# Patient Record
Sex: MALE | Race: WHITE | NOT HISPANIC OR LATINO | Employment: OTHER | URBAN - METROPOLITAN AREA
[De-identification: names, ages, dates, MRNs, and addresses within clinical notes are randomized per-mention and may not be internally consistent; named-entity substitution may affect disease eponyms.]

---

## 2017-01-13 RX ORDER — SODIUM CHLORIDE 9 MG/ML
20 INJECTION, SOLUTION INTRAVENOUS CONTINUOUS
Status: DISCONTINUED | OUTPATIENT
Start: 2017-01-16 | End: 2017-01-19 | Stop reason: HOSPADM

## 2017-01-16 ENCOUNTER — HOSPITAL ENCOUNTER (OUTPATIENT)
Dept: INFUSION CENTER | Facility: CLINIC | Age: 57
Discharge: HOME | End: 2017-01-16
Payer: COMMERCIAL

## 2017-01-16 VITALS
TEMPERATURE: 95.5 F | DIASTOLIC BLOOD PRESSURE: 92 MMHG | BODY MASS INDEX: 28.1 KG/M2 | HEART RATE: 61 BPM | RESPIRATION RATE: 16 BRPM | SYSTOLIC BLOOD PRESSURE: 144 MMHG | WEIGHT: 236.99 LBS

## 2017-01-16 LAB
ALBUMIN SERPL BCP-MCNC: 4.1 G/DL (ref 3.5–5)
ALP SERPL-CCNC: 69 U/L (ref 46–116)
ALT SERPL W P-5'-P-CCNC: <6 U/L (ref 12–78)
ANION GAP SERPL CALCULATED.3IONS-SCNC: 8 MMOL/L (ref 4–13)
ANISOCYTOSIS BLD QL SMEAR: PRESENT
AST SERPL W P-5'-P-CCNC: 8 U/L (ref 5–45)
BASOPHILS # BLD AUTO: 0.06 THOUSAND/UL (ref 0–0.1)
BASOPHILS NFR MAR MANUAL: 1 % (ref 0–1)
BILIRUB SERPL-MCNC: 1.3 MG/DL (ref 0.2–1)
BILIRUB UR QL STRIP: NEGATIVE
BUN SERPL-MCNC: 12 MG/DL (ref 5–25)
CALCIUM SERPL-MCNC: 9.5 MG/DL (ref 8.3–10.1)
CEA SERPL-MCNC: 1.6 NG/ML (ref 0–3)
CHLORIDE SERPL-SCNC: 105 MMOL/L (ref 100–108)
CLARITY UR: CLEAR
CO2 SERPL-SCNC: 29 MMOL/L (ref 21–32)
COLOR UR: YELLOW
CREAT SERPL-MCNC: 1.02 MG/DL (ref 0.6–1.3)
EOSINOPHIL # BLD AUTO: 0.17 THOUSAND/UL (ref 0–0.61)
EOSINOPHIL NFR BLD MANUAL: 3 % (ref 0–6)
ERYTHROCYTE [DISTWIDTH] IN BLOOD BY AUTOMATED COUNT: 15.5 % (ref 11.6–15.1)
GFR SERPL CREATININE-BSD FRML MDRD: >60 ML/MIN/1.73SQ M
GLUCOSE SERPL-MCNC: 117 MG/DL (ref 65–140)
GLUCOSE UR STRIP-MCNC: NEGATIVE MG/DL
HCT VFR BLD AUTO: 48.5 % (ref 36.5–49.3)
HGB BLD-MCNC: 16.7 G/DL (ref 12–17)
HGB UR QL STRIP.AUTO: NEGATIVE
KETONES UR STRIP-MCNC: NEGATIVE MG/DL
LEUKOCYTE ESTERASE UR QL STRIP: NEGATIVE
LYMPHOCYTES # BLD AUTO: 1.22 THOUSAND/UL (ref 0.6–4.47)
LYMPHOCYTES # BLD AUTO: 21 % (ref 14–44)
MCH RBC QN AUTO: 33.5 PG (ref 26.8–34.3)
MCHC RBC AUTO-ENTMCNC: 34.4 G/DL (ref 31.4–37.4)
MCV RBC AUTO: 97 FL (ref 82–98)
MONOCYTES # BLD AUTO: 0.23 THOUSAND/UL (ref 0–1.22)
MONOCYTES NFR BLD AUTO: 4 % (ref 4–12)
NEUTS BAND NFR BLD MANUAL: 0 % (ref 0–8)
NEUTS SEG # BLD: 4.12 THOUSAND/UL (ref 1.81–6.82)
NEUTS SEG NFR BLD AUTO: 71 % (ref 43–75)
NITRITE UR QL STRIP: NEGATIVE
PH UR STRIP.AUTO: 6 [PH] (ref 4.5–8)
PLATELET # BLD AUTO: 137 THOUSANDS/UL (ref 149–390)
PLATELET BLD QL SMEAR: ABNORMAL
PMV BLD AUTO: 6.7 FL (ref 8.9–12.7)
POTASSIUM SERPL-SCNC: 4.1 MMOL/L (ref 3.5–5.3)
PROT SERPL-MCNC: 7.2 G/DL (ref 6.4–8.2)
PROT UR STRIP-MCNC: NEGATIVE MG/DL
PROT UR-MCNC: 17 MG/DL
RBC # BLD AUTO: 4.98 MILLION/UL (ref 3.88–5.62)
SODIUM SERPL-SCNC: 142 MMOL/L (ref 136–145)
SP GR UR STRIP.AUTO: >=1.03 (ref 1–1.03)
TOTAL CELLS COUNTED SPEC: 100
UROBILINOGEN UR QL STRIP.AUTO: 0.2 E.U./DL
WBC # BLD AUTO: 5.8 THOUSAND/UL (ref 4.31–10.16)
WBC NRBC COR # BLD: 5.8 THOUSAND/UL (ref 4.31–10.16)

## 2017-01-16 PROCEDURE — 84156 ASSAY OF PROTEIN URINE: CPT | Performed by: INTERNAL MEDICINE

## 2017-01-16 PROCEDURE — 80053 COMPREHEN METABOLIC PANEL: CPT | Performed by: INTERNAL MEDICINE

## 2017-01-16 PROCEDURE — 81003 URINALYSIS AUTO W/O SCOPE: CPT | Performed by: INTERNAL MEDICINE

## 2017-01-16 PROCEDURE — 85027 COMPLETE CBC AUTOMATED: CPT | Performed by: INTERNAL MEDICINE

## 2017-01-16 PROCEDURE — 82378 CARCINOEMBRYONIC ANTIGEN: CPT | Performed by: INTERNAL MEDICINE

## 2017-01-16 PROCEDURE — 85007 BL SMEAR W/DIFF WBC COUNT: CPT | Performed by: INTERNAL MEDICINE

## 2017-01-16 PROCEDURE — 96413 CHEMO IV INFUSION 1 HR: CPT

## 2017-01-16 RX ADMIN — SODIUM CHLORIDE 20 ML/HR: 0.9 INJECTION, SOLUTION INTRAVENOUS at 08:35

## 2017-01-16 RX ADMIN — BEVACIZUMAB 800 MG: 100 INJECTION, SOLUTION INTRAVENOUS at 08:45

## 2017-01-16 RX ADMIN — Medication 300 UNITS: at 09:22

## 2017-01-25 ENCOUNTER — ALLSCRIPTS OFFICE VISIT (OUTPATIENT)
Dept: OTHER | Facility: OTHER | Age: 57
End: 2017-01-25

## 2017-01-25 ENCOUNTER — TRANSCRIBE ORDERS (OUTPATIENT)
Dept: ADMINISTRATIVE | Facility: HOSPITAL | Age: 57
End: 2017-01-25

## 2017-01-25 DIAGNOSIS — C18.9 MALIGNANT NEOPLASM OF COLON, UNSPECIFIED SITE: Primary | ICD-10-CM

## 2017-01-25 DIAGNOSIS — D75.1 SECONDARY POLYCYTHEMIA: ICD-10-CM

## 2017-01-25 DIAGNOSIS — C18.9 MALIGNANT NEOPLASM OF COLON (HCC): ICD-10-CM

## 2017-02-03 RX ORDER — SODIUM CHLORIDE 9 MG/ML
20 INJECTION, SOLUTION INTRAVENOUS CONTINUOUS
Status: DISCONTINUED | OUTPATIENT
Start: 2017-02-06 | End: 2017-02-09 | Stop reason: HOSPADM

## 2017-02-06 ENCOUNTER — HOSPITAL ENCOUNTER (OUTPATIENT)
Dept: INFUSION CENTER | Facility: CLINIC | Age: 57
Discharge: HOME | End: 2017-02-06
Payer: COMMERCIAL

## 2017-02-06 VITALS
BODY MASS INDEX: 28.23 KG/M2 | DIASTOLIC BLOOD PRESSURE: 60 MMHG | TEMPERATURE: 96.3 F | SYSTOLIC BLOOD PRESSURE: 132 MMHG | WEIGHT: 238.1 LBS | HEART RATE: 60 BPM | RESPIRATION RATE: 18 BRPM

## 2017-02-06 LAB
ALBUMIN SERPL BCP-MCNC: 3.9 G/DL (ref 3.5–5)
ALP SERPL-CCNC: 61 U/L (ref 46–116)
ALT SERPL W P-5'-P-CCNC: <6 U/L (ref 12–78)
ANION GAP SERPL CALCULATED.3IONS-SCNC: 7 MMOL/L (ref 4–13)
ANISOCYTOSIS BLD QL SMEAR: PRESENT
AST SERPL W P-5'-P-CCNC: <5 U/L (ref 5–45)
BACTERIA UR QL AUTO: NORMAL /HPF
BASOPHILS # BLD AUTO: 0 THOUSAND/UL (ref 0–0.1)
BASOPHILS NFR MAR MANUAL: 0 % (ref 0–1)
BILIRUB SERPL-MCNC: 0.7 MG/DL (ref 0.2–1)
BILIRUB UR QL STRIP: NEGATIVE
BUN SERPL-MCNC: 12 MG/DL (ref 5–25)
CALCIUM SERPL-MCNC: 8.9 MG/DL (ref 8.3–10.1)
CEA SERPL-MCNC: 2 NG/ML (ref 0–3)
CHLORIDE SERPL-SCNC: 105 MMOL/L (ref 100–108)
CLARITY UR: CLEAR
CO2 SERPL-SCNC: 28 MMOL/L (ref 21–32)
COLOR UR: YELLOW
CREAT SERPL-MCNC: 0.91 MG/DL (ref 0.6–1.3)
EOSINOPHIL # BLD AUTO: 0 THOUSAND/UL (ref 0–0.61)
EOSINOPHIL NFR BLD MANUAL: 0 % (ref 0–6)
ERYTHROCYTE [DISTWIDTH] IN BLOOD BY AUTOMATED COUNT: 14.4 % (ref 11.6–15.1)
GFR SERPL CREATININE-BSD FRML MDRD: >60 ML/MIN/1.73SQ M
GLUCOSE SERPL-MCNC: 105 MG/DL (ref 65–140)
GLUCOSE UR STRIP-MCNC: NEGATIVE MG/DL
HCT VFR BLD AUTO: 46 % (ref 36.5–49.3)
HGB BLD-MCNC: 16.3 G/DL (ref 12–17)
HGB UR QL STRIP.AUTO: NEGATIVE
KETONES UR STRIP-MCNC: NEGATIVE MG/DL
LEUKOCYTE ESTERASE UR QL STRIP: NEGATIVE
LYMPHOCYTES # BLD AUTO: 1.22 THOUSAND/UL (ref 0.6–4.47)
LYMPHOCYTES # BLD AUTO: 26 % (ref 14–44)
MCH RBC QN AUTO: 34.2 PG (ref 26.8–34.3)
MCHC RBC AUTO-ENTMCNC: 35.5 G/DL (ref 31.4–37.4)
MCV RBC AUTO: 96 FL (ref 82–98)
MONOCYTES # BLD AUTO: 0.14 THOUSAND/UL (ref 0–1.22)
MONOCYTES NFR BLD AUTO: 3 % (ref 4–12)
NEUTS BAND NFR BLD MANUAL: 1 % (ref 0–8)
NEUTS SEG # BLD: 3.24 THOUSAND/UL (ref 1.81–6.82)
NEUTS SEG NFR BLD AUTO: 68 % (ref 43–75)
NITRITE UR QL STRIP: NEGATIVE
NON-SQ EPI CELLS URNS QL MICRO: NORMAL /HPF
PH UR STRIP.AUTO: 5.5 [PH] (ref 4.5–8)
PLATELET # BLD AUTO: 122 THOUSANDS/UL (ref 149–390)
PLATELET BLD QL SMEAR: ABNORMAL
PMV BLD AUTO: 7 FL (ref 8.9–12.7)
POTASSIUM SERPL-SCNC: 4.1 MMOL/L (ref 3.5–5.3)
PROT SERPL-MCNC: 6.6 G/DL (ref 6.4–8.2)
PROT UR STRIP-MCNC: NEGATIVE MG/DL
RBC # BLD AUTO: 4.77 MILLION/UL (ref 3.88–5.62)
RBC #/AREA URNS AUTO: NORMAL /HPF
SODIUM SERPL-SCNC: 140 MMOL/L (ref 136–145)
SP GR UR STRIP.AUTO: 1.01 (ref 1–1.03)
TOTAL CELLS COUNTED SPEC: 100
UROBILINOGEN UR QL STRIP.AUTO: 0.2 E.U./DL
VARIANT LYMPHS # BLD AUTO: 2 % (ref 0–0)
WBC # BLD AUTO: 4.7 THOUSAND/UL (ref 4.31–10.16)
WBC #/AREA URNS AUTO: NORMAL /HPF
WBC NRBC COR # BLD: 4.7 THOUSAND/UL (ref 4.31–10.16)

## 2017-02-06 PROCEDURE — 80053 COMPREHEN METABOLIC PANEL: CPT | Performed by: INTERNAL MEDICINE

## 2017-02-06 PROCEDURE — 85027 COMPLETE CBC AUTOMATED: CPT | Performed by: INTERNAL MEDICINE

## 2017-02-06 PROCEDURE — 82668 ASSAY OF ERYTHROPOIETIN: CPT | Performed by: INTERNAL MEDICINE

## 2017-02-06 PROCEDURE — 82378 CARCINOEMBRYONIC ANTIGEN: CPT | Performed by: INTERNAL MEDICINE

## 2017-02-06 PROCEDURE — 96413 CHEMO IV INFUSION 1 HR: CPT

## 2017-02-06 PROCEDURE — 85007 BL SMEAR W/DIFF WBC COUNT: CPT | Performed by: INTERNAL MEDICINE

## 2017-02-06 PROCEDURE — 81270 JAK2 GENE: CPT | Performed by: INTERNAL MEDICINE

## 2017-02-06 PROCEDURE — 81001 URINALYSIS AUTO W/SCOPE: CPT | Performed by: INTERNAL MEDICINE

## 2017-02-06 RX ADMIN — BEVACIZUMAB 800 MG: 400 INJECTION, SOLUTION INTRAVENOUS at 08:41

## 2017-02-06 RX ADMIN — SODIUM CHLORIDE 20 ML/HR: 0.9 INJECTION, SOLUTION INTRAVENOUS at 08:30

## 2017-02-06 RX ADMIN — Medication 300 UNITS: at 09:15

## 2017-02-07 LAB — EPO SERPL-ACNC: 14.8 MIU/ML (ref 2.6–18.5)

## 2017-02-20 ENCOUNTER — HOSPITAL ENCOUNTER (OUTPATIENT)
Dept: CT IMAGING | Facility: HOSPITAL | Age: 57
Discharge: HOME | End: 2017-02-20
Attending: INTERNAL MEDICINE
Payer: COMMERCIAL

## 2017-02-20 DIAGNOSIS — C18.9 MALIGNANT NEOPLASM OF COLON (HCC): ICD-10-CM

## 2017-02-20 PROCEDURE — 71260 CT THORAX DX C+: CPT

## 2017-02-20 PROCEDURE — 74177 CT ABD & PELVIS W/CONTRAST: CPT

## 2017-02-20 RX ADMIN — IOHEXOL 100 ML: 350 INJECTION, SOLUTION INTRAVENOUS at 08:07

## 2017-02-23 LAB — SCAN RESULT: NORMAL

## 2017-02-24 RX ORDER — SODIUM CHLORIDE 9 MG/ML
20 INJECTION, SOLUTION INTRAVENOUS CONTINUOUS
Status: DISCONTINUED | OUTPATIENT
Start: 2017-02-27 | End: 2017-03-02 | Stop reason: HOSPADM

## 2017-02-27 ENCOUNTER — HOSPITAL ENCOUNTER (OUTPATIENT)
Dept: INFUSION CENTER | Facility: CLINIC | Age: 57
Discharge: HOME | End: 2017-02-27
Payer: COMMERCIAL

## 2017-02-27 VITALS
SYSTOLIC BLOOD PRESSURE: 120 MMHG | RESPIRATION RATE: 16 BRPM | DIASTOLIC BLOOD PRESSURE: 84 MMHG | WEIGHT: 234.79 LBS | BODY MASS INDEX: 27.84 KG/M2 | TEMPERATURE: 97.1 F | HEART RATE: 80 BPM

## 2017-02-27 LAB
ALBUMIN SERPL BCP-MCNC: 4.1 G/DL (ref 3.5–5)
ALP SERPL-CCNC: 71 U/L (ref 46–116)
ALT SERPL W P-5'-P-CCNC: <6 U/L (ref 12–78)
ANION GAP SERPL CALCULATED.3IONS-SCNC: 6 MMOL/L (ref 4–13)
ANISOCYTOSIS BLD QL SMEAR: PRESENT
AST SERPL W P-5'-P-CCNC: 5 U/L (ref 5–45)
BASOPHILS # BLD AUTO: 0 THOUSAND/UL (ref 0–0.1)
BASOPHILS NFR MAR MANUAL: 0 % (ref 0–1)
BILIRUB SERPL-MCNC: 1.1 MG/DL (ref 0.2–1)
BILIRUB UR QL STRIP: NEGATIVE
BUN SERPL-MCNC: 10 MG/DL (ref 5–25)
CALCIUM SERPL-MCNC: 9.2 MG/DL (ref 8.3–10.1)
CEA SERPL-MCNC: 1.6 NG/ML (ref 0–3)
CHLORIDE SERPL-SCNC: 104 MMOL/L (ref 100–108)
CLARITY UR: CLEAR
CO2 SERPL-SCNC: 30 MMOL/L (ref 21–32)
COLOR UR: YELLOW
CREAT SERPL-MCNC: 1.07 MG/DL (ref 0.6–1.3)
EOSINOPHIL # BLD AUTO: 0.36 THOUSAND/UL (ref 0–0.61)
EOSINOPHIL NFR BLD MANUAL: 4 % (ref 0–6)
ERYTHROCYTE [DISTWIDTH] IN BLOOD BY AUTOMATED COUNT: 13.3 % (ref 11.6–15.1)
GFR SERPL CREATININE-BSD FRML MDRD: >60 ML/MIN/1.73SQ M
GLUCOSE SERPL-MCNC: 101 MG/DL (ref 65–140)
GLUCOSE UR STRIP-MCNC: NEGATIVE MG/DL
HCT VFR BLD AUTO: 50.9 % (ref 36.5–49.3)
HGB BLD-MCNC: 17.2 G/DL (ref 12–17)
HGB UR QL STRIP.AUTO: NEGATIVE
KETONES UR STRIP-MCNC: NEGATIVE MG/DL
LEUKOCYTE ESTERASE UR QL STRIP: NEGATIVE
LYMPHOCYTES # BLD AUTO: 1.42 THOUSAND/UL (ref 0.6–4.47)
LYMPHOCYTES # BLD AUTO: 16 % (ref 14–44)
MCH RBC QN AUTO: 31.7 PG (ref 26.8–34.3)
MCHC RBC AUTO-ENTMCNC: 33.7 G/DL (ref 31.4–37.4)
MCV RBC AUTO: 94 FL (ref 82–98)
MONOCYTES # BLD AUTO: 0.27 THOUSAND/UL (ref 0–1.22)
MONOCYTES NFR BLD AUTO: 3 % (ref 4–12)
NEUTS BAND NFR BLD MANUAL: 0 % (ref 0–8)
NEUTS SEG # BLD: 6.76 THOUSAND/UL (ref 1.81–6.82)
NEUTS SEG NFR BLD AUTO: 76 % (ref 43–75)
NITRITE UR QL STRIP: NEGATIVE
PH UR STRIP.AUTO: 6 [PH] (ref 4.5–8)
PLATELET # BLD AUTO: 164 THOUSANDS/UL (ref 149–390)
PLATELET BLD QL SMEAR: ADEQUATE
PMV BLD AUTO: 6.9 FL (ref 8.9–12.7)
POTASSIUM SERPL-SCNC: 4.3 MMOL/L (ref 3.5–5.3)
PROT SERPL-MCNC: 7.1 G/DL (ref 6.4–8.2)
PROT UR STRIP-MCNC: NEGATIVE MG/DL
RBC # BLD AUTO: 5.41 MILLION/UL (ref 3.88–5.62)
SODIUM SERPL-SCNC: 140 MMOL/L (ref 136–145)
SP GR UR STRIP.AUTO: 1.01 (ref 1–1.03)
TOTAL CELLS COUNTED SPEC: 100
UROBILINOGEN UR QL STRIP.AUTO: 0.2 E.U./DL
VARIANT LYMPHS # BLD AUTO: 1 % (ref 0–0)
WBC # BLD AUTO: 8.9 THOUSAND/UL (ref 4.31–10.16)
WBC NRBC COR # BLD: 8.9 THOUSAND/UL (ref 4.31–10.16)

## 2017-02-27 PROCEDURE — 96413 CHEMO IV INFUSION 1 HR: CPT

## 2017-02-27 PROCEDURE — 82378 CARCINOEMBRYONIC ANTIGEN: CPT | Performed by: INTERNAL MEDICINE

## 2017-02-27 PROCEDURE — 85027 COMPLETE CBC AUTOMATED: CPT | Performed by: INTERNAL MEDICINE

## 2017-02-27 PROCEDURE — 85007 BL SMEAR W/DIFF WBC COUNT: CPT | Performed by: INTERNAL MEDICINE

## 2017-02-27 PROCEDURE — 80053 COMPREHEN METABOLIC PANEL: CPT | Performed by: INTERNAL MEDICINE

## 2017-02-27 PROCEDURE — 81003 URINALYSIS AUTO W/O SCOPE: CPT | Performed by: INTERNAL MEDICINE

## 2017-02-27 RX ADMIN — Medication 300 UNITS: at 09:52

## 2017-02-27 RX ADMIN — SODIUM CHLORIDE 20 ML/HR: 0.9 INJECTION, SOLUTION INTRAVENOUS at 09:08

## 2017-02-27 RX ADMIN — BEVACIZUMAB 800 MG: 400 INJECTION, SOLUTION INTRAVENOUS at 09:08

## 2017-02-27 NOTE — PROGRESS NOTES
Labs drawn as per lab slip/urine specimen obtained  Patient tolerated chemotherapy  Denies any discomforts   Aware of next appointment

## 2017-03-24 ENCOUNTER — GENERIC CONVERSION - ENCOUNTER (OUTPATIENT)
Dept: OTHER | Facility: OTHER | Age: 57
End: 2017-03-24

## 2017-04-14 ENCOUNTER — TRANSCRIBE ORDERS (OUTPATIENT)
Dept: ADMINISTRATIVE | Facility: HOSPITAL | Age: 57
End: 2017-04-14

## 2017-04-14 ENCOUNTER — ALLSCRIPTS OFFICE VISIT (OUTPATIENT)
Dept: OTHER | Facility: OTHER | Age: 57
End: 2017-04-14

## 2017-04-14 DIAGNOSIS — C18.9 MALIGNANT NEOPLASM OF COLON, UNSPECIFIED PART OF COLON (HCC): Primary | ICD-10-CM

## 2017-05-03 ENCOUNTER — HOSPITAL ENCOUNTER (OUTPATIENT)
Dept: CT IMAGING | Facility: HOSPITAL | Age: 57
Discharge: HOME | End: 2017-05-03
Attending: INTERNAL MEDICINE
Payer: COMMERCIAL

## 2017-05-03 DIAGNOSIS — C18.9 MALIGNANT NEOPLASM OF COLON (HCC): ICD-10-CM

## 2017-05-03 PROCEDURE — 71250 CT THORAX DX C-: CPT

## 2017-05-08 ENCOUNTER — ALLSCRIPTS OFFICE VISIT (OUTPATIENT)
Dept: OTHER | Facility: OTHER | Age: 57
End: 2017-05-08

## 2017-05-10 ENCOUNTER — ALLSCRIPTS OFFICE VISIT (OUTPATIENT)
Dept: OTHER | Facility: OTHER | Age: 57
End: 2017-05-10

## 2017-05-15 ENCOUNTER — HOSPITAL ENCOUNTER (OUTPATIENT)
Dept: INFUSION CENTER | Facility: CLINIC | Age: 57
Discharge: HOME | End: 2017-05-15
Payer: COMMERCIAL

## 2017-05-15 PROCEDURE — 96523 IRRIG DRUG DELIVERY DEVICE: CPT

## 2017-05-15 RX ADMIN — Medication 300 UNITS: at 12:10

## 2017-05-15 NOTE — PROGRESS NOTES
Pt here for port flush  Last flush was 2/28/17  Line erwin and flushed easily  Pt aware of future appointments for port flush  AVS declined  Pt left withough questions or concern

## 2017-05-16 ENCOUNTER — GENERIC CONVERSION - ENCOUNTER (OUTPATIENT)
Dept: OTHER | Facility: OTHER | Age: 57
End: 2017-05-16

## 2017-05-16 ENCOUNTER — APPOINTMENT (OUTPATIENT)
Dept: RADIATION ONCOLOGY | Facility: HOSPITAL | Age: 57
End: 2017-05-16
Attending: RADIOLOGY
Payer: COMMERCIAL

## 2017-05-16 PROCEDURE — 99214 OFFICE O/P EST MOD 30 MIN: CPT | Performed by: RADIOLOGY

## 2017-05-22 ENCOUNTER — GENERIC CONVERSION - ENCOUNTER (OUTPATIENT)
Dept: OTHER | Facility: OTHER | Age: 57
End: 2017-05-22

## 2017-06-22 ENCOUNTER — TRANSCRIBE ORDERS (OUTPATIENT)
Dept: ADMINISTRATIVE | Facility: HOSPITAL | Age: 57
End: 2017-06-22

## 2017-06-22 DIAGNOSIS — C78.00 MALIGNANT NEOPLASM METASTATIC TO LUNG, UNSPECIFIED LATERALITY (HCC): Primary | ICD-10-CM

## 2017-06-26 ENCOUNTER — HOSPITAL ENCOUNTER (OUTPATIENT)
Dept: INFUSION CENTER | Facility: CLINIC | Age: 57
Discharge: HOME | End: 2017-06-26
Payer: COMMERCIAL

## 2017-06-26 PROCEDURE — 96523 IRRIG DRUG DELIVERY DEVICE: CPT

## 2017-06-26 RX ADMIN — Medication 300 UNITS: at 13:13

## 2017-08-04 ENCOUNTER — HOSPITAL ENCOUNTER (OUTPATIENT)
Dept: CT IMAGING | Facility: HOSPITAL | Age: 57
Discharge: HOME | End: 2017-08-04
Attending: RADIOLOGY
Payer: COMMERCIAL

## 2017-08-04 DIAGNOSIS — C78.00 MALIGNANT NEOPLASM METASTATIC TO LUNG, UNSPECIFIED LATERALITY (HCC): ICD-10-CM

## 2017-08-04 PROCEDURE — 71250 CT THORAX DX C-: CPT

## 2017-08-09 ENCOUNTER — HOSPITAL ENCOUNTER (OUTPATIENT)
Dept: INFUSION CENTER | Facility: CLINIC | Age: 57
Discharge: HOME | End: 2017-08-09
Payer: COMMERCIAL

## 2017-08-09 ENCOUNTER — ALLSCRIPTS OFFICE VISIT (OUTPATIENT)
Dept: OTHER | Facility: OTHER | Age: 57
End: 2017-08-09

## 2017-08-09 ENCOUNTER — TRANSCRIBE ORDERS (OUTPATIENT)
Dept: ADMINISTRATIVE | Facility: HOSPITAL | Age: 57
End: 2017-08-09

## 2017-08-09 DIAGNOSIS — C18.9 MALIGNANT NEOPLASM OF COLON, UNSPECIFIED PART OF COLON (HCC): Primary | ICD-10-CM

## 2017-08-09 LAB
ALBUMIN SERPL BCP-MCNC: 4 G/DL (ref 3.2–5)
ALP SERPL-CCNC: 63 U/L (ref 46–116)
ALT SERPL W P-5'-P-CCNC: 22 U/L (ref 12–78)
ANION GAP SERPL CALCULATED.3IONS-SCNC: 6 MMOL/L (ref 4–13)
AST SERPL W P-5'-P-CCNC: 29 U/L (ref 5–45)
BASOPHILS # BLD AUTO: 0 THOUSAND/UL (ref 0–0.1)
BASOPHILS NFR MAR MANUAL: 0 % (ref 0–1)
BILIRUB SERPL-MCNC: 1.3 MG/DL (ref 0.2–1)
BUN SERPL-MCNC: 9 MG/DL (ref 5–25)
CALCIUM SERPL-MCNC: 8.9 MG/DL (ref 8.3–10.1)
CEA SERPL-MCNC: 3 NG/ML (ref 0–3)
CHLORIDE SERPL-SCNC: 105 MMOL/L (ref 98–108)
CO2 SERPL-SCNC: 29 MMOL/L (ref 21–32)
CREAT SERPL-MCNC: 1.1 MG/DL (ref 0.6–1.3)
EOSINOPHIL # BLD AUTO: 0.07 THOUSAND/UL (ref 0–0.61)
EOSINOPHIL NFR BLD MANUAL: 1 % (ref 0–6)
ERYTHROCYTE [DISTWIDTH] IN BLOOD BY AUTOMATED COUNT: 13.6 % (ref 11.6–15.1)
GFR SERPL CREATININE-BSD FRML MDRD: 75 ML/MIN/1.73SQ M
GLUCOSE SERPL-MCNC: 88 MG/DL (ref 65–140)
HCT VFR BLD AUTO: 51.2 % (ref 36.5–49.3)
HGB BLD-MCNC: 17.2 G/DL (ref 12–17)
LYMPHOCYTES # BLD AUTO: 0.5 THOUSAND/UL (ref 0.6–4.47)
LYMPHOCYTES # BLD AUTO: 7 % (ref 14–44)
MCH RBC QN AUTO: 31.7 PG (ref 26.8–34.3)
MCHC RBC AUTO-ENTMCNC: 33.6 G/DL (ref 31.4–37.4)
MCV RBC AUTO: 94 FL (ref 82–98)
MONOCYTES # BLD AUTO: 0.22 THOUSAND/UL (ref 0–1.22)
MONOCYTES NFR BLD AUTO: 3 % (ref 4–12)
MYELOCYTES NFR BLD MANUAL: 1 % (ref 0–1)
NEUTS BAND NFR BLD MANUAL: 1 % (ref 0–8)
NEUTS SEG # BLD: 6.34 THOUSAND/UL (ref 1.81–6.82)
NEUTS SEG NFR BLD AUTO: 87 % (ref 43–75)
OVALOCYTES BLD QL SMEAR: PRESENT
PLATELET # BLD AUTO: 123 THOUSANDS/UL (ref 149–390)
PLATELET BLD QL SMEAR: ABNORMAL
PMV BLD AUTO: 6.9 FL (ref 8.9–12.7)
POTASSIUM SERPL-SCNC: 4.4 MMOL/L (ref 3.5–5.3)
PROT SERPL-MCNC: 6.8 G/DL (ref 6.4–8.2)
RBC # BLD AUTO: 5.42 MILLION/UL (ref 3.88–5.62)
SODIUM SERPL-SCNC: 140 MMOL/L (ref 136–145)
TOTAL CELLS COUNTED SPEC: 100
WBC # BLD AUTO: 7.2 THOUSAND/UL (ref 4.31–10.16)
WBC NRBC COR # BLD: 7.2 THOUSAND/UL (ref 4.31–10.16)

## 2017-08-09 PROCEDURE — 85027 COMPLETE CBC AUTOMATED: CPT | Performed by: INTERNAL MEDICINE

## 2017-08-09 PROCEDURE — 85007 BL SMEAR W/DIFF WBC COUNT: CPT | Performed by: INTERNAL MEDICINE

## 2017-08-09 PROCEDURE — 82378 CARCINOEMBRYONIC ANTIGEN: CPT | Performed by: INTERNAL MEDICINE

## 2017-08-09 PROCEDURE — 80053 COMPREHEN METABOLIC PANEL: CPT | Performed by: INTERNAL MEDICINE

## 2017-08-09 RX ADMIN — Medication 300 UNITS: at 09:10

## 2017-08-18 DIAGNOSIS — C18.9 MALIGNANT NEOPLASM OF COLON (HCC): ICD-10-CM

## 2017-08-18 DIAGNOSIS — C77.9 SECONDARY AND UNSPECIFIED MALIGNANT NEOPLASM OF LYMPH NODE, UNSPECIFIED (HCC): ICD-10-CM

## 2017-08-18 DIAGNOSIS — C78.00 SECONDARY MALIGNANT NEOPLASM OF LUNG (HCC): ICD-10-CM

## 2017-08-21 ENCOUNTER — TRANSCRIBE ORDERS (OUTPATIENT)
Dept: ADMINISTRATIVE | Facility: HOSPITAL | Age: 57
End: 2017-08-21

## 2017-08-21 ENCOUNTER — HOSPITAL ENCOUNTER (OUTPATIENT)
Dept: RADIOLOGY | Age: 57
Discharge: HOME | End: 2017-08-21
Payer: COMMERCIAL

## 2017-08-21 ENCOUNTER — GENERIC CONVERSION - ENCOUNTER (OUTPATIENT)
Dept: OTHER | Facility: OTHER | Age: 57
End: 2017-08-21

## 2017-08-21 VITALS — BODY MASS INDEX: 26.2 KG/M2 | WEIGHT: 221 LBS

## 2017-08-21 DIAGNOSIS — C78.00 MALIGNANT NEOPLASM METASTATIC TO LUNG, UNSPECIFIED LATERALITY (HCC): ICD-10-CM

## 2017-08-21 DIAGNOSIS — C18.9 MALIGNANT NEOPLASM OF COLON, UNSPECIFIED PART OF COLON (HCC): ICD-10-CM

## 2017-08-21 DIAGNOSIS — C18.9 MALIGNANT NEOPLASM OF COLON, UNSPECIFIED PART OF COLON (HCC): Primary | ICD-10-CM

## 2017-08-21 LAB — GLUCOSE SERPL-MCNC: 76 MG/DL (ref 65–140)

## 2017-08-21 PROCEDURE — 82948 REAGENT STRIP/BLOOD GLUCOSE: CPT

## 2017-08-21 PROCEDURE — 78815 PET IMAGE W/CT SKULL-THIGH: CPT

## 2017-08-21 PROCEDURE — A9552 F18 FDG: HCPCS

## 2017-08-21 RX ADMIN — IOHEXOL 5 ML: 240 INJECTION, SOLUTION INTRATHECAL; INTRAVASCULAR; INTRAVENOUS; ORAL at 11:13

## 2017-08-22 ENCOUNTER — GENERIC CONVERSION - ENCOUNTER (OUTPATIENT)
Dept: OTHER | Facility: OTHER | Age: 57
End: 2017-08-22

## 2017-08-28 ENCOUNTER — HOSPITAL ENCOUNTER (OUTPATIENT)
Dept: MRI IMAGING | Facility: HOSPITAL | Age: 57
Discharge: HOME | End: 2017-08-28
Attending: INTERNAL MEDICINE
Payer: COMMERCIAL

## 2017-08-28 DIAGNOSIS — C18.9 MALIGNANT NEOPLASM OF COLON (HCC): ICD-10-CM

## 2017-08-28 DIAGNOSIS — C78.00 SECONDARY MALIGNANT NEOPLASM OF LUNG (HCC): ICD-10-CM

## 2017-08-28 PROCEDURE — 70553 MRI BRAIN STEM W/O & W/DYE: CPT

## 2017-08-28 PROCEDURE — A9585 GADOBUTROL INJECTION: HCPCS | Performed by: INTERNAL MEDICINE

## 2017-08-28 RX ADMIN — GADOBUTROL 9 ML: 604.72 INJECTION INTRAVENOUS at 08:02

## 2017-08-30 ENCOUNTER — ALLSCRIPTS OFFICE VISIT (OUTPATIENT)
Dept: OTHER | Facility: OTHER | Age: 57
End: 2017-08-30

## 2017-09-05 ENCOUNTER — GENERIC CONVERSION - ENCOUNTER (OUTPATIENT)
Dept: OTHER | Facility: OTHER | Age: 57
End: 2017-09-05

## 2017-09-20 DIAGNOSIS — C18.9 MALIGNANT NEOPLASM OF COLON (HCC): ICD-10-CM

## 2017-10-31 ENCOUNTER — GENERIC CONVERSION - ENCOUNTER (OUTPATIENT)
Dept: OTHER | Facility: OTHER | Age: 57
End: 2017-10-31

## 2017-11-07 ENCOUNTER — GENERIC CONVERSION - ENCOUNTER (OUTPATIENT)
Dept: OTHER | Facility: OTHER | Age: 57
End: 2017-11-07

## 2018-01-09 NOTE — MISCELLANEOUS
Message  Call from [address] who is reporting pt at her office with c/o fever and diarrhea  Thinks he should be admitted  Called PAC with update and Dr Aylin Krishnamurthy  Pt going to [facility] ER  Active Problems    1  Atrial fibrillation (427 31) (I48 91)   2  Atrial flutter (427 32) (I48 92)   3  Broncholithiasis (518 89)   4  Cancer, colon (153 9) (C18 9)   5  Collapse of lung (518 0) (J98 19)   6  Left rotator cuff tear arthropathy (716 81) (M12 812)   7  Malignant neoplasm of descending colon (153 2) (C18 6)   8  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)   9  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)   10  Pulmonary metastases (197 0) (C78 00)   11  Tension Pneumothorax (512 0)    Current Meds   1  Capecitabine 500 MG Oral Tablet; TAKE 4 TABLETS PO TWICE DAILY FOR 2   WEEKS,THEN 1 WEEK OFF  REPEAT CYCLE; Therapy: 81TCE3425 to (Evaluate:69Wsc3762)  Requested for: 36BON4522; Last   Rx:02Nov2015 Ordered   2  Metoprolol Succinate ER 25 MG Oral Tablet Extended Release 24 Hour; Take 1 tablet   daily; Therapy: 02WWP8103 to (Evaluate:24Jun2016); Last Rx:30Jun2015 Ordered   3  Ondansetron 8 MG Oral Tablet Dispersible; 1 tab PO Q 8 hr PRN nausea; Therapy: 28Vuc3309 to (Last Rx:54Ovj7821) Ordered   4  OxyCODONE HCl - 5 MG Oral Capsule; TAKE 1 CAPSULE EVERY 4   HOURS AS   NEEDED FOR  PAIN;   Therapy: 75ATO4261 to (Evaluate:26Rjk2127); Last Rx:06Jan2016 Ordered    Allergies    1  Aspirin TABS   2   Sulfa Drugs    Signatures   Electronically signed by : Jonathan Singleton RN; Jan 26 2016 11:36AM EST                       (Author)

## 2018-01-10 NOTE — MISCELLANEOUS
Message  Yesterday I noted PET CT scan report and called patient and requested a return phone call      Plan  Adenocarcinoma of colon, Carcinoma metastatic to lung    · * MRI BRAIN W WO CONTRAST; Status:Need Information - Financial Authorization;   Requested for:62Lxy8573 02:30PM;     Signatures   Electronically signed by : Kristin Kim MD; Aug 22 2017  3:32AM EST                       (Author)

## 2018-01-10 NOTE — MISCELLANEOUS
Message   Recorded as Task   Date: 08/22/2017 08:40 AM, Created By: Sharla Fall   Task Name: Care Coordination   Assigned To: Xena Sebastian   Regarding Patient: Romana Fitz, Status: In Progress   Tuan Nunez - 22 Aug 2017 8:40 AM     TASK CREATED  Caller: Self; Care Coordination; [phone] (Home)  stated that he is returning a call for Dr Anna Shawq that he call him back at [phone]  [facility] hospital - 22 Aug 2017 9:46 AM     TASK IN PROGRESS   Xena Sebastian - 22 Aug 2017 1:14 PM     TASK REPLIED TO: Previously Assigned To Philomena Irene called and spoke with patient  noted      Active Problems    1  Adenocarcinoma of colon (153 9) (C18 9)   2  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,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)   3  Atrial fibrillation (427 31) (I48 91)   4  Atrial flutter (427 32) (I48 92)   5  Broncholithiasis (518 89)   6  Carcinoma metastatic to lung (197 0) (C78 00)   7  Collapse of lung (518 0) (J98 19)   8  Erythrocytosis (289 0) (D75 1)   9  Left rotator cuff tear arthropathy (716 81) (M12 812)   10  Malignant neoplasm of colon (153 9) (C18 9)   11  Malignant neoplasm of descending colon (153 2) (C18 6)   12  Tension Pneumothorax (512 0)    Current Meds   1  Ventolin  (90 Base) MCG/ACT Inhalation Aerosol Solution; INHALE 1 TO 2   PUFFS EVERY 4 TO 6 HOURS AS NEEDED; Therapy: 49QLJ4637 to (Last Rx:81Cgg3972)  Requested for: 00QDS8046 Ordered    Allergies    1  Aspirin TABS   2   Sulfa Drugs    Signatures   Electronically signed by : Manju Gunter; Aug 22 2017  1:14PM EST                       (Author)

## 2018-01-12 VITALS
WEIGHT: 236.38 LBS | TEMPERATURE: 97.2 F | OXYGEN SATURATION: 97 % | HEIGHT: 76 IN | HEART RATE: 80 BPM | BODY MASS INDEX: 28.78 KG/M2 | DIASTOLIC BLOOD PRESSURE: 82 MMHG | SYSTOLIC BLOOD PRESSURE: 132 MMHG | RESPIRATION RATE: 18 BRPM

## 2018-01-13 VITALS
BODY MASS INDEX: 27.8 KG/M2 | DIASTOLIC BLOOD PRESSURE: 73 MMHG | HEIGHT: 76 IN | TEMPERATURE: 96.9 F | HEART RATE: 58 BPM | WEIGHT: 228.31 LBS | OXYGEN SATURATION: 97 % | SYSTOLIC BLOOD PRESSURE: 134 MMHG

## 2018-01-13 VITALS
HEART RATE: 78 BPM | WEIGHT: 228 LBS | DIASTOLIC BLOOD PRESSURE: 72 MMHG | HEIGHT: 76 IN | OXYGEN SATURATION: 97 % | SYSTOLIC BLOOD PRESSURE: 114 MMHG | TEMPERATURE: 98.7 F | BODY MASS INDEX: 27.76 KG/M2

## 2018-01-13 VITALS
WEIGHT: 228 LBS | TEMPERATURE: 97.6 F | HEIGHT: 76 IN | BODY MASS INDEX: 27.76 KG/M2 | RESPIRATION RATE: 16 BRPM | OXYGEN SATURATION: 99 % | SYSTOLIC BLOOD PRESSURE: 118 MMHG | DIASTOLIC BLOOD PRESSURE: 82 MMHG | HEART RATE: 67 BPM

## 2018-01-13 NOTE — MISCELLANEOUS
Message  I received a call from Dr Mora Listen that patient is not a candidate for RFA to lung lesions   I left message for the patient that I'll be changing his treatment when I see him on 4/14/17      Signatures   Electronically signed by : Stephanie Chavez MD; Mar 24 2017 11:33AM EST                       (Author)

## 2018-01-13 NOTE — MISCELLANEOUS
Message  Patient had peritonsilar abcess last week lanced  Feeling better  No fevers, chills  Advised ok for treatment        Signatures   Electronically signed by : Baljeet Barrera [facility]; Feb 29 2016  8:59AM EST                       (Author)

## 2018-01-14 VITALS
TEMPERATURE: 98.2 F | DIASTOLIC BLOOD PRESSURE: 90 MMHG | WEIGHT: 219.5 LBS | OXYGEN SATURATION: 96 % | HEART RATE: 73 BPM | HEIGHT: 76 IN | RESPIRATION RATE: 18 BRPM | BODY MASS INDEX: 26.73 KG/M2 | SYSTOLIC BLOOD PRESSURE: 140 MMHG

## 2018-01-14 VITALS
SYSTOLIC BLOOD PRESSURE: 124 MMHG | WEIGHT: 221 LBS | HEART RATE: 72 BPM | DIASTOLIC BLOOD PRESSURE: 80 MMHG | BODY MASS INDEX: 26.91 KG/M2 | HEIGHT: 76 IN | RESPIRATION RATE: 18 BRPM | OXYGEN SATURATION: 95 % | TEMPERATURE: 98.6 F

## 2018-01-14 VITALS
HEART RATE: 77 BPM | OXYGEN SATURATION: 96 % | BODY MASS INDEX: 28.07 KG/M2 | HEIGHT: 76 IN | DIASTOLIC BLOOD PRESSURE: 78 MMHG | WEIGHT: 230.5 LBS | TEMPERATURE: 96.1 F | RESPIRATION RATE: 16 BRPM | SYSTOLIC BLOOD PRESSURE: 136 MMHG

## 2018-01-14 NOTE — MISCELLANEOUS
Message  I called patient about lung biopsy  He said he would rather not        Signatures   Electronically signed by : Arleth Rivera MD; Sep  5 2017  3:15PM EST                       (Author)

## 2018-01-15 ENCOUNTER — GENERIC CONVERSION - ENCOUNTER (OUTPATIENT)
Dept: OTHER | Facility: OTHER | Age: 58
End: 2018-01-15

## 2018-01-15 NOTE — MISCELLANEOUS
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)   3  Atrial fibrillation (427 31) (I48 91)   4  Atrial flutter (427 32) (I48 92)   5  Broncholithiasis (518 89)   6  Carcinoma metastatic to lung (197 0) (C78 00)   7  Collapse of lung (518 0) (J98 19)   8  Erythrocytosis (289 0) (D75 1)   9  Left rotator cuff tear arthropathy (716 81) (M12 812)   10  Malignant neoplasm of colon (153 9) (C18 9)   11  Malignant neoplasm of descending colon (153 2) (C18 6)   12  Tension Pneumothorax (512 0)    Current Meds   1  Ventolin  (90 Base) MCG/ACT Inhalation Aerosol Solution; INHALE 1 TO 2   PUFFS EVERY 4 TO 6 HOURS AS NEEDED; Therapy: 82SQO2728 to (Last Rx:07Cam1339)  Requested for: 32HSV5390 Ordered    Allergies    1  Aspirin TABS   2   Sulfa Drugs    Signatures   Electronically signed by : Lawson Esparza, ; Nov 7 2017 11:42AM EST                       (Author)

## 2018-01-16 NOTE — MISCELLANEOUS
Message   Recorded as Task   Date: 02/15/2016 02:26 PM, Created By: Jordin Trent   Task Name: Call Back   Assigned To: Yara Del Rosario   Regarding Patient: Juanita Simmons, Status: Active   Xuanshelton Hyde - 15 Feb 2016 2:26 PM     TASK CREATED  Patient called stating he has been having alot of trouble getting his capecitabine and is unsure of what to do and does not know if you have heard anything  Please call at [phone]   Spoke with pt who reports insurance changed  Stated just received fax from [employer] reporting prescription forwarded to [address]   Fax new script to [facility]  Active Problems    1  Atrial fibrillation (427 31) (I48 91)   2  Atrial flutter (427 32) (I48 92)   3  Broncholithiasis (518 89)   4  Cancer, colon (153 9) (C18 9)   5  Collapse of lung (518 0) (J98 19)   6  Left rotator cuff tear arthropathy (716 81) (M12 812)   7  Malignant neoplasm of descending colon (153 2) (C18 6)   8  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)   9  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)   10  Pulmonary metastases (197 0) (C78 00)   11  Tension Pneumothorax (512 0)    Current Meds   1  Capecitabine 500 MG Oral Tablet; TAKE 4 TABLETS PO TWICE DAILY FOR 2   WEEKS,THEN 1 WEEK OFF  REPEAT CYCLE; Therapy: 48UUC5549 to (Bello Capulin)  Requested for: 34AYK5066; Last   Rx:94Vrx8911; Status: ACTIVE - Retrospective By Protocol Authorization Ordered   2  Levofloxacin 500 MG Oral Tablet; TAKE 1 TABLET DAILY UNTIL FINISHED; Therapy: 74CNG5173 to (Evaluate:82Esy3963)  Requested for: 67HQP2247; Last   Rx:13Kzg4734 Ordered   3  Metoprolol Succinate ER 25 MG Oral Tablet Extended Release 24 Hour; Take 1 tablet   daily; Therapy: 35AVX1567 to (Evaluate:24Jun2016); Last Rx:30Jun2015 Ordered   4  Ondansetron 8 MG Oral Tablet Dispersible; 1 tab PO Q 8 hr PRN nausea; Therapy: 18Hba1282 to (Last Rx:12Aug2015) Ordered   5   OxyCODONE HCl - 5 MG Oral Capsule; TAKE 1 CAPSULE EVERY 4   HOURS AS   NEEDED FOR PAIN;   Therapy: 54RFX4841 to (Evaluate:71Gkq8967); Last Rx:06Jan2016 Ordered    Allergies    1  Aspirin TABS   2   Sulfa Drugs    Signatures   Electronically signed by : Patrice Winters RN; Feb 15 2016  2:57PM EST                       (Author)

## 2018-01-16 NOTE — MISCELLANEOUS
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)   3  Atrial fibrillation (427 31) (I48 91)   4  Atrial flutter (427 32) (I48 92)   5  Broncholithiasis (518 89)   6  Carcinoma metastatic to lung (197 0) (C78 00)   7  Collapse of lung (518 0) (J98 19)   8  Erythrocytosis (289 0) (D75 1)   9  Left rotator cuff tear arthropathy (716 81) (M12 812)   10  Malignant neoplasm of colon (153 9) (C18 9)   11  Malignant neoplasm of descending colon (153 2) (C18 6)   12  Tension Pneumothorax (512 0)    Current Meds   1  Ventolin  (90 Base) MCG/ACT Inhalation Aerosol Solution; INHALE 1 TO 2   PUFFS EVERY 4 TO 6 HOURS AS NEEDED; Therapy: 79RNY9509 to (Last Rx:23Dqu3051)  Requested for: 20JBZ9841 Ordered    Allergies    1  Aspirin TABS   2   Sulfa Drugs    Signatures   Electronically signed by : Jerson Rogers, ; Oct 31 2017 10:08AM EST                       (Author)

## 2018-01-23 NOTE — MISCELLANEOUS
Message   Recorded as Task   Date: 01/01/2018 11:26 PM, Created By: Abby Cassidy   Task Name: Miscellaneous   Assigned To: Radha Mukherjee   Regarding Patient: Kathryn Paredes, Status: Active   Comment:    Seth Gongora - 01 Jan 2018 11:26 PM     TASK CREATED  Please send patient another letter that we have not heard from him and he had missed his appointment on 11/01/2017 and a letter was sent to him on 11/07/2017  Please call our office to set up another appointment because he would need treatment for his condition that we were managing  Thanks,   Rolan    mailed reminder letter to patient for missed appt  11/7/17--mailed letter today      Active Problems    1  Adenocarcinoma of colon (153 9) (C18 9)   2  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,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)   3  Atrial fibrillation (427 31) (I48 91)   4  Atrial flutter (427 32) (I48 92)   5  Broncholithiasis (518 89)   6  Carcinoma metastatic to lung (197 0) (C78 00)   7  Collapse of lung (518 0) (J98 19)   8  Erythrocytosis (289 0) (D75 1)   9  Left rotator cuff tear arthropathy (716 81) (M12 812)   10  Malignant neoplasm of colon (153 9) (C18 9)   11  Malignant neoplasm of descending colon (153 2) (C18 6)   12  Tension Pneumothorax (512 0)    Current Meds   1  Ventolin  (90 Base) MCG/ACT Inhalation Aerosol Solution; INHALE 1 TO 2   PUFFS EVERY 4 TO 6 HOURS AS NEEDED; Therapy: 28YOF3579 to (Last Rx:62Lcz8192)  Requested for: 73CUH9063 Ordered    Allergies    1  Aspirin TABS   2   Sulfa Drugs    Signatures   Electronically signed by : Bee Wills, ; Mahamed 15 2018 12:40PM EST                       (Author)

## 2018-01-29 ENCOUNTER — HOSPITAL ENCOUNTER (OUTPATIENT)
Dept: INFUSION CENTER | Facility: CLINIC | Age: 58
Discharge: HOME | End: 2018-01-29
Payer: COMMERCIAL

## 2018-01-29 DIAGNOSIS — C18.9 MALIGNANT NEOPLASM OF COLON (HCC): ICD-10-CM

## 2018-01-29 LAB
BASOPHILS # BLD AUTO: 0 THOUSAND/UL (ref 0–0.1)
BASOPHILS NFR MAR MANUAL: 0 % (ref 0–1)
CREAT SERPL-MCNC: 0.92 MG/DL (ref 0.6–1.3)
EOSINOPHIL # BLD AUTO: 0.06 THOUSAND/UL (ref 0–0.61)
EOSINOPHIL NFR BLD MANUAL: 1 % (ref 0–6)
ERYTHROCYTE [DISTWIDTH] IN BLOOD BY AUTOMATED COUNT: 12.8 % (ref 11.6–15.1)
GFR SERPL CREATININE-BSD FRML MDRD: 93 ML/MIN/1.73SQ M
HCT VFR BLD AUTO: 48.4 % (ref 36.5–49.3)
HGB BLD-MCNC: 16.6 G/DL (ref 12–17)
LYMPHOCYTES # BLD AUTO: 0.93 THOUSAND/UL (ref 0.6–4.47)
LYMPHOCYTES # BLD AUTO: 15 % (ref 14–44)
MCH RBC QN AUTO: 31.8 PG (ref 26.8–34.3)
MCHC RBC AUTO-ENTMCNC: 34.3 G/DL (ref 31.4–37.4)
MCV RBC AUTO: 93 FL (ref 82–98)
MONOCYTES # BLD AUTO: 0.25 THOUSAND/UL (ref 0–1.22)
MONOCYTES NFR BLD AUTO: 4 % (ref 4–12)
NEUTS BAND NFR BLD MANUAL: 0 % (ref 0–8)
NEUTS SEG # BLD: 4.96 THOUSAND/UL (ref 1.81–6.82)
NEUTS SEG NFR BLD AUTO: 80 % (ref 43–75)
OVALOCYTES BLD QL SMEAR: PRESENT
PLATELET # BLD AUTO: 142 THOUSANDS/UL (ref 149–390)
PLATELET BLD QL SMEAR: ABNORMAL
PMV BLD AUTO: 7 FL (ref 8.9–12.7)
RBC # BLD AUTO: 5.22 MILLION/UL (ref 3.88–5.62)
TOTAL CELLS COUNTED SPEC: 100
WBC # BLD AUTO: 6.2 THOUSAND/UL (ref 4.31–10.16)
WBC NRBC COR # BLD: 6.2 THOUSAND/UL (ref 4.31–10.16)

## 2018-01-29 PROCEDURE — 85027 COMPLETE CBC AUTOMATED: CPT

## 2018-01-29 PROCEDURE — 82565 ASSAY OF CREATININE: CPT

## 2018-01-29 PROCEDURE — 85007 BL SMEAR W/DIFF WBC COUNT: CPT

## 2018-01-29 RX ADMIN — Medication 300 UNITS: at 11:50

## 2018-01-29 NOTE — PROGRESS NOTES
Patient offers no complaints  Port flushed per protocol  Central labs drawn per Dr Janet West order  Patient sees Dr Janet West next week and that will determine if he needs port flush or treatment appts and will schedule at that time  AVS declined

## 2018-02-05 ENCOUNTER — OFFICE VISIT (OUTPATIENT)
Dept: HEMATOLOGY ONCOLOGY | Facility: CLINIC | Age: 58
End: 2018-02-05
Payer: COMMERCIAL

## 2018-02-05 VITALS
TEMPERATURE: 98.1 F | BODY MASS INDEX: 26.73 KG/M2 | SYSTOLIC BLOOD PRESSURE: 128 MMHG | DIASTOLIC BLOOD PRESSURE: 70 MMHG | WEIGHT: 226.4 LBS | HEART RATE: 69 BPM | OXYGEN SATURATION: 97 % | HEIGHT: 77 IN | RESPIRATION RATE: 16 BRPM

## 2018-02-05 DIAGNOSIS — C78.00 COLON CANCER METASTASIZED TO LUNG (HCC): Primary | Chronic | ICD-10-CM

## 2018-02-05 DIAGNOSIS — C77.1 METASTASIS TO INTRATHORACIC LYMPH NODES (HCC): ICD-10-CM

## 2018-02-05 DIAGNOSIS — C18.2 MALIGNANT NEOPLASM OF ASCENDING COLON (HCC): ICD-10-CM

## 2018-02-05 DIAGNOSIS — C18.9 COLON CANCER METASTASIZED TO LUNG (HCC): Primary | Chronic | ICD-10-CM

## 2018-02-05 PROCEDURE — 99214 OFFICE O/P EST MOD 30 MIN: CPT | Performed by: INTERNAL MEDICINE

## 2018-02-05 NOTE — PROGRESS NOTES
HPI:   Miryam Mosqueda is a 65 y o  male with  Metastatic right-sided colon cancer with metastases to lungs and lymph nodes and disease was progressing when he was seen last in our office in August 2017  He wanted to have a break  He states he is feeling much better, better than before and hardly has any symptom , nothing of the ordinary  He has gained some weight  Discussion Summary: This is a follow-up visit for stage IV colon cancer with metastases to lungs and lymph nodes  Patient has disease progression in the right lung and hilar and infrahilar areas  He is being considered for systemic chemotherapy  We discussed various options and he decided to go with [facility]  Original diagnosis was in 2008 and he had resection of ascending colon  Second resection was of transverse colon and that was in March 2011  Patient states that even in 2008 he had stage IV disease with involvement of the nodes and lung  Post surgery he received FOLFOX plus Avastin chemotherapy for 6 months in 2008  Patient received chemotherapy again after second surgery in 2011 and that was FOLFIRI plus Avastin, again for 6 months  In 2012 he had wedge resection of right lung nodule  There was no chemotherapy at that time  He had wedge resection of right lower lung nodule in January 2014 and he states this time he had radiation therapy after wedge resection  He thinks he had a total of 3 wedge resections  Post surgery he had atrial fibrillation/flutter  He had ablation of atrial fibrillation and that was successful and he is in sinus rhythm  He had left lower lobe lobectomy on 4/14/2015  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  He was not a candidate for Erbitux or Vectibix  was on Xeloda and Avastin from June 2015 - February 2017 because of a small new lesion in the lung  He had good response to Xeloda plus Avastin  2017 stopped Xeloda plus Avastin due to 6 mm lung nodule   Discussed at that time with IR for RFA and could not be done due to technical reasonsthoracic surg in May 2017 regarding 6 mm right lower lobe nodule, stable since Feb 2017, but enlarged since Aug 2016; due to location wedge resection cannot be performed  Thoracic surgery advised SBRT; rad onc conslt was made  Patient did not receive radiation  He had progression of disease in the right lower lung, hilar and infrahilar areas as above  He was running hematocrit 16 and higher  He was checked for erythrocytosis/polycythemia and tests that did not favor polycythemia vera   chronic problem the left shoulder rotator cuff  He was on oxycodone   We  had discussed Lonsurf, stivarga and cyramza plus FOLFIRI  We had decided to go with lonsurf  Also had discussed consultation at [facility]  He was not interested in having another lung biopsy  No current outpatient prescriptions on file  Allergies   Allergen Reactions    Aspirin Other (See Comments)     Nose bleed    Sulfa Antibiotics Other (See Comments)     Dizzy        ROS:  02/05/18 Reviewed 13 systems:  Presently no headaches, seizures, dizziness, diplopia, dysphagia, hoarseness, chest pain, palpitations, shortness of breath, cough, hemoptysis, abdominal pain, nausea, vomiting, change in bowel habits, melena, hematuria, fever, chills, bleeding, bone pains, skin rash, weight loss, arthritic symptoms, numbness, tiredness , weakness, claudication and gait problem  No frequent infections  No hot flashes  Not unusually sensitive to heat or cold  No swelling of the ankles  No swollen glands  Patient is anxious         /70 (BP Location: Left arm, Cuff Size: Adult)   Pulse 69   Temp 98 1 °F (36 7 °C) (Tympanic)   Resp 16   Ht 6' 5" (1 956 m)   Wt 103 kg (226 lb 6 4 oz)   SpO2 97%   BMI 26 85 kg/m²     Physical Exam:  Vitals:    02/05/18 1300   BP: 128/70   BP Location: Left arm   Cuff Size: Adult   Pulse: 69   Resp: 16   Temp: 98 1 °F (36 7 °C)   TempSrc: Tympanic   SpO2: 97%   Weight: 103 kg (226 lb 6 4 oz) Height: 6' 5" (1 956 m)     Alert, oriented, not in distress, no icterus, no oral thrush, no palpable neck mass, clear lung foods, regular heart rate, abdomen  soft and non tender, no palpable abdominal mass, no ascites, no edema of ankles, no calf tenderness, no focal neurological deficit, no skin rash, no palpable lymphadenopathy, good arterial pulses, no clubbing  Patient is anxious  Performance status 1  IMAGING:  CT chest abdomen pelvis w contrast    (Results Pending)       Lab Results    Lab Results   Component Value Date    WBC 6 20 01/29/2018    HGB 16 6 01/29/2018    HCT 48 4 01/29/2018    MCV 93 01/29/2018     (L) 01/29/2018     Lab Results   Component Value Date     08/09/2017    K 4 4 08/09/2017     08/09/2017    CO2 29 08/09/2017    ANIONGAP 6 08/09/2017    BUN 9 08/09/2017    CREATININE 0 92 01/29/2018    GLUCOSE 88 08/09/2017    CALCIUM 8 9 08/09/2017    AST 29 08/09/2017    ALT 22 08/09/2017    ALKPHOS 63 08/09/2017    PROT 6 8 08/09/2017    BILITOT 1 30 (H) 08/09/2017    EGFR 93 01/29/2018         No results found for: SPEP, UPEP    No results found for: PSA    Lab Results   Component Value Date    CEA 3 0 08/09/2017             Reviewed and discussed with patient  Assessment and plan: This is a follow-up visit for stage IV colon cancer with metastases to lungs and lymph nodes  Patient has disease progression in the right lung and hilar and infrahilar areas  He is being considered for systemic chemotherapy  We discussed various options and he decided to go with [facility]  Original diagnosis was in 2008 and he had resection of ascending colon  Second resection was of transverse colon and that was in March 2011  Patient states that even in 2008 he had stage IV disease with involvement of the nodes and lung  Post surgery he received FOLFOX plus Avastin chemotherapy for 6 months in 2008   Patient received chemotherapy again after second surgery in 2011 and that was FOLFIRI plus Avastin, again for 6 months  In 2012 he had wedge resection of right lung nodule  There was no chemotherapy at that time  He had wedge resection of right lower lung nodule in January 2014 and he states this time he had radiation therapy after wedge resection  He thinks he had a total of 3 wedge resections  Post surgery he had atrial fibrillation/flutter  He had ablation of atrial fibrillation and that was successful and he is in sinus rhythm  He had left lower lobe lobectomy on 4/14/2015  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  He was not a candidate for Erbitux or Vectibix  was on Xeloda and Avastin from June 2015 - February 2017 because of a small new lesion in the lung  He had good response to Xeloda plus Avastin  2017 stopped Xeloda plus Avastin due to 6 mm lung nodule  Discussed at that time with IR for RFA and could not be done due to technical reasonsthoracic surg in May 2017 regarding 6 mm right lower lobe nodule, stable since Feb 2017, but enlarged since Aug 2016; due to location wedge resection cannot be performed  Thoracic surgery advised SBRT; rad onc conslt was made  Patient did not receive radiation  He had progression of disease in the right lower lung, hilar and infrahilar areas as above  He was running hematocrit 16 and higher  He was checked for erythrocytosis/polycythemia and tests that did not favor polycythemia vera   chronic problem the left shoulder rotator cuff  He was on oxycodone   We  had discussed Lonsurf, stivarga and cyramza plus FOLFIRI  We had decided to go with lonsurf  Also had discussed consultation at [facility]  He was not interested in having another lung biopsy  Patient is feeling so well     We discussed the  whole situation  He agrees that it is time to re-evaluate status of his malignancy before deciding additional management  He will have CT scans and blood tests and return to our office  Condition and plan discussed  Questions answered     He will continue to get Port-A-Cath flushed  Patient voiced understanding and agreement in the discussion  He is aware to contact us with questions/symptoms in the interim  Counseling / Coordination of Care  Total  time spent today 35 minutes  Greater than 50% of total time was spent with the patient and / or family counseling and / or coordination of care

## 2018-02-05 NOTE — LETTER
February 5, 2018     Farshadfeltonjuany Gomez, DO  [address]    Patient: Nazario Yap   YOB: 1960   Date of Visit: 2/5/2018       Dear Dr Dulce Lopez:    Thank you for referring Ebony Fransisco to me for evaluation  Below are my notes for this consultation  If you have questions, please do not hesitate to call me  I look forward to following your patient along with you  Sincerely,        Eugenio Adames MD        CC: No Recipients  Eugenio Adames MD  2/5/2018  2:25 PM  Sign at close encounter    HPI:   Nazario Yap is a 65 y o  male with  Metastatic right-sided colon cancer with metastases to lungs and lymph nodes and disease was progressing when he was seen last in our office in August 2017  He wanted to have a break  He states he is feeling much better, better than before and hardly has any symptom , nothing of the ordinary  He has gained some weight  Discussion Summary: This is a follow-up visit for stage IV colon cancer with metastases to lungs and lymph nodes  Patient has disease progression in the right lung and hilar and infrahilar areas  He is being considered for systemic chemotherapy  We discussed various options and he decided to go with [facility]  Original diagnosis was in 2008 and he had resection of ascending colon  Second resection was of transverse colon and that was in March 2011  Patient states that even in 2008 he had stage IV disease with involvement of the nodes and lung  Post surgery he received FOLFOX plus Avastin chemotherapy for 6 months in 2008  Patient received chemotherapy again after second surgery in 2011 and that was FOLFIRI plus Avastin, again for 6 months  In 2012 he had wedge resection of right lung nodule  There was no chemotherapy at that time  He had wedge resection of right lower lung nodule in January 2014 and he states this time he had radiation therapy after wedge resection   He thinks he had a total of 3 wedge resections  Post surgery he had atrial fibrillation/flutter  He had ablation of atrial fibrillation and that was successful and he is in sinus rhythm  He had left lower lobe lobectomy on 4/14/2015  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  He was not a candidate for Erbitux or Vectibix  was on Xeloda and Avastin from June 2015 - February 2017 because of a small new lesion in the lung  He had good response to Xeloda plus Avastin  2017 stopped Xeloda plus Avastin due to 6 mm lung nodule  Discussed at that time with IR for RFA and could not be done due to technical reasonsthoracic surg in May 2017 regarding 6 mm right lower lobe nodule, stable since Feb 2017, but enlarged since Aug 2016; due to location wedge resection cannot be performed  Thoracic surgery advised SBRT; rad onc conslt was made  Patient did not receive radiation  He had progression of disease in the right lower lung, hilar and infrahilar areas as above  He was running hematocrit 16 and higher  He was checked for erythrocytosis/polycythemia and tests that did not favor polycythemia vera   chronic problem the left shoulder rotator cuff  He was on oxycodone   We  had discussed Lonsurf, stivarga and cyramza plus FOLFIRI  We had decided to go with lonsurf  Also had discussed consultation at [facility]  He was not interested in having another lung biopsy  No current outpatient prescriptions on file  Allergies   Allergen Reactions    Aspirin Other (See Comments)     Nose bleed    Sulfa Antibiotics Other (See Comments)     Dizzy        ROS:  02/05/18 Reviewed 13 systems:  Presently no headaches, seizures, dizziness, diplopia, dysphagia, hoarseness, chest pain, palpitations, shortness of breath, cough, hemoptysis, abdominal pain, nausea, vomiting, change in bowel habits, melena, hematuria, fever, chills, bleeding, bone pains, skin rash, weight loss, arthritic symptoms, numbness, tiredness , weakness, claudication and gait problem   No frequent infections  No hot flashes  Not unusually sensitive to heat or cold  No swelling of the ankles  No swollen glands  Patient is anxious  /70 (BP Location: Left arm, Cuff Size: Adult)   Pulse 69   Temp 98 1 °F (36 7 °C) (Tympanic)   Resp 16   Ht 6' 5" (1 956 m)   Wt 103 kg (226 lb 6 4 oz)   SpO2 97%   BMI 26 85 kg/m²      Physical Exam:  Vitals:    02/05/18 1300   BP: 128/70   BP Location: Left arm   Cuff Size: Adult   Pulse: 69   Resp: 16   Temp: 98 1 °F (36 7 °C)   TempSrc: Tympanic   SpO2: 97%   Weight: 103 kg (226 lb 6 4 oz)   Height: 6' 5" (1 956 m)     Alert, oriented, not in distress, no icterus, no oral thrush, no palpable neck mass, clear lung foods, regular heart rate, abdomen  soft and non tender, no palpable abdominal mass, no ascites, no edema of ankles, no calf tenderness, no focal neurological deficit, no skin rash, no palpable lymphadenopathy, good arterial pulses, no clubbing  Patient is anxious  Performance status 1  IMAGING:  CT chest abdomen pelvis w contrast    (Results Pending)       Lab Results    Lab Results   Component Value Date    WBC 6 20 01/29/2018    HGB 16 6 01/29/2018    HCT 48 4 01/29/2018    MCV 93 01/29/2018     (L) 01/29/2018     Lab Results   Component Value Date     08/09/2017    K 4 4 08/09/2017     08/09/2017    CO2 29 08/09/2017    ANIONGAP 6 08/09/2017    BUN 9 08/09/2017    CREATININE 0 92 01/29/2018    GLUCOSE 88 08/09/2017    CALCIUM 8 9 08/09/2017    AST 29 08/09/2017    ALT 22 08/09/2017    ALKPHOS 63 08/09/2017    PROT 6 8 08/09/2017    BILITOT 1 30 (H) 08/09/2017    EGFR 93 01/29/2018         No results found for: SPEP, UPEP    No results found for: PSA    Lab Results   Component Value Date    CEA 3 0 08/09/2017             Reviewed and discussed with patient  Assessment and plan: This is a follow-up visit for stage IV colon cancer with metastases to lungs and lymph nodes   Patient has disease progression in the right lung and hilar and infrahilar areas  He is being considered for systemic chemotherapy  We discussed various options and he decided to go with [facility]  Original diagnosis was in 2008 and he had resection of ascending colon  Second resection was of transverse colon and that was in March 2011  Patient states that even in 2008 he had stage IV disease with involvement of the nodes and lung  Post surgery he received FOLFOX plus Avastin chemotherapy for 6 months in 2008  Patient received chemotherapy again after second surgery in 2011 and that was FOLFIRI plus Avastin, again for 6 months  In 2012 he had wedge resection of right lung nodule  There was no chemotherapy at that time  He had wedge resection of right lower lung nodule in January 2014 and he states this time he had radiation therapy after wedge resection  He thinks he had a total of 3 wedge resections  Post surgery he had atrial fibrillation/flutter  He had ablation of atrial fibrillation and that was successful and he is in sinus rhythm  He had left lower lobe lobectomy on 4/14/2015  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  He was not a candidate for Erbitux or Vectibix  was on Xeloda and Avastin from June 2015 - February 2017 because of a small new lesion in the lung  He had good response to Xeloda plus Avastin  2017 stopped Xeloda plus Avastin due to 6 mm lung nodule  Discussed at that time with IR for RFA and could not be done due to technical reasonsthoracic surg in May 2017 regarding 6 mm right lower lobe nodule, stable since Feb 2017, but enlarged since Aug 2016; due to location wedge resection cannot be performed  Thoracic surgery advised SBRT; rad onc conslt was made  Patient did not receive radiation  He had progression of disease in the right lower lung, hilar and infrahilar areas as above  He was running hematocrit 16 and higher  He was checked for erythrocytosis/polycythemia and tests that did not favor polycythemia vera   chronic problem the left shoulder rotator cuff  He was on oxycodone   We  had discussed Lonsurf, stivarga and cyramza plus FOLFIRI  We had decided to go with lonsurf  Also had discussed consultation at [facility]  He was not interested in having another lung biopsy  Patient is feeling so well     We discussed the  whole situation  He agrees that it is time to re-evaluate status of his malignancy before deciding additional management  He will have CT scans and blood tests and return to our office  Condition and plan discussed  Questions answered   He will continue to get Port-A-Cath flushed  Patient voiced understanding and agreement in the discussion  He is aware to contact us with questions/symptoms in the interim  Counseling / Coordination of Care  Total  time spent today 35 minutes  Greater than 50% of total time was spent with the patient and / or family counseling and / or coordination of care

## 2018-03-06 ENCOUNTER — HOSPITAL ENCOUNTER (OUTPATIENT)
Dept: CT IMAGING | Facility: HOSPITAL | Age: 58
Discharge: HOME | End: 2018-03-06
Attending: INTERNAL MEDICINE
Payer: COMMERCIAL

## 2018-03-06 PROCEDURE — 74177 CT ABD & PELVIS W/CONTRAST: CPT

## 2018-03-06 PROCEDURE — 71260 CT THORAX DX C+: CPT

## 2018-03-06 RX ADMIN — IOHEXOL 100 ML: 350 INJECTION, SOLUTION INTRAVENOUS at 15:06

## 2018-03-07 NOTE — PROGRESS NOTES
Thoracic Oncology Multidisciplinary Conference: Paulina Carrasco was discussed at Thoracic Tumor Conference  The groups suggestions were: Continued surveillance  When nodule increases in size treat with SBRT  Spoke with Dr Shahida Eastman he will contact patient with recommendation and repeat CTC in 2-3 months        Signatures   Electronically signed by : Perry Kasper, ; May 22 2017  1:39PM EST                       (Author)

## 2018-03-15 ENCOUNTER — OFFICE VISIT (OUTPATIENT)
Dept: HEMATOLOGY ONCOLOGY | Facility: CLINIC | Age: 58
End: 2018-03-15
Payer: COMMERCIAL

## 2018-03-15 VITALS
OXYGEN SATURATION: 95 % | RESPIRATION RATE: 16 BRPM | WEIGHT: 223.8 LBS | DIASTOLIC BLOOD PRESSURE: 78 MMHG | TEMPERATURE: 98 F | HEART RATE: 88 BPM | SYSTOLIC BLOOD PRESSURE: 126 MMHG | HEIGHT: 77 IN | BODY MASS INDEX: 26.42 KG/M2

## 2018-03-15 DIAGNOSIS — C18.8 OVERLAPPING MALIGNANT NEOPLASM OF COLON (HCC): Primary | ICD-10-CM

## 2018-03-15 DIAGNOSIS — C18.9 COLON CANCER METASTASIZED TO LUNG (HCC): Chronic | ICD-10-CM

## 2018-03-15 DIAGNOSIS — C18.2 MALIGNANT NEOPLASM OF ASCENDING COLON (HCC): ICD-10-CM

## 2018-03-15 DIAGNOSIS — C78.00 COLON CANCER METASTASIZED TO LUNG (HCC): Chronic | ICD-10-CM

## 2018-03-15 PROCEDURE — 99215 OFFICE O/P EST HI 40 MIN: CPT | Performed by: INTERNAL MEDICINE

## 2018-03-15 NOTE — LETTER
March 15, 2018     Kody Pantoja, [address] [facility]  [address]    Patient: Shiva Connolly   YOB: 1960   Date of Visit: 3/15/2018       Dear Dr Abdoul Wong:    Thank you for referring Odalis Wayne to me for evaluation  Below are my notes for this consultation  If you have questions, please do not hesitate to call me  I look forward to following your patient along with you  Sincerely,        Kandice Goodman MD        CC: No Recipients  Kandice Goodman MD  3/15/2018  2:34 PM  Sign at close encounter    HPI:   Shiva Connolly is a 65 y o  male with to metastatic lesion in right lower lung lobe with slight increase in size  Patient is not symptomatic from these 2 lesions  This is a follow-up visit for stage IV colon cancer with metastases to lungs and lymph nodes  Original colon cancer diagnosis was made in 2008 and he had resection of ascending colon  Second resection was of transverse colon and that was in March 2011  Patient states that even in 2008 he had stage IV disease with involvement of the nodes and lung  Post surgery he received FOLFOX plus Avastin chemotherapy for 6 months in 2008  Patient received chemotherapy again after second surgery in 2011 and that was FOLFIRI plus Avastin, again for 6 months  In 2012 he had wedge resection of right lung nodule  There was no chemotherapy at that time  He had wedge resection of right lower lung nodule in January 2014 and he states this time he had radiation therapy after wedge resection  He thinks he had a total of 3 wedge resections  Post surgery he had atrial fibrillation/flutter  He had ablation of atrial fibrillation and that was successful and he is in sinus rhythm  He had left lower lobe lobectomy on 4/14/2015  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  He was not a candidate for Erbitux or Vectibix  was on Xeloda and Avastin from June 2015 - February 2017 because of a small new lesion in the lung   He had good response to Xeloda plus Avastin  2017 stopped Xeloda plus Avastin due to 6 mm lung nodule  Discussed at that time with IR for RFA and could not be done due to technical reasonsthoracic surg in May 2017 regarding 6 mm right lower lobe nodule, stable since Feb 2017, but enlarged since Aug 2016; due to location wedge resection cannot be performed  Thoracic surgery advised SBRT; rad onc conslt was made  Patient did not receive radiation  He had progression of disease in the right lower lung, hilar and infrahilar areas as above  Patient had not have any treatment since February 2017  New CT scan shows slight increase in 2 lung nodules in right lower lung  No mention of other distant metastatic disease  Patient does not have pulmonary symptoms  He does not have GI symptoms  No bone pains  He is maintaining his weight  He was running hematocrit 16 and higher  He was checked for erythrocytosis/polycythemia and tests that did not favor polycythemia vera     chronic problem the left shoulder rotator cuff  He was on oxycodone but not anymore  We  had discussed  Lonsurf, stivarga and cyramza plus FOLFIRI  We had decided to go with lonsurf  Also had discussed consultation at [facility]  He was not interested in having another lung biopsy  No current outpatient prescriptions on file  Allergies   Allergen Reactions    Aspirin Other (See Comments)     Nose bleed    Sulfa Antibiotics Other (See Comments)     Dizzy        ROS:  03/15/18 Reviewed 13 systems:  Presently no headaches, seizures, dizziness, diplopia, dysphagia, hoarseness, chest pain, palpitations, shortness of breath, cough, hemoptysis, abdominal pain, nausea, vomiting, change in bowel habits, melena, hematuria, fever, chills, bleeding, bone pains, skin rash, weight loss, numbness, tiredness , weakness, claudication and gait problem  No frequent infections  No hot flashes  Not unusually sensitive to heat or cold   No swelling of the ankles  No swollen glands  Patient is anxious  /78 (BP Location: Left arm, Cuff Size: Adult)   Pulse 88   Temp 98 °F (36 7 °C) (Tympanic)   Resp 16   Ht 6' 5" (1 956 m)   Wt 102 kg (223 lb 12 8 oz)   SpO2 95%   BMI 26 54 kg/m²      Physical Exam:  Alert, oriented, not in distress, no icterus, no oral thrush, no palpable neck mass, clear lung fields, regular heart rate, abdomen  soft and non tender, no palpable abdominal mass, no ascites, no edema of ankles, no calf tenderness, no focal neurological deficit, no skin rash, no palpable lymphadenopathy, good arterial pulses, no clubbing  Patient is anxious  Performance status 0  IMAGING:  CT scan of chest abdomen and pelvis done on 03/06/2018  FL < 1 hour    (Results Pending)   Enlarging right lower lobe nodular masses suspicious for metastases     Stable postoperative changes bilateral chest including probable loculated left apical effusion     No evidence of metastatic disease involving the abdomen or pelvis       LABS:    Results for orders placed or performed during the hospital encounter of 01/29/18   CBC and differential   Result Value Ref Range    WBC 6 20 4  31 - 10 16 Thousand/uL    Adjusted WBC 6 20 4  31 - 10 16 Thousand/uL    RBC 5 22 3 88 - 5 62 Million/uL    Hemoglobin 16 6 12 0 - 17 0 g/dL    Hematocrit 48 4 36 5 - 49 3 %    MCV 93 82 - 98 fL    MCH 31 8 26 8 - 34 3 pg    MCHC 34 3 31 4 - 37 4 g/dL    RDW 12 8 11 6 - 15 1 %    MPV 7 0 (L) 8 9 - 12 7 fL    Platelets 166 (L) 098 - 390 Thousands/uL   Creatinine, serum   Result Value Ref Range    Creatinine 0 92 0 60 - 1 30 mg/dL    eGFR 93 ml/min/1 73sq m   Manual Differential (Non Wam)   Result Value Ref Range    Segmented % 80 (H) 43 - 75 %    Bands % 0 0 - 8 %    Lymphocytes % 15 14 - 44 %    Monocytes % 4 4 - 12 %    Eosinophils % 1 0 - 6 %    Basophils % 0 0 - 1 %    Neutrophils Absolute 4 96 1 81 - 6 82 Thousand/uL    Lymphocytes Absolute 0 93 0 60 - 4 47 Thousand/uL Monocytes Absolute 0 25 0 00 - 1 22 Thousand/uL    Eosinophils Absolute 0 06 0 00 - 0 61 Thousand/uL    Basophils Absolute 0 00 0 00 - 0 10 Thousand/uL    Total Counted 100     Ovalocytes Present     Platelet Estimate Borderline (A) Adequate           Reviewed and discussed with patient  Assessment and plan:  Timoteo Gomez is a 65 y o  male with 2 metastatic nodule in right lower lung they has grown slightly over the last  several months  Detail is in HPI  Patient does not have pulmonary symptoms and he does not have GI symptoms  No bone pains  He is maintaining his weight  CEA has increased significantly  We discussed various options, surgery,  RFA, radiation, locally or CyberKnife and systemic chemotherapy  He will have consultation for RFA and thoracic surgery and then decide  We had a long abena discussion  All discussed in detail  Questions answered  Patient voiced understanding and agreement in the discussion  Counseling / Coordination of Care   Greater than 50% of total time was spent with the patient and / or family counseling and / or coordination of care

## 2018-03-15 NOTE — PROGRESS NOTES
HPI:   Miryam Mosqueda is a 65 y o  male with to metastatic lesion in right lower lung lobe with slight increase in size  Patient is not symptomatic from these 2 lesions  This is a follow-up visit for stage IV colon cancer with metastases to lungs and lymph nodes  Original colon cancer diagnosis was made in 2008 and he had resection of ascending colon  Second resection was of transverse colon and that was in March 2011  Patient states that even in 2008 he had stage IV disease with involvement of the nodes and lung  Post surgery he received FOLFOX plus Avastin chemotherapy for 6 months in 2008  Patient received chemotherapy again after second surgery in 2011 and that was FOLFIRI plus Avastin, again for 6 months  In 2012 he had wedge resection of right lung nodule  There was no chemotherapy at that time  He had wedge resection of right lower lung nodule in January 2014 and he states this time he had radiation therapy after wedge resection  He thinks he had a total of 3 wedge resections  Post surgery he had atrial fibrillation/flutter  He had ablation of atrial fibrillation and that was successful and he is in sinus rhythm  He had left lower lobe lobectomy on 4/14/2015  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  He was not a candidate for Erbitux or Vectibix  was on Xeloda and Avastin from June 2015 - February 2017 because of a small new lesion in the lung  He had good response to Xeloda plus Avastin  2017 stopped Xeloda plus Avastin due to 6 mm lung nodule  Discussed at that time with IR for RFA and could not be done due to technical reasonsthoracic surg in May 2017 regarding 6 mm right lower lobe nodule, stable since Feb 2017, but enlarged since Aug 2016; due to location wedge resection cannot be performed  Thoracic surgery advised SBRT; rad onc conslt was made  Patient did not receive radiation  He had progression of disease in the right lower lung, hilar and infrahilar areas as above    Patient had not have any treatment since February 2017  New CT scan shows slight increase in 2 lung nodules in right lower lung  No mention of other distant metastatic disease  Patient does not have pulmonary symptoms  He does not have GI symptoms  No bone pains  He is maintaining his weight  He was running hematocrit 16 and higher  He was checked for erythrocytosis/polycythemia and tests that did not favor polycythemia vera     chronic problem the left shoulder rotator cuff  He was on oxycodone but not anymore  We  had discussed Lonsurf, stivarga and cyramza plus FOLFIRI  We had decided to go with lonsurf  Also had discussed consultation at [facility]  He was not interested in having another lung biopsy  No current outpatient prescriptions on file  Allergies   Allergen Reactions    Aspirin Other (See Comments)     Nose bleed    Sulfa Antibiotics Other (See Comments)     Dizzy        ROS:  03/15/18 Reviewed 13 systems:  Presently no headaches, seizures, dizziness, diplopia, dysphagia, hoarseness, chest pain, palpitations, shortness of breath, cough, hemoptysis, abdominal pain, nausea, vomiting, change in bowel habits, melena, hematuria, fever, chills, bleeding, bone pains, skin rash, weight loss, numbness, tiredness , weakness, claudication and gait problem  No frequent infections  No hot flashes  Not unusually sensitive to heat or cold  No swelling of the ankles  No swollen glands  Patient is anxious         /78 (BP Location: Left arm, Cuff Size: Adult)   Pulse 88   Temp 98 °F (36 7 °C) (Tympanic)   Resp 16   Ht 6' 5" (1 956 m)   Wt 102 kg (223 lb 12 8 oz)   SpO2 95%   BMI 26 54 kg/m²     Physical Exam:  Alert, oriented, not in distress, no icterus, no oral thrush, no palpable neck mass, clear lung fields, regular heart rate, abdomen  soft and non tender, no palpable abdominal mass, no ascites, no edema of ankles, no calf tenderness, no focal neurological deficit, no skin rash, no palpable lymphadenopathy, good arterial pulses, no clubbing  Patient is anxious  Performance status 0  IMAGING:  CT scan of chest abdomen and pelvis done on 03/06/2018  FL < 1 hour    (Results Pending)   Enlarging right lower lobe nodular masses suspicious for metastases     Stable postoperative changes bilateral chest including probable loculated left apical effusion     No evidence of metastatic disease involving the abdomen or pelvis       LABS:    Results for orders placed or performed during the hospital encounter of 01/29/18   CBC and differential   Result Value Ref Range    WBC 6 20 4  31 - 10 16 Thousand/uL    Adjusted WBC 6 20 4  31 - 10 16 Thousand/uL    RBC 5 22 3 88 - 5 62 Million/uL    Hemoglobin 16 6 12 0 - 17 0 g/dL    Hematocrit 48 4 36 5 - 49 3 %    MCV 93 82 - 98 fL    MCH 31 8 26 8 - 34 3 pg    MCHC 34 3 31 4 - 37 4 g/dL    RDW 12 8 11 6 - 15 1 %    MPV 7 0 (L) 8 9 - 12 7 fL    Platelets 028 (L) 742 - 390 Thousands/uL   Creatinine, serum   Result Value Ref Range    Creatinine 0 92 0 60 - 1 30 mg/dL    eGFR 93 ml/min/1 73sq m   Manual Differential (Non Wam)   Result Value Ref Range    Segmented % 80 (H) 43 - 75 %    Bands % 0 0 - 8 %    Lymphocytes % 15 14 - 44 %    Monocytes % 4 4 - 12 %    Eosinophils % 1 0 - 6 %    Basophils % 0 0 - 1 %    Neutrophils Absolute 4 96 1 81 - 6 82 Thousand/uL    Lymphocytes Absolute 0 93 0 60 - 4 47 Thousand/uL    Monocytes Absolute 0 25 0 00 - 1 22 Thousand/uL    Eosinophils Absolute 0 06 0 00 - 0 61 Thousand/uL    Basophils Absolute 0 00 0 00 - 0 10 Thousand/uL    Total Counted 100     Ovalocytes Present     Platelet Estimate Borderline (A) Adequate           Reviewed and discussed with patient  Assessment and plan:  Ross Shetty is a 65 y o  male with 2 metastatic nodule in right lower lung they has grown slightly over the last  several months  Detail is in HPI  Patient does not have pulmonary symptoms and he does not have GI symptoms  No bone pains    He is maintaining his weight  CEA has increased significantly  We discussed various options, surgery,  RFA, radiation, locally or CyberKnife and systemic chemotherapy  He will have consultation for RFA and thoracic surgery and then decide  We had a long abena discussion  All discussed in detail  Questions answered  Patient voiced understanding and agreement in the discussion  Counseling / Coordination of Care   Greater than 50% of total time was spent with the patient and / or family counseling and / or coordination of care

## 2018-03-26 ENCOUNTER — HOSPITAL ENCOUNTER (OUTPATIENT)
Dept: INFUSION CENTER | Facility: CLINIC | Age: 58
Discharge: HOME | End: 2018-03-26
Payer: COMMERCIAL

## 2018-03-26 PROCEDURE — 96523 IRRIG DRUG DELIVERY DEVICE: CPT

## 2018-03-26 RX ADMIN — Medication 300 UNITS: at 08:40

## 2018-03-27 ENCOUNTER — OFFICE VISIT (OUTPATIENT)
Dept: CARDIAC SURGERY | Facility: CLINIC | Age: 58
End: 2018-03-27
Payer: COMMERCIAL

## 2018-03-27 VITALS
WEIGHT: 228 LBS | HEART RATE: 67 BPM | DIASTOLIC BLOOD PRESSURE: 77 MMHG | SYSTOLIC BLOOD PRESSURE: 138 MMHG | OXYGEN SATURATION: 98 % | BODY MASS INDEX: 26.92 KG/M2 | HEIGHT: 77 IN

## 2018-03-27 DIAGNOSIS — C78.00 COLON CANCER METASTASIZED TO LUNG (HCC): Primary | Chronic | ICD-10-CM

## 2018-03-27 DIAGNOSIS — C18.9 COLON CANCER METASTASIZED TO LUNG (HCC): Primary | Chronic | ICD-10-CM

## 2018-03-27 PROCEDURE — 99213 OFFICE O/P EST LOW 20 MIN: CPT | Performed by: THORACIC SURGERY (CARDIOTHORACIC VASCULAR SURGERY)

## 2018-03-27 NOTE — ASSESSMENT & PLAN NOTE
Mr Sepideh Roberto has what is likely 2 metastatic foci of colon cancer within his right lower lobe  Both her fairly central and the removal of them would require a right lower lobectomy  He is status post left lower lobectomy for metastatic disease  We will obtain full pulmonary function tests and see him back in the office to discuss the feasibility  He would like to put this off until after his daughter's wedding in early April

## 2018-03-27 NOTE — PROGRESS NOTES
Thoracic Follow-Up  Assessment/Plan:    Colon cancer metastasized to lung [facility])  Mr  Jean Sam has what is likely 2 metastatic foci of colon cancer within his right lower lobe  Both her fairly central and the removal of them would require a right lower lobectomy  He is status post left lower lobectomy for metastatic disease  We will obtain full pulmonary function tests and see him back in the office to discuss the feasibility  He would like to put this off until after his daughter's wedding in early April  Diagnoses and all orders for this visit:    Colon cancer metastasized to lung [facility])  -     Spirometry with diffusing capacity; Future          Thoracic History   Diagnosis: 1  Left recurrent pneumothorax2  Chest wall hernia3  History of colon cancer lung metastasis  Procedures: 1  Flexible bronchoscopy, right thoracoscopic lower lobe wedge resection from April 21, 2010 2  Left Port-A-Cath placed on may 7, 2010 3  Flexible bronchoscopy, left thorascopic lower lobar dissection intercostal nerve block from August 19, 2010 4  Flexible bronchoscopy, left thoracoscopic upper lobe wedge resection with technetium and gamma probe localization, repair chest wall incisional hernia and intercostal nerve block performed on November 9, 2011 5  Left vats, talc pleurodesis, replacement [place]-Tian patch for chest wall incisional hernia and intercostal block performed on December 11, 20116  Right thoracoscopic lower lobe wedge resection on January 15, 2014  7  Flexible bronchoscopy, video mediastinoscopy, left posterior lateral thoracotomy, lower lobectomy and pulmonary artery arterioplasty April 14, 20158  Flexible bronchoscopy April 22, 20159  Port-A-Cath Insertion - Left IJ June 8, 2015  Pathology: 1  Right lower lobe pulmonary nodule consistent with metastatic colon adenocarcinoma   Tumor immunohistochemically positive for CK 20 and Cd-X2, negative her CK 7 and CT F1  2  Left lower lobe wedge resection revealed moderately differentiated adenocarcinoma, morphologically consistent with metastases from colon primary  Tumor size 1 3 cm, positive for pancytokeratin A1/A3, cytokeratin 20 and CDX-2, and negative to cytokeratin 7 and CTF-1  3  Left upper lobe wedge resection show metastatic adenocarcinoma of the colon, compatible with patient's known history of colon adenocarcinoma  Invasive carcinoma 0 4 cm from parenchymal resected margin  4  Right lower lobe wedge resection revealed moderate to poorly differentiated adenocarcinoma with focal signet-ring cell features and extensive necrosis consistent with patient's known colon adenocarcinoma  Surgical margins were negative5  Left lower lobe--metastatic colonic adenocarcinoma  Vascular and bronchial margins are negative for tumor  Lymph node level IIR and 7 are positive for metastatic colonic adenocarcinoma6  Bronchial washings and cell block of the left upper lobe are negative for malignancy  Subjective:    Patient ID: Nazario Yap is a 65 y o  male  Mr Connor Gallegos has been along running [place] with colon cancer  He has had multiple pulmonary metastasis and has required a wedge resection on the right, a left wedge resection, and a left lower lobectomy in the past for metastasectomy  He and his medical oncologist have been following 2 right lower lobe lung mass is which have continued to increase in size despite previous chemotherapy  The 2 right lower lobe lung mass is now measured 12 x 12 mm and 12 x 25 mm  There may be another small subcentimeter nodule in the superior segment of his right lower lobe  He does not appear to have significantly enlarged mediastinal or hilar adenopathy  The right lower lobe lung nodules or fairly central   He denies significant symptoms related to his lungs  He is fairly active and denies shortness of breath  He denies cough, purulent sputum production, or hemoptysis    He denies unexplained weight loss, new headaches or blurry vision, new bone or joint pains, or new numbness or weakness in any extremity  The following portions of the patient's history were reviewed and updated as appropriate: allergies, current medications, past family history, past medical history, past social history, past surgical history and problem list     Review of Systems   Constitutional: Negative for activity change, appetite change, chills, fever and unexpected weight change  HENT: Negative for voice change  Respiratory: Negative for cough, shortness of breath and wheezing  Cardiovascular: Negative for chest pain, palpitations and leg swelling  Gastrointestinal: Negative for abdominal pain, constipation, diarrhea, nausea and vomiting  Musculoskeletal: Negative for arthralgias and myalgias  Skin: Negative for rash  Neurological: Negative for dizziness, seizures, weakness, numbness and headaches  Hematological: Negative for adenopathy  Psychiatric/Behavioral: Negative for confusion  Objective:   Physical Exam   Constitutional: He is oriented to person, place, and time  He appears well-developed  No distress  HENT:   Head: Normocephalic and atraumatic  Mouth/Throat: Oropharynx is clear and moist    Eyes: Conjunctivae and EOM are normal  Pupils are equal, round, and reactive to light  No scleral icterus  Neck: Normal range of motion  Neck supple  No tracheal deviation present  Cardiovascular: Normal rate, regular rhythm and normal heart sounds  Pulmonary/Chest: Effort normal and breath sounds normal  No respiratory distress  He has no wheezes  He has no rales  Abdominal: Soft  Bowel sounds are normal  He exhibits no distension  There is no tenderness  Musculoskeletal: He exhibits no edema  Lymphadenopathy:     He has no cervical adenopathy  Neurological: He is alert and oriented to person, place, and time  No cranial nerve deficit  Skin: Skin is warm and dry  Psychiatric: He has a normal mood and affect   His behavior is normal  Judgment and thought content normal    Vitals reviewed  /77 (BP Location: Left arm, Patient Position: Sitting, Cuff Size: Standard)   Pulse 67   Ht 6' 5" (1 956 m)   Wt 103 kg (228 lb)   SpO2 98%   BMI 27 04 kg/m²       Ct Chest Wo Contrast    Result Date: 8/8/2017  Narrative CT CHEST WITHOUT IV CONTRAST INDICATION:  60-year-old male, metastatic colon cancer COMPARISON: 5/3/2017 CT TECHNIQUE: CT examination of the chest was performed without intravenous contrast   Reformatted images were created in axial, sagittal, and coronal planes  Radiation dose length product (DLP) for this visit:  357 mGy-cm   This examination, like all CT scans performed in the [facility], was performed utilizing techniques to minimize radiation dose exposure, including the use of iterative reconstruction and automated exposure control  FINDINGS: LUNGS: Diffuse centrilobular emphysema Stable postoperative changes bilaterally Right lower lobe pulmonary nodule measuring 12 mm x 12 mm, previously 6 mm x 6 mm, series 3, image 42    Increased wall thickening associated with 12 mm x 25 mm medial right lower lobe cavitary mass, previously 10 mm x 20 mm, series 3, image 43  [place] Anjum PLEURA:  Persistent small loculated left pleural effusion HEART/GREAT VESSELS:  4 3 cm ascending thoracic aortic aneurysm, unchanged  MEDIASTINUM AND ZABRINA: Persistent mediastinal shift to the left related to postoperative volume loss CHEST WALL AND LOWER NECK: Right chest wall [id number], terminating at cavoatrial junction, unchanged VISUALIZED STRUCTURES IN THE UPPER ABDOMEN:  Unremarkable  OSSEOUS STRUCTURES:  No acute fracture  No destructive osseous lesion       Impression Stable bilateral postoperative changes Enlarging solid right lower lobe pulmonary nodule measuring approximately 12 mm x 12 mm and increasing thick walled cystic lesion 12 mm x 25 mm suspicious for metastases    Persistent small left apical pleural effusion 4 3 cm ascending thoracic aortic aneurysm    Centrilobular emphysema ##sigslh##sigslh Workstation performed: [id number]

## 2018-04-03 ENCOUNTER — HOSPITAL ENCOUNTER (OUTPATIENT)
Dept: PULMONOLOGY | Facility: HOSPITAL | Age: 58
Discharge: HOME | End: 2018-04-03
Attending: THORACIC SURGERY (CARDIOTHORACIC VASCULAR SURGERY)
Payer: COMMERCIAL

## 2018-04-03 DIAGNOSIS — C78.00 COLON CANCER METASTASIZED TO LUNG (HCC): Chronic | ICD-10-CM

## 2018-04-03 DIAGNOSIS — C18.9 COLON CANCER METASTASIZED TO LUNG (HCC): Chronic | ICD-10-CM

## 2018-04-03 PROCEDURE — 94729 DIFFUSING CAPACITY: CPT | Performed by: INTERNAL MEDICINE

## 2018-04-03 PROCEDURE — 94010 BREATHING CAPACITY TEST: CPT

## 2018-04-03 PROCEDURE — 94760 N-INVAS EAR/PLS OXIMETRY 1: CPT

## 2018-04-03 PROCEDURE — 94010 BREATHING CAPACITY TEST: CPT | Performed by: INTERNAL MEDICINE

## 2018-04-03 PROCEDURE — 94729 DIFFUSING CAPACITY: CPT

## 2018-04-03 RX ORDER — ALBUTEROL SULFATE 2.5 MG/3ML
2.5 SOLUTION RESPIRATORY (INHALATION) ONCE AS NEEDED
Status: DISCONTINUED | OUTPATIENT
Start: 2018-04-03 | End: 2018-04-07 | Stop reason: HOSPADM

## 2018-04-17 ENCOUNTER — OFFICE VISIT (OUTPATIENT)
Dept: CARDIAC SURGERY | Facility: CLINIC | Age: 58
End: 2018-04-17
Payer: COMMERCIAL

## 2018-04-17 VITALS
WEIGHT: 227.4 LBS | DIASTOLIC BLOOD PRESSURE: 70 MMHG | BODY MASS INDEX: 26.85 KG/M2 | HEIGHT: 77 IN | OXYGEN SATURATION: 99 % | TEMPERATURE: 97.3 F | SYSTOLIC BLOOD PRESSURE: 141 MMHG | HEART RATE: 73 BPM

## 2018-04-17 DIAGNOSIS — C78.00 COLON CANCER METASTASIZED TO LUNG (HCC): Primary | Chronic | ICD-10-CM

## 2018-04-17 DIAGNOSIS — C18.9 COLON CANCER METASTASIZED TO LUNG (HCC): Primary | Chronic | ICD-10-CM

## 2018-04-17 PROCEDURE — 99211 OFF/OP EST MAY X REQ PHY/QHP: CPT | Performed by: THORACIC SURGERY (CARDIOTHORACIC VASCULAR SURGERY)

## 2018-04-17 NOTE — ASSESSMENT & PLAN NOTE
Mr Heriberto Miller underwent pulmonary function testing and they demonstrate limitations in air flow with an FEV1 of only 44% of predicted as well as a DLCO of 58% of predicted  I suspect that he would not tolerate a right lower lobectomy well  He would likely be left with a significant pulmonary deficit with a decrement in his quality of life and possible need for lifelong oxygen use  We did discuss doing a cardiopulmonary exercise test as well as a V/Q scan to more formally define the predicted postoperative deficit but he would rather avoid surgery at this point  He is going to contact Dr Reyes Chew to re-evaluate possible chemotherapy and/or stereotactic body radiotherapy

## 2018-04-17 NOTE — PROGRESS NOTES
Thoracic Follow-Up  Assessment/Plan:    Colon cancer metastasized to lung [facility])  Mr Shahid Araiza underwent pulmonary function testing and they demonstrate limitations in air flow with an FEV1 of only 44% of predicted as well as a DLCO of 58% of predicted  I suspect that he would not tolerate a right lower lobectomy well  He would likely be left with a significant pulmonary deficit with a decrement in his quality of life and possible need for lifelong oxygen use  We did discuss doing a cardiopulmonary exercise test as well as a V/Q scan to more formally define the predicted postoperative deficit but he would rather avoid surgery at this point  He is going to contact Dr Lawson Valente to re-evaluate possible chemotherapy and/or stereotactic body radiotherapy  There are no diagnoses linked to this encounter  Thoracic History    Diagnosis: 1  Left recurrent pneumothorax  2  Chest wall hernia  3  Colon cancer lung metastasis      Procedures: 1  Flexible bronchoscopy, right thoracoscopic lower lobe wedge resection from April 21, 2010 2  Left Port-A-Cath placed on may 7, 2010 3  Flexible bronchoscopy, left thorascopic lower lobar dissection intercostal nerve block from August 19, 2010 4  Flexible bronchoscopy, left thoracoscopic upper lobe wedge resection with technetium and gamma probe localization, repair chest wall incisional hernia and intercostal nerve block performed on November 9, 2011 5  Left vats, talc pleurodesis, replacement [place]-Tian patch for chest wall incisional hernia and intercostal block performed on December 11, 20116  Right thoracoscopic lower lobe wedge resection on January 15, 2014  7  Flexible bronchoscopy, video mediastinoscopy, left posterior lateral thoracotomy, lower lobectomy and pulmonary artery arterioplasty April 14, 20158  Flexible bronchoscopy April 22, 20159  Port-A-Cath Insertion - Left IJ June 8, 2015  Subjective:    Patient ID: Evon Troncoso is a 65 y o  male      Mr Shahid Araiza has were likely recurrent lung metastases within his right lower lobe, centrally  He has had multiple previous resection including a left lower lobectomy but has also had a right thoracoscopic lower lobe wedge resection  He returns now to discuss possible resection of his central right lower lobe masses  He underwent repeat pulmonary function testing in April of 2018 which demonstrated an FVC of 3 65 L or 61% of predicted, an FEV1 of 2 0 L or 44% of predicted, and a DLCO of 58% of predicted  Looking at his CT scan, his left lung is pretty small and restricted at this point  While we have not done formal V/Q scan testing, I would estimate that 75% of his lung function is on his right side currently  He is quite functional and active and values that freedom  The following portions of the patient's history were reviewed and updated as appropriate: allergies, current medications, past family history, past medical history, past social history, past surgical history and problem list     Review of Systems      Objective:   Physical ExamBP 141/70 (BP Location: Left arm, Patient Position: Sitting, Cuff Size: Adult)   Pulse 73   Temp (!) 97 3 °F (36 3 °C)   Ht 6' 5" (1 956 m)   Wt 103 kg (227 lb 6 4 oz)   SpO2 99%   BMI 26 97 kg/m²       Ct Chest Abdomen Pelvis W Contrast    Result Date: 3/8/2018  Narrative CT CHEST, ABDOMEN AND PELVIS WITH IV CONTRAST INDICATION:  69-year-old male,  C18 9: Malignant neoplasm of colon, unspecified C78 00: Secondary malignant neoplasm of unspecified lung COMPARISON: 8/4/2017 chest CT; 2/20/2017 chest, abdomen and pelvis CT TECHNIQUE: CT examination of the chest, abdomen and pelvis was performed  Axial, sagittal, and coronal 2D reformatted images were created from the source data and submitted for interpretation  Radiation dose length product (DLP) for this visit:  680 mGy-cm     This examination, like all CT scans performed in the [facility], was performed utilizing techniques to minimize radiation dose exposure, including the use of iterative reconstruction and automated exposure control  IV Contrast:  100 mL of iohexol (OMNIPAQUE) Enteric Contrast: Enteric contrast was administered  FINDINGS: CHEST LUNGS:  Enlarging medial right lower lobe pulmonary nodule measuring approximately 2 2 cm x 3 cm, previously 1 2 cm x 2 5 cm, series 3, image 44     Enlarging right lower lobe pulmonary nodule currently measuring 1 6 cm x 1 6 cm compared with 1 2 cm x 1 2  cm on the prior study  Findings remain suspicious for metastases  Diffuse centrilobular emphysema unchanged  Stable postoperative residua at both lung bases, unchanged  Persistent volume loss left hemithorax  PLEURA:  Persistent probable loculated left apical effusion, unchanged  HEART/GREAT VESSELS:  Ascending aortic dilatation up to 4 cm, slightly improved, compared with 4 3 cm on the prior study  MEDIASTINUM AND ZABRINA:  Unremarkable  CHEST WALL AND LOWER NECK:   Right IJ [id number] terminating at cavoatrial junction, unchanged  ABDOMEN LIVER/BILIARY TREE:  Unremarkable  GALLBLADDER:  No calcified gallstones  No pericholecystic inflammatory change  SPLEEN:  Unremarkable  PANCREAS:  Unremarkable  ADRENAL GLANDS:  Unremarkable  KIDNEYS/URETERS:  Unremarkable  No hydronephrosis  STOMACH AND BOWEL:  Unremarkable  APPENDIX:  No findings to suggest appendicitis  ABDOMINOPELVIC CAVITY:  No ascites or free intraperitoneal air  No lymphadenopathy  VESSELS:  Unremarkable for patient's age  PELVIS REPRODUCTIVE ORGANS:  Unremarkable for patient's age  URINARY BLADDER:  Unremarkable  ABDOMINAL WALL/INGUINAL REGIONS:  Unremarkable  OSSEOUS STRUCTURES:  No acute fracture or destructive osseous lesion       Impression Enlarging right lower lobe nodular masses suspicious for metastases Stable postoperative changes bilateral chest including probable loculated left apical effusion No evidence of metastatic disease involving the abdomen or pelvis Workstation performed: [id number]

## 2018-05-14 ENCOUNTER — HOSPITAL ENCOUNTER (OUTPATIENT)
Dept: INFUSION CENTER | Facility: CLINIC | Age: 58
Discharge: HOME | End: 2018-05-14
Payer: COMMERCIAL

## 2018-05-14 PROCEDURE — 96523 IRRIG DRUG DELIVERY DEVICE: CPT

## 2018-05-14 RX ADMIN — Medication 300 UNITS: at 10:40

## 2018-06-25 ENCOUNTER — HOSPITAL ENCOUNTER (OUTPATIENT)
Dept: INFUSION CENTER | Facility: CLINIC | Age: 58
Discharge: HOME | End: 2018-06-25
Payer: COMMERCIAL

## 2018-06-25 VITALS — TEMPERATURE: 96.9 F | DIASTOLIC BLOOD PRESSURE: 83 MMHG | RESPIRATION RATE: 18 BRPM | SYSTOLIC BLOOD PRESSURE: 129 MMHG

## 2018-06-25 PROCEDURE — 96523 IRRIG DRUG DELIVERY DEVICE: CPT

## 2018-06-25 RX ADMIN — Medication 300 UNITS: at 09:00

## 2018-07-18 ENCOUNTER — OFFICE VISIT (OUTPATIENT)
Dept: HEMATOLOGY ONCOLOGY | Facility: CLINIC | Age: 58
End: 2018-07-18
Payer: COMMERCIAL

## 2018-07-18 VITALS
RESPIRATION RATE: 17 BRPM | OXYGEN SATURATION: 99 % | SYSTOLIC BLOOD PRESSURE: 146 MMHG | HEIGHT: 77 IN | TEMPERATURE: 97.9 F | WEIGHT: 223.4 LBS | HEART RATE: 66 BPM | BODY MASS INDEX: 26.38 KG/M2 | DIASTOLIC BLOOD PRESSURE: 86 MMHG

## 2018-07-18 DIAGNOSIS — C18.9 COLON CANCER METASTASIZED TO LUNG (HCC): Chronic | ICD-10-CM

## 2018-07-18 DIAGNOSIS — C18.2 MALIGNANT NEOPLASM OF ASCENDING COLON (HCC): Primary | ICD-10-CM

## 2018-07-18 DIAGNOSIS — C78.00 COLON CANCER METASTASIZED TO LUNG (HCC): Chronic | ICD-10-CM

## 2018-07-18 PROCEDURE — 99215 OFFICE O/P EST HI 40 MIN: CPT | Performed by: INTERNAL MEDICINE

## 2018-07-18 RX ORDER — ALBUTEROL SULFATE 90 UG/1
AEROSOL, METERED RESPIRATORY (INHALATION)
COMMUNITY
End: 2018-07-18 | Stop reason: SDUPTHER

## 2018-07-18 RX ORDER — ALBUTEROL SULFATE 90 UG/1
AEROSOL, METERED RESPIRATORY (INHALATION)
Qty: 1 INHALER | Refills: 1 | Status: SHIPPED | OUTPATIENT
Start: 2018-07-18 | End: 2018-08-22 | Stop reason: SDUPTHER

## 2018-07-18 NOTE — PROGRESS NOTES
HPI:  Patient has stage IV colon cancer with metastatic disease to lungs and lymph nodes and on previous CT scan of the chest disease was in the   Right lower lung  He did not qualify for RFA  His pulmonary functions were not good enough for  right lower lobectomy  He did not go for  CyberKnife  He would consider that now but that will be if disease is still limited to l right lower lung lobe  He will have PET-CT scan to be sure that his disease is limited to left lower lobe  Otherwise he will have systemic therapy  Now he is symptomatic from metastatic disease to his lung  He has exertional dyspnea  1 day he had slight amount of cough with blood  He has noticed decreased stamina  He has asked for renewal of Ventolin inhaler and he uses that when that is humidity  Original colon cancer diagnosis was made in 2008 and he had resection of ascending colon  Second resection was of transverse colon and that was in March 2011  Patient states that even in 2008 he had stage IV disease with involvement of the nodes and lung  Post surgery he received FOLFOX plus Avastin chemotherapy for 6 months in 2008  Patient received chemotherapy again after second surgery in 2011 and that was FOLFIRI plus Avastin, again for 6 months  In 2012 he had wedge resection of right lung nodule  There was no chemotherapy at that time  He had wedge resection of right lower lung nodule in January 2014 and he states this time he had radiation therapy after wedge resection  He thinks he had a total of 3 wedge resections  Post surgery he had atrial fibrillation/flutter  He had ablation of atrial fibrillation and that was successful and he is in sinus rhythm  He had left lower lobe lobectomy on 4/14/2015  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  He was not a candidate for Erbitux or Vectibix  was on Xeloda and Avastin from June 2015 - February 2017 because of a small new lesion in the lung  He had good response to Xeloda plus Avastin  2017 stopped Xeloda plus Avastin due to 6 mm lung nodule  Discussed at that time with IR for RFA and could not be done due to technical reasonsthoracic surg in May 2017 regarding 6 mm right lower lobe nodule, stable since Feb 2017, but enlarged since Aug 2016; due to location wedge resection cannot be performed  Thoracic surgery advised SBRT  Rad onc conslt was made  Patient did not receive radiation  He had progression of disease in the right lower lung, hilar and infrahilar areas as above  Patient had not have any treatment since February 2017  New CT scan showed slight increase in 2 lung nodules in right lower lung  No mention of other distant metastatic disease  He was running hematocrit 16 and higher  He was checked for erythrocytosis/polycythemia and tests that did not favor polycythemia vera     chronic problem the left shoulder rotator cuff  He was on oxycodone but not anymore  We  had discussed Lonsurf, stivarga and cyramza plus FOLFIRI  We had decided to go with lonsurf  He did not come back for lonsurf  We had discussed consultation at [facility] was not interested in having another lung biopsy  Current Outpatient Prescriptions:     albuterol (VENTOLIN HFA) 90 mcg/act inhaler, Ventolin HFA 90 mcg/actuation aerosol inhaler, Disp: , Rfl:     Allergies   Allergen Reactions    Aspirin Other (See Comments)     Nose bleed    Sulfa Antibiotics Other (See Comments)     Dizzy         No history exists  ROS:  07/18/18 Reviewed 13 systems:  Presently no headaches, seizures, dizziness, diplopia, dysphagia, hoarseness, chest pain, palpitations,  abdominal pain, nausea, vomiting, change in bowel habits, melena, hematuria, fever, chills, bleeding, bone pains, skin rash, weight loss, arthritic symptoms,   weakness, numbness,  claudication and gait problem  No frequent infections  Not unusually sensitive to heat or cold  No swelling of the ankles  No swollen glands    Patient is anxious  Other symptoms are in HPI        /86 (BP Location: Left arm, Cuff Size: Adult)   Pulse 66   Temp 97 9 °F (36 6 °C) (Tympanic)   Resp 17   Ht 6' 5" (1 956 m)   Wt 101 kg (223 lb 6 4 oz)   SpO2 99%   BMI 26 49 kg/m²     Physical Exam:  Alert, oriented, not in distress, no icterus, no oral thrush, no palpable neck mass, clear lung fields, regular heart rate, abdomen  soft and non tender, no palpable abdominal mass, no ascites, no edema of ankles, no calf tenderness, no focal neurological deficit, no skin rash, no palpable lymphadenopathy, good arterial pulses, no clubbing  Patient is anxious  Performance status 1  IMAGING:  IMPRESSION:  Enlarging right lower lobe nodular masses suspicious for metastases     Stable postoperative changes bilateral chest including probable loculated left apical effusion     No evidence of metastatic disease involving the abdomen or pelvis           Workstation performed: [id number]      Imaging     CT chest abdomen pelvis w contrast (Order #[id number]) on 2/5/2018 - Imaging Information       LABS:  Results for orders placed or performed during the hospital encounter of 01/29/18   CBC and differential   Result Value Ref Range    WBC 6 20 4  31 - 10 16 Thousand/uL    Adjusted WBC 6 20 4  31 - 10 16 Thousand/uL    RBC 5 22 3 88 - 5 62 Million/uL    Hemoglobin 16 6 12 0 - 17 0 g/dL    Hematocrit 48 4 36 5 - 49 3 %    MCV 93 82 - 98 fL    MCH 31 8 26 8 - 34 3 pg    MCHC 34 3 31 4 - 37 4 g/dL    RDW 12 8 11 6 - 15 1 %    MPV 7 0 (L) 8 9 - 12 7 fL    Platelets 504 (L) 147 - 390 Thousands/uL   Creatinine, serum   Result Value Ref Range    Creatinine 0 92 0 60 - 1 30 mg/dL    eGFR 93 ml/min/1 73sq m   Manual Differential (Non Wam)   Result Value Ref Range    Segmented % 80 (H) 43 - 75 %    Bands % 0 0 - 8 %    Lymphocytes % 15 14 - 44 %    Monocytes % 4 4 - 12 %    Eosinophils % 1 0 - 6 %    Basophils % 0 0 - 1 %    Neutrophils Absolute 4 96 1 81 - 6 82 Thousand/uL Lymphocytes Absolute 0 93 0 60 - 4 47 Thousand/uL    Monocytes Absolute 0 25 0 00 - 1 22 Thousand/uL    Eosinophils Absolute 0 06 0 00 - 0 61 Thousand/uL    Basophils Absolute 0 00 0 00 - 0 10 Thousand/uL    Total Counted 100     Ovalocytes Present     Platelet Estimate Borderline (A) Adequate     Labs, Imaging, & Other studies:   All pertinent labs and imaging studies were personally reviewed    Lab Results   Component Value Date     08/09/2017    K 4 4 08/09/2017     08/09/2017    CO2 29 08/09/2017    ANIONGAP 6 08/09/2017    BUN 9 08/09/2017    CREATININE 0 92 01/29/2018    GLUCOSE 88 08/09/2017    CALCIUM 8 9 08/09/2017    AST 29 08/09/2017    ALT 22 08/09/2017    ALKPHOS 63 08/09/2017    PROT 6 8 08/09/2017    BILITOT 1 30 (H) 08/09/2017    EGFR 93 01/29/2018     Lab Results   Component Value Date    WBC 6 20 01/29/2018    HGB 16 6 01/29/2018    HCT 48 4 01/29/2018    MCV 93 01/29/2018     (L) 01/29/2018   No results found for: SEDRATE    Reviewed and discussed with patient  Assessment and plan:  Patient has stage IV colon cancer with metastatic disease to lungs and lymph nodes and on previous CT scan of the chest disease was in the   Right lower lung  He did not qualify for RFA  His pulmonary functions were not good enough for  right lower lobectomy  He did not go for  CyberKnife  He would consider that now but that will be if disease is still limited to l right lower lung lobe  He will have PET-CT scan to be sure that his disease is limited to left lower lobe  Otherwise he will have systemic therapy  Now he is symptomatic from metastatic disease to his lung  He has exertional dyspnea  1 day he had slight amount of cough with blood  He has noticed decreased stamina  He has asked for renewal of Ventolin inhaler and he uses that when that is humidity  Original colon cancer diagnosis was made in 2008 and he had resection of ascending colon   Second resection was of transverse colon and that was in March 2011  Patient states that even in 2008 he had stage IV disease with involvement of the nodes and lung  Post surgery he received FOLFOX plus Avastin chemotherapy for 6 months in 2008  Patient received chemotherapy again after second surgery in 2011 and that was FOLFIRI plus Avastin, again for 6 months  In 2012 he had wedge resection of right lung nodule  There was no chemotherapy at that time  He had wedge resection of right lower lung nodule in January 2014 and he states this time he had radiation therapy after wedge resection  He thinks he had a total of 3 wedge resections  Post surgery he had atrial fibrillation/flutter  He had ablation of atrial fibrillation and that was successful and he is in sinus rhythm  He had left lower lobe lobectomy on 4/14/2015  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  He was not a candidate for Erbitux or Vectibix  was on Xeloda and Avastin from June 2015 - February 2017 because of a small new lesion in the lung  He had good response to Xeloda plus Avastin  2017 stopped Xeloda plus Avastin due to 6 mm lung nodule  Discussed at that time with IR for RFA and could not be done due to technical reasonsthoracic surg in May 2017 regarding 6 mm right lower lobe nodule, stable since Feb 2017, but enlarged since Aug 2016; due to location wedge resection cannot be performed  Thoracic surgery advised SBRT  Rad onc conslt was made  Patient did not receive radiation  He had progression of disease in the right lower lung, hilar and infrahilar areas as above  Patient had not have any treatment since February 2017  New CT scan showed slight increase in 2 lung nodules in right lower lung  No mention of other distant metastatic disease  He was running hematocrit 16 and higher  He was checked for erythrocytosis/polycythemia and tests that did not favor polycythemia vera     chronic problem the left shoulder rotator cuff  He was on oxycodone but not anymore  We  had discussed Lonsurf, stivarga and cyramza plus FOLFIRI  We had decided to go with lonsurf  He did not come back for lonsurf  We had discussed consultation at [facility] was not interested in having another lung biopsy  1  Malignant neoplasm of ascending colon (HCC)    - CBC and differential  - CEA  - Comprehensive metabolic panel  - NM PET CT skull base to mid thigh; Future    2  Colon cancer metastasized to lung (HCC)    - CBC and differential  - CEA  - Comprehensive metabolic panel  - NM PET CT skull base to mid thigh; Future  - albuterol (VENTOLIN HFA) 90 mcg/act inhaler; 2 puffs every 4 hours as needed for shortness of breath  Dispense: 1 Inhaler; Refill: 1   He will have both tests and depending upon the results he would have consultation at [facility] or go with systemic therapy  We discussed the options listed above  All discussed in detail  Questions answered  Patient voiced understanding and agreement in the discussion  Counseling / Coordination of Care : 45 minutes   Greater than 50% of total time was spent with the patient and / or family counseling and / or coordination of care

## 2018-07-18 NOTE — LETTER
July 18, 2018     Bennie Yang, [address] [facility]  [address] [facility]    Patient: Goran Crocker   YOB: 1960   Date of Visit: 7/18/2018       Dear Dr Rufus Easton:    Thank you for referring Anh Castañeda to me for evaluation  Below are my notes for this consultation  If you have questions, please do not hesitate to call me  I look forward to following your patient along with you  Sincerely,        Yokasta Sims MD        CC: No Recipients  Yokasta Sims MD  7/18/2018  9:09 AM  Sign at close encounter    HPI:  Patient has stage IV colon cancer with metastatic disease to lungs and lymph nodes and on previous CT scan of the chest disease was in the   Right lower lung  He did not qualify for RFA  His pulmonary functions were not good enough for  right lower lobectomy  He did not go for  CyberKnife  He would consider that now but that will be if disease is still limited to l right lower lung lobe  He will have PET-CT scan to be sure that his disease is limited to left lower lobe  Otherwise he will have systemic therapy  Now he is symptomatic from metastatic disease to his lung  He has exertional dyspnea  1 day he had slight amount of cough with blood  He has noticed decreased stamina  He has asked for renewal of Ventolin inhaler and he uses that when that is humidity  Original colon cancer diagnosis was made in 2008 and he had resection of ascending colon  Second resection was of transverse colon and that was in March 2011  Patient states that even in 2008 he had stage IV disease with involvement of the nodes and lung  Post surgery he received FOLFOX plus Avastin chemotherapy for 6 months in 2008  Patient received chemotherapy again after second surgery in 2011 and that was FOLFIRI plus Avastin, again for 6 months  In 2012 he had wedge resection of right lung nodule  There was no chemotherapy at that time   He had wedge resection of right lower lung nodule in January 2014 and he states this time he had radiation therapy after wedge resection  He thinks he had a total of 3 wedge resections  Post surgery he had atrial fibrillation/flutter  He had ablation of atrial fibrillation and that was successful and he is in sinus rhythm  He had left lower lobe lobectomy on 4/14/2015  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  He was not a candidate for Erbitux or Vectibix  was on Xeloda and Avastin from June 2015 - February 2017 because of a small new lesion in the lung  He had good response to Xeloda plus Avastin  2017 stopped Xeloda plus Avastin due to 6 mm lung nodule  Discussed at that time with IR for RFA and could not be done due to technical reasonsthoracic surg in May 2017 regarding 6 mm right lower lobe nodule, stable since Feb 2017, but enlarged since Aug 2016; due to location wedge resection cannot be performed  Thoracic surgery advised SBRT  Rad onc conslt was made  Patient did not receive radiation  He had progression of disease in the right lower lung, hilar and infrahilar areas as above  Patient had not have any treatment since February 2017  New CT scan showed slight increase in 2 lung nodules in right lower lung  No mention of other distant metastatic disease  He was running hematocrit 16 and higher  He was checked for erythrocytosis/polycythemia and tests that did not favor polycythemia vera     chronic problem the left shoulder rotator cuff  He was on oxycodone but not anymore  We  had discussed Lonsurf, stivarga and cyramza plus FOLFIRI  We had decided to go with lonsurf  He did not come back for lonsurf  We had discussed consultation at [facility] was not interested in having another lung biopsy          Current Outpatient Prescriptions:     albuterol (VENTOLIN HFA) 90 mcg/act inhaler, Ventolin HFA 90 mcg/actuation aerosol inhaler, Disp: , Rfl:     Allergies   Allergen Reactions    Aspirin Other (See Comments)     Nose bleed    Sulfa Antibiotics Other (See Comments)     Dizzy         No history exists  ROS:  07/18/18 Reviewed 13 systems:  Presently no headaches, seizures, dizziness, diplopia, dysphagia, hoarseness, chest pain, palpitations,  abdominal pain, nausea, vomiting, change in bowel habits, melena, hematuria, fever, chills, bleeding, bone pains, skin rash, weight loss, arthritic symptoms,   weakness, numbness,  claudication and gait problem  No frequent infections  Not unusually sensitive to heat or cold  No swelling of the ankles  No swollen glands  Patient is anxious  Other symptoms are in HPI        /86 (BP Location: Left arm, Cuff Size: Adult)   Pulse 66   Temp 97 9 °F (36 6 °C) (Tympanic)   Resp 17   Ht 6' 5" (1 956 m)   Wt 101 kg (223 lb 6 4 oz)   SpO2 99%   BMI 26 49 kg/m²      Physical Exam:  Alert, oriented, not in distress, no icterus, no oral thrush, no palpable neck mass, clear lung fields, regular heart rate, abdomen  soft and non tender, no palpable abdominal mass, no ascites, no edema of ankles, no calf tenderness, no focal neurological deficit, no skin rash, no palpable lymphadenopathy, good arterial pulses, no clubbing  Patient is anxious  Performance status 1  IMAGING:  IMPRESSION:  Enlarging right lower lobe nodular masses suspicious for metastases     Stable postoperative changes bilateral chest including probable loculated left apical effusion     No evidence of metastatic disease involving the abdomen or pelvis           Workstation performed: [id number]      Imaging     CT chest abdomen pelvis w contrast (Order #[id number]) on 2/5/2018 - Imaging Information       LABS:  Results for orders placed or performed during the hospital encounter of 01/29/18   CBC and differential   Result Value Ref Range    WBC 6 20 4  31 - 10 16 Thousand/uL    Adjusted WBC 6 20 4  31 - 10 16 Thousand/uL    RBC 5 22 3 88 - 5 62 Million/uL    Hemoglobin 16 6 12 0 - 17 0 g/dL    Hematocrit 48 4 36 5 - 49 3 % MCV 93 82 - 98 fL    MCH 31 8 26 8 - 34 3 pg    MCHC 34 3 31 4 - 37 4 g/dL    RDW 12 8 11 6 - 15 1 %    MPV 7 0 (L) 8 9 - 12 7 fL    Platelets 149 (L) 196 - 390 Thousands/uL   Creatinine, serum   Result Value Ref Range    Creatinine 0 92 0 60 - 1 30 mg/dL    eGFR 93 ml/min/1 73sq m   Manual Differential (Non Wam)   Result Value Ref Range    Segmented % 80 (H) 43 - 75 %    Bands % 0 0 - 8 %    Lymphocytes % 15 14 - 44 %    Monocytes % 4 4 - 12 %    Eosinophils % 1 0 - 6 %    Basophils % 0 0 - 1 %    Neutrophils Absolute 4 96 1 81 - 6 82 Thousand/uL    Lymphocytes Absolute 0 93 0 60 - 4 47 Thousand/uL    Monocytes Absolute 0 25 0 00 - 1 22 Thousand/uL    Eosinophils Absolute 0 06 0 00 - 0 61 Thousand/uL    Basophils Absolute 0 00 0 00 - 0 10 Thousand/uL    Total Counted 100     Ovalocytes Present     Platelet Estimate Borderline (A) Adequate     Labs, Imaging, & Other studies:   All pertinent labs and imaging studies were personally reviewed    Lab Results   Component Value Date     08/09/2017    K 4 4 08/09/2017     08/09/2017    CO2 29 08/09/2017    ANIONGAP 6 08/09/2017    BUN 9 08/09/2017    CREATININE 0 92 01/29/2018    GLUCOSE 88 08/09/2017    CALCIUM 8 9 08/09/2017    AST 29 08/09/2017    ALT 22 08/09/2017    ALKPHOS 63 08/09/2017    PROT 6 8 08/09/2017    BILITOT 1 30 (H) 08/09/2017    EGFR 93 01/29/2018     Lab Results   Component Value Date    WBC 6 20 01/29/2018    HGB 16 6 01/29/2018    HCT 48 4 01/29/2018    MCV 93 01/29/2018     (L) 01/29/2018   No results found for: SEDRATE    Reviewed and discussed with patient  Assessment and plan:  Patient has stage IV colon cancer with metastatic disease to lungs and lymph nodes and on previous CT scan of the chest disease was in the   Right lower lung  He did not qualify for RFA  His pulmonary functions were not good enough for  right lower lobectomy  He did not go for  CyberKnife    He would consider that now but that will be if disease is still limited to l right lower lung lobe  He will have PET-CT scan to be sure that his disease is limited to left lower lobe  Otherwise he will have systemic therapy  Now he is symptomatic from metastatic disease to his lung  He has exertional dyspnea  1 day he had slight amount of cough with blood  He has noticed decreased stamina  He has asked for renewal of Ventolin inhaler and he uses that when that is humidity  Original colon cancer diagnosis was made in 2008 and he had resection of ascending colon  Second resection was of transverse colon and that was in March 2011  Patient states that even in 2008 he had stage IV disease with involvement of the nodes and lung  Post surgery he received FOLFOX plus Avastin chemotherapy for 6 months in 2008  Patient received chemotherapy again after second surgery in 2011 and that was FOLFIRI plus Avastin, again for 6 months  In 2012 he had wedge resection of right lung nodule  There was no chemotherapy at that time  He had wedge resection of right lower lung nodule in January 2014 and he states this time he had radiation therapy after wedge resection  He thinks he had a total of 3 wedge resections  Post surgery he had atrial fibrillation/flutter  He had ablation of atrial fibrillation and that was successful and he is in sinus rhythm  He had left lower lobe lobectomy on 4/14/2015  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  He was not a candidate for Erbitux or Vectibix  was on Xeloda and Avastin from June 2015 - February 2017 because of a small new lesion in the lung  He had good response to Xeloda plus Avastin  2017 stopped Xeloda plus Avastin due to 6 mm lung nodule  Discussed at that time with IR for RFA and could not be done due to technical reasonsthoracic surg in May 2017 regarding 6 mm right lower lobe nodule, stable since Feb 2017, but enlarged since Aug 2016; due to location wedge resection cannot be performed  Thoracic surgery advised SBRT   Rad onc conslt was made  Patient did not receive radiation  He had progression of disease in the right lower lung, hilar and infrahilar areas as above  Patient had not have any treatment since February 2017  New CT scan showed slight increase in 2 lung nodules in right lower lung  No mention of other distant metastatic disease  He was running hematocrit 16 and higher  He was checked for erythrocytosis/polycythemia and tests that did not favor polycythemia vera     chronic problem the left shoulder rotator cuff  He was on oxycodone but not anymore  We  had discussed Lonsurf, stivarga and cyramza plus FOLFIRI  We had decided to go with lonsurf  He did not come back for lonsurf  We had discussed consultation at [facility] was not interested in having another lung biopsy  1  Malignant neoplasm of ascending colon (HCC)    - CBC and differential  - CEA  - Comprehensive metabolic panel  - NM PET CT skull base to mid thigh; Future    2  Colon cancer metastasized to lung (HCC)    - CBC and differential  - CEA  - Comprehensive metabolic panel  - NM PET CT skull base to mid thigh; Future  - albuterol (VENTOLIN HFA) 90 mcg/act inhaler; 2 puffs every 4 hours as needed for shortness of breath  Dispense: 1 Inhaler; Refill: 1   He will have both tests and depending upon the results he would have consultation at [facility] or go with systemic therapy  We discussed the options listed above  All discussed in detail  Questions answered  Patient voiced understanding and agreement in the discussion  Counseling / Coordination of Care : 45 minutes   Greater than 50% of total time was spent with the patient and / or family counseling and / or coordination of care

## 2018-08-02 ENCOUNTER — TELEPHONE (OUTPATIENT)
Dept: HEMATOLOGY ONCOLOGY | Facility: CLINIC | Age: 58
End: 2018-08-02

## 2018-08-02 ENCOUNTER — HOSPITAL ENCOUNTER (OUTPATIENT)
Dept: RADIOLOGY | Age: 58
Discharge: HOME | End: 2018-08-02
Payer: COMMERCIAL

## 2018-08-02 ENCOUNTER — APPOINTMENT (OUTPATIENT)
Dept: LAB | Age: 58
End: 2018-08-02
Payer: COMMERCIAL

## 2018-08-02 VITALS — WEIGHT: 222.8 LBS | BODY MASS INDEX: 26.42 KG/M2

## 2018-08-02 DIAGNOSIS — C18.2 MALIGNANT NEOPLASM OF ASCENDING COLON (HCC): ICD-10-CM

## 2018-08-02 DIAGNOSIS — C78.00 COLON CANCER METASTASIZED TO LUNG (HCC): Chronic | ICD-10-CM

## 2018-08-02 DIAGNOSIS — C18.9 COLON CANCER METASTASIZED TO LUNG (HCC): Chronic | ICD-10-CM

## 2018-08-02 LAB
ALBUMIN SERPL BCP-MCNC: 3.6 G/DL (ref 3.5–5)
ALP SERPL-CCNC: 78 U/L (ref 46–116)
ALT SERPL W P-5'-P-CCNC: 23 U/L (ref 12–78)
ANION GAP SERPL CALCULATED.3IONS-SCNC: 8 MMOL/L (ref 4–13)
AST SERPL W P-5'-P-CCNC: 22 U/L (ref 5–45)
BASOPHILS # BLD AUTO: 0.03 THOUSANDS/ΜL (ref 0–0.1)
BASOPHILS NFR BLD AUTO: 1 % (ref 0–1)
BILIRUB SERPL-MCNC: 1.27 MG/DL (ref 0.2–1)
BUN SERPL-MCNC: 12 MG/DL (ref 5–25)
CALCIUM SERPL-MCNC: 9.2 MG/DL (ref 8.3–10.1)
CEA SERPL-MCNC: 30.1 NG/ML (ref 0–3)
CHLORIDE SERPL-SCNC: 103 MMOL/L (ref 100–108)
CO2 SERPL-SCNC: 27 MMOL/L (ref 21–32)
CREAT SERPL-MCNC: 0.86 MG/DL (ref 0.6–1.3)
EOSINOPHIL # BLD AUTO: 0.2 THOUSAND/ΜL (ref 0–0.61)
EOSINOPHIL NFR BLD AUTO: 3 % (ref 0–6)
ERYTHROCYTE [DISTWIDTH] IN BLOOD BY AUTOMATED COUNT: 12.9 % (ref 11.6–15.1)
GFR SERPL CREATININE-BSD FRML MDRD: 97 ML/MIN/1.73SQ M
GLUCOSE P FAST SERPL-MCNC: 79 MG/DL (ref 65–99)
GLUCOSE SERPL-MCNC: 100 MG/DL (ref 65–140)
HCT VFR BLD AUTO: 49.8 % (ref 36.5–49.3)
HGB BLD-MCNC: 17 G/DL (ref 12–17)
IMM GRANULOCYTES # BLD AUTO: 0.02 THOUSAND/UL (ref 0–0.2)
IMM GRANULOCYTES NFR BLD AUTO: 0 % (ref 0–2)
LYMPHOCYTES # BLD AUTO: 1.03 THOUSANDS/ΜL (ref 0.6–4.47)
LYMPHOCYTES NFR BLD AUTO: 18 % (ref 14–44)
MCH RBC QN AUTO: 31.7 PG (ref 26.8–34.3)
MCHC RBC AUTO-ENTMCNC: 34.1 G/DL (ref 31.4–37.4)
MCV RBC AUTO: 93 FL (ref 82–98)
MONOCYTES # BLD AUTO: 0.54 THOUSAND/ΜL (ref 0.17–1.22)
MONOCYTES NFR BLD AUTO: 9 % (ref 4–12)
NEUTROPHILS # BLD AUTO: 4.06 THOUSANDS/ΜL (ref 1.85–7.62)
NEUTS SEG NFR BLD AUTO: 69 % (ref 43–75)
NRBC BLD AUTO-RTO: 0 /100 WBCS
PLATELET # BLD AUTO: 148 THOUSANDS/UL (ref 149–390)
PMV BLD AUTO: 9.1 FL (ref 8.9–12.7)
POTASSIUM SERPL-SCNC: 4.1 MMOL/L (ref 3.5–5.3)
PROT SERPL-MCNC: 7.3 G/DL (ref 6.4–8.2)
RBC # BLD AUTO: 5.36 MILLION/UL (ref 3.88–5.62)
SODIUM SERPL-SCNC: 138 MMOL/L (ref 136–145)
WBC # BLD AUTO: 5.88 THOUSAND/UL (ref 4.31–10.16)

## 2018-08-02 PROCEDURE — 78815 PET IMAGE W/CT SKULL-THIGH: CPT

## 2018-08-02 PROCEDURE — 85025 COMPLETE CBC W/AUTO DIFF WBC: CPT | Performed by: INTERNAL MEDICINE

## 2018-08-02 PROCEDURE — 82378 CARCINOEMBRYONIC ANTIGEN: CPT | Performed by: INTERNAL MEDICINE

## 2018-08-02 PROCEDURE — 80053 COMPREHEN METABOLIC PANEL: CPT | Performed by: INTERNAL MEDICINE

## 2018-08-02 PROCEDURE — 82948 REAGENT STRIP/BLOOD GLUCOSE: CPT

## 2018-08-02 PROCEDURE — 36415 COLL VENOUS BLD VENIPUNCTURE: CPT | Performed by: INTERNAL MEDICINE

## 2018-08-02 PROCEDURE — A9552 F18 FDG: HCPCS

## 2018-08-02 RX ADMIN — IOHEXOL 5 ML: 240 INJECTION, SOLUTION INTRATHECAL; INTRAVASCULAR; INTRAVENOUS; ORAL at 11:47

## 2018-08-08 ENCOUNTER — OFFICE VISIT (OUTPATIENT)
Dept: HEMATOLOGY ONCOLOGY | Facility: CLINIC | Age: 58
End: 2018-08-08
Payer: COMMERCIAL

## 2018-08-08 VITALS
BODY MASS INDEX: 26.71 KG/M2 | HEART RATE: 67 BPM | TEMPERATURE: 97.7 F | SYSTOLIC BLOOD PRESSURE: 132 MMHG | HEIGHT: 77 IN | WEIGHT: 226.2 LBS | DIASTOLIC BLOOD PRESSURE: 78 MMHG | OXYGEN SATURATION: 96 % | RESPIRATION RATE: 16 BRPM

## 2018-08-08 DIAGNOSIS — C78.00 COLON CANCER METASTASIZED TO LUNG (HCC): Primary | Chronic | ICD-10-CM

## 2018-08-08 DIAGNOSIS — C18.9 COLON CANCER METASTASIZED TO LUNG (HCC): Primary | Chronic | ICD-10-CM

## 2018-08-08 PROCEDURE — 99215 OFFICE O/P EST HI 40 MIN: CPT | Performed by: INTERNAL MEDICINE

## 2018-08-08 NOTE — LETTER
August 8, 2018     Bennie Partidanatanael   [address] [facility]  [address] [facility]    Patient: Goran Crocker   YOB: 1960   Date of Visit: 8/8/2018       Dear Dr Rufus Easton:    Thank you for referring Anh Castañeda to me for evaluation  Below are my notes for this consultation  If you have questions, please do not hesitate to call me  I look forward to following your patient along with you  Sincerely,        Yokasta Sims MD        CC: No Recipients  Yokasta Sims MD  8/8/2018  1:10 PM  Sign at close encounter    HPI:   Goran Crocker is a 65 y o  male with  enlarging lung metastases and new lung metastases from  Colon cancer  Patient has nonproductive cough that is more in the morning and has some exertional dyspnea  No chest pain  No hemoptysis  Patient has stage IV colon cancer with metastatic disease to lungs and lymph nodes and on previous CT scan of the chest disease was in the   Right lower lung  He did not qualify for RFA  His pulmonary functions were not good enough for  right lower lobectomy  He did not go for  CyberKnife  He    had PET-CT scan recently there is progression of disease in the  lungs  See  report  Original colon cancer diagnosis was made in 2008 and he had resection of ascending colon  Second resection was of transverse colon and that was in March 2011  Patient states that even in 2008 he had stage IV disease with involvement of the nodes and lung  Post surgery he received FOLFOX plus Avastin chemotherapy for 6 months in 2008  Patient received chemotherapy again after second surgery in 2011 and that was FOLFIRI plus Avastin, again for 6 months  In 2012 he had wedge resection of right lung nodule  There was no chemotherapy at that time  He had wedge resection of right lower lung nodule in January 2014 and he states this time he had radiation therapy after wedge resection  He thinks he had a total of 3 wedge resections   Post surgery he had atrial fibrillation/flutter  He had ablation of atrial fibrillation and that was successful and he is in sinus rhythm  He had left lower lobe lobectomy on 4/14/2015  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  He was not a candidate for Erbitux or Vectibix  was on Xeloda and Avastin from June 2015 - February 2017 because of a small new lesion in the lung  He had good response to Xeloda plus Avastin  2017 stopped Xeloda plus Avastin due to 6 mm lung nodule  Discussed at that time with IR for RFA and could not be done due to technical reasonsthoracic surg in May 2017 regarding 6 mm right lower lobe nodule, stable since Feb 2017, but enlarged since Aug 2016; due to location wedge resection cannot be performed  Thoracic surgery advised SBRT  Rad onc conslt was made  Patient did not receive radiation  He had progression of disease in the right lower lung, hilar and infrahilar areas as above   Patient had not have any treatment since February 2017  Adama Grayson running hematocrit 16 and higher  He was checked for erythrocytosis/polycythemia and tests that did not favor polycythemia vera     Chronic problem the left shoulder rotator cuff  He was on oxycodone but not anymore  Current Outpatient Prescriptions:     albuterol (VENTOLIN HFA) 90 mcg/act inhaler, 2 puffs every 4 hours as needed for shortness of breath, Disp: 1 Inhaler, Rfl: 1    Allergies   Allergen Reactions    Aspirin Other (See Comments)     Nose bleed    Sulfa Antibiotics Other (See Comments)     Dizzy         No history exists  ROS:  08/08/18 Reviewed 13 systems:  Presently no headaches, seizures, dizziness, diplopia, dysphagia, hoarseness, chest pain, palpitations,  hemoptysis, abdominal pain, nausea, vomiting, change in bowel habits, melena, hematuria, fever, chills, bleeding, bone pains, skin rash, weight loss,  tiredness ,  weakness, numbness,  claudication and gait problem  No frequent infections  Not unusually sensitive to heat or cold   No swelling of the ankles  No swollen glands  Patient is anxious  Other symptoms are in HPI        /78 (BP Location: Right arm, Cuff Size: Adult)   Pulse 67   Temp 97 7 °F (36 5 °C) (Tympanic)   Resp 16   Ht 6' 5" (1 956 m)   Wt 103 kg (226 lb 3 2 oz)   SpO2 96%   BMI 26 82 kg/m²      Physical Exam:  Alert, oriented, not in distress, no icterus, no oral thrush, no palpable neck mass,   decreased breath sounds left lung base,  regular heart rate, abdomen  soft and non tender, no palpable abdominal mass, no ascites, no edema of ankles, no calf tenderness, no focal neurological deficit, no skin rash, no palpable lymphadenopathy, good arterial pulses, no clubbing  Patient is anxious  Performance status 1  IMAGING:     IMPRESSION:  1  Enlarging hypermetabolic right lower medial lung and right hilar lesions, compatible with metastases  2   New hypermetabolic left upper lung nodule also compatible with metastasis  3   Multiple left pleural-based hypermetabolic lesions, similar in configuration to prior exam, likely inflammatory related to prior talc pleurodesis  4   No new hypermetabolic metastases in the neck, abdomen, pelvis  5   Tiny left apical hydropneumothorax    Follow-up recommended      The study was marked in [facility] for immediate notification         Workstation performed: [id number]      Imaging     NM PET CT skull base to mid thigh (Order #[id number]) on 8/2/2018 - Imaging Information         LABS:  Results for orders placed or performed during the hospital encounter of 08/02/18   Fingerstick Glucose (POCT)   Result Value Ref Range    POC Glucose 100 65 - 140 mg/dl     Labs, Imaging, & Other studies:   All pertinent labs and imaging studies were personally reviewed    Lab Results   Component Value Date     08/02/2018    K 4 1 08/02/2018     08/02/2018    CO2 27 08/02/2018    ANIONGAP 8 08/02/2018    BUN 12 08/02/2018    CREATININE 0 86 08/02/2018    GLUCOSE 88 08/09/2017    GLUF 79 08/02/2018    CALCIUM 9 2 08/02/2018    AST 22 08/02/2018    ALT 23 08/02/2018    ALKPHOS 78 08/02/2018    PROT 7 3 08/02/2018    BILITOT 1 27 (H) 08/02/2018    EGFR 97 08/02/2018     Lab Results   Component Value Date    WBC 5 88 08/02/2018    HGB 17 0 08/02/2018    HCT 49 8 (H) 08/02/2018    MCV 93 08/02/2018     (L) 08/02/2018   No results found for: [address] [place] [address] and discussed with patient  Assessment and plan:  Coral Diggs is a 65 y o  male with  enlarging lung metastases and new lung metastases from  Colon cancer  Patient has nonproductive cough that is more in the morning and has some exertional dyspnea  No chest pain  No hemoptysis  Patient has stage IV colon cancer with metastatic disease to lungs and lymph nodes and on previous CT scan of the chest disease was in the   Right lower lung  He did not qualify for RFA  His pulmonary functions were not good enough for  right lower lobectomy  He did not go for  CyberKnife  He    had PET-CT scan recently there is progression of disease in the  lungs  See  report  Original colon cancer diagnosis was made in 2008 and he had resection of ascending colon  Second resection was of transverse colon and that was in March 2011  Patient states that even in 2008 he had stage IV disease with involvement of the nodes and lung  Post surgery he received FOLFOX plus Avastin chemotherapy for 6 months in 2008  Patient received chemotherapy again after second surgery in 2011 and that was FOLFIRI plus Avastin, again for 6 months  In 2012 he had wedge resection of right lung nodule  There was no chemotherapy at that time  He had wedge resection of right lower lung nodule in January 2014 and he states this time he had radiation therapy after wedge resection  He thinks he had a total of 3 wedge resections  Post surgery he had atrial fibrillation/flutter  He had ablation of atrial fibrillation and that was successful and he is in sinus rhythm   He had left lower lobe lobectomy on 4/14/2015  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  He was not a candidate for Erbitux or Vectibix  was on Xeloda and Avastin from June 2015 - February 2017 because of a small new lesion in the lung  He had good response to Xeloda plus Avastin  2017 stopped Xeloda plus Avastin due to 6 mm lung nodule  Discussed at that time with IR for RFA and could not be done due to technical reasonsthoracic surg in May 2017 regarding 6 mm right lower lobe nodule, stable since Feb 2017, but enlarged since Aug 2016; due to location wedge resection cannot be performed  Thoracic surgery advised SBRT  Rad onc conslt was made  Patient did not receive radiation  He had progression of disease in the right lower lung, hilar and infrahilar areas as above   Patient had not have any treatment since February 2017  Sammy Clementine running hematocrit 16 and higher  He was checked for erythrocytosis/polycythemia and tests that did not favor polycythemia vera     Chronic problem the left shoulder rotator cuff  He was on oxycodone but not anymore  physical examination and test results are as recorded and discussed  I showed him the report of most recent PET-CT scan and he has copy of that  He has information to take home on [place], [place], and FOLFIRI+Cyramza   Myself and my nurse gave information on these medications verbally and in printed form  He has signed consent for Lonsurf  To check for MSI,/ MMR to see he would qualify for Keytruda  I suggested getting  a biopsy on the lung mass but is not ready for that  We had a long discussion  All discussed in detail  Questions answered  Patient voiced understanding and agreement in the discussion  Counseling / Coordination of Care; 50 min   Greater than 50% of total time was spent with the patient and / or family counseling and / or coordination of care

## 2018-08-13 ENCOUNTER — LAB REQUISITION (OUTPATIENT)
Dept: LAB | Facility: HOSPITAL | Age: 58
End: 2018-08-13
Payer: COMMERCIAL

## 2018-08-13 DIAGNOSIS — C78.00 SECONDARY MALIGNANT NEOPLASM OF LUNG (HCC): ICD-10-CM

## 2018-08-13 DIAGNOSIS — C18.9 MALIGNANT NEOPLASM OF COLON (HCC): ICD-10-CM

## 2018-08-13 PROCEDURE — 88363 XM ARCHIVE TISSUE MOLEC ANAL: CPT | Performed by: PATHOLOGY

## 2018-08-15 ENCOUNTER — HOSPITAL ENCOUNTER (OUTPATIENT)
Dept: INFUSION CENTER | Facility: CLINIC | Age: 58
Discharge: HOME | End: 2018-08-15
Payer: COMMERCIAL

## 2018-08-15 PROCEDURE — 96523 IRRIG DRUG DELIVERY DEVICE: CPT

## 2018-08-15 RX ADMIN — Medication 300 UNITS: at 11:16

## 2018-08-15 NOTE — PLAN OF CARE
Problem: SAFETY ADULT  Goal: Patient will remain free of falls  INTERVENTIONS:  - Assess patient frequently for physical needs  -  Identify cognitive and physical deficits and behaviors that affect risk of falls    -  [place] fall precautions as indicated by assessment   - Educate patient/family on patient safety including physical limitations  - Instruct patient to call for assistance with activity based on assessment  - Modify environment to reduce risk of injury  - Consider OT/PT consult to assist with strengthening/mobility  Outcome: Progressing

## 2018-08-22 ENCOUNTER — OFFICE VISIT (OUTPATIENT)
Dept: HEMATOLOGY ONCOLOGY | Facility: CLINIC | Age: 58
End: 2018-08-22
Payer: COMMERCIAL

## 2018-08-22 VITALS
RESPIRATION RATE: 18 BRPM | HEART RATE: 84 BPM | WEIGHT: 225.6 LBS | HEIGHT: 77 IN | TEMPERATURE: 97.9 F | BODY MASS INDEX: 26.64 KG/M2 | DIASTOLIC BLOOD PRESSURE: 80 MMHG | SYSTOLIC BLOOD PRESSURE: 151 MMHG | OXYGEN SATURATION: 98 %

## 2018-08-22 DIAGNOSIS — C18.2 MALIGNANT NEOPLASM OF ASCENDING COLON (HCC): ICD-10-CM

## 2018-08-22 DIAGNOSIS — C78.00 COLON CANCER METASTASIZED TO LUNG (HCC): Primary | Chronic | ICD-10-CM

## 2018-08-22 DIAGNOSIS — C77.1 METASTASIS TO INTRATHORACIC LYMPH NODES (HCC): ICD-10-CM

## 2018-08-22 DIAGNOSIS — C18.9 COLON CANCER METASTASIZED TO LUNG (HCC): Primary | Chronic | ICD-10-CM

## 2018-08-22 DIAGNOSIS — R06.00 DYSPNEA ON EXERTION: ICD-10-CM

## 2018-08-22 PROCEDURE — 99214 OFFICE O/P EST MOD 30 MIN: CPT | Performed by: INTERNAL MEDICINE

## 2018-08-22 RX ORDER — ALBUTEROL SULFATE 90 UG/1
AEROSOL, METERED RESPIRATORY (INHALATION)
Qty: 1 INHALER | Refills: 0 | Status: SHIPPED | OUTPATIENT
Start: 2018-08-22 | End: 2018-10-10 | Stop reason: SDUPTHER

## 2018-08-22 NOTE — PROGRESS NOTES
HPI:  Patient has stage IV colon cancer with metastasis to lungs and lymph nodes  He is going to be started on Lonsurf  He has instructions but does not have the supply of the medicine and financial person in the hospital and my nurse are helping to get him the funding and the supply  Once he has the medicine he will call our office to let us know that he is starting  He had the schema /schedule  He has minimal nonproductive cough and exertional dyspnea with some wheezing and he uses Proventil HFA inhaler twice a day and that helps  He has for renewal of the prescription of Proventil HFA  No chest pain  No hemoptysis  Patient has stage IV colon cancer with metastatic disease to lungs and lymph nodes and on previous CT scan of the chest disease was in the [place] lower lung   He did not qualify for RFA   His pulmonary functions were not good enough for  right lower lobectomy   He did not go for [employer] [facility]   had PET-CT scan recently there is progression of disease in the  lungs  See  report  Original colon cancer diagnosis was made in 2008 and he had resection of ascending colon  Second resection was of transverse colon and that was in March 2011  Patient states that even in 2008 he had stage IV disease with involvement of the nodes and lung  Post surgery he received FOLFOX plus Avastin chemotherapy for 6 months in 2008  Patient received chemotherapy again after second surgery in 2011 and that was FOLFIRI plus Avastin, again for 6 months  In 2012 he had wedge resection of right lung nodule  There was no chemotherapy at that time  He had wedge resection of right lower lung nodule in January 2014 and he states this time he had radiation therapy after wedge resection  He thinks he had a total of 3 wedge resections  Post surgery he had atrial fibrillation/flutter  He had ablation of atrial fibrillation and that was successful and he is in sinus rhythm  He had left lower lobe lobectomy on 4/14/2015  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  He was not a candidate for Erbitux or Vectibix  was on Xeloda and Avastin from June 2015 - February 2017 because of a small new lesion in the lung  He had good response to Xeloda plus Avastin  2017 stopped Xeloda plus Avastin due to 6 mm lung nodule  Discussed at that time with IR for RFA and could not be done due to technical reasonsthoracic surg in May 2017 regarding 6 mm right lower lobe nodule, stable since Feb 2017, but enlarged since Aug 2016; due to location wedge resection cannot be performed  Thoracic surgery advised SBRT  Rad onc conslt was made  Patient did not receive radiation  He had progression of disease in the right lower lung, hilar and infrahilar areas as above   Patient had not have any treatment since February 2017        He was running hematocrit 16 and higher  He was checked for erythrocytosis/polycythemia and tests that did not favor polycythemia vera     Chronic problem with left shoulder rotator cuff  He was on oxycodone but not anymore  Current Outpatient Prescriptions:     albuterol (VENTOLIN HFA) 90 mcg/act inhaler, 2 puffs every 4 hours as needed for shortness of breath, Disp: 1 Inhaler, Rfl: 1    Allergies   Allergen Reactions    Aspirin Other (See Comments)     Nose bleed    Sulfa Antibiotics Other (See Comments)     Dizzy         No history exists  ROS:  08/22/18 Reviewed 13 systems:  Presently no headaches, seizures, dizziness, diplopia, dysphagia, hoarseness, chest pain, palpitations,hemoptysis, abdominal pain, nausea, vomiting, change in bowel habits, melena, hematuria, fever, chills, bleeding, bone pains, skin rash, weight loss,  tiredness ,  weakness, numbness,  claudication and gait problem  No frequent infections  Not unusually sensitive to heat or cold  No swelling of the ankles  No swollen glands  Patient is anxious   Other symptoms are in HPI        /80 (BP Location: Right arm, Cuff Size: Standard) Pulse 84   Temp 97 9 °F (36 6 °C) (Tympanic)   Resp 18   Ht 6' 5" (1 956 m)   Wt 102 kg (225 lb 9 6 oz)   SpO2 98%   BMI 26 75 kg/m²     Physical Exam:  Alert, oriented, not in distress, no icterus, no oral thrush, no palpable neck mass,   decreased breath sounds left lower lung,clear lung fields, regular heart rate, abdomen  soft and non tender, no palpable abdominal mass, no ascites, no edema of ankles, no calf tenderness, no focal neurological deficit, no skin rash, no palpable lymphadenopathy, good arterial pulses, no clubbing  Patient is anxious  Performance status 1  IMAGING:      LABS:  Results for orders placed or performed in visit on 08/13/18   [facility]   Result Value Ref Range    Case Report       Surgical Pathology Report                         Case: [id number]                                   Authorizing Provider:  Yokasta Sims MD        Collected:           08/13/2018 0547              Pathologist:           Migdalia Carnes MD      Received:            08/13/2018 0548              Specimen:    Lymph Node, Lymph node Level 2R, [address] case [id number], originally collected on 4/14/2015, reaccessioned for Molecular               Studies  The final diagnosis reamins unchanged  Final Diagnosis       A  Lymph Node, Lymph node Level 2R, [facility] case [id number], originally collected on 4/14/2015, reaccessioned for Molecular Studies:  -  The final diagnosis reamins unchanged  Additional Information       All controls performed with the immunohistochemical stains reported above reacted appropriately  These tests were developed and their performance characteristics determined by [facility] or [facility]  They may not be cleared or approved by the [place]  Food and Drug Administration   The FDA has determined that such clearance or approval is not necessary  These tests are used for clinical purposes  They should not be regarded as investigational or for research  This laboratory has been approved by [place] [address], designated as a high-complexity laboratory and is qualified to perform these tests  Labs, Imaging, & Other studies:   All pertinent labs and imaging studies were personally reviewed    Lab Results   Component Value Date     08/02/2018    K 4 1 08/02/2018     08/02/2018    CO2 27 08/02/2018    ANIONGAP 8 08/02/2018    BUN 12 08/02/2018    CREATININE 0 86 08/02/2018    GLUCOSE 88 08/09/2017    GLUF 79 08/02/2018    CALCIUM 9 2 08/02/2018    AST 22 08/02/2018    ALT 23 08/02/2018    ALKPHOS 78 08/02/2018    PROT 7 3 08/02/2018    BILITOT 1 27 (H) 08/02/2018    EGFR 97 08/02/2018     Lab Results   Component Value Date    WBC 5 88 08/02/2018    HGB 17 0 08/02/2018    HCT 49 8 (H) 08/02/2018    MCV 93 08/02/2018     (L) 08/02/2018   No results found for: [address] [place] [address] and discussed with patient  Assessment and plan:  Patient has stage IV colon cancer with metastasis to lungs and lymph nodes  He is going to be started on Lonsurf  He has instructions but does not have the supply of the medicine and financial person in the hospital and my nurse are helping to get him the funding and the supply  Once he has the medicine he will call our office to let us know that he is starting  He had the schema /schedule  He has minimal nonproductive cough and exertional dyspnea with some wheezing and he uses Proventil HFA inhaler twice a day and that helps  He has for renewal of the prescription of Proventil HFA  No chest pain  No hemoptysis    Patient has stage IV colon cancer with metastatic disease to lungs and lymph nodes and on previous CT scan of the chest disease was in the   Right lower lung   He did not qualify for RFA   His pulmonary functions were not good enough for  right lower lobectomy   He did not go for [facility]   had PET-CT scan recently there is progression of disease in the  lungs  See  report  Original colon cancer diagnosis was made in 2008 and he had resection of ascending colon  Second resection was of transverse colon and that was in March 2011  Patient states that even in 2008 he had stage IV disease with involvement of the nodes and lung  Post surgery he received FOLFOX plus Avastin chemotherapy for 6 months in 2008  Patient received chemotherapy again after second surgery in 2011 and that was FOLFIRI plus Avastin, again for 6 months  In 2012 he had wedge resection of right lung nodule  There was no chemotherapy at that time  He had wedge resection of right lower lung nodule in January 2014 and he states this time he had radiation therapy after wedge resection  He thinks he had a total of 3 wedge resections  Post surgery he had atrial fibrillation/flutter  He had ablation of atrial fibrillation and that was successful and he is in sinus rhythm  He had left lower lobe lobectomy on 4/14/2015  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  He was not a candidate for Erbitux or Vectibix  was on Xeloda and Avastin from June 2015 - February 2017 because of a small new lesion in the lung  He had good response to Xeloda plus Avastin  2017 stopped Xeloda plus Avastin due to 6 mm lung nodule  Discussed at that time with IR for RFA and could not be done due to technical reasonsthoracic surg in May 2017 regarding 6 mm right lower lobe nodule, stable since Feb 2017, but enlarged since Aug 2016; due to location wedge resection cannot be performed  Thoracic surgery advised SBRT  Rad onc conslt was made  Patient did not receive radiation  He had progression of disease in the right lower lung, hilar and infrahilar areas as above   Patient had not have any treatment since February 2017        He was running hematocrit 16 and higher   He was checked for erythrocytosis/polycythemia and tests that did not favor polycythemia vera     Chronic problem with left shoulder rotator cuff  He was on oxycodone but not anymore  1  Colon cancer metastasized to lung (HCC)    - albuterol (VENTOLIN HFA) 90 mcg/act inhaler; 2 puffs every 4 hours as needed for shortness of breath  Dispense: 1 Inhaler; Refill: 0  - CBC and differential; Standing  - CEA; Standing  - Comprehensive metabolic panel; Standing  - CBC and differential  - CEA  - Comprehensive metabolic panel    2  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[facility] [address] )      3  Malignant neoplasm of ascending colon (HCC)    - CBC and differential; Standing  - CEA; Standing  - Comprehensive metabolic panel; Standing  - CBC and differential  - CEA  - Comprehensive metabolic panel    4  Dyspnea on exertion   Plan is as above  Goal is prolongation of survival   All discussed in detail  Questions answered  Also discussed the importance of eating healthy foods, staying active and health screening tests        Patient voiced understanding and agreement in the discussion  Counseling / Coordination of Care   Greater than 50% of total time was spent with the patient and / or family counseling and / or coordination of care

## 2018-08-22 NOTE — LETTER
August 22, 2018     Diptibree Yagn, [address]    Patient: Goran Crocker   YOB: 1960   Date of Visit: 8/22/2018       Dear Dr Rufus Easton:    Thank you for referring Anh Castañeda to me for evaluation  Below are my notes for this consultation  If you have questions, please do not hesitate to call me  I look forward to following your patient along with you  Sincerely,        Yokasta Sims MD        CC: No Recipients  Yokasta Sims MD  8/22/2018  9:46 AM  Sign at close encounter    HPI:  Patient has stage IV colon cancer with metastasis to lungs and lymph nodes  He is going to be started on Lonsurf  He has instructions but does not have the supply of the medicine and financial person in the hospital and my nurse are helping to get him the funding and the supply  Once he has the medicine he will call our office to let us know that he is starting  He had the schema /schedule  He has minimal nonproductive cough and exertional dyspnea with some wheezing and he uses Proventil HFA inhaler twice a day and that helps  He has for renewal of the prescription of Proventil HFA  No chest pain  No hemoptysis  Patient has stage IV colon cancer with metastatic disease to lungs and lymph nodes and on previous CT scan of the chest disease was in the [place] lower lung   He did not qualify for RFA   His pulmonary functions were not good enough for  right lower lobectomy   He did not go for Juli Even   had PET-CT scan recently there is progression of disease in the  lungs  See  report  Original colon cancer diagnosis was made in 2008 and he had resection of ascending colon  Second resection was of transverse colon and that was in March 2011  Patient states that even in 2008 he had stage IV disease with involvement of the nodes and lung  Post surgery he received FOLFOX plus Avastin chemotherapy for 6 months in 2008   Patient received chemotherapy again after second surgery in 2011 and that was FOLFIRI plus Avastin, again for 6 months  In 2012 he had wedge resection of right lung nodule  There was no chemotherapy at that time  He had wedge resection of right lower lung nodule in January 2014 and he states this time he had radiation therapy after wedge resection  He thinks he had a total of 3 wedge resections  Post surgery he had atrial fibrillation/flutter  He had ablation of atrial fibrillation and that was successful and he is in sinus rhythm  He had left lower lobe lobectomy on 4/14/2015  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  He was not a candidate for Erbitux or Vectibix  was on Xeloda and Avastin from June 2015 - February 2017 because of a small new lesion in the lung  He had good response to Xeloda plus Avastin  2017 stopped Xeloda plus Avastin due to 6 mm lung nodule  Discussed at that time with IR for RFA and could not be done due to technical reasonsthoracic surg in May 2017 regarding 6 mm right lower lobe nodule, stable since Feb 2017, but enlarged since Aug 2016; due to location wedge resection cannot be performed  Thoracic surgery advised SBRT  Rad onc conslt was made  Patient did not receive radiation  He had progression of disease in the right lower lung, hilar and infrahilar areas as above   Patient had not have any treatment since February 2017        He was running hematocrit 16 and higher  He was checked for erythrocytosis/polycythemia and tests that did not favor polycythemia vera     Chronic problem with left shoulder rotator cuff  He was on oxycodone but not anymore  Current Outpatient Prescriptions:     albuterol (VENTOLIN HFA) 90 mcg/act inhaler, 2 puffs every 4 hours as needed for shortness of breath, Disp: 1 Inhaler, Rfl: 1    Allergies   Allergen Reactions    Aspirin Other (See Comments)     Nose bleed    Sulfa Antibiotics Other (See Comments)     Dizzy         No history exists         ROS:  08/22/18 Reviewed 13 systems:  Presently no headaches, seizures, dizziness, diplopia, dysphagia, hoarseness, chest pain, palpitations,hemoptysis, abdominal pain, nausea, vomiting, change in bowel habits, melena, hematuria, fever, chills, bleeding, bone pains, skin rash, weight loss,  tiredness ,  weakness, numbness,  claudication and gait problem  No frequent infections  Not unusually sensitive to heat or cold  No swelling of the ankles  No swollen glands  Patient is anxious  Other symptoms are in HPI        /80 (BP Location: Right arm, Cuff Size: Standard)   Pulse 84   Temp 97 9 °F (36 6 °C) (Tympanic)   Resp 18   Ht 6' 5" (1 956 m)   Wt 102 kg (225 lb 9 6 oz)   SpO2 98%   BMI 26 75 kg/m²      Physical Exam:  Alert, oriented, not in distress, no icterus, no oral thrush, no palpable neck mass,   decreased breath sounds left lower lung,clear lung fields, regular heart rate, abdomen  soft and non tender, no palpable abdominal mass, no ascites, no edema of ankles, no calf tenderness, no focal neurological deficit, no skin rash, no palpable lymphadenopathy, good arterial pulses, no clubbing  Patient is anxious  Performance status 1  IMAGING:      LABS:  Results for orders placed or performed in visit on 08/13/18   [facility]   Result Value Ref Range    Case Report       Surgical Pathology Report                         Case: [id number]                                   Authorizing Provider:  Derk Dakin, MD        Collected:           08/13/2018 0547              Pathologist:           Nancy Rivera MD      Received:            08/13/2018 0548              Specimen:    Lymph Node, Lymph node Level 2R, [address] [place] case [id number], originally collected on 4/14/2015, reaccessioned for Molecular               Studies  The final diagnosis reamins unchanged  Final Diagnosis       A   Lymph Node, Lymph node Level 2R, [address] case [id number], originally collected on 4/14/2015, reaccessioned for Molecular Studies:  -  The final diagnosis reamins unchanged  Additional Information       All controls performed with the immunohistochemical stains reported above reacted appropriately  These tests were developed and their performance characteristics determined by [facility] or [facility]  They may not be cleared or approved by the [place]  Food and Drug Administration  The FDA has determined that such clearance or approval is not necessary  These tests are used for clinical purposes  They should not be regarded as investigational or for research  This laboratory has been approved by [place] [address], designated as a high-complexity laboratory and is qualified to perform these tests  Labs, Imaging, & Other studies:   All pertinent labs and imaging studies were personally reviewed    Lab Results   Component Value Date     08/02/2018    K 4 1 08/02/2018     08/02/2018    CO2 27 08/02/2018    ANIONGAP 8 08/02/2018    BUN 12 08/02/2018    CREATININE 0 86 08/02/2018    GLUCOSE 88 08/09/2017    GLUF 79 08/02/2018    CALCIUM 9 2 08/02/2018    AST 22 08/02/2018    ALT 23 08/02/2018    ALKPHOS 78 08/02/2018    PROT 7 3 08/02/2018    BILITOT 1 27 (H) 08/02/2018    EGFR 97 08/02/2018     Lab Results   Component Value Date    WBC 5 88 08/02/2018    HGB 17 0 08/02/2018    HCT 49 8 (H) 08/02/2018    MCV 93 08/02/2018     (L) 08/02/2018   No results found for: [address] [place] [address] and discussed with patient  Assessment and plan:  Patient has stage IV colon cancer with metastasis to lungs and lymph nodes  He is going to be started on Lonsurf  He has instructions but does not have the supply of the medicine and financial person in the hospital and my nurse are helping to get him the funding and the supply    Once he has the medicine he will call our office to let us know that he is starting  He had the schema /schedule  He has minimal nonproductive cough and exertional dyspnea with some wheezing and he uses Proventil HFA inhaler twice a day and that helps  He has for renewal of the prescription of Proventil HFA  No chest pain  No hemoptysis  Patient has stage IV colon cancer with metastatic disease to lungs and lymph nodes and on previous CT scan of the chest disease was in the [place] lower lung   He did not qualify for RFA   His pulmonary functions were not good enough for  right lower lobectomy   He did not go for Aaliyah Rios   had PET-CT scan recently there is progression of disease in the  lungs  See  report  Original colon cancer diagnosis was made in 2008 and he had resection of ascending colon  Second resection was of transverse colon and that was in March 2011  Patient states that even in 2008 he had stage IV disease with involvement of the nodes and lung  Post surgery he received FOLFOX plus Avastin chemotherapy for 6 months in 2008  Patient received chemotherapy again after second surgery in 2011 and that was FOLFIRI plus Avastin, again for 6 months  In 2012 he had wedge resection of right lung nodule  There was no chemotherapy at that time  He had wedge resection of right lower lung nodule in January 2014 and he states this time he had radiation therapy after wedge resection  He thinks he had a total of 3 wedge resections  Post surgery he had atrial fibrillation/flutter  He had ablation of atrial fibrillation and that was successful and he is in sinus rhythm  He had left lower lobe lobectomy on 4/14/2015  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  He was not a candidate for Erbitux or Vectibix  was on Xeloda and Avastin from June 2015 - February 2017 because of a small new lesion in the lung  He had good response to Xeloda plus Avastin  2017 stopped Xeloda plus Avastin due to 6 mm lung nodule   Discussed at that time with IR for RFA and could not be done due to technical reasonsthoracic surg in May 2017 regarding 6 mm right lower lobe nodule, stable since Feb 2017, but enlarged since Aug 2016; due to location wedge resection cannot be performed  Thoracic surgery advised SBRT  Rad onc conslt was made  Patient did not receive radiation  He had progression of disease in the right lower lung, hilar and infrahilar areas as above   Patient had not have any treatment since February 2017        He was running hematocrit 16 and higher  He was checked for erythrocytosis/polycythemia and tests that did not favor polycythemia vera     Chronic problem with left shoulder rotator cuff  He was on oxycodone but not anymore  1  Colon cancer metastasized to lung (HCC)    - albuterol (VENTOLIN HFA) 90 mcg/act inhaler; 2 puffs every 4 hours as needed for shortness of breath  Dispense: 1 Inhaler; Refill: 0  - CBC and differential; Standing  - CEA; Standing  - Comprehensive metabolic panel; Standing  - CBC and differential  - CEA  - Comprehensive metabolic panel    2  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[facility] )      3  Malignant neoplasm of ascending colon (HCC)    - CBC and differential; Standing  - CEA; Standing  - Comprehensive metabolic panel; Standing  - CBC and differential  - CEA  - Comprehensive metabolic panel    4  Dyspnea on exertion   Plan is as above  Goal is prolongation of survival   All discussed in detail  Questions answered  Also discussed the importance of eating healthy foods, staying active and health screening tests        Patient voiced understanding and agreement in the discussion  Counseling / Coordination of Care   Greater than 50% of total time was spent with the patient and / or family counseling and / or coordination of care

## 2018-08-28 ENCOUNTER — DOCUMENTATION (OUTPATIENT)
Dept: HEMATOLOGY ONCOLOGY | Facility: CLINIC | Age: 58
End: 2018-08-28

## 2018-08-28 ENCOUNTER — TELEPHONE (OUTPATIENT)
Dept: HEMATOLOGY ONCOLOGY | Facility: CLINIC | Age: 58
End: 2018-08-28

## 2018-08-28 NOTE — PROGRESS NOTES
8/10/2018 received notification from clinical pt will be starting lonsurf  Called pharmacy to obtain rx ins information    Pt has [facility]  ID #[id number]  Phone # [phone]    Submitted for auth    8/14/2018 called [facility] @ 8:15 ([phone]) per auto system, auth request is still pending  8/16/2018 called [facility] @ 8:40 ([phone]) per auto system auth is still pending  Transferred to an attendant  Spoke with Blanquita Barnes who stated that they have until 8/24/2018 to review  8/20/2018 received denial from [facility]  It was denied due to pt not meeting their criteria  However, pt did meet the criteria of having the KRAS mutation  Called [facility] @10:30 ([phone]) spoke with Jerad Truong who took the information and stated she will send it for a re-review  It takes 4 days for this process  8/24/2018 called [facility] @ 1:35 ([phone]) per Jerad Truong it is still pending case #[id number]    8/28/2018 called [facility] @ 8:47 ([phone]) spoke with Sarah Cifuentes who stated the lonsurf has been approved  Jose Hernandez #[place] - [id number] is valid from 8/20/2018 through 8/20/2019  Requested an approval letter be sent  Called [facility] @ 8:52 ([phone]) per Rik Gambino, pt is not capitated to a specific pharmacy  Received approval letter  Notified clinical, financial, and [facility]

## 2018-08-28 NOTE — PROGRESS NOTES
Received a message that [facility] called back stating the pt is capitated to [facility]   requested for clinical to please forward the order along with the pt demographic, insurance information, med list, and allergy list to [facility]    The phone number I have for them is [phone] and the fax number is [phone]

## 2018-08-28 NOTE — TELEPHONE ENCOUNTER
Belen Adamson, This was tasked to me  Chief Complaint   Patient presents with    Vomiting     Vomiting for over a week, stopped then started back up yesterday.  Diarrhea last week, abdominal pain

## 2018-08-28 NOTE — TELEPHONE ENCOUNTER
[facility] called back patient is locked into a specialty pharmacy [address]  They said someone named Conor Grossman called and asked them this

## 2018-09-04 ENCOUNTER — DOCUMENTATION (OUTPATIENT)
Dept: HEMATOLOGY ONCOLOGY | Facility: CLINIC | Age: 58
End: 2018-09-04

## 2018-09-04 ENCOUNTER — TELEPHONE (OUTPATIENT)
Dept: HEMATOLOGY ONCOLOGY | Facility: CLINIC | Age: 58
End: 2018-09-04

## 2018-09-04 NOTE — TELEPHONE ENCOUNTER
[facility] called to report that the do not carry Lonsurf as well  Script was forwarded to [address]

## 2018-09-04 NOTE — TELEPHONE ENCOUNTER
received a script from Hawa Blanchard  for [facility] but [facility] is unable to fill the script since they don't carry the medication  Not sure who you would like them to forward it to  Please call  back

## 2018-09-04 NOTE — PROGRESS NOTES
Received notification from Adrien Ignacio that the pt is not capitated to [address] [place] [address] as per my previous notation  Apparently, I was misinformed by Stacie Singh at [facility]  The pt will be receiving a copay card but the card is not accepted by [address] [place] [address]  Called [facility] @ 1:46 ([phone]) spoke with Neha Grayson who stated that the pt is not capitated to [address] [place] [address]  Pt is not capitated at all  Informed him I was told the pt had to use [facility] and he stated that was an error  Informed clinical and requested that the order be forwarded to [facility]

## 2018-09-05 LAB — SCAN RESULT: NORMAL

## 2018-09-06 ENCOUNTER — TELEPHONE (OUTPATIENT)
Dept: HEMATOLOGY ONCOLOGY | Facility: CLINIC | Age: 58
End: 2018-09-06

## 2018-09-06 NOTE — TELEPHONE ENCOUNTER
Call from [address] [place] [address] regarding [address]  Asked if can speak with the pharmacist but then stated the wait time is to long and would it be OK for the pharmacist to call directly later today   OK given and informed in the office until 4:30 pm

## 2018-09-07 ENCOUNTER — TELEPHONE (OUTPATIENT)
Dept: HEMATOLOGY ONCOLOGY | Facility: CLINIC | Age: 58
End: 2018-09-07

## 2018-09-07 NOTE — TELEPHONE ENCOUNTER
CEDRICK Briceño Call from Sara Chappell [address] with [facility] called to verify [facility] script  Script reviewed

## 2018-09-10 ENCOUNTER — TELEPHONE (OUTPATIENT)
Dept: HEMATOLOGY ONCOLOGY | Facility: CLINIC | Age: 58
End: 2018-09-10

## 2018-09-10 ENCOUNTER — DOCUMENTATION (OUTPATIENT)
Dept: HEMATOLOGY ONCOLOGY | Facility: CLINIC | Age: 58
End: 2018-09-10

## 2018-09-10 NOTE — PROGRESS NOTES
9/10/2018 received notification from [facility] that ros[facility] called stating the lonsurf needs auth  Called [facility] @ 3:57 ([phone]) spoke with Matt Sharma who stated that the medication needs authorization  I explained to her that Ramona Gene was obtained and gave her the approval number  Also informed her that if a copy of the approval letter is needed, they should call me and let me know so I can send them a copy

## 2018-09-10 NOTE — TELEPHONE ENCOUNTER
PER AIMEE, THEY FAXED A FORM FOR A REQ'D PRIOR AUTH ON Friday  Lev Dynvikash # [phone]  THIS IS REQ'D FOR PT'S LONSURF 20MG    TXS

## 2018-09-11 ENCOUNTER — TELEPHONE (OUTPATIENT)
Dept: HEMATOLOGY ONCOLOGY | Facility: CLINIC | Age: 58
End: 2018-09-11

## 2018-09-11 ENCOUNTER — DOCUMENTATION (OUTPATIENT)
Dept: HEMATOLOGY ONCOLOGY | Facility: CLINIC | Age: 58
End: 2018-09-11

## 2018-09-11 NOTE — PROGRESS NOTES
9/10/2018 received notification from clinical that [facility] called stating that they can not process the order due to no auth  Called [facility] back @ 4:40 ([phone]) spoke with Ashlee Yen who stated that they are reflecting the pt having [facility] plan  I explained that the auth obtained was from [facility]  She stated that she does not have that insurance information  Gave her the auth number, pt ID #,     Checked Lani, the pt does have both [employer] health plan and Elayne Weber (pharmacy goes through [facility]) both reflect themselves as primary and active    [employer] @ 4:45 ([phone]) spoke with Reji Shen who stated that she will fax an auth form over to us  Std turn around time is 72 hrs and expedited is 24 hrs  Received form  Completed it and sent it over to Dr Alok Lopez for his review and signature  Received the form back on 9/11/2018  Submitted for auth    Received notification from clinical on 9/11/2018 that [facility] called stating [employer] #[id number] is for the [address]  Called [facility] @ 1:42 ([phone]) spoke with Rosalina Jenkins who stated the [employer] is valid from 9/11/2018 through 9/11/2019  An approval letter is to be sent  She stated [facility] can supply the drug    Called [facility] @ 1:53 ([phone]) spoke with nena who transferred me to [facility]  Informed [facility] that [employer] has been obtained for the lonsurf  She stated that they were able to get the claim to go through and the medication is scheduled to go out for delivery tomorrow      Notified clinical

## 2018-09-11 NOTE — TELEPHONE ENCOUNTER
CEDRICK Ocampo from [facility] called  Has attempted to reach pt to schedule delivery of Lonsurf but has not spoken with pt  Has left VM for pt  She requested to verify phone #'s and that was done

## 2018-09-19 ENCOUNTER — TELEPHONE (OUTPATIENT)
Dept: HEMATOLOGY ONCOLOGY | Facility: CLINIC | Age: 58
End: 2018-09-19

## 2018-09-19 DIAGNOSIS — T45.1X5A CHEMOTHERAPY INDUCED NAUSEA AND VOMITING: Primary | ICD-10-CM

## 2018-09-19 DIAGNOSIS — R11.2 CHEMOTHERAPY INDUCED NAUSEA AND VOMITING: Primary | ICD-10-CM

## 2018-09-19 RX ORDER — ONDANSETRON 4 MG/1
4 TABLET, FILM COATED ORAL EVERY 6 HOURS PRN
Qty: 30 TABLET | Refills: 1 | Status: SHIPPED | OUTPATIENT
Start: 2018-09-19 | End: 2018-12-05 | Stop reason: SDUPTHER

## 2018-09-19 NOTE — TELEPHONE ENCOUNTER
Patient just recently started his second week of Lonsurf and has been experiencing nausea that he describes as "pretty bad"  Script for 4 mg Zofran Q6 hrs was sent to the patient's pharmacy  The patient was cautioned that he will need to take stool softeners if he finds he is taking the Zofran a lot  Patient verbally understood

## 2018-09-19 NOTE — TELEPHONE ENCOUNTER
Patient called  He was told to call if he noticed any reactions from his Lonsurf medication and he is starting to have a lot of nausea  He would like to speak with Elena Dunbar about this and possibly get a script for an anti-nausea medication  He can be reached at [phone]

## 2018-09-24 ENCOUNTER — TELEPHONE (OUTPATIENT)
Dept: HEMATOLOGY ONCOLOGY | Facility: CLINIC | Age: 58
End: 2018-09-24

## 2018-09-24 NOTE — TELEPHONE ENCOUNTER
Called and left message for patient  He has an appointment with Reece Samuels on 9/26  He is missing a CBC, CEA and CMP  He is supposed to have them done every four weeks and the last one we have is from 8/9/18 in [place]  Asked him to call us back so we know if he had them done at an outside lab or if he still needs to have them done

## 2018-10-03 ENCOUNTER — TELEPHONE (OUTPATIENT)
Dept: HEMATOLOGY ONCOLOGY | Facility: CLINIC | Age: 58
End: 2018-10-03

## 2018-10-03 NOTE — TELEPHONE ENCOUNTER
[address] and spoke with a representative regarding the patient's Lonsurf prescription  Patient does have refills and according to [facility] they have been trying to get a hold of the patient since Monday, 10/1/18  Called the patient and gave him [facility]'s number, [phone], and made them him aware that if he schedules delivery with them today, he should have the [address] by Monday, 10/8/18

## 2018-10-03 NOTE — TELEPHONE ENCOUNTER
Patient rescheduled his appointment for next Wednesday 10/10/18 with Roge Langston and he is requesting we set up a port flush for the same day in [facility]  please call patient with time

## 2018-10-03 NOTE — TELEPHONE ENCOUNTER
Patient is scheduled for 10/10/18 at 2:30 pm for a port flush  Called and informed him of this appt  He stated that he has not be able to get his medication lonsurf that he is suppose to start again on Monday due to the company that he gets the meds from being unreachable  Bertha Silva RN this information and she is going to contact the company for his medication  Informed him to call our office if he has any additional questions

## 2018-10-09 ENCOUNTER — TELEPHONE (OUTPATIENT)
Dept: HEMATOLOGY ONCOLOGY | Facility: CLINIC | Age: 58
End: 2018-10-09

## 2018-10-09 NOTE — TELEPHONE ENCOUNTER
Attempted to reach pt to verify where he gets his blood work done at so I can retrieve results for tomorrow's appt with Peggy Posey

## 2018-10-10 ENCOUNTER — OFFICE VISIT (OUTPATIENT)
Dept: HEMATOLOGY ONCOLOGY | Facility: CLINIC | Age: 58
End: 2018-10-10
Payer: COMMERCIAL

## 2018-10-10 ENCOUNTER — HOSPITAL ENCOUNTER (OUTPATIENT)
Dept: INFUSION CENTER | Facility: CLINIC | Age: 58
Discharge: HOME | End: 2018-10-10
Payer: COMMERCIAL

## 2018-10-10 VITALS
RESPIRATION RATE: 18 BRPM | HEART RATE: 76 BPM | OXYGEN SATURATION: 97 % | BODY MASS INDEX: 26.68 KG/M2 | HEIGHT: 77 IN | TEMPERATURE: 98.6 F | WEIGHT: 226 LBS | SYSTOLIC BLOOD PRESSURE: 116 MMHG | DIASTOLIC BLOOD PRESSURE: 68 MMHG

## 2018-10-10 DIAGNOSIS — C18.9 COLON CANCER METASTASIZED TO LUNG (HCC): Chronic | ICD-10-CM

## 2018-10-10 DIAGNOSIS — C78.00 COLON CANCER METASTASIZED TO LUNG (HCC): Chronic | ICD-10-CM

## 2018-10-10 DIAGNOSIS — Z13.1 SCREENING FOR DIABETES MELLITUS: Primary | ICD-10-CM

## 2018-10-10 PROCEDURE — 99214 OFFICE O/P EST MOD 30 MIN: CPT | Performed by: PHYSICIAN ASSISTANT

## 2018-10-10 PROCEDURE — 96523 IRRIG DRUG DELIVERY DEVICE: CPT

## 2018-10-10 RX ORDER — ALBUTEROL SULFATE 90 UG/1
AEROSOL, METERED RESPIRATORY (INHALATION)
Qty: 1 INHALER | Refills: 0 | Status: SHIPPED | OUTPATIENT
Start: 2018-10-10 | End: 2018-12-05 | Stop reason: SDUPTHER

## 2018-10-10 RX ADMIN — Medication 300 UNITS: at 12:36

## 2018-10-10 NOTE — TELEPHONE ENCOUNTER
Pt called and had labs done about 2 weeks ago at [facility]   Pt confirmed today's appt at 11:30 am

## 2018-10-10 NOTE — PROGRESS NOTES
Hematology/Oncology Outpatient Follow-up  Sg Ramos 65 y o  male 1960 [id number]    Date:  10/10/2018      Assessment and Plan:  1  Colon cancer metastasized to lung [facility])  56-year-old male with history of stage IV adenocarcinoma of the colon as outlined below  He is presently on Lonsurf  He is tolerating well except for some intermittent nausea  This is controlled at this point with Zofran  Reviewed that continued use of Zofran may result and constipation  He will complete 3 cycles of therapy and then we will repeat imaging studies  He will follow up after repeat imaging  He will continue to have CBC and CMP following completion of Lonsurf  CEA in September 2018 was 63 2 -- previously 26 4 in March 2018  Patient previously had noted erythrocytosis  Most recent hemoglobin is 16 4     - albuterol (VENTOLIN HFA) 90 mcg/act inhaler; 2 puffs every 4 hours as needed for shortness of breath  Dispense: 1 Inhaler; Refill: 0  - CBC and differential; Standing  - Comprehensive metabolic panel; Standing  - NM PET CT skull base to mid thigh; Future  - CBC and differential  - Comprehensive metabolic panel    2  Screening for diabetes mellitus  - Hemoglobin A1C; Future    HPI:  56-year-old male with history of stage IV adenocarcinoma of the colon  Patient's original diagnosis was in 2008  He had resection of the ascending colon  Pathology showed involvement of lymph nodes and lung  Postsurgery he received FOLFOX plus Avastin for 6 months  Then, in 2011 patient had additional resection this time of the transverse colon  He received FOLFIRI plus Avastin for 6 months after surgery  He has residual grade 1 peripheral 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  In 2012 he had wedge resection for right lung nodule  No chemotherapy at that time  In January 2014 he had wedge resection of the right lower  No radiation following this      Left wrist surgery he developed AFib/atrial flutter and had ablation  June 2015-February 2017 patient was on Xeloda plus Avastin  In February 2017 patient was noted to have a new lesion of the lung  IR was unable to do RFA to this lesion  Patient was seen by thoracic surgery  Due to location, wedge resection was not able to be done  He was referred to rad onc for SBRT  May 2017 lung nodule was noted to be stable compared to February 2017 August 2017 right lower lobe pulmonary nodule had enlarged compared to May 2017  Due to enlarging nodule in May 2017, a PET/CT was performed in August 2017  This showed a 2 8 x 1 8 cm right lower medial lung mass with hypermetabolism  Aug 2017 he was referred to [place] for Cyberknife of the lung  Patient then did not return for follow up with our office until Feb 2018  At this time patient then had repeat scans to be performed  CT C/A/P in March 2018 showed enlarging right lower lobe nodular masses -- consistent with metastatic disease  He was then seen in follow-up on 03/15/2018 after his CT scan result  Patient was presented with multiple options at included surgery, RFA, radiation, CyberKnife, systemic therapy  Patient opted for consultation for RFA in thoracic surgery and then decide  Patient then did not come back to the office until July 2018  He needed up to date imaging studies and had repeat PET/CT in Aug 2018 which showed enlarging hypermetabolic right lower medial lung in right hilar lesions  New hypermetabolic left lower lung nodule  Multiple left pleural base lesions, however noted to likely be inflammatory related to prior talc pleurodesis  No new lesions in the neck, abdomen, pelvis  Due to increase in metastatic disease, patient was recommended to pursue systemic therapy  Patient began [place] in September 2018  Interval history:   He is presently on day 2, cycle 2 of Lonsurf  He is tolerating well except for some nausea  He has been taking Zofran approximately once daily   He has headache  ROS: Review of Systems   Constitutional: Positive for appetite change (always has poor appetite )  Negative for chills, fatigue, fever and unexpected weight change  Respiratory: Positive for cough (associated with being in humidity) and shortness of breath (associated with being in humidity)  Cardiovascular: Negative for chest pain and leg swelling  Gastrointestinal: Positive for nausea  Negative for abdominal pain, constipation, diarrhea and vomiting  Genitourinary: Negative for difficulty urinating, dysuria and hematuria  Musculoskeletal: Negative for arthralgias, joint swelling and myalgias  Skin: Negative  Neurological: Negative for dizziness, weakness, light-headedness, numbness and headaches  Hematological: Negative  Psychiatric/Behavioral: Negative           Past Medical History:   Diagnosis Date    Atrial fibrillation ([facility] [address] )     Colon cancer (HCC)     Metastatic carcinoma to lung [facility])        Past Surgical History:   Procedure Laterality Date    ANTERIOR CRUCIATE LIGAMENT REPAIR      ANTERIOR CRUCIATE LIGAMENT REPAIR      2  on R knee    APPENDECTOMY      ATRIAL ABLATION SURGERY      BACK SURGERY      COLON SURGERY      Ascending and transverse colon    LUNG REMOVAL, PARTIAL      RIGHT COLECTOMY      and Transverse colon resection       Social History     Social History    Marital status: /Civil Union     Spouse name: N/A    Number of children: N/A    Years of education: N/A     Social History Main Topics    Smoking status: Former Smoker     Types: Cigarettes     Quit date: 2010    Smokeless tobacco: Never Used      Comment: Cigars     Alcohol use 7 2 oz/week     12 Cans of beer per week      Comment: Socially     Drug use: No    Sexual activity: Not on file     Other Topics Concern    Not on file     Social History Narrative    Patient not questioned about family hx        Family History   Problem Relation Age of Onset    Diabetes Mother    Niko Alejandro Heart disease Mother     Heart disease Father        Allergies   Allergen Reactions    Aspirin Other (See Comments)     Nose bleed    Sulfa Antibiotics Other (See Comments)     Dizzy          Current Outpatient Prescriptions:     albuterol (VENTOLIN HFA) 90 mcg/act inhaler, 2 puffs every 4 hours as needed for shortness of breath, Disp: 1 Inhaler, Rfl: 0    ondansetron (ZOFRAN) 4 mg tablet, Take 1 tablet (4 mg total) by mouth every 6 (six) hours as needed for nausea or vomiting, Disp: 30 tablet, Rfl: 1  No current facility-administered medications for this visit  Facility-Administered Medications Ordered in Other Visits:     heparin lock flush 100 units/mL injection 300 Units, 300 Units, Intracatheter, Q1H PRN, Mark Lomeli MD      Physical Exam:  /68 (BP Location: Right arm, Cuff Size: Large)   Pulse 76   Temp 98 6 °F (37 °C) (Tympanic)   Resp 18   Ht 6' 5" (1 956 m)   Wt 103 kg (226 lb)   SpO2 97%   BMI 26 80 kg/m²     Physical Exam   Constitutional: He is oriented to person, place, and time  He appears well-developed and well-nourished  No distress  HENT:   Head: Normocephalic and atraumatic  Eyes: Conjunctivae are normal  No scleral icterus  Neck: Normal range of motion  Neck supple  Cardiovascular: Normal rate, regular rhythm and normal heart sounds  No murmur heard  Pulmonary/Chest: Effort normal and breath sounds normal  No respiratory distress  Abdominal: Soft  There is no tenderness  Musculoskeletal: Normal range of motion  He exhibits no edema or tenderness  Lymphadenopathy:     He has no cervical adenopathy  Neurological: He is alert and oriented to person, place, and time  No cranial nerve deficit  Skin: Skin is warm and dry  Psychiatric: He has a normal mood and affect  Vitals reviewed  Labs:            Patient voiced understanding and agreement in the above discussion  Aware to contact our office with questions/symptoms in the interim

## 2018-10-10 NOTE — PROGRESS NOTES
Patient here for port flush  Port accessed with good blood return, Heparin flushed per protocol  Declines AVS, left unit in stable condition

## 2018-11-09 DIAGNOSIS — C18.9: ICD-10-CM

## 2018-11-09 DIAGNOSIS — C18.2 MALIGNANT NEOPLASM OF ASCENDING COLON (HCC): Primary | ICD-10-CM

## 2018-11-09 DIAGNOSIS — C77.1: ICD-10-CM

## 2018-11-21 ENCOUNTER — HOSPITAL ENCOUNTER (OUTPATIENT)
Dept: INFUSION CENTER | Facility: CLINIC | Age: 58
Discharge: HOME | End: 2018-11-21
Payer: COMMERCIAL

## 2018-11-21 DIAGNOSIS — Z13.1 SCREENING FOR DIABETES MELLITUS: ICD-10-CM

## 2018-11-21 DIAGNOSIS — C18.2 MALIGNANT NEOPLASM OF ASCENDING COLON (HCC): ICD-10-CM

## 2018-11-21 DIAGNOSIS — C18.9 COLON CANCER METASTASIZED TO LUNG (HCC): ICD-10-CM

## 2018-11-21 DIAGNOSIS — C78.00 COLON CANCER METASTASIZED TO LUNG (HCC): ICD-10-CM

## 2018-11-21 LAB
ALBUMIN SERPL BCP-MCNC: 3.8 G/DL (ref 3.5–5.7)
ALP SERPL-CCNC: 61 U/L (ref 46–116)
ALT SERPL W P-5'-P-CCNC: 20 U/L (ref 12–78)
ANION GAP SERPL CALCULATED.3IONS-SCNC: 8 MMOL/L (ref 4–13)
ANISOCYTOSIS BLD QL SMEAR: PRESENT
AST SERPL W P-5'-P-CCNC: 25 U/L (ref 5–45)
BASOPHILS # BLD AUTO: 0 THOUSAND/UL (ref 0–0.1)
BASOPHILS NFR MAR MANUAL: 0 % (ref 0–1)
BILIRUB SERPL-MCNC: 1.2 MG/DL (ref 0.2–1)
BUN SERPL-MCNC: 10 MG/DL (ref 5–25)
CALCIUM SERPL-MCNC: 9.2 MG/DL (ref 8.3–10.1)
CEA SERPL-MCNC: 19.6 NG/ML (ref 0–3)
CHLORIDE SERPL-SCNC: 104 MMOL/L (ref 98–108)
CO2 SERPL-SCNC: 28 MMOL/L (ref 21–32)
CREAT SERPL-MCNC: 0.6 MG/DL (ref 0.6–1.3)
EOSINOPHIL # BLD AUTO: 0 THOUSAND/UL (ref 0–0.61)
EOSINOPHIL NFR BLD MANUAL: 0 % (ref 0–6)
ERYTHROCYTE [DISTWIDTH] IN BLOOD BY AUTOMATED COUNT: 15.6 % (ref 11.6–15.1)
EST. AVERAGE GLUCOSE BLD GHB EST-MCNC: 88 MG/DL
GFR SERPL CREATININE-BSD FRML MDRD: 112 ML/MIN/1.73SQ M
GLUCOSE SERPL-MCNC: 94 MG/DL (ref 65–140)
HBA1C MFR BLD: 4.7 % (ref 4.2–6.3)
HCT VFR BLD AUTO: 36.7 % (ref 36.5–49.3)
HGB BLD-MCNC: 12.5 G/DL (ref 12–17)
LYMPHOCYTES # BLD AUTO: 0.99 THOUSAND/UL (ref 0.6–4.47)
LYMPHOCYTES # BLD AUTO: 32 % (ref 14–44)
MCH RBC QN AUTO: 34.2 PG (ref 26.8–34.3)
MCHC RBC AUTO-ENTMCNC: 34.1 G/DL (ref 31.4–37.4)
MCV RBC AUTO: 100 FL (ref 82–98)
MONOCYTES # BLD AUTO: 0.12 THOUSAND/UL (ref 0–1.22)
MONOCYTES NFR BLD AUTO: 4 % (ref 4–12)
NEUTS BAND NFR BLD MANUAL: 2 % (ref 0–8)
NEUTS SEG # BLD: 1.98 THOUSAND/UL (ref 1.81–6.82)
NEUTS SEG NFR BLD AUTO: 62 % (ref 43–75)
PLATELET # BLD AUTO: 123 THOUSANDS/UL (ref 149–390)
PLATELET BLD QL SMEAR: ABNORMAL
PMV BLD AUTO: 5.6 FL (ref 8.9–12.7)
POTASSIUM SERPL-SCNC: 4 MMOL/L (ref 3.5–5.3)
PROT SERPL-MCNC: 6.4 G/DL (ref 6.4–8.2)
RBC # BLD AUTO: 3.66 MILLION/UL (ref 3.88–5.62)
SODIUM SERPL-SCNC: 140 MMOL/L (ref 136–145)
TOTAL CELLS COUNTED SPEC: 100
WBC # BLD AUTO: 3.1 THOUSAND/UL (ref 4.31–10.16)
WBC NRBC COR # BLD: 3.1 THOUSAND/UL (ref 4.31–10.16)

## 2018-11-21 PROCEDURE — 80053 COMPREHEN METABOLIC PANEL: CPT

## 2018-11-21 PROCEDURE — 82378 CARCINOEMBRYONIC ANTIGEN: CPT

## 2018-11-21 PROCEDURE — 85007 BL SMEAR W/DIFF WBC COUNT: CPT

## 2018-11-21 PROCEDURE — 83036 HEMOGLOBIN GLYCOSYLATED A1C: CPT

## 2018-11-21 PROCEDURE — 85027 COMPLETE CBC AUTOMATED: CPT

## 2018-11-21 RX ADMIN — Medication 300 UNITS: at 09:05

## 2018-11-21 NOTE — PLAN OF CARE
Problem: INFECTION - ADULT  Goal: Absence or prevention of progression during hospitalization  INTERVENTIONS:  - Assess and monitor for signs and symptoms of infection  - Monitor lab/diagnostic results  - Monitor all insertion sites, i e  indwelling lines, tubes, and drains  - Monitor endotracheal (as able) and nasal secretions for changes in amount and color  - Fraser appropriate cooling/warming therapies per order  - Administer medications as ordered  - Instruct and encourage patient and family to use good hand hygiene technique  - Identify and instruct in appropriate isolation precautions for identified infection/condition  Outcome: Progressing    Goal: Absence of fever/infection during neutropenic period  INTERVENTIONS:  - Monitor WBC  - Implement neutropenic guidelines  Outcome: Progressing

## 2018-11-29 ENCOUNTER — TELEPHONE (OUTPATIENT)
Dept: HEMATOLOGY ONCOLOGY | Facility: CLINIC | Age: 58
End: 2018-11-29

## 2018-11-29 DIAGNOSIS — C18.2 MALIGNANT NEOPLASM OF ASCENDING COLON (HCC): ICD-10-CM

## 2018-11-29 DIAGNOSIS — C77.1: ICD-10-CM

## 2018-11-29 DIAGNOSIS — C18.9: ICD-10-CM

## 2018-12-03 ENCOUNTER — HOSPITAL ENCOUNTER (OUTPATIENT)
Dept: RADIOLOGY | Age: 58
Discharge: HOME | End: 2018-12-03
Payer: COMMERCIAL

## 2018-12-03 VITALS — WEIGHT: 227 LBS | BODY MASS INDEX: 26.92 KG/M2

## 2018-12-03 DIAGNOSIS — C18.9 COLON CANCER METASTASIZED TO LUNG (HCC): Chronic | ICD-10-CM

## 2018-12-03 DIAGNOSIS — C78.00 COLON CANCER METASTASIZED TO LUNG (HCC): Chronic | ICD-10-CM

## 2018-12-03 LAB — GLUCOSE SERPL-MCNC: 84 MG/DL (ref 65–140)

## 2018-12-03 PROCEDURE — 82948 REAGENT STRIP/BLOOD GLUCOSE: CPT

## 2018-12-03 PROCEDURE — 78815 PET IMAGE W/CT SKULL-THIGH: CPT

## 2018-12-03 PROCEDURE — A9552 F18 FDG: HCPCS

## 2018-12-05 ENCOUNTER — OFFICE VISIT (OUTPATIENT)
Dept: HEMATOLOGY ONCOLOGY | Facility: CLINIC | Age: 58
End: 2018-12-05
Payer: COMMERCIAL

## 2018-12-05 VITALS
DIASTOLIC BLOOD PRESSURE: 80 MMHG | OXYGEN SATURATION: 99 % | BODY MASS INDEX: 26.73 KG/M2 | WEIGHT: 226.4 LBS | RESPIRATION RATE: 18 BRPM | TEMPERATURE: 97.8 F | SYSTOLIC BLOOD PRESSURE: 120 MMHG | HEART RATE: 70 BPM | HEIGHT: 77 IN

## 2018-12-05 DIAGNOSIS — C78.00 COLON CANCER METASTASIZED TO LUNG (HCC): Chronic | ICD-10-CM

## 2018-12-05 DIAGNOSIS — C18.9 COLON CANCER METASTASIZED TO LUNG (HCC): Chronic | ICD-10-CM

## 2018-12-05 DIAGNOSIS — T45.1X5A CHEMOTHERAPY INDUCED NAUSEA AND VOMITING: ICD-10-CM

## 2018-12-05 DIAGNOSIS — R11.2 CHEMOTHERAPY INDUCED NAUSEA AND VOMITING: ICD-10-CM

## 2018-12-05 PROCEDURE — 99214 OFFICE O/P EST MOD 30 MIN: CPT | Performed by: INTERNAL MEDICINE

## 2018-12-05 RX ORDER — ALBUTEROL SULFATE 90 UG/1
AEROSOL, METERED RESPIRATORY (INHALATION)
Qty: 1 INHALER | Refills: 2 | Status: SHIPPED | OUTPATIENT
Start: 2018-12-05 | End: 2019-02-13 | Stop reason: SDUPTHER

## 2018-12-05 RX ORDER — ONDANSETRON 4 MG/1
4 TABLET, FILM COATED ORAL EVERY 6 HOURS PRN
Qty: 50 TABLET | Refills: 2 | Status: SHIPPED | OUTPATIENT
Start: 2018-12-05 | End: 2020-08-22 | Stop reason: HOSPADM

## 2018-12-05 NOTE — LETTER
December 5, 2018     Christian Monte DO  [address] [facility]  [address]    Patient: Ernst Olmstead   YOB: 1960   Date of Visit: 12/5/2018       Dear Dr Duy Hightower:    Thank you for referring Jonnieda Figures to me for evaluation  Below are my notes for this consultation  If you have questions, please do not hesitate to call me  I look forward to following your patient along with you  Sincerely,        Gail Mcbride MD        CC: No Recipients  Gail Mcbride MD  12/5/2018  2:25 PM  Sign at close encounter    HPI:  Follow-up visit for colon cancer with lung metastasis and patient is on [address] and he is responding some  CEA level has come down from 30 to 19  Mixed picture on PET-CT scan, some response and some progression     Patient has some tiredness  When he is stressed he has some wheezing and shortness of breath but not otherwise  No cough  No chest pain  No hemoptysis  Occasionally nausea  He has requested renewal prescriptions for Zofran and Ventolin inhaler  52-year-old male with history of stage IV adenocarcinoma of the colon  Patient's original diagnosis was in 2008  He had resection of the ascending colon  Pathology showed involvement of lymph nodes and lung  Postsurgery he received FOLFOX plus Avastin for 6 months      Then, in 2011 patient had additional resection this time of the transverse colon  He received FOLFIRI plus Avastin for 6 months after surgery  He has residual grade 1 peripheral 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      In 2012 he had wedge resection for right lung nodule  No chemotherapy at that time  In January 2014 he had wedge resection of the right lower  No radiation following this      Left wrist surgery he developed AFib/atrial flutter and had ablation      June 2015-February 2017 patient was on Xeloda plus Avastin  In February 2017 patient was noted to have a new lesion of the lung    IR was unable to do RFA to this lesion  Patient was seen by thoracic surgery  Due to location, wedge resection was not able to be done  He was referred to rad onc for SBRT       May 2017 lung nodule was noted to be stable compared to February 2017 August 2017 right lower lobe pulmonary nodule had enlarged compared to May 2017      Due to enlarging nodule in May 2017, a PET/CT was performed in August 2017  This showed a 2 8 x 1 8 cm right lower medial lung mass with hypermetabolism      Aug 2017 he was referred to [place] for Cyberknife of the lung      Patient then did not return for follow up with our office until Feb 2018  At this time patient then had repeat scans to be performed  CT C/A/P in March 2018 showed enlarging right lower lobe nodular masses -- consistent with metastatic disease      He was then seen in follow-up on 03/15/2018 after his CT scan result  Patient was presented with multiple options at included surgery, RFA, radiation, CyberKnife, systemic therapy  Patient opted for consultation for RFA in thoracic surgery and then decide      Patient then did not come back to the office until July 2018  He needed up to date imaging studies and had repeat PET/CT in Aug 2018 which showed enlarging hypermetabolic right lower medial lung in right hilar lesions  New hypermetabolic left lower lung nodule  Multiple left pleural base lesions, however noted to likely be inflammatory related to prior talc pleurodesis  No new lesions in the neck, abdomen, pelvis      Due to increase in metastatic disease, patient was recommended to pursue systemic therapy  Patient began [place] in September 2018           Current Outpatient Prescriptions:     albuterol (VENTOLIN HFA) 90 mcg/act inhaler, 2 puffs every 4 hours as needed for shortness of breath, Disp: 1 Inhaler, Rfl: 0    ondansetron (ZOFRAN) 4 mg tablet, Take 1 tablet (4 mg total) by mouth every 6 (six) hours as needed for nausea or vomiting, Disp: 30 tablet, Rfl: 1   Trifluridine-Tipiracil (LONSURF) 20-8  19 MG TABS, Take 4 tablets (80 mg) BID on Days 1 through 5 and 8 through 12 every 28 days, Disp: 80 tablet, Rfl: 5    Allergies   Allergen Reactions    Aspirin Other (See Comments)     Nose bleed    Sulfa Antibiotics Other (See Comments)     Dizzy         No history exists  ROS:  12/05/18 Reviewed 13 systems:  Presently no other neurological, cardiac, pulmonary, GI and  symptoms other than listed above in HPI  Other symptoms as in HPI  No other symptoms like  fever, chills, bleeding, bone pains, skin rash, weight loss, arthritic symptoms,  weakness, numbness,  claudication and gait problem  No frequent infections  Not unusually sensitive to heat or cold  No swelling of the ankles  No swollen glands  Patient is anxious  Other symptoms are in HPI        /80 (BP Location: Right arm, Patient Position: Sitting, Cuff Size: Adult)   Pulse 70   Temp 97 8 °F (36 6 °C)   Resp 18   Ht 6' 4 75" (1 949 m)   Wt 103 kg (226 lb 6 4 oz)   SpO2 99%   BMI 27 02 kg/m²      Physical Exam:    Patient is alert and oriented  He is not in distress  Vital signs are stable  There is no icterus  There is no oral thrush  No palpable neck mass  Lung fields are clear to percussion and auscultation  Heart rate is regular  Abdomen is    soft and non tender, no palpable abdominal mass, no ascites, no edema of ankles, no calf tenderness, no focal neurological deficit, no skin rash, no palpable lymphadenopathy, good arterial pulses, no clubbing  Patient is anxious  Performance status 1  IMAGING:  IMPRESSION:  1  Confluent hypermetabolic right lower medial lesion demonstrates decreased size and hypermetabolism, however viable tumor likely remains  Right hilar lesion also appears improved  2   Mildly increased size and hypermetabolism of metastatic left upper lung nodule    3   New hypermetabolic densities in the anterior right middle lobe, anterior right upper lobe, and along the left heart border may be inflammatory/infectious or metastatic  These may be reassessed on follow-up exam   4   No new hypermetabolic metastases in the neck, abdomen, pelvis               Workstation performed: [id number]      Imaging     NM PET CT skull base to mid thigh (Order #[id number]) on 12/3/2018 - Imaging Information       LABS:  Results for orders placed or performed during the hospital encounter of 12/03/18   Fingerstick Glucose (POCT)   Result Value Ref Range    POC Glucose 84 65 - 140 mg/dl     Labs, Imaging, & Other studies:   All pertinent labs and imaging studies were personally reviewed    Lab Results   Component Value Date     10/19/2015    K 4 0 11/21/2018     11/21/2018    CO2 28 11/21/2018    ANIONGAP 8 10/19/2015    BUN 10 11/21/2018    CREATININE 0 60 11/21/2018    GLUCOSE 92 10/19/2015    GLUF 79 08/02/2018    CALCIUM 9 2 11/21/2018    AST 25 11/21/2018    ALT 20 11/21/2018    ALKPHOS 61 11/21/2018    PROT 7 1 10/19/2015    BILITOT 1 24 (H) 10/19/2015    EGFR 112 11/21/2018     Lab Results   Component Value Date    WBC 3 10 (L) 11/21/2018    HGB 12 5 11/21/2018    HCT 36 7 11/21/2018     (H) 11/21/2018     (L) 11/21/2018    Absolute granulocyte 1980  CEA has come down from 30 1 to 19 6 a CT  Reviewed and discussed with patient  Assessment and plan: Follow-up visit for colon cancer with lung metastasis and patient is on Lonsurf and he is responding some  CEA level has come down from 30 to 19  Mixed picture on PET-CT scan, some response and some progression     Patient has some tiredness  When he is stressed he has some wheezing and shortness of breath but not otherwise  No cough  No chest pain  No hemoptysis  Occasionally nausea  He has requested renewal prescriptions for Zofran and Ventolin inhaler  46-year-old male with history of stage IV adenocarcinoma of the colon  Patient's original diagnosis was in 2008    He had resection of the ascending colon  Pathology showed involvement of lymph nodes and lung  Postsurgery he received FOLFOX plus Avastin for 6 months      Then, in 2011 patient had additional resection this time of the transverse colon  He received FOLFIRI plus Avastin for 6 months after surgery  He has residual grade 1 peripheral 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      In 2012 he had wedge resection for right lung nodule  No chemotherapy at that time  In January 2014 he had wedge resection of the right lower  No radiation following this      Left wrist surgery he developed AFib/atrial flutter and had ablation      June 2015-February 2017 patient was on Xeloda plus Avastin  In February 2017 patient was noted to have a new lesion of the lung  IR was unable to do RFA to this lesion  Patient was seen by thoracic surgery  Due to location, wedge resection was not able to be done  He was referred to rad onc for SBRT       May 2017 lung nodule was noted to be stable compared to February 2017 August 2017 right lower lobe pulmonary nodule had enlarged compared to May 2017      Due to enlarging nodule in May 2017, a PET/CT was performed in August 2017  This showed a 2 8 x 1 8 cm right lower medial lung mass with hypermetabolism      Aug 2017 he was referred to [facility] for Cyberknife of the lung      Patient then did not return for follow up with our office until Feb 2018  At this time patient then had repeat scans to be performed  CT C/A/P in March 2018 showed enlarging right lower lobe nodular masses -- consistent with metastatic disease      He was then seen in follow-up on 03/15/2018 after his CT scan result  Patient was presented with multiple options at included surgery, RFA, radiation, CyberKnife, systemic therapy  Patient opted for consultation for RFA in thoracic surgery and then decide      Patient then did not come back to the office until July 2018    He needed up to date imaging studies and had repeat PET/CT in Aug 2018 which showed enlarging hypermetabolic right lower medial lung in right hilar lesions  New hypermetabolic left lower lung nodule  Multiple left pleural base lesions, however noted to likely be inflammatory related to prior talc pleurodesis  No new lesions in the neck, abdomen, pelvis      Due to increase in metastatic disease, patient was recommended to pursue systemic therapy  Patient began [place] in September 2018  Lexx Davila Physical examination and test results are as recorded and discussed  No change in therapy at this time  Follow-up CT scan of chest prior to next visit in 2 months  Renewed Zofran and Ventolin inhaler  He will continue to take Lonsurf as before He has ongoing paper for blood tests  All discussed in detail  Questions answered  Discussed the importance of eating healthy foods, staying active and health screening tests  He is capable of self-care  1  Chemotherapy induced nausea and vomiting    - ondansetron (ZOFRAN) 4 mg tablet; Take 1 tablet (4 mg total) by mouth every 6 (six) hours as needed for nausea or vomiting  Dispense: 50 tablet; Refill: 2    2  Colon cancer metastasized to lung (HCC)    - albuterol (VENTOLIN HFA) 90 mcg/act inhaler; 2 puffs every 4 hours as needed for shortness of breath  No more than 4 times a day  Dispense: 1 Inhaler; Refill: 2  - CT chest wo contrast; Future        Patient voiced understanding and agreement in the discussion  Counseling / Coordination of Care   Greater than 50% of total time was spent with the patient and / or family counseling and / or coordination of care

## 2018-12-05 NOTE — PROGRESS NOTES
HPI:  Follow-up visit for colon cancer with lung metastasis and patient is on Lonsurf and he is responding some  CEA level has come down from 30 to 19  Mixed picture on PET-CT scan, some response and some progression     Patient has some tiredness  When he is stressed he has some wheezing and shortness of breath but not otherwise  No cough  No chest pain  No hemoptysis  Occasionally nausea  He has requested renewal prescriptions for Zofran and Ventolin inhaler  28-year-old male with history of stage IV adenocarcinoma of the colon  Patient's original diagnosis was in 2008  He had resection of the ascending colon  Pathology showed involvement of lymph nodes and lung  Postsurgery he received FOLFOX plus Avastin for 6 months      Then, in 2011 patient had additional resection this time of the transverse colon  He received FOLFIRI plus Avastin for 6 months after surgery  He has residual grade 1 peripheral 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      In 2012 he had wedge resection for right lung nodule  No chemotherapy at that time  In January 2014 he had wedge resection of the right lower  No radiation following this      Left wrist surgery he developed AFib/atrial flutter and had ablation      June 2015-February 2017 patient was on Xeloda plus Avastin  In February 2017 patient was noted to have a new lesion of the lung  IR was unable to do RFA to this lesion  Patient was seen by thoracic surgery  Due to location, wedge resection was not able to be done  He was referred to rad onc for SBRT       May 2017 lung nodule was noted to be stable compared to February 2017 August 2017 right lower lobe pulmonary nodule had enlarged compared to May 2017      Due to enlarging nodule in May 2017, a PET/CT was performed in August 2017    This showed a 2 8 x 1 8 cm right lower medial lung mass with hypermetabolism      Aug 2017 he was referred to [place] for Cyberknife of the lung      Patient then did not return for follow up with our office until Feb 2018  At this time patient then had repeat scans to be performed  CT C/A/P in March 2018 showed enlarging right lower lobe nodular masses -- consistent with metastatic disease      He was then seen in follow-up on 03/15/2018 after his CT scan result  Patient was presented with multiple options at included surgery, RFA, radiation, CyberKnife, systemic therapy  Patient opted for consultation for RFA in thoracic surgery and then decide      Patient then did not come back to the office until July 2018  He needed up to date imaging studies and had repeat PET/CT in Aug 2018 which showed enlarging hypermetabolic right lower medial lung in right hilar lesions  New hypermetabolic left lower lung nodule  Multiple left pleural base lesions, however noted to likely be inflammatory related to prior talc pleurodesis  No new lesions in the neck, abdomen, pelvis      Due to increase in metastatic disease, patient was recommended to pursue systemic therapy  Patient began [place] in September 2018  Current Outpatient Prescriptions:     albuterol (VENTOLIN HFA) 90 mcg/act inhaler, 2 puffs every 4 hours as needed for shortness of breath, Disp: 1 Inhaler, Rfl: 0    ondansetron (ZOFRAN) 4 mg tablet, Take 1 tablet (4 mg total) by mouth every 6 (six) hours as needed for nausea or vomiting, Disp: 30 tablet, Rfl: 1    Trifluridine-Tipiracil (LONSURF) 20-8  19 MG TABS, Take 4 tablets (80 mg) BID on Days 1 through 5 and 8 through 12 every 28 days, Disp: 80 tablet, Rfl: 5    Allergies   Allergen Reactions    Aspirin Other (See Comments)     Nose bleed    Sulfa Antibiotics Other (See Comments)     Dizzy         No history exists  ROS:  12/05/18 Reviewed 13 systems:  Presently no other neurological, cardiac, pulmonary, GI and  symptoms other than listed above in HPI  Other symptoms as in HPI    No other symptoms like  fever, chills, bleeding, bone pains, skin rash, weight loss, arthritic symptoms,  weakness, numbness,  claudication and gait problem  No frequent infections  Not unusually sensitive to heat or cold  No swelling of the ankles  No swollen glands  Patient is anxious  Other symptoms are in HPI        /80 (BP Location: Right arm, Patient Position: Sitting, Cuff Size: Adult)   Pulse 70   Temp 97 8 °F (36 6 °C)   Resp 18   Ht 6' 4 75" (1 949 m)   Wt 103 kg (226 lb 6 4 oz)   SpO2 99%   BMI 27 02 kg/m²     Physical Exam:    Patient is alert and oriented  He is not in distress  Vital signs are stable  There is no icterus  There is no oral thrush  No palpable neck mass  Lung fields are clear to percussion and auscultation  Heart rate is regular  Abdomen is    soft and non tender, no palpable abdominal mass, no ascites, no edema of ankles, no calf tenderness, no focal neurological deficit, no skin rash, no palpable lymphadenopathy, good arterial pulses, no clubbing  Patient is anxious  Performance status 1  IMAGING:  IMPRESSION:  1  Confluent hypermetabolic right lower medial lesion demonstrates decreased size and hypermetabolism, however viable tumor likely remains  Right hilar lesion also appears improved  2   Mildly increased size and hypermetabolism of metastatic left upper lung nodule  3   New hypermetabolic densities in the anterior right middle lobe, anterior right upper lobe, and along the left heart border may be inflammatory/infectious or metastatic    These may be reassessed on follow-up exam   4   No new hypermetabolic metastases in the neck, abdomen, pelvis               Workstation performed: [id number]      Imaging     NM PET CT skull base to mid thigh (Order #[id number]) on 12/3/2018 - Imaging Information       LABS:  Results for orders placed or performed during the hospital encounter of 12/03/18   Fingerstick Glucose (POCT)   Result Value Ref Range    POC Glucose 84 65 - 140 mg/dl     Labs, Imaging, & Other studies:   All pertinent labs and imaging studies were personally reviewed    Lab Results   Component Value Date     10/19/2015    K 4 0 11/21/2018     11/21/2018    CO2 28 11/21/2018    ANIONGAP 8 10/19/2015    BUN 10 11/21/2018    CREATININE 0 60 11/21/2018    GLUCOSE 92 10/19/2015    GLUF 79 08/02/2018    CALCIUM 9 2 11/21/2018    AST 25 11/21/2018    ALT 20 11/21/2018    ALKPHOS 61 11/21/2018    PROT 7 1 10/19/2015    BILITOT 1 24 (H) 10/19/2015    EGFR 112 11/21/2018     Lab Results   Component Value Date    WBC 3 10 (L) 11/21/2018    HGB 12 5 11/21/2018    HCT 36 7 11/21/2018     (H) 11/21/2018     (L) 11/21/2018    Absolute granulocyte 1980  CEA has come down from 30 1 to 19 6 a CT  Reviewed and discussed with patient  Assessment and plan: Follow-up visit for colon cancer with lung metastasis and patient is on Lonsurf and he is responding some  CEA level has come down from 30 to 19  Mixed picture on PET-CT scan, some response and some progression     Patient has some tiredness  When he is stressed he has some wheezing and shortness of breath but not otherwise  No cough  No chest pain  No hemoptysis  Occasionally nausea  He has requested renewal prescriptions for Zofran and Ventolin inhaler  61-year-old male with history of stage IV adenocarcinoma of the colon  Patient's original diagnosis was in 2008  He had resection of the ascending colon  Pathology showed involvement of lymph nodes and lung  Postsurgery he received FOLFOX plus Avastin for 6 months      Then, in 2011 patient had additional resection this time of the transverse colon  He received FOLFIRI plus Avastin for 6 months after surgery  He has residual grade 1 peripheral 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      In 2012 he had wedge resection for right lung nodule  No chemotherapy at that time  In January 2014 he had wedge resection of the right lower     No radiation following this      Left wrist surgery he developed AFib/atrial flutter and had ablation      June 2015-February 2017 patient was on Xeloda plus Avastin  In February 2017 patient was noted to have a new lesion of the lung  IR was unable to do RFA to this lesion  Patient was seen by thoracic surgery  Due to location, wedge resection was not able to be done  He was referred to rad onc for SBRT       May 2017 lung nodule was noted to be stable compared to February 2017 August 2017 right lower lobe pulmonary nodule had enlarged compared to May 2017      Due to enlarging nodule in May 2017, a PET/CT was performed in August 2017  This showed a 2 8 x 1 8 cm right lower medial lung mass with hypermetabolism      Aug 2017 he was referred to [place] for Cyberknife of the lung      Patient then did not return for follow up with our office until Feb 2018  At this time patient then had repeat scans to be performed  CT C/A/P in March 2018 showed enlarging right lower lobe nodular masses -- consistent with metastatic disease      He was then seen in follow-up on 03/15/2018 after his CT scan result  Patient was presented with multiple options at included surgery, RFA, radiation, CyberKnife, systemic therapy  Patient opted for consultation for RFA in thoracic surgery and then decide      Patient then did not come back to the office until July 2018  He needed up to date imaging studies and had repeat PET/CT in Aug 2018 which showed enlarging hypermetabolic right lower medial lung in right hilar lesions  New hypermetabolic left lower lung nodule  Multiple left pleural base lesions, however noted to likely be inflammatory related to prior talc pleurodesis  No new lesions in the neck, abdomen, pelvis      Due to increase in metastatic disease, patient was recommended to pursue systemic therapy  Patient began [place] in September 2018  [place] Organ Physical examination and test results are as recorded and discussed  No change in therapy at this time    Follow-up CT scan of chest prior to next visit in 2 months  Renewed Zofran and Ventolin inhaler  He will continue to take Lonsurf as before He has ongoing paper for blood tests  All discussed in detail  Questions answered  Discussed the importance of eating healthy foods, staying active and health screening tests  He is capable of self-care  1  Chemotherapy induced nausea and vomiting    - ondansetron (ZOFRAN) 4 mg tablet; Take 1 tablet (4 mg total) by mouth every 6 (six) hours as needed for nausea or vomiting  Dispense: 50 tablet; Refill: 2    2  Colon cancer metastasized to lung (HCC)    - albuterol (VENTOLIN HFA) 90 mcg/act inhaler; 2 puffs every 4 hours as needed for shortness of breath  No more than 4 times a day  Dispense: 1 Inhaler; Refill: 2  - CT chest wo contrast; Future        Patient voiced understanding and agreement in the discussion  Counseling / Coordination of Care   Greater than 50% of total time was spent with the patient and / or family counseling and / or coordination of care

## 2019-01-02 ENCOUNTER — HOSPITAL ENCOUNTER (OUTPATIENT)
Dept: INFUSION CENTER | Facility: CLINIC | Age: 59
Discharge: HOME | End: 2019-01-02
Payer: COMMERCIAL

## 2019-01-02 DIAGNOSIS — C18.9 COLON CANCER METASTASIZED TO LUNG (HCC): Chronic | ICD-10-CM

## 2019-01-02 DIAGNOSIS — C78.00 COLON CANCER METASTASIZED TO LUNG (HCC): Chronic | ICD-10-CM

## 2019-01-02 LAB
ALBUMIN SERPL BCP-MCNC: 3.5 G/DL (ref 3.5–5.7)
ALP SERPL-CCNC: 74 U/L (ref 46–116)
ALT SERPL W P-5'-P-CCNC: 20 U/L (ref 12–78)
ANION GAP SERPL CALCULATED.3IONS-SCNC: 8 MMOL/L (ref 4–13)
AST SERPL W P-5'-P-CCNC: 25 U/L (ref 5–45)
BASOPHILS # BLD AUTO: 0.01 THOUSANDS/ΜL (ref 0–0.1)
BASOPHILS NFR BLD AUTO: 1 % (ref 0–1)
BILIRUB SERPL-MCNC: 1.4 MG/DL (ref 0.2–1)
BUN SERPL-MCNC: 9 MG/DL (ref 5–25)
CALCIUM SERPL-MCNC: 9.4 MG/DL (ref 8.3–10.1)
CHLORIDE SERPL-SCNC: 107 MMOL/L (ref 98–108)
CO2 SERPL-SCNC: 28 MMOL/L (ref 21–32)
CREAT SERPL-MCNC: 1.2 MG/DL (ref 0.6–1.3)
EOSINOPHIL # BLD AUTO: 0.05 THOUSAND/ΜL (ref 0–0.61)
EOSINOPHIL NFR BLD AUTO: 2 % (ref 0–6)
ERYTHROCYTE [DISTWIDTH] IN BLOOD BY AUTOMATED COUNT: 14.1 % (ref 11.6–15.1)
GFR SERPL CREATININE-BSD FRML MDRD: 67 ML/MIN/1.73SQ M
GLUCOSE SERPL-MCNC: 92 MG/DL (ref 65–140)
HCT VFR BLD AUTO: 38.1 % (ref 36.5–49.3)
HGB BLD-MCNC: 13.7 G/DL (ref 12–17)
LYMPHOCYTES # BLD AUTO: 0.7 THOUSANDS/ΜL (ref 0.6–4.47)
LYMPHOCYTES NFR BLD AUTO: 33 % (ref 14–44)
MCH RBC QN AUTO: 36.3 PG (ref 26.8–34.3)
MCHC RBC AUTO-ENTMCNC: 36 G/DL (ref 31.4–37.4)
MCV RBC AUTO: 101 FL (ref 82–98)
MONOCYTES # BLD AUTO: 0.3 THOUSAND/ΜL (ref 0.17–1.22)
MONOCYTES NFR BLD AUTO: 14 % (ref 4–12)
NEUTROPHILS # BLD AUTO: 1.07 THOUSANDS/ΜL (ref 1.85–7.62)
NEUTS SEG NFR BLD AUTO: 50 % (ref 43–75)
PLATELET # BLD AUTO: 116 THOUSANDS/UL (ref 149–390)
PMV BLD AUTO: 9 FL (ref 8.9–12.7)
POTASSIUM SERPL-SCNC: 4.1 MMOL/L (ref 3.5–5.3)
PROT SERPL-MCNC: 6.4 G/DL (ref 6.4–8.2)
RBC # BLD AUTO: 3.77 MILLION/UL (ref 3.88–5.62)
SODIUM SERPL-SCNC: 143 MMOL/L (ref 136–145)
WBC # BLD AUTO: 2.13 THOUSAND/UL (ref 4.31–10.16)

## 2019-01-02 PROCEDURE — 85025 COMPLETE CBC W/AUTO DIFF WBC: CPT

## 2019-01-02 PROCEDURE — 80053 COMPREHEN METABOLIC PANEL: CPT

## 2019-01-02 NOTE — PROGRESS NOTES
Patient  arrived on unit for central labs  And port flush  Patient tolerated  Aware of next appointment   AVS given to patient

## 2019-01-29 ENCOUNTER — HOSPITAL ENCOUNTER (OUTPATIENT)
Dept: CT IMAGING | Facility: HOSPITAL | Age: 59
Discharge: HOME | End: 2019-01-29
Attending: INTERNAL MEDICINE
Payer: COMMERCIAL

## 2019-01-29 DIAGNOSIS — C18.9 COLON CANCER METASTASIZED TO LUNG (HCC): Chronic | ICD-10-CM

## 2019-01-29 DIAGNOSIS — C78.00 COLON CANCER METASTASIZED TO LUNG (HCC): Chronic | ICD-10-CM

## 2019-01-29 PROCEDURE — 71250 CT THORAX DX C-: CPT

## 2019-02-13 ENCOUNTER — HOSPITAL ENCOUNTER (OUTPATIENT)
Dept: INFUSION CENTER | Facility: CLINIC | Age: 59
Discharge: HOME | End: 2019-02-13
Payer: COMMERCIAL

## 2019-02-13 ENCOUNTER — OFFICE VISIT (OUTPATIENT)
Dept: HEMATOLOGY ONCOLOGY | Facility: CLINIC | Age: 59
End: 2019-02-13
Payer: COMMERCIAL

## 2019-02-13 VITALS
HEART RATE: 67 BPM | WEIGHT: 223 LBS | DIASTOLIC BLOOD PRESSURE: 72 MMHG | TEMPERATURE: 97.8 F | OXYGEN SATURATION: 93 % | SYSTOLIC BLOOD PRESSURE: 140 MMHG | BODY MASS INDEX: 26.33 KG/M2 | HEIGHT: 77 IN | RESPIRATION RATE: 17 BRPM

## 2019-02-13 DIAGNOSIS — C78.00 COLON CANCER METASTASIZED TO LUNG (HCC): Chronic | ICD-10-CM

## 2019-02-13 DIAGNOSIS — C18.9 COLON CANCER METASTASIZED TO LUNG (HCC): Chronic | ICD-10-CM

## 2019-02-13 DIAGNOSIS — C18.2 MALIGNANT NEOPLASM OF ASCENDING COLON (HCC): Primary | ICD-10-CM

## 2019-02-13 PROCEDURE — 96523 IRRIG DRUG DELIVERY DEVICE: CPT

## 2019-02-13 PROCEDURE — 99214 OFFICE O/P EST MOD 30 MIN: CPT | Performed by: INTERNAL MEDICINE

## 2019-02-13 RX ORDER — ALBUTEROL SULFATE 90 UG/1
AEROSOL, METERED RESPIRATORY (INHALATION)
Qty: 1 INHALER | Refills: 2 | Status: SHIPPED | OUTPATIENT
Start: 2019-02-13 | End: 2019-07-10 | Stop reason: SDUPTHER

## 2019-02-13 RX ORDER — ALBUTEROL SULFATE 90 UG/1
AEROSOL, METERED RESPIRATORY (INHALATION)
Qty: 1 INHALER | Refills: 2 | Status: SHIPPED | OUTPATIENT
Start: 2019-02-13 | End: 2019-02-13 | Stop reason: SDUPTHER

## 2019-02-13 NOTE — PROGRESS NOTES
HPI:  Patient is on Lonsurf for metastatic colon cancer to lungs  This was started in September 2018  He has nausea and fatigue secondary to Lonsurf  Also when he is on Lonsurf he has diarrhea but not in between  He states diarrhea is not too bad  Most recent CT scan has shown stable disease except 1 nodule has gotten bigger  When he is stressed he has some wheezing and shortness of breath but not otherwise  No cough  No chest pain  No hemoptysis  He has requested renewal of Ventolin inhaler     In 2008 he was diagnosed to have  stage IV adenocarcinoma of the colon     He had resection of the ascending colon   Pathology showed involvement of lymph nodes and lung   Postsurgery he received FOLFOX plus Avastin for 6 months      Then, in 2011 patient had additional resection this time of the transverse colon   He received FOLFIRI plus Avastin for 6 months after surgery    He has residual grade 1 peripheral 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      In 2012 he had wedge resection for right lung nodule   No chemotherapy at that time   In January 2014 he had wedge resection of the right lower    No radiation following this      Left wrist surgery he developed AFib/atrial flutter and had ablation      June 2015-February 2017 patient was on Xeloda plus Avastin   In February 2017 patient was noted to have a new lesion of the lung  Silvano Angulo was unable to do RFA to this lesion   Patient was seen by thoracic surgery   Due to location, wedge resection was not able to be done   He was referred to rad onc for SBRT       May 2017 lung nodule was noted to be stable compared to February 2017 August 2017 right lower lobe pulmonary nodule had enlarged compared to May 2017      Due to enlarging nodule in May 2017, a PET/CT was performed in August 2017   This showed a 2 8 x 1 8 cm right lower medial lung mass with hypermetabolism      Aug 2017 he was referred to [place] for Cyberknife of the lung      Patient then did not return for follow up with our office until Feb 2018  At this time patient then had repeat scans to be performed  CT C/A/P in March 2018 showed enlarging right lower lobe nodular masses -- consistent with metastatic disease      He was then seen in follow-up on 03/15/2018 after his CT scan result   Patient was presented with multiple options at included surgery, RFA, radiation, CyberKnife, systemic therapy   Patient opted for consultation for RFA in thoracic surgery and then decide      Patient then did not come back to the office until July 2018  [facility] needed up to date imaging studies and had repeat PET/CT in Aug 2018 which showed enlarging hypermetabolic right lower medial lung in right hilar lesions   New hypermetabolic left lower lung nodule   Multiple left pleural base lesions, however noted to likely be inflammatory related to prior talc pleurodesis  No new lesions in the neck, abdomen, pelvis      Due to increase in metastatic disease, patient was recommended to pursue systemic therapy   Patient began Lonsurf in September 2018                        Current Outpatient Medications:     albuterol (VENTOLIN HFA) 90 mcg/act inhaler, 2 puffs every 4 hours as needed for shortness of breath  No more than 4 times a day, Disp: 1 Inhaler, Rfl: 2    ondansetron (ZOFRAN) 4 mg tablet, Take 1 tablet (4 mg total) by mouth every 6 (six) hours as needed for nausea or vomiting, Disp: 50 tablet, Rfl: 2    Trifluridine-Tipiracil (LONSURF) 20-8  19 MG TABS, Take 4 tablets (80 mg) BID on Days 1 through 5 and 8 through 12 every 28 days, Disp: 80 tablet, Rfl: 5    Allergies   Allergen Reactions    Aspirin Other (See Comments)     Nose bleed    Sulfa Antibiotics Other (See Comments)     Dizzy         No history exists         ROS:  02/13/19 Reviewed 13 systems:  Presently no headaches, seizures, dizziness, diplopia, dysphagia, hoarseness, chest pain, palpitations, shortness of breath, cough, hemoptysis, abdominal pain, vomiting, melena, hematuria, fever, chills, bleeding, bone pains, skin rash, weight loss, arthritic symptoms,   weakness, numbness,  claudication and gait problem  No frequent infections  Not unusually sensitive to heat or cold  No swelling of the ankles  No swollen glands  Patient is anxious  Other symptoms are in HPI        /72 (BP Location: Right arm, Patient Position: Sitting)   Pulse 67   Temp 97 8 °F (36 6 °C) (Tympanic)   Resp 17   Ht 6' 4 5" (1 943 m)   Wt 101 kg (223 lb)   SpO2 93%   BMI 26 79 kg/m²     Physical Exam:  Alert, oriented, not in distress, no icterus, no oral thrush, no palpable neck mass, clear lung fields, regular heart rate, abdomen  soft and non tender, no palpable abdominal mass, no ascites, no edema of ankles, no calf tenderness, no focal neurological deficit, no skin rash, no palpable lymphadenopathy, good arterial pulses, no clubbing  Patient is anxious  Performance status 1  IMAGING:  IMPRESSION:     Again seen are multiple nodular masses in the lung consistent with metastatic disease    Most of these are stable except for the anterior right middle lobe pulmonary nodule, currently 2 0 x 1 4 cm, increased from the PET/CT of 12/3/2018, where it measured   1 5 x 0 8 cm (series 2 image 59)              Workstation performed: [id number]      Imaging     CT chest wo contrast (Order: [id number]) - 1/29/2019       LABS:  Results for orders placed or performed during the hospital encounter of 01/02/19   CBC and differential   Result Value Ref Range    WBC 2 13 (L) 4 31 - 10 16 Thousand/uL    RBC 3 77 (L) 3 88 - 5 62 Million/uL    Hemoglobin 13 7 12 0 - 17 0 g/dL    Hematocrit 38 1 36 5 - 49 3 %     (H) 82 - 98 fL    MCH 36 3 (H) 26 8 - 34 3 pg    MCHC 36 0 31 4 - 37 4 g/dL    RDW 14 1 11 6 - 15 1 %    MPV 9 0 8 9 - 12 7 fL    Platelets 725 (L) 140 - 390 Thousands/uL    Neutrophils Relative 50 43 - 75 %    Lymphocytes Relative 33 14 - 44 %    Monocytes Relative 14 (H) 4 - 12 % Eosinophils Relative 2 0 - 6 %    Basophils Relative 1 0 - 1 %    Neutrophils Absolute 1 07 (L) 1 85 - 7 62 Thousands/µL    Lymphocytes Absolute 0 70 0 60 - 4 47 Thousands/µL    Monocytes Absolute 0 30 0 17 - 1 22 Thousand/µL    Eosinophils Absolute 0 05 0 00 - 0 61 Thousand/µL    Basophils Absolute 0 01 0 00 - 0 10 Thousands/µL   Comprehensive metabolic panel   Result Value Ref Range    Sodium 143 136 - 145 mmol/L    Potassium 4 1 3 5 - 5 3 mmol/L    Chloride 107 98 - 108 mmol/L    CO2 28 21 - 32 mmol/L    ANION GAP 8 4 - 13 mmol/L    BUN 9 5 - 25 mg/dL    Creatinine 1 20 0 60 - 1 30 mg/dL    Glucose 92 65 - 140 mg/dL    Calcium 9 4 8 3 - 10 1 mg/dL    AST 25 5 - 45 U/L    ALT 20 12 - 78 U/L    Alkaline Phosphatase 74 46 - 116 U/L    Total Protein 6 4 6 4 - 8 2 g/dL    Albumin 3 5 3 5 - 5 7 g/dL    Total Bilirubin 1 40 (H) 0 20 - 1 00 mg/dL    eGFR 67 ml/min/1 73sq m     Labs, Imaging, & Other studies:   All pertinent labs and imaging studies were personally reviewed    Lab Results   Component Value Date     10/19/2015    K 4 1 01/02/2019     01/02/2019    CO2 28 01/02/2019    ANIONGAP 8 10/19/2015    BUN 9 01/02/2019    CREATININE 1 20 01/02/2019    GLUCOSE 92 10/19/2015    GLUF 79 08/02/2018    CALCIUM 9 4 01/02/2019    AST 25 01/02/2019    ALT 20 01/02/2019    ALKPHOS 74 01/02/2019    PROT 7 1 10/19/2015    BILITOT 1 24 (H) 10/19/2015    EGFR 67 01/02/2019 02/11/2019:  Hemoglobin 13 3  WBC 3100  Granulocyte 2000  Platelets [id number]  Normal chemistry profile  Reviewed and discussed with patient  Assessment and plan:  Patient is on Lonsurf for metastatic colon cancer to lungs  This was started in September 2018  He has nausea and fatigue secondary to Lonsurf  Also when he is on Lonsurf he has diarrhea but not in between  He states diarrhea is not too bad  Most recent CT scan has shown stable disease except 1 nodule has gotten bigger     When he is stressed he has some wheezing and shortness of breath but not otherwise  No cough  No chest pain  No hemoptysis  He has requested renewal of Ventolin inhaler     In 2008 he was diagnosed to have  stage IV adenocarcinoma of the colon     He had resection of the ascending colon   Pathology showed involvement of lymph nodes and lung   Postsurgery he received FOLFOX plus Avastin for 6 months      Then, in 2011 patient had additional resection this time of the transverse colon   He received FOLFIRI plus Avastin for 6 months after surgery    He has residual grade 1 peripheral 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      In 2012 he had wedge resection for right lung nodule   No chemotherapy at that time   In January 2014 he had wedge resection of the right lower    No radiation following this      Left wrist surgery he developed AFib/atrial flutter and had ablation      June 2015-February 2017 patient was on Xeloda plus Avastin   In February 2017 patient was noted to have a new lesion of the lung  Janis Pain was unable to do RFA to this lesion   Patient was seen by thoracic surgery   Due to location, wedge resection was not able to be done  He was referred to rad onc for SBRT       May 2017 lung nodule was noted to be stable compared to February 2017 August 2017 right lower lobe pulmonary nodule had enlarged compared to May 2017      Due to enlarging nodule in May 2017, a PET/CT was performed in August 2017   This showed a 2 8 x 1 8 cm right lower medial lung mass with hypermetabolism      Aug 2017 he was referred to [place] for Cyberknife of the lung      Patient then did not return for follow up with our office until Feb 2018  At this time patient then had repeat scans to be performed   CT C/A/P in March 2018 showed enlarging right lower lobe nodular masses -- consistent with metastatic disease      He was then seen in follow-up on 03/15/2018 after his CT scan result   Patient was presented with multiple options at included surgery, RFA, radiation, CyberKnife, systemic therapy   Patient opted for consultation for RFA in thoracic surgery and then decide      Patient then did not come back to the office until July 2018  [facility] needed up to date imaging studies and had repeat PET/CT in Aug 2018 which showed enlarging hypermetabolic right lower medial lung in right hilar lesions   New hypermetabolic left lower lung nodule   Multiple left pleural base lesions, however noted to likely be inflammatory related to prior talc pleurodesis  No new lesions in the neck, abdomen, pelvis      Due to increase in metastatic disease, patient was recommended to pursue systemic therapy   Patient began Lonsurf in September 2018  [place] Organ Physical examination and test results are as recorded and discussed  No change in therapy at this time  Suggested follow-up   CT scan of chest prior to next visit in 2 months but he wants to wait  Renewed  Ventolin inhaler  He will continue to take Lonsurf as before He has ongoing paper for blood tests  All discussed in detail  Questions answered  Discussed the importance of eating healthy foods, staying active and health screening tests  He is capable of self-care  He gets Port-A-Cath flushed every 6 weeks  She states she will be going for tPA tomorrow because there was no blood return the last time  All discussed in detail  Questions answered  Goal is response and prolongation of survival     1  Malignant neoplasm of ascending colon ([facility] [address] )      2  Colon cancer metastasized to lung (HCC)    - albuterol (VENTOLIN HFA) 90 mcg/act inhaler; 2 puffs every 4 hours as needed for shortness of breath  No more than 4 times a day  Dispense: 1 Inhaler; Refill: 2               Patient voiced understanding and agreement in the discussion  Counseling / Coordination of Care   Greater than 50% of total time was spent with the patient and / or family counseling and / or coordination of care

## 2019-02-13 NOTE — LETTER
February 13, 2019     [place], [address]  [address]    Patient: Leonel Prado   YOB: 1960   Date of Visit: 2/13/2019       Dear Dr Yue Santana:    Thank you for referring Tejinder Harris to me for evaluation  Below are my notes for this consultation  If you have questions, please do not hesitate to call me  I look forward to following your patient along with you  Sincerely,        Isabel Covington MD        CC: No Recipients  Isabel Covington MD  2/13/2019  1:40 PM  Sign at close encounter    HPI:  Patient is on [address] [place] [address] for metastatic colon cancer to lungs  This was started in September 2018  He has nausea and fatigue secondary to Lonsurf  Also when he is on Lonsurf he has diarrhea but not in between  He states diarrhea is not too bad  Most recent CT scan has shown stable disease except 1 nodule has gotten bigger  When he is stressed he has some wheezing and shortness of breath but not otherwise  No cough  No chest pain  No hemoptysis  He has requested renewal of Ventolin inhaler       In 2008 he was diagnosed to have  stage IV adenocarcinoma of the colon     He had resection of the ascending colon   Pathology showed involvement of lymph nodes and lung   Postsurgery he received FOLFOX plus Avastin for 6 months      Then, in 2011 patient had additional resection this time of the transverse colon   He received FOLFIRI plus Avastin for 6 months after surgery    He has residual grade 1 peripheral 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      In 2012 he had wedge resection for right lung nodule   No chemotherapy at that time   In January 2014 he had wedge resection of the right lower    No radiation following this      Left wrist surgery he developed AFib/atrial flutter and had ablation      June 2015-February 2017 patient was on Xeloda plus Avastin   In February 2017 patient was noted to have a new lesion of the lung   IR was unable to do RFA to this lesion   Patient was seen by thoracic surgery   Due to location, wedge resection was not able to be done  He was referred to rad onc for SBRT       May 2017 lung nodule was noted to be stable compared to February 2017 August 2017 right lower lobe pulmonary nodule had enlarged compared to May 2017      Due to enlarging nodule in May 2017, a PET/CT was performed in August 2017   This showed a 2 8 x 1 8 cm right lower medial lung mass with hypermetabolism      Aug 2017 he was referred to [facility] for Cyberknife of the lung      Patient then did not return for follow up with our office until Feb 2018  At this time patient then had repeat scans to be performed  CT C/A/P in March 2018 showed enlarging right lower lobe nodular masses -- consistent with metastatic disease      He was then seen in follow-up on 03/15/2018 after his CT scan result   Patient was presented with multiple options at included surgery, RFA, radiation, CyberKnife, systemic therapy   Patient opted for consultation for RFA in thoracic surgery and then decide      Patient then did not come back to the office until July 2018  Adrianna Ulysses needed up to date imaging studies and had repeat PET/CT in Aug 2018 which showed enlarging hypermetabolic right lower medial lung in right hilar lesions   New hypermetabolic left lower lung nodule   Multiple left pleural base lesions, however noted to likely be inflammatory related to prior talc pleurodesis  No new lesions in the neck, abdomen, pelvis      Due to increase in metastatic disease, patient was recommended to pursue systemic therapy   Patient began Lonsurf in September 2018                        Current Outpatient Medications:     albuterol (VENTOLIN HFA) 90 mcg/act inhaler, 2 puffs every 4 hours as needed for shortness of breath    No more than 4 times a day, Disp: 1 Inhaler, Rfl: 2    ondansetron (ZOFRAN) 4 mg tablet, Take 1 tablet (4 mg total) by mouth every 6 (six) hours as needed for nausea or vomiting, Disp: 50 tablet, Rfl: 2    Trifluridine-Tipiracil (LONSURF) 20-8  19 MG TABS, Take 4 tablets (80 mg) BID on Days 1 through 5 and 8 through 12 every 28 days, Disp: 80 tablet, Rfl: 5    Allergies   Allergen Reactions    Aspirin Other (See Comments)     Nose bleed    Sulfa Antibiotics Other (See Comments)     Dizzy         No history exists  ROS:  02/13/19 Reviewed 13 systems:  Presently no headaches, seizures, dizziness, diplopia, dysphagia, hoarseness, chest pain, palpitations, shortness of breath, cough, hemoptysis, abdominal pain, vomiting,   melena, hematuria, fever, chills, bleeding, bone pains, skin rash, weight loss, arthritic symptoms,   weakness, numbness,  claudication and gait problem  No frequent infections  Not unusually sensitive to heat or cold  No swelling of the ankles  No swollen glands  Patient is anxious  Other symptoms are in HPI        /72 (BP Location: Right arm, Patient Position: Sitting)   Pulse 67   Temp 97 8 °F (36 6 °C) (Tympanic)   Resp 17   Ht 6' 4 5" (1 943 m)   Wt 101 kg (223 lb)   SpO2 93%   BMI 26 79 kg/m²      Physical Exam:  Alert, oriented, not in distress, no icterus, no oral thrush, no palpable neck mass, clear lung fields, regular heart rate, abdomen  soft and non tender, no palpable abdominal mass, no ascites, no edema of ankles, no calf tenderness, no focal neurological deficit, no skin rash, no palpable lymphadenopathy, good arterial pulses, no clubbing  Patient is anxious  Performance status 1  IMAGING:  IMPRESSION:     Again seen are multiple nodular masses in the lung consistent with metastatic disease    Most of these are stable except for the anterior right middle lobe pulmonary nodule, currently 2 0 x 1 4 cm, increased from the PET/CT of 12/3/2018, where it measured   1 5 x 0 8 cm (series 2 image 59)              Workstation performed: [id number]      Imaging     CT chest wo contrast (Order: [id number]) - 1/29/2019 LABS:  Results for orders placed or performed during the hospital encounter of 01/02/19   CBC and differential   Result Value Ref Range    WBC 2 13 (L) 4 31 - 10 16 Thousand/uL    RBC 3 77 (L) 3 88 - 5 62 Million/uL    Hemoglobin 13 7 12 0 - 17 0 g/dL    Hematocrit 38 1 36 5 - 49 3 %     (H) 82 - 98 fL    MCH 36 3 (H) 26 8 - 34 3 pg    MCHC 36 0 31 4 - 37 4 g/dL    RDW 14 1 11 6 - 15 1 %    MPV 9 0 8 9 - 12 7 fL    Platelets 229 (L) 292 - 390 Thousands/uL    Neutrophils Relative 50 43 - 75 %    Lymphocytes Relative 33 14 - 44 %    Monocytes Relative 14 (H) 4 - 12 %    Eosinophils Relative 2 0 - 6 %    Basophils Relative 1 0 - 1 %    Neutrophils Absolute 1 07 (L) 1 85 - 7 62 Thousands/µL    Lymphocytes Absolute 0 70 0 60 - 4 47 Thousands/µL    Monocytes Absolute 0 30 0 17 - 1 22 Thousand/µL    Eosinophils Absolute 0 05 0 00 - 0 61 Thousand/µL    Basophils Absolute 0 01 0 00 - 0 10 Thousands/µL   Comprehensive metabolic panel   Result Value Ref Range    Sodium 143 136 - 145 mmol/L    Potassium 4 1 3 5 - 5 3 mmol/L    Chloride 107 98 - 108 mmol/L    CO2 28 21 - 32 mmol/L    ANION GAP 8 4 - 13 mmol/L    BUN 9 5 - 25 mg/dL    Creatinine 1 20 0 60 - 1 30 mg/dL    Glucose 92 65 - 140 mg/dL    Calcium 9 4 8 3 - 10 1 mg/dL    AST 25 5 - 45 U/L    ALT 20 12 - 78 U/L    Alkaline Phosphatase 74 46 - 116 U/L    Total Protein 6 4 6 4 - 8 2 g/dL    Albumin 3 5 3 5 - 5 7 g/dL    Total Bilirubin 1 40 (H) 0 20 - 1 00 mg/dL    eGFR 67 ml/min/1 73sq m     Labs, Imaging, & Other studies:   All pertinent labs and imaging studies were personally reviewed    Lab Results   Component Value Date     10/19/2015    K 4 1 01/02/2019     01/02/2019    CO2 28 01/02/2019    ANIONGAP 8 10/19/2015    BUN 9 01/02/2019    CREATININE 1 20 01/02/2019    GLUCOSE 92 10/19/2015    GLUF 79 08/02/2018    CALCIUM 9 4 01/02/2019    AST 25 01/02/2019    ALT 20 01/02/2019    ALKPHOS 74 01/02/2019    PROT 7 1 10/19/2015    BILITOT 1 24 (H) 10/19/2015    EGFR 67 01/02/2019 02/11/2019:  Hemoglobin 13 3  WBC 3100  Granulocyte 2000  Platelets 25996  Normal chemistry profile  Reviewed and discussed with patient  Assessment and plan:  Patient is on Lonsurf for metastatic colon cancer to lungs  This was started in September 2018  He has nausea and fatigue secondary to Lonsurf  Also when he is on Lonsurf he has diarrhea but not in between  He states diarrhea is not too bad  Most recent CT scan has shown stable disease except 1 nodule has gotten bigger  When he is stressed he has some wheezing and shortness of breath but not otherwise  No cough  No chest pain  No hemoptysis  He has requested renewal of Ventolin inhaler     In 2008 he was diagnosed to have  stage IV adenocarcinoma of the colon     He had resection of the ascending colon   Pathology showed involvement of lymph nodes and lung   Postsurgery he received FOLFOX plus Avastin for 6 months      Then, in 2011 patient had additional resection this time of the transverse colon   He received FOLFIRI plus Avastin for 6 months after surgery    He has residual grade 1 peripheral 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      In 2012 he had wedge resection for right lung nodule   No chemotherapy at that time   In January 2014 he had wedge resection of the right lower    No radiation following this      Left wrist surgery he developed AFib/atrial flutter and had ablation      June 2015-February 2017 patient was on Xeloda plus Avastin   In February 2017 patient was noted to have a new lesion of the lung  Lesly Caballero was unable to do RFA to this lesion   Patient was seen by thoracic surgery   Due to location, wedge resection was not able to be done   He was referred to rad onc for SBRT       May 2017 lung nodule was noted to be stable compared to February 2017 August 2017 right lower lobe pulmonary nodule had enlarged compared to May 2017      Due to enlarging nodule in May 2017, a PET/CT was performed in August 2017   This showed a 2 8 x 1 8 cm right lower medial lung mass with hypermetabolism      Aug 2017 he was referred to [place] for Cyberknife of the lung      Patient then did not return for follow up with our office until Feb 2018  At this time patient then had repeat scans to be performed  CT C/A/P in March 2018 showed enlarging right lower lobe nodular masses -- consistent with metastatic disease      He was then seen in follow-up on 03/15/2018 after his CT scan result   Patient was presented with multiple options at included surgery, RFA, radiation, CyberKnife, systemic therapy   Patient opted for consultation for RFA in thoracic surgery and then decide      Patient then did not come back to the office until July 2018  Aime Horowitz needed up to date imaging studies and had repeat PET/CT in Aug 2018 which showed enlarging hypermetabolic right lower medial lung in right hilar lesions   New hypermetabolic left lower lung nodule   Multiple left pleural base lesions, however noted to likely be inflammatory related to prior talc pleurodesis  No new lesions in the neck, abdomen, pelvis      Due to increase in metastatic disease, patient was recommended to pursue systemic therapy   Patient began Lonsurf in September 2018  Dara Soulier Physical examination and test results are as recorded and discussed  No change in therapy at this time  Suggested follow-up   CT scan of chest prior to next visit in 2 months but he wants to wait  Renewed  Ventolin inhaler  He will continue to take Lonsurf as before He has ongoing paper for blood tests  All discussed in detail  Questions answered  Discussed the importance of eating healthy foods, staying active and health screening tests  He is capable of self-care  He gets Port-A-Cath flushed every 6 weeks  She states she will be going for tPA tomorrow because there was no blood return the last time  All discussed in detail  Questions answered    Goal is response and prolongation of survival     1  Malignant neoplasm of ascending colon ([facility] [address] )      2  Colon cancer metastasized to lung ([facility])    - albuterol (VENTOLIN HFA) 90 mcg/act inhaler; 2 puffs every 4 hours as needed for shortness of breath  No more than 4 times a day  Dispense: 1 Inhaler; Refill: 2               Patient voiced understanding and agreement in the discussion  Counseling / Coordination of Care   Greater than 50% of total time was spent with the patient and / or family counseling and / or coordination of care

## 2019-02-13 NOTE — PROGRESS NOTES
Port with no blood return  Patient unable to stay for treatment with TPA due to MD appointment  Patient will return later in the week for port treatment

## 2019-02-13 NOTE — PLAN OF CARE
Problem: INFECTION - ADULT  Goal: Absence of fever/infection during neutropenic period  Description  INTERVENTIONS:  - Monitor WBC  - Implement neutropenic guidelines  Outcome: Progressing

## 2019-02-14 ENCOUNTER — HOSPITAL ENCOUNTER (OUTPATIENT)
Dept: INFUSION CENTER | Facility: CLINIC | Age: 59
Discharge: HOME | End: 2019-02-14
Payer: COMMERCIAL

## 2019-02-14 PROCEDURE — 96523 IRRIG DRUG DELIVERY DEVICE: CPT

## 2019-02-14 NOTE — PROGRESS NOTES
Port flushed per protocol, good blood return obtained  No need for Cath brittney today  Port flush scheduled in 6 weeks per MD orders  AVS declined, pt aware of appt

## 2019-03-06 NOTE — PATIENT INSTRUCTIONS
Please see thoracic surgery and interventional radiology for 2 lesions in the lung  Alert and oriented to person, place and time

## 2019-03-28 ENCOUNTER — HOSPITAL ENCOUNTER (OUTPATIENT)
Dept: INFUSION CENTER | Facility: CLINIC | Age: 59
Discharge: HOME | End: 2019-03-28
Payer: COMMERCIAL

## 2019-03-28 PROCEDURE — 36593 DECLOT VASCULAR DEVICE: CPT

## 2019-03-28 RX ADMIN — ALTEPLASE 2 MG: 2.2 INJECTION, POWDER, LYOPHILIZED, FOR SOLUTION INTRAVENOUS at 09:50

## 2019-03-28 NOTE — PROGRESS NOTES
Pt here for port flush  Pt states he does not need monthly blood work completed today  Port without blood return initially  Port treated effectively with cath brittney per [facility] protocol  Port flushed  AVS provided with next flush appt

## 2019-04-10 ENCOUNTER — OFFICE VISIT (OUTPATIENT)
Dept: HEMATOLOGY ONCOLOGY | Facility: CLINIC | Age: 59
End: 2019-04-10
Payer: COMMERCIAL

## 2019-04-10 VITALS
DIASTOLIC BLOOD PRESSURE: 68 MMHG | HEART RATE: 64 BPM | BODY MASS INDEX: 26.61 KG/M2 | HEIGHT: 77 IN | SYSTOLIC BLOOD PRESSURE: 130 MMHG | TEMPERATURE: 98.1 F | WEIGHT: 225.4 LBS | OXYGEN SATURATION: 96 % | RESPIRATION RATE: 18 BRPM

## 2019-04-10 DIAGNOSIS — C18.9 COLON CANCER METASTASIZED TO LUNG (HCC): Primary | Chronic | ICD-10-CM

## 2019-04-10 DIAGNOSIS — C18.2 MALIGNANT NEOPLASM OF ASCENDING COLON (HCC): ICD-10-CM

## 2019-04-10 DIAGNOSIS — C78.00 COLON CANCER METASTASIZED TO LUNG (HCC): Primary | Chronic | ICD-10-CM

## 2019-04-10 PROCEDURE — 99214 OFFICE O/P EST MOD 30 MIN: CPT | Performed by: INTERNAL MEDICINE

## 2019-04-15 DIAGNOSIS — C18.2 MALIGNANT NEOPLASM OF ASCENDING COLON (HCC): ICD-10-CM

## 2019-04-15 DIAGNOSIS — C18.9: ICD-10-CM

## 2019-04-15 DIAGNOSIS — C77.1: ICD-10-CM

## 2019-05-02 ENCOUNTER — HOSPITAL ENCOUNTER (OUTPATIENT)
Dept: INFUSION CENTER | Facility: CLINIC | Age: 59
Discharge: HOME | End: 2019-05-02
Payer: COMMERCIAL

## 2019-05-02 DIAGNOSIS — C78.00 COLON CANCER METASTASIZED TO LUNG (HCC): Chronic | ICD-10-CM

## 2019-05-02 DIAGNOSIS — C18.9 COLON CANCER METASTASIZED TO LUNG (HCC): Chronic | ICD-10-CM

## 2019-05-02 DIAGNOSIS — C18.2 MALIGNANT NEOPLASM OF ASCENDING COLON (HCC): ICD-10-CM

## 2019-05-02 PROCEDURE — 96523 IRRIG DRUG DELIVERY DEVICE: CPT

## 2019-05-02 NOTE — PLAN OF CARE
Problem: SAFETY ADULT  Goal: Patient will remain free of falls  Description  INTERVENTIONS:  - Assess patient frequently for physical needs  -  Identify cognitive and physical deficits and behaviors that affect risk of falls    -  [place] fall precautions as indicated by assessment   - Educate patient/family on patient safety including physical limitations  - Instruct patient to call for assistance with activity based on assessment  - Modify environment to reduce risk of injury  - Consider OT/PT consult to assist with strengthening/mobility  Outcome: Progressing

## 2019-05-02 NOTE — PROGRESS NOTES
Patient tolerated his port flush, however we were not able to get a blood return  Pt was unable to stay for cath brittney  Pt will return on Monday for Cath brittney and central labs  No labs were drawn today

## 2019-05-06 ENCOUNTER — HOSPITAL ENCOUNTER (OUTPATIENT)
Dept: INFUSION CENTER | Facility: CLINIC | Age: 59
Discharge: HOME | End: 2019-05-06
Payer: COMMERCIAL

## 2019-05-06 DIAGNOSIS — C78.00 COLON CANCER METASTASIZED TO LUNG (HCC): Chronic | ICD-10-CM

## 2019-05-06 DIAGNOSIS — C18.9 COLON CANCER METASTASIZED TO LUNG (HCC): Chronic | ICD-10-CM

## 2019-05-06 DIAGNOSIS — C18.2 MALIGNANT NEOPLASM OF ASCENDING COLON (HCC): ICD-10-CM

## 2019-05-06 LAB
ALBUMIN SERPL BCP-MCNC: 4 G/DL (ref 3.5–5.7)
ALP SERPL-CCNC: 55 U/L (ref 46–116)
ALT SERPL W P-5'-P-CCNC: 18 U/L (ref 12–78)
ANION GAP SERPL CALCULATED.3IONS-SCNC: 2 MMOL/L (ref 4–13)
AST SERPL W P-5'-P-CCNC: 27 U/L (ref 5–45)
BASOPHILS # BLD AUTO: 0.02 THOUSANDS/ΜL (ref 0–0.1)
BASOPHILS NFR BLD AUTO: 1 % (ref 0–1)
BILIRUB SERPL-MCNC: 1.5 MG/DL (ref 0.2–1)
BUN SERPL-MCNC: 9 MG/DL (ref 5–25)
CALCIUM SERPL-MCNC: 9.1 MG/DL (ref 8.3–10.1)
CEA SERPL-MCNC: 24.3 NG/ML (ref 0–3)
CHLORIDE SERPL-SCNC: 102 MMOL/L (ref 98–108)
CO2 SERPL-SCNC: 30 MMOL/L (ref 21–32)
CREAT SERPL-MCNC: 0.8 MG/DL (ref 0.6–1.3)
EOSINOPHIL # BLD AUTO: 0.04 THOUSAND/ΜL (ref 0–0.61)
EOSINOPHIL NFR BLD AUTO: 1 % (ref 0–6)
ERYTHROCYTE [DISTWIDTH] IN BLOOD BY AUTOMATED COUNT: 13.7 % (ref 11.6–15.1)
GFR SERPL CREATININE-BSD FRML MDRD: 99 ML/MIN/1.73SQ M
GLUCOSE SERPL-MCNC: 98 MG/DL (ref 65–140)
HCT VFR BLD AUTO: 35.6 % (ref 36.5–49.3)
HGB BLD-MCNC: 13.1 G/DL (ref 12–17)
LYMPHOCYTES # BLD AUTO: 0.5 THOUSANDS/ΜL (ref 0.6–4.47)
LYMPHOCYTES NFR BLD AUTO: 12 % (ref 14–44)
MCH RBC QN AUTO: 38.3 PG (ref 26.8–34.3)
MCHC RBC AUTO-ENTMCNC: 36.8 G/DL (ref 31.4–37.4)
MCV RBC AUTO: 104 FL (ref 82–98)
MONOCYTES # BLD AUTO: 0.24 THOUSAND/ΜL (ref 0.17–1.22)
MONOCYTES NFR BLD AUTO: 6 % (ref 4–12)
NEUTROPHILS # BLD AUTO: 3.45 THOUSANDS/ΜL (ref 1.85–7.62)
NEUTS SEG NFR BLD AUTO: 80 % (ref 43–75)
PLATELET # BLD AUTO: 80 THOUSANDS/UL (ref 149–390)
PMV BLD AUTO: 8.5 FL (ref 8.9–12.7)
POTASSIUM SERPL-SCNC: 4 MMOL/L (ref 3.5–5.3)
PROT SERPL-MCNC: 6.9 G/DL (ref 6.4–8.2)
RBC # BLD AUTO: 3.42 MILLION/UL (ref 3.88–5.62)
SODIUM SERPL-SCNC: 134 MMOL/L (ref 136–145)
WBC # BLD AUTO: 4.25 THOUSAND/UL (ref 4.31–10.16)

## 2019-05-06 PROCEDURE — 36593 DECLOT VASCULAR DEVICE: CPT

## 2019-05-06 PROCEDURE — 82378 CARCINOEMBRYONIC ANTIGEN: CPT

## 2019-05-06 PROCEDURE — 85025 COMPLETE CBC W/AUTO DIFF WBC: CPT

## 2019-05-06 PROCEDURE — 80053 COMPREHEN METABOLIC PANEL: CPT

## 2019-05-06 RX ADMIN — ALTEPLASE 2 MG: 2.2 INJECTION, POWDER, LYOPHILIZED, FOR SOLUTION INTRAVENOUS at 09:41

## 2019-05-06 NOTE — PLAN OF CARE
Problem: INFECTION - ADULT  Goal: Absence or prevention of progression during hospitalization  Description  INTERVENTIONS:  - Assess and monitor for signs and symptoms of infection  - Monitor lab/diagnostic results  - Monitor all insertion sites, i e  indwelling lines, tubes, and drains  - Monitor endotracheal (as able) and nasal secretions for changes in amount and color  - [place] appropriate cooling/warming therapies per order  - Administer medications as ordered  - Instruct and encourage patient and family to use good hand hygiene technique  - Identify and instruct in appropriate isolation precautions for identified infection/condition  Outcome: Progressing

## 2019-05-06 NOTE — PROGRESS NOTES
Patient tolerated his port flush and central line draw  Cath brittney was used and successful after 30 minutes

## 2019-05-13 ENCOUNTER — HOSPITAL ENCOUNTER (OUTPATIENT)
Dept: CT IMAGING | Facility: HOSPITAL | Age: 59
Discharge: HOME | End: 2019-05-13
Attending: INTERNAL MEDICINE
Payer: COMMERCIAL

## 2019-05-13 DIAGNOSIS — C18.9 COLON CANCER METASTASIZED TO LUNG (HCC): ICD-10-CM

## 2019-05-13 DIAGNOSIS — C18.2 MALIGNANT NEOPLASM OF ASCENDING COLON (HCC): ICD-10-CM

## 2019-05-13 DIAGNOSIS — C78.00 COLON CANCER METASTASIZED TO LUNG (HCC): ICD-10-CM

## 2019-05-13 PROCEDURE — 71250 CT THORAX DX C-: CPT

## 2019-05-22 ENCOUNTER — OFFICE VISIT (OUTPATIENT)
Dept: HEMATOLOGY ONCOLOGY | Facility: CLINIC | Age: 59
End: 2019-05-22
Payer: COMMERCIAL

## 2019-05-22 VITALS
HEART RATE: 68 BPM | SYSTOLIC BLOOD PRESSURE: 124 MMHG | DIASTOLIC BLOOD PRESSURE: 76 MMHG | HEIGHT: 77 IN | WEIGHT: 227.4 LBS | BODY MASS INDEX: 26.85 KG/M2 | TEMPERATURE: 97.6 F

## 2019-05-22 DIAGNOSIS — C77.1 METASTASIS TO INTRATHORACIC LYMPH NODES (HCC): ICD-10-CM

## 2019-05-22 DIAGNOSIS — C18.2 MALIGNANT NEOPLASM OF ASCENDING COLON (HCC): Primary | ICD-10-CM

## 2019-05-22 DIAGNOSIS — C78.00 COLON CANCER METASTASIZED TO LUNG (HCC): Chronic | ICD-10-CM

## 2019-05-22 DIAGNOSIS — C18.9 COLON CANCER METASTASIZED TO LUNG (HCC): Chronic | ICD-10-CM

## 2019-05-22 PROBLEM — Z95.828 PORT-A-CATH IN PLACE: Status: ACTIVE | Noted: 2019-05-22

## 2019-05-22 PROCEDURE — 99215 OFFICE O/P EST HI 40 MIN: CPT | Performed by: INTERNAL MEDICINE

## 2019-05-23 ENCOUNTER — DOCUMENTATION (OUTPATIENT)
Dept: HEMATOLOGY ONCOLOGY | Facility: CLINIC | Age: 59
End: 2019-05-23

## 2019-05-23 DIAGNOSIS — C18.2 MALIGNANT NEOPLASM OF ASCENDING COLON (HCC): Primary | ICD-10-CM

## 2019-05-24 DIAGNOSIS — C18.2 MALIGNANT NEOPLASM OF ASCENDING COLON (HCC): ICD-10-CM

## 2019-05-28 ENCOUNTER — TELEPHONE (OUTPATIENT)
Dept: HEMATOLOGY ONCOLOGY | Facility: CLINIC | Age: 59
End: 2019-05-28

## 2019-05-30 ENCOUNTER — DOCUMENTATION (OUTPATIENT)
Dept: HEMATOLOGY ONCOLOGY | Facility: CLINIC | Age: 59
End: 2019-05-30

## 2019-05-30 DIAGNOSIS — C18.2 MALIGNANT NEOPLASM OF ASCENDING COLON (HCC): ICD-10-CM

## 2019-06-17 ENCOUNTER — HOSPITAL ENCOUNTER (OUTPATIENT)
Dept: INFUSION CENTER | Facility: CLINIC | Age: 59
Discharge: HOME | End: 2019-06-17
Payer: COMMERCIAL

## 2019-06-17 DIAGNOSIS — Z95.828 PORT-A-CATH IN PLACE: Primary | ICD-10-CM

## 2019-06-17 PROCEDURE — 96523 IRRIG DRUG DELIVERY DEVICE: CPT

## 2019-06-17 NOTE — PROGRESS NOTES
Port accessed without difficulty  Good blood return noted, flushed per protocol  Next appointment made, AVS declined, left unit in stable condition

## 2019-06-21 ENCOUNTER — TELEPHONE (OUTPATIENT)
Dept: HEMATOLOGY ONCOLOGY | Facility: CLINIC | Age: 59
End: 2019-06-21

## 2019-07-09 ENCOUNTER — TELEPHONE (OUTPATIENT)
Dept: HEMATOLOGY ONCOLOGY | Facility: CLINIC | Age: 59
End: 2019-07-09

## 2019-07-09 NOTE — TELEPHONE ENCOUNTER
Spoke to and informed him to get his labs completed  He stated that was just coming in for a conference

## 2019-07-10 ENCOUNTER — OFFICE VISIT (OUTPATIENT)
Dept: HEMATOLOGY ONCOLOGY | Facility: CLINIC | Age: 59
End: 2019-07-10
Payer: COMMERCIAL

## 2019-07-10 ENCOUNTER — DOCUMENTATION (OUTPATIENT)
Dept: HEMATOLOGY ONCOLOGY | Facility: CLINIC | Age: 59
End: 2019-07-10

## 2019-07-10 ENCOUNTER — TELEPHONE (OUTPATIENT)
Dept: GASTROENTEROLOGY | Facility: MEDICAL CENTER | Age: 59
End: 2019-07-10

## 2019-07-10 VITALS
WEIGHT: 227.2 LBS | SYSTOLIC BLOOD PRESSURE: 144 MMHG | OXYGEN SATURATION: 94 % | HEART RATE: 84 BPM | BODY MASS INDEX: 26.83 KG/M2 | HEIGHT: 77 IN | DIASTOLIC BLOOD PRESSURE: 84 MMHG | RESPIRATION RATE: 18 BRPM | TEMPERATURE: 98 F

## 2019-07-10 DIAGNOSIS — C77.1 METASTASIS TO INTRATHORACIC LYMPH NODES (HCC): ICD-10-CM

## 2019-07-10 DIAGNOSIS — C79.9 METASTASIS FROM COLON CANCER (HCC): ICD-10-CM

## 2019-07-10 DIAGNOSIS — Z95.828 PORT-A-CATH IN PLACE: ICD-10-CM

## 2019-07-10 DIAGNOSIS — C18.9 METASTASIS FROM COLON CANCER (HCC): ICD-10-CM

## 2019-07-10 DIAGNOSIS — C18.9 COLON CANCER METASTASIZED TO LUNG (HCC): Chronic | ICD-10-CM

## 2019-07-10 DIAGNOSIS — R13.19 ESOPHAGEAL DYSPHAGIA: ICD-10-CM

## 2019-07-10 DIAGNOSIS — C78.00 COLON CANCER METASTASIZED TO LUNG (HCC): Chronic | ICD-10-CM

## 2019-07-10 DIAGNOSIS — C18.2 MALIGNANT NEOPLASM OF ASCENDING COLON (HCC): Primary | ICD-10-CM

## 2019-07-10 PROCEDURE — 99215 OFFICE O/P EST HI 40 MIN: CPT | Performed by: INTERNAL MEDICINE

## 2019-07-10 RX ORDER — ALBUTEROL SULFATE 90 UG/1
AEROSOL, METERED RESPIRATORY (INHALATION)
Qty: 1 INHALER | Refills: 2 | Status: SHIPPED | OUTPATIENT
Start: 2019-07-10 | End: 2019-12-21 | Stop reason: SDUPTHER

## 2019-07-10 NOTE — PROGRESS NOTES
Completed nursing teach with patient on Irinotecan, Oxaliplatin, and Avastin  Reviewed mechanism of action, administration of, possible side effects, managing those side effects, and when to call the doctor/ go to ER  Additionally, reviewed general information to infusion center and what to expect  Answered all patient questions  Patient verbalized understanding through teach back  Consents signed and scanned into patient chart

## 2019-07-10 NOTE — LETTER
July 10, 2019     Cayden Espositos, [address] [facility]  [address]    Patient: Gabriella Hazel   YOB: 1960   Date of Visit: 7/10/2019       Dear Dr Beryl Schreiber:    Thank you for referring Shon Guthrie to me for evaluation  Below are my notes for this consultation  If you have questions, please do not hesitate to call me  I look forward to following your patient along with you  Sincerely,        Jian Hua MD        CC: MD Jian Olmos MD  7/10/2019 10:03 PM  Sign at close encounter    HPI:  Patient has metastatic 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 maybe mediastinal/hilar lymph nodes  He had been through several lines of systemic therapy and was being considered for Stivarga  Patient saw Dr Nixon Flor and he suggested IROX plus Avastin, a combination of irinotecan and oxaliplatin and Avastin  Patient had these drugs previously but long time ago and were packaged differently  He also suggested getting CT scans and radiation consultation to the large lung mass and maybe to  mediastinal/hilar lymph nodes depending upon results of CT scans  Patient has cough and shortness of breath  He has dysphagia in the lower neck area    Has generalized weakness and tiredness    In 2008 he was diagnosed to have  stage IV adenocarcinoma of the colon     He had resection of the ascending colon   Pathology showed involvement of lymph nodes and lung   Postsurgery he received FOLFOX plus Avastin for 6 months      Then, in 2011 patient had additional resection this time of the transverse colon   He received FOLFIRI plus Avastin for 6 months after surgery    He has residual grade 1 peripheral 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      In 2012 he had wedge resection for right lung nodule   No chemotherapy at that time   In January 2014 he had wedge resection of the right lower    No radiation following this         History of AFib/atrial flutter and had ablation      Mitzi 2015-February 2017 patient was on Xeloda plus Avastin   In February 2017 patient was noted to have a new lesion of the lung  Buddy Zamora was unable to do RFA to this lesion   Patient was seen by thoracic surgery   Due to location, wedge resection was not able to be done  He was referred to rad onc for SBRT       May 2017 lung nodule was noted to be stable compared to February 2017 August 2017 right lower lobe pulmonary nodule had enlarged compared to May 2017      Due to enlarging nodule in May 2017, a PET/CT was performed in August 2017   This showed a 2 8 x 1 8 cm right lower medial lung mass with hypermetabolism      Aug 2017 he was referred to [facility] for Cyberknife of the lung      Patient then did not return for follow up with our office until Feb 2018  At this time patient then had repeat scans to be performed  CT C/A/P in March 2018 showed enlarging right lower lobe nodular masses -- consistent with metastatic disease      He was then seen in follow-up on 03/15/2018 after his CT scan result   Patient was presented with multiple options at included surgery, RFA, radiation, CyberKnife, systemic therapy   Patient opted for consultation for RFA in thoracic surgery and then decide      Patient then did not come back to the office until July 2018  Bggordo Lamb needed up to date imaging studies and had repeat PET/CT in Aug 2018 which showed enlarging hypermetabolic right lower medial lung in right hilar lesions   New hypermetabolic left lower lung nodule   Multiple left pleural base lesions, however noted to likely be inflammatory related to prior talc pleurodesis  No new lesions in the neck, abdomen, pelvis    Due to increase in metastatic disease, patient was recommended to pursue systemic therapy   Patient began Loigu 42 September 2018    Most recent CT scan showed disease progression and Stivarga was offered          Current Outpatient Medications:     albuterol (VENTOLIN HFA) 90 mcg/act inhaler, 2 puffs every 4 hours as needed for shortness of breath  No more than 4 times a day, Disp: 1 Inhaler, Rfl: 2    ondansetron (ZOFRAN) 4 mg tablet, Take 1 tablet (4 mg total) by mouth every 6 (six) hours as needed for nausea or vomiting (Patient not taking: Reported on 7/10/2019), Disp: 50 tablet, Rfl: 2    Regorafenib (STIVARGA) 40 MG TABS, 2 tablets daily for 21 days  7 days off  (Patient not taking: Reported on 7/10/2019), Disp: 42 tablet, Rfl: 3    Allergies   Allergen Reactions    Aspirin Other (See Comments)     Nose bleed    Sulfa Antibiotics Other (See Comments)     Dizzy         No history exists  ROS:  07/10/19 Reviewed 13 systems:  Presently no other neurological, cardiac, pulmonary, GI and  symptoms other than mentioned above  No other symptoms like  fever, chills, bleeding, bone pains, skin rash, weight loss, arthritic symptoms, numbness,  claudication and gait problem  No frequent infections  Not unusually sensitive to heat or cold  No swelling of the ankles  No swollen glands  Patient is anxious  Other symptoms are in HPI        /84 (BP Location: Right arm, Patient Position: Sitting, Cuff Size: Adult)   Pulse 84   Temp 98 °F (36 7 °C)   Resp 18   Ht 6' 4 5" (1 943 m)   Wt 103 kg (227 lb 3 2 oz)   SpO2 94%   BMI 27 30 kg/m²      Physical Exam:    Patient is alert and oriented  He is not in distress  Vital signs are as above  There is no scleral icterus,  no oral thrush, no palpable neck mass, decreased breath sounds right lung base, regular heart rate?, abdomen  soft and non tender, no palpable abdominal mass, no ascites, no edema of ankles, no calf tenderness, no focal neurological deficit, no skin rash, no palpable lymphadenopathy, good arterial pulses, no clubbing  Patient is anxious  Performance status 1      IMAGING:  CT chest abdomen pelvis w contrast    (Results Pending)   CT soft tissue neck w contrast    (Results Pending)   MRI brain w wo contrast    (Results Pending) LABS:  Results for orders placed or performed during the hospital encounter of 05/06/19   CBC and differential   Result Value Ref Range    WBC 4 25 (L) 4 31 - 10 16 Thousand/uL    RBC 3 42 (L) 3 88 - 5 62 Million/uL    Hemoglobin 13 1 12 0 - 17 0 g/dL    Hematocrit 35 6 (L) 36 5 - 49 3 %     (H) 82 - 98 fL    MCH 38 3 (H) 26 8 - 34 3 pg    MCHC 36 8 31 4 - 37 4 g/dL    RDW 13 7 11 6 - 15 1 %    MPV 8 5 (L) 8 9 - 12 7 fL    Platelets 80 (L) 856 - 390 Thousands/uL    Neutrophils Relative 80 (H) 43 - 75 %    Lymphocytes Relative 12 (L) 14 - 44 %    Monocytes Relative 6 4 - 12 %    Eosinophils Relative 1 0 - 6 %    Basophils Relative 1 0 - 1 %    Neutrophils Absolute 3 45 1 85 - 7 62 Thousands/µL    Lymphocytes Absolute 0 50 (L) 0 60 - 4 47 Thousands/µL    Monocytes Absolute 0 24 0 17 - 1 22 Thousand/µL    Eosinophils Absolute 0 04 0 00 - 0 61 Thousand/µL    Basophils Absolute 0 02 0 00 - 0 10 Thousands/µL   CEA   Result Value Ref Range    CEA 24 3 (H) 0 0 - 3 0 ng/mL   Comprehensive metabolic panel   Result Value Ref Range    Sodium 134 (L) 136 - 145 mmol/L    Potassium 4 0 3 5 - 5 3 mmol/L    Chloride 102 98 - 108 mmol/L    CO2 30 21 - 32 mmol/L    ANION GAP 2 (L) 4 - 13 mmol/L    BUN 9 5 - 25 mg/dL    Creatinine 0 80 0 60 - 1 30 mg/dL    Glucose 98 65 - 140 mg/dL    Calcium 9 1 8 3 - 10 1 mg/dL    AST 27 5 - 45 U/L    ALT 18 12 - 78 U/L    Alkaline Phosphatase 55 46 - 116 U/L    Total Protein 6 9 6 4 - 8 2 g/dL    Albumin 4 0 3 5 - 5 7 g/dL    Total Bilirubin 1 50 (H) 0 20 - 1 00 mg/dL    eGFR 99 ml/min/1 73sq m     Labs, Imaging, & Other studies:   All pertinent labs and imaging studies were personally reviewed    Lab Results   Component Value Date     10/19/2015    K 4 0 05/06/2019     05/06/2019    CO2 30 05/06/2019    ANIONGAP 8 10/19/2015    BUN 9 05/06/2019    CREATININE 0 80 05/06/2019    GLUCOSE 92 10/19/2015    GLUF 79 08/02/2018    CALCIUM 9 1 05/06/2019    AST 27 05/06/2019    ALT 18 05/06/2019    ALKPHOS 55 05/06/2019    PROT 7 1 10/19/2015    BILITOT 1 24 (H) 10/19/2015    EGFR 99 05/06/2019     Lab Results   Component Value Date    WBC 4 25 (L) 05/06/2019    HGB 13 1 05/06/2019    HCT 35 6 (L) 05/06/2019     (H) 05/06/2019    PLT 80 (L) 05/06/2019   No results found for: [address] [place] [address] and discussed with patient  Assessment and plan:  Patient has metastatic 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 maybe mediastinal/hilar lymph nodes  He had been through several lines of systemic therapy and was being considered for Stivarga  Patient saw Dr Angelo Nathan and he suggested IROX plus Avastin, a combination of irinotecan and oxaliplatin and Avastin  Patient had these drugs previously but long time ago and were packaged differently  He also suggested getting CT scans and radiation consultation to the large lung mass and maybe to  mediastinal/hilar lymph nodes depending upon results of CT scans  Patient has cough and shortness of breath  He has dysphagia in the lower neck area    Has generalized weakness and tiredness    In 2008 he was diagnosed to have  stage IV adenocarcinoma of the colon     He had resection of the ascending colon   Pathology showed involvement of lymph nodes and lung   Postsurgery he received FOLFOX plus Avastin for 6 months      Then, in 2011 patient had additional resection this time of the transverse colon   He received FOLFIRI plus Avastin for 6 months after surgery    He has residual grade 1 peripheral 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      In 2012 he had wedge resection for right lung nodule   No chemotherapy at that time   In January 2014 he had wedge resection of the right lower    No radiation following this         History of AFib/atrial flutter and had ablation      June 2015-February 2017 patient was on Xeloda plus Avastin   In February 2017 patient was noted to have a new lesion of the lung  Dank  was unable to do RFA to this lesion   Patient was seen by thoracic surgery   Due to location, wedge resection was not able to be done  He was referred to rad onc for SBRT       May 2017 lung nodule was noted to be stable compared to February 2017 August 2017 right lower lobe pulmonary nodule had enlarged compared to May 2017      Due to enlarging nodule in May 2017, a PET/CT was performed in August 2017   This showed a 2 8 x 1 8 cm right lower medial lung mass with hypermetabolism      Aug 2017 he was referred to [place] for Cyberknife of the lung      Patient then did not return for follow up with our office until Feb 2018  At this time patient then had repeat scans to be performed  CT C/A/P in March 2018 showed enlarging right lower lobe nodular masses -- consistent with metastatic disease      He was then seen in follow-up on 03/15/2018 after his CT scan result   Patient was presented with multiple options at included surgery, RFA, radiation, CyberKnife, systemic therapy   Patient opted for consultation for RFA in thoracic surgery and then decide      Patient then did not come back to the office until July 2018  Claudean Backbone needed up to date imaging studies and had repeat PET/CT in Aug 2018 which showed enlarging hypermetabolic right lower medial lung in right hilar lesions   New hypermetabolic left lower lung nodule   Multiple left pleural base lesions, however noted to likely be inflammatory related to prior talc pleurodesis  No new lesions in the neck, abdomen, pelvis    Due to increase in metastatic disease, patient was recommended to pursue systemic therapy   Patient began Loigu 42 September 2018    Most recent CT scan showed disease progression and Stivarga was offered  Camilo Salmon Physical examination as recorded  I discussed recommendations made by Dr Yusuf Romano  in detail and ordered blood tests, urine test, CT scans and MRI scan of the brain  These tests for disease status and for treatment especially Avastin    We discussed irinotecan, oxaliplatin and Avastin in detail  He had these medications before  Myself and my nurse gave him verbal and printed information on these medications  Also maid referral to Radiation as above and gastroenterologist for dysphagia  All discussed in very much detail  Questions answered  Patient signed informed consent for systemic therapy, IROX plus Avastin and for blood transfusions in case he would need transfusions  All discussed in very much detail  Questions answered  Goal will be response and prolongation of survival   Also discussed eating healthy dysphagia diet and staying active  Patient is capable of self-care  Patient has Port-A-Cath and he gets that flushed once every 6 weeks  1  Malignant neoplasm of ascending colon [facility])    - Ambulatory referral to Radiation Oncology; Future  - CT chest abdomen pelvis w contrast; Future  - CT soft tissue neck w contrast; Future  - MRI brain w wo contrast; Future  - UA (URINE) with reflex to Microscopic; Standing  - UA (URINE) with reflex to Microscopic    2  Colon cancer metastasized to lung [facility])    - Ambulatory referral to Radiation Oncology; Future  - albuterol (VENTOLIN HFA) 90 mcg/act inhaler; 2 puffs every 4 hours as needed for shortness of breath  No more than 4 times a day  Dispense: 1 Inhaler; Refill: 2  - CT chest abdomen pelvis w contrast; Future  - CT soft tissue neck w contrast; Future  - MRI brain w wo contrast; Future  - UA (URINE) with reflex to Microscopic; Standing  - UA (URINE) with reflex to Microscopic    3  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)    - CT chest abdomen pelvis w contrast; Future  - CT soft tissue neck w contrast; Future  - MRI brain w wo contrast; Future  - UA (URINE) with reflex to Microscopic; Standing  - UA (URINE) with reflex to Microscopic    4  Port-A-Cath in place      5   Metastasis from colon cancer ([address] )    - CT chest abdomen pelvis w contrast; Future  - CT soft tissue neck w contrast; Future  - MRI brain w wo contrast; Future  - UA (URINE) with reflex to Microscopic; Standing  - UA (URINE) with reflex to Microscopic    6  Esophageal dysphagia    - Ambulatory referral to Gastroenterology; Future          Patient voiced understanding and agreement in the discussion  Counseling / Coordination of Care: 50 min   Greater than 50% of total time was spent with the patient and / or family counseling and / or coordination of care

## 2019-07-10 NOTE — PATIENT INSTRUCTIONS
Blood tests, urine test, scans and GI evaluation as soon as possible    Radiation consultation as soon as possible

## 2019-07-10 NOTE — TELEPHONE ENCOUNTER
Flash Starks from Dr Gongora's office ([address] [place] [address]) looking to get a sooner appt for patient per Dr Anisa Starks can be reached at [phone]

## 2019-07-11 ENCOUNTER — APPOINTMENT (OUTPATIENT)
Dept: LAB | Facility: HOSPITAL | Age: 59
End: 2019-07-11
Attending: INTERNAL MEDICINE
Payer: COMMERCIAL

## 2019-07-11 LAB
BILIRUB UR QL STRIP: NEGATIVE
CLARITY UR: CLEAR
COLOR UR: YELLOW
GLUCOSE UR STRIP-MCNC: NEGATIVE MG/DL
HGB UR QL STRIP.AUTO: NEGATIVE
KETONES UR STRIP-MCNC: NEGATIVE MG/DL
LEUKOCYTE ESTERASE UR QL STRIP: NEGATIVE
NITRITE UR QL STRIP: NEGATIVE
PH UR STRIP.AUTO: 7 [PH]
PROT UR STRIP-MCNC: NEGATIVE MG/DL
SP GR UR STRIP.AUTO: 1.01 (ref 1–1.03)
UROBILINOGEN UR QL STRIP.AUTO: 0.2 E.U./DL

## 2019-07-11 PROCEDURE — 81003 URINALYSIS AUTO W/O SCOPE: CPT | Performed by: INTERNAL MEDICINE

## 2019-07-11 NOTE — PROGRESS NOTES
HPI:  Patient has metastatic 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 maybe mediastinal/hilar lymph nodes  He had been through several lines of systemic therapy and was being considered for Stivarga  Patient saw Dr Jarrett Kimball and he suggested IROX plus Avastin, a combination of irinotecan and oxaliplatin and Avastin  Patient had these drugs previously but long time ago and were packaged differently  He also suggested getting CT scans and radiation consultation to the large lung mass and maybe to  mediastinal/hilar lymph nodes depending upon results of CT scans  Patient has cough and shortness of breath  He has dysphagia in the lower neck area  Has generalized weakness and tiredness    In 2008 he was diagnosed to have  stage IV adenocarcinoma of the colon     He had resection of the ascending colon   Pathology showed involvement of lymph nodes and lung   Postsurgery he received FOLFOX plus Avastin for 6 months      Then, in 2011 patient had additional resection this time of the transverse colon   He received FOLFIRI plus Avastin for 6 months after surgery    He has residual grade 1 peripheral 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      In 2012 he had wedge resection for right lung nodule   No chemotherapy at that time   In January 2014 he had wedge resection of the right lower    No radiation following this         History of AFib/atrial flutter and had ablation      June 2015-February 2017 patient was on Xeloda plus Avastin   In February 2017 patient was noted to have a new lesion of the lung  Kerry Avers was unable to do RFA to this lesion   Patient was seen by thoracic surgery   Due to location, wedge resection was not able to be done   He was referred to rad onc for SBRT       May 2017 lung nodule was noted to be stable compared to February 2017 August 2017 right lower lobe pulmonary nodule had enlarged compared to May 2017      Due to enlarging nodule in May 2017, a PET/CT was performed in August 2017   This showed a 2 8 x 1 8 cm right lower medial lung mass with hypermetabolism      Aug 2017 he was referred to [facility] for Cyberknife of the lung      Patient then did not return for follow up with our office until Feb 2018  At this time patient then had repeat scans to be performed  CT C/A/P in March 2018 showed enlarging right lower lobe nodular masses -- consistent with metastatic disease      He was then seen in follow-up on 03/15/2018 after his CT scan result   Patient was presented with multiple options at included surgery, RFA, radiation, CyberKnife, systemic therapy   Patient opted for consultation for RFA in thoracic surgery and then decide      Patient then did not come back to the office until July 2018  [facility] needed up to date imaging studies and had repeat PET/CT in Aug 2018 which showed enlarging hypermetabolic right lower medial lung in right hilar lesions   New hypermetabolic left lower lung nodule   Multiple left pleural base lesions, however noted to likely be inflammatory related to prior talc pleurodesis  No new lesions in the neck, abdomen, pelvis    Due to increase in metastatic disease, patient was recommended to pursue systemic therapy   Patient began Loigu 42 September 2018    Most recent CT scan showed disease progression and Stivarga was offered  Current Outpatient Medications:     albuterol (VENTOLIN HFA) 90 mcg/act inhaler, 2 puffs every 4 hours as needed for shortness of breath  No more than 4 times a day, Disp: 1 Inhaler, Rfl: 2    ondansetron (ZOFRAN) 4 mg tablet, Take 1 tablet (4 mg total) by mouth every 6 (six) hours as needed for nausea or vomiting (Patient not taking: Reported on 7/10/2019), Disp: 50 tablet, Rfl: 2    Regorafenib (STIVARGA) 40 MG TABS, 2 tablets daily for 21 days  7 days off   (Patient not taking: Reported on 7/10/2019), Disp: 42 tablet, Rfl: 3    Allergies   Allergen Reactions    Aspirin Other (See Comments)     Nose bleed    Sulfa Antibiotics Other (See Comments) Dizzy         No history exists  ROS:  07/10/19 Reviewed 13 systems:  Presently no other neurological, cardiac, pulmonary, GI and  symptoms other than mentioned above  No other symptoms like  fever, chills, bleeding, bone pains, skin rash, weight loss, arthritic symptoms, numbness,  claudication and gait problem  No frequent infections  Not unusually sensitive to heat or cold  No swelling of the ankles  No swollen glands  Patient is anxious  Other symptoms are in HPI        /84 (BP Location: Right arm, Patient Position: Sitting, Cuff Size: Adult)   Pulse 84   Temp 98 °F (36 7 °C)   Resp 18   Ht 6' 4 5" (1 943 m)   Wt 103 kg (227 lb 3 2 oz)   SpO2 94%   BMI 27 30 kg/m²     Physical Exam:    Patient is alert and oriented  He is not in distress  Vital signs are as above  There is no scleral icterus,  no oral thrush, no palpable neck mass, decreased breath sounds right lung base, regular heart rate?, abdomen  soft and non tender, no palpable abdominal mass, no ascites, no edema of ankles, no calf tenderness, no focal neurological deficit, no skin rash, no palpable lymphadenopathy, good arterial pulses, no clubbing  Patient is anxious  Performance status 1      IMAGING:  CT chest abdomen pelvis w contrast    (Results Pending)   CT soft tissue neck w contrast    (Results Pending)   MRI brain w wo contrast    (Results Pending)       LABS:  Results for orders placed or performed during the hospital encounter of 05/06/19   CBC and differential   Result Value Ref Range    WBC 4 25 (L) 4 31 - 10 16 Thousand/uL    RBC 3 42 (L) 3 88 - 5 62 Million/uL    Hemoglobin 13 1 12 0 - 17 0 g/dL    Hematocrit 35 6 (L) 36 5 - 49 3 %     (H) 82 - 98 fL    MCH 38 3 (H) 26 8 - 34 3 pg    MCHC 36 8 31 4 - 37 4 g/dL    RDW 13 7 11 6 - 15 1 %    MPV 8 5 (L) 8 9 - 12 7 fL    Platelets 80 (L) 720 - 390 Thousands/uL    Neutrophils Relative 80 (H) 43 - 75 %    Lymphocytes Relative 12 (L) 14 - 44 %    Monocytes Relative 6 4 - 12 %    Eosinophils Relative 1 0 - 6 %    Basophils Relative 1 0 - 1 %    Neutrophils Absolute 3 45 1 85 - 7 62 Thousands/µL    Lymphocytes Absolute 0 50 (L) 0 60 - 4 47 Thousands/µL    Monocytes Absolute 0 24 0 17 - 1 22 Thousand/µL    Eosinophils Absolute 0 04 0 00 - 0 61 Thousand/µL    Basophils Absolute 0 02 0 00 - 0 10 Thousands/µL   CEA   Result Value Ref Range    CEA 24 3 (H) 0 0 - 3 0 ng/mL   Comprehensive metabolic panel   Result Value Ref Range    Sodium 134 (L) 136 - 145 mmol/L    Potassium 4 0 3 5 - 5 3 mmol/L    Chloride 102 98 - 108 mmol/L    CO2 30 21 - 32 mmol/L    ANION GAP 2 (L) 4 - 13 mmol/L    BUN 9 5 - 25 mg/dL    Creatinine 0 80 0 60 - 1 30 mg/dL    Glucose 98 65 - 140 mg/dL    Calcium 9 1 8 3 - 10 1 mg/dL    AST 27 5 - 45 U/L    ALT 18 12 - 78 U/L    Alkaline Phosphatase 55 46 - 116 U/L    Total Protein 6 9 6 4 - 8 2 g/dL    Albumin 4 0 3 5 - 5 7 g/dL    Total Bilirubin 1 50 (H) 0 20 - 1 00 mg/dL    eGFR 99 ml/min/1 73sq m     Labs, Imaging, & Other studies:   All pertinent labs and imaging studies were personally reviewed    Lab Results   Component Value Date     10/19/2015    K 4 0 05/06/2019     05/06/2019    CO2 30 05/06/2019    ANIONGAP 8 10/19/2015    BUN 9 05/06/2019    CREATININE 0 80 05/06/2019    GLUCOSE 92 10/19/2015    GLUF 79 08/02/2018    CALCIUM 9 1 05/06/2019    AST 27 05/06/2019    ALT 18 05/06/2019    ALKPHOS 55 05/06/2019    PROT 7 1 10/19/2015    BILITOT 1 24 (H) 10/19/2015    EGFR 99 05/06/2019     Lab Results   Component Value Date    WBC 4 25 (L) 05/06/2019    HGB 13 1 05/06/2019    HCT 35 6 (L) 05/06/2019     (H) 05/06/2019    PLT 80 (L) 05/06/2019   No results found for: SEDRATE    Reviewed and discussed with patient  Assessment and plan:  Patient has metastatic 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 maybe mediastinal/hilar lymph nodes  He had been through several lines of systemic therapy and was being considered for Stivarga    Patient saw   Clotilde Campbell and he suggested IROX plus Avastin, a combination of irinotecan and oxaliplatin and Avastin  Patient had these drugs previously but long time ago and were packaged differently  He also suggested getting CT scans and radiation consultation to the large lung mass and maybe to  mediastinal/hilar lymph nodes depending upon results of CT scans  Patient has cough and shortness of breath  He has dysphagia in the lower neck area  Has generalized weakness and tiredness    In 2008 he was diagnosed to have  stage IV adenocarcinoma of the colon     He had resection of the ascending colon   Pathology showed involvement of lymph nodes and lung   Postsurgery he received FOLFOX plus Avastin for 6 months      Then, in 2011 patient had additional resection this time of the transverse colon   He received FOLFIRI plus Avastin for 6 months after surgery    He has residual grade 1 peripheral 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      In 2012 he had wedge resection for right lung nodule   No chemotherapy at that time   In January 2014 he had wedge resection of the right lower    No radiation following this         History of AFib/atrial flutter and had ablation      June 2015-February 2017 patient was on Xeloda plus Avastin   In February 2017 patient was noted to have a new lesion of the lung  Buddy Zamora was unable to do RFA to this lesion   Patient was seen by thoracic surgery   Due to location, wedge resection was not able to be done   He was referred to rad onc for SBRT       May 2017 lung nodule was noted to be stable compared to February 2017 August 2017 right lower lobe pulmonary nodule had enlarged compared to May 2017      Due to enlarging nodule in May 2017, a PET/CT was performed in August 2017   This showed a 2 8 x 1 8 cm right lower medial lung mass with hypermetabolism      Aug 2017 he was referred to [facility] for Cyberknife of the lung      Patient then did not return for follow up with our office until Feb 2018  At this time patient then had repeat scans to be performed  CT C/A/P in March 2018 showed enlarging right lower lobe nodular masses -- consistent with metastatic disease      He was then seen in follow-up on 03/15/2018 after his CT scan result   Patient was presented with multiple options at included surgery, RFA, radiation, CyberKnife, systemic therapy   Patient opted for consultation for RFA in thoracic surgery and then decide      Patient then did not come back to the office until July 2018  [facility] needed up to date imaging studies and had repeat PET/CT in Aug 2018 which showed enlarging hypermetabolic right lower medial lung in right hilar lesions   New hypermetabolic left lower lung nodule   Multiple left pleural base lesions, however noted to likely be inflammatory related to prior talc pleurodesis  No new lesions in the neck, abdomen, pelvis    Due to increase in metastatic disease, patient was recommended to pursue systemic therapy   Patient began Loigu 42 September 2018    Most recent CT scan showed disease progression and Stivarga was offered  Matteo Balint Physical examination as recorded  I discussed recommendations made by Dr Diana Cotto  in detail and ordered blood tests, urine test, CT scans and MRI scan of the brain  These tests for disease status and for treatment especially Avastin  We discussed irinotecan, oxaliplatin and Avastin in detail  He had these medications before  Myself and my nurse gave him verbal and printed information on these medications  Also maid referral to Radiation as above and gastroenterologist for dysphagia  All discussed in very much detail  Questions answered  Patient signed informed consent for systemic therapy, IROX plus Avastin and for blood transfusions in case he would need transfusions  All discussed in very much detail  Questions answered    Goal will be response and prolongation of survival   Also discussed eating healthy dysphagia diet and staying active  Patient is capable of self-care  Patient has Port-A-Cath and he gets that flushed once every 6 weeks  1  Malignant neoplasm of ascending colon [facility])    - Ambulatory referral to Radiation Oncology; Future  - CT chest abdomen pelvis w contrast; Future  - CT soft tissue neck w contrast; Future  - MRI brain w wo contrast; Future  - UA (URINE) with reflex to Microscopic; Standing  - UA (URINE) with reflex to Microscopic    2  Colon cancer metastasized to lung [facility])    - Ambulatory referral to Radiation Oncology; Future  - albuterol (VENTOLIN HFA) 90 mcg/act inhaler; 2 puffs every 4 hours as needed for shortness of breath  No more than 4 times a day  Dispense: 1 Inhaler; Refill: 2  - CT chest abdomen pelvis w contrast; Future  - CT soft tissue neck w contrast; Future  - MRI brain w wo contrast; Future  - UA (URINE) with reflex to Microscopic; Standing  - UA (URINE) with reflex to Microscopic    3  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)    - CT chest abdomen pelvis w contrast; Future  - CT soft tissue neck w contrast; Future  - MRI brain w wo contrast; Future  - UA (URINE) with reflex to Microscopic; Standing  - UA (URINE) with reflex to Microscopic    4  Port-A-Cath in place      5  Metastasis from colon cancer ([facility] [address] )    - CT chest abdomen pelvis w contrast; Future  - CT soft tissue neck w contrast; Future  - MRI brain w wo contrast; Future  - UA (URINE) with reflex to Microscopic; Standing  - UA (URINE) with reflex to Microscopic    6  Esophageal dysphagia    - Ambulatory referral to Gastroenterology; Future          Patient voiced understanding and agreement in the discussion  Counseling / Coordination of Care: 50 min   Greater than 50% of total time was spent with the patient and / or family counseling and / or coordination of care

## 2019-07-12 ENCOUNTER — RADIATION ONCOLOGY CONSULT (OUTPATIENT)
Dept: RADIATION ONCOLOGY | Facility: CLINIC | Age: 59
End: 2019-07-12
Attending: RADIOLOGY
Payer: COMMERCIAL

## 2019-07-12 VITALS
DIASTOLIC BLOOD PRESSURE: 64 MMHG | SYSTOLIC BLOOD PRESSURE: 110 MMHG | BODY MASS INDEX: 26.21 KG/M2 | RESPIRATION RATE: 18 BRPM | HEIGHT: 77 IN | WEIGHT: 222 LBS | OXYGEN SATURATION: 94 % | TEMPERATURE: 99.3 F | HEART RATE: 79 BPM

## 2019-07-12 DIAGNOSIS — C78.00 COLON CANCER METASTASIZED TO LUNG (HCC): ICD-10-CM

## 2019-07-12 DIAGNOSIS — C77.1 METASTASIS TO INTRATHORACIC LYMPH NODES (HCC): ICD-10-CM

## 2019-07-12 DIAGNOSIS — C78.00 COLON CANCER METASTASIZED TO LUNG (HCC): Primary | Chronic | ICD-10-CM

## 2019-07-12 DIAGNOSIS — C18.2 MALIGNANT NEOPLASM OF ASCENDING COLON (HCC): ICD-10-CM

## 2019-07-12 DIAGNOSIS — C18.9 COLON CANCER METASTASIZED TO LUNG (HCC): Primary | Chronic | ICD-10-CM

## 2019-07-12 DIAGNOSIS — C18.9 COLON CANCER METASTASIZED TO LUNG (HCC): ICD-10-CM

## 2019-07-12 PROCEDURE — 99211 OFF/OP EST MAY X REQ PHY/QHP: CPT | Performed by: RADIOLOGY

## 2019-07-12 NOTE — PROGRESS NOTES
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 status post right hemicolectomy in 2008, pathologic stage T3, N2, M1, moderately differentiated adenocarcinoma of the ascending colon followed by FOLFOX  In 2010 he underwent wedge resection of left lower and right lower lobe lesions, then additional chemotherapy with Avastin and FOLFIRI  Right VATS and right lower lobe wedge resection 2014, SBRT of left lung metastases March 2014, left lower lobectomy April 2015, Xeloda and Avastin from June 2015 to February 2017  He was last see on 5/16/17 by Dr Brianna Eden for consideration of SBRT of a right lower lobe lesion  At that time the lesion was not amenable to wedge resection an he was not a candidate for another lobectomy  His case was discussed at thoracic tumor conference, the consensus was to defer SBRT, continue  observation and repeat CT scan of the chest in 2-3 months     8/2017 PET/CT showed the right lung nodule has increased in size  Patient was referred to [place] [facility]  No additional radiation given  Patient did not return to medical oncology until March 2018 after CT imaging, which showed enlarging right lower lobe nodular masses  He had PET CT in August 2018 showing progression of disease and systemic therapy was recommended   He started Lonsurf in September 2018 1/29/19 CT chest showed multiple nodular masses in the lung consistent with metastatic disease, most of these are stable except for the anterior right middle lobe pulmonary nodule, currently 2 0 x 1 4 cm, increased from the PET/CT of 12/3/2018, where it measured  1 5 x 0 8 cm        5/1319 CT chest showed mild interval enlargement of right lower lobe mass measuring approximately 7 7 x 3 9 cm and previously 7 2 x 3 cm when measured in the same fashion     Mild interval enlargement of 17 mm left upper lobe nodule previously 14 mm and new right lung base 11 mm nodule    Plaque-like soft tissue thickening right middle lobe approximately 14 x 8 mm previously 20 x 11 mm    No other significant interval change  6/21/19 Second opinion at [facility] with Dr Jax Almazan    7/10/19 Dr Damien Kimball FU  - disease progression on recent imaging  - case reviewed with Dr Jax Almazan, discussed irinotecan, oxaliplatin and Avastin   - refer to radiation   - refer to GI for dysphagia      7/14/19 CT chest, abdomen and pelvis and  CT soft tissue neck  7/15/19 MRI brain         Colon cancer metastasized to lung [facility])    2008 Initial Diagnosis     Colon cancer metastasized to lung ([facility] [address] )      2/18/2008 Surgery     Partial colectomy/Right hemicolectomy:  - stage IV (pT3,pN2,pM1a, G2)    - Dr Shannon Medrano        2008 - 2009 Chemotherapy     FOLFOX for two 6 month courses (3 month break in-between courses, according to the patient)  - Dr Lamonte Ang      4/21/2010 Surgery     Right lower lobe lung nodule:  - metastatic colonic adenocarcinoma      8/19/2010 Surgery     Left lower lobe wedge resection:  - moderately differentiated adenocarcinoma, consistent with metastasis from known colonic primary, 1 3 cm      4/2011 - 10/2011 Chemotherapy     Avastin and FOLFIRI       2011 Surgery     Transverse colectomy      11/9/2011 Surgery     RAZ lung wedge resection:  - metastatic adenocarcinoma of the colon  - invasive carcinoma is 0 4 cm from parenchymal resection      2012 Surgery     Wedge resection for right lung nodule      11/29/2012 Surgery     I  [id number] (11/29/12)   Omental nodule, excision:   - Metastatic adenocarcinoma morphologically consistent with colonic primary  1/15/2014 Surgery     A  Lung, right lower lobe, wedge resection:   - 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, moderately to poorly differentiated, consistent with colorectal primary, see note  - Resection margin, no tumor seen     - Background lung with few non-necrotizing granulomas and emphysematous changes  3/4/2014 - 3/14/2014 Radiation     Plan ID Energy Fractions Dose per Fraction (cGy) Total Dose Delivered (cGy) Elapsed Days   SBRT Lt Lung 6X 5 / 5 1,000 5,000 8     - Dr Mayra Foster      4/14/2015 Surgery     Left lower lobe lobectomy, cervical mediastinoscopy, Left posterolateral thoracotomy:  A   Lymph node Level 2R:    - 3/3 lymph nodes, positive for metastatic colonic adenocarcinoma  BShirlyn Maywood node Level 7:   - 3/8 lymph nodes, positive for metastatic colonic adenocarcinoma       C   Left lower lobe:   - Metastatic colonic adenocarcinoma    - The vascular and bronchial margins of resection are negative for tumor    - 0/6 lymph nodes, negative for metastatic colonic adenocarcinoma     - The tumor is 0 5 cm away from the hilar staple line        6/2015 - 2/2017 Chemotherapy     Xeloda and Avastin      9/2018 - 5/2019 Chemotherapy     [facility]         Clinical Trial: no      Health Maintenance   Topic Date Due    Hepatitis C Screening  1960    Depression Screening PHQ  1960    CRC Screening: Colonoscopy  1960    Pneumococcal Vaccine: Pediatrics (0 to 5 Years) and At-Risk Patients (6 to 59 Years) (1 of 3 - PCV13) 11/13/1967    BMI: Followup Plan  11/13/1979    DTaP,Tdap,and Td Vaccines (1 - Tdap) 11/13/1982    INFLUENZA VACCINE  07/01/2019    BMI: Adult  07/10/2020    Pneumococcal Vaccine: 65+ Years (1 of 2 - PCV13) 11/13/2026    HEPATITIS B VACCINES  Aged Out       Patient Active Problem List   Diagnosis    Flu-like symptoms    Fever    Colon cancer metastasized to lung (HCC)    A-fib (HCC)    Chronic back pain    Diarrhea    Malignant neoplasm of ascending colon (HCC)    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)    Dyspnea on exertion    Port-A-Cath in place     Past Medical History:   Diagnosis Date    Atrial fibrillation ([facility] [address] )     Colon cancer ([facility] [address] )     Metastatic carcinoma to lung [facility])      Past Surgical History:   Procedure Laterality Date    ANTERIOR CRUCIATE LIGAMENT REPAIR      ANTERIOR CRUCIATE LIGAMENT REPAIR      2  on R knee    APPENDECTOMY      ATRIAL ABLATION SURGERY      BACK SURGERY      COLON SURGERY      Ascending and transverse colon    COLONOSCOPY      LUNG REMOVAL, PARTIAL      RIGHT COLECTOMY      and Transverse colon resection     Family History   Problem Relation Age of Onset    Diabetes Mother     Heart disease Mother     Heart disease Father     Skin cancer Sister      Social History     Socioeconomic History    Marital status: /Civil Union     Spouse name: Not on file    Number of children: Not on file    Years of education: Not on file    Highest education level: Not on file   Occupational History    Not on file   Social Needs    Financial resource strain: Not on file    Food insecurity:     Worry: Not on file     Inability: Not on file    Transportation needs:     Medical: Not on file     Non-medical: Not on file   Tobacco Use    Smoking status: Former Smoker     Types: Cigarettes     Last attempt to quit:      Years since quittin 5    Smokeless tobacco: Never Used    Tobacco comment: Cigars    Substance and Sexual Activity    Alcohol use:  Yes     Alcohol/week: 12 0 standard drinks     Types: 12 Cans of beer per week     Comment: Socially     Drug use: No    Sexual activity: Not on file   Lifestyle    Physical activity:     Days per week: Not on file     Minutes per session: Not on file    Stress: Not on file   Relationships    Social connections:     Talks on phone: Not on file     Gets together: Not on file     Attends Mandaeism service: Not on file     Active member of club or organization: Not on file     Attends meetings of clubs or organizations: Not on file     Relationship status: Not on file    Intimate partner violence:     Fear of current or ex partner: Not on file     Emotionally abused: Not on file     Physically abused: Not on file Forced sexual activity: Not on file   Other Topics Concern    Not on file   Social History Narrative    Patient not questioned about family hx        Current Outpatient Medications:     albuterol (VENTOLIN HFA) 90 mcg/act inhaler, 2 puffs every 4 hours as needed for shortness of breath  No more than 4 times a day, Disp: 1 Inhaler, Rfl: 2    ondansetron (ZOFRAN) 4 mg tablet, Take 1 tablet (4 mg total) by mouth every 6 (six) hours as needed for nausea or vomiting (Patient not taking: Reported on 7/12/2019), Disp: 50 tablet, Rfl: 2    Regorafenib (STIVARGA) 40 MG TABS, 2 tablets daily for 21 days  7 days off  (Patient not taking: Reported on 7/10/2019), Disp: 42 tablet, Rfl: 3    Allergies   Allergen Reactions    Aspirin Other (See Comments)     Nose bleed    Sulfa Antibiotics Other (See Comments)     Dizzy        Review of Systems:  Review of Systems   Constitutional: Negative  HENT: Positive for voice change (only if he talks too much)  Anterior throat/neck discomfort   Eyes: Negative  Respiratory: Positive for cough (occasional, especially when humid, with occasional white/clear mucous production), shortness of breath (labored when it's humid and occasionally with exertion) and wheezing (only when he gets over exerted)  Cardiovascular: Negative  Gastrointestinal: Negative  Endocrine: Negative  Genitourinary: Negative  Musculoskeletal: Positive for neck pain  Skin: Negative  Allergic/Immunologic: Negative  Neurological: Positive for headaches (when his jaw bothers him)  Hematological: Negative  Psychiatric/Behavioral: Negative          Vitals:    07/12/19 0828   BP: 110/64   Pulse: 79   Resp: 18   Temp: 99 3 °F (37 4 °C)   TempSrc: Temporal   SpO2: 94%   Weight: 101 kg (222 lb)   Height: 6' 4 5" (1 943 m)          Imaging:   Ct Chest Wo Contrast    Result Date: 5/15/2019  Narrative CT CHEST WITHOUT IV CONTRAST INDICATION:   C18 9: Malignant neoplasm of colon, unspecified C78 00: Secondary malignant neoplasm of unspecified lung C18 2: Malignant neoplasm of ascending colon  COMPARISON:  CT chest 1/29/2019 TECHNIQUE: CT examination of the chest was performed without intravenous contrast   Axial, sagittal, and coronal 2D reformatted images were created from the source data and submitted for interpretation  Radiation dose length product (DLP) for this visit:  334 mGy-cm   This examination, like all CT scans performed in the [facility], was performed utilizing techniques to minimize radiation dose exposure, including the use of iterative reconstruction and automated exposure control  FINDINGS: LUNGS: Post left partial pneumonectomy  Stable emphysematous disease  Nonspecific abnormal reticulation anterior right middle lobe  Multifocal left lung subpleural scarring unchanged  No infiltrate or pneumothorax  Central airways are clear  Right lower lobe perihilar lobulated and spiculated mass  Maximum transverse diameter is 7 7 cm image 92 previously 7 2 cm when measured in the same fashion  Medial AP measurement of this mass 3 9 cm image 87 series 3 previously 3 cm  Inferolateral subpleural nodular component of this mass 1 5 cm image 98 series 3 previously 1 cm  There are multiple pulmonary nodules, which will be described on series 3: Image 44, left upper lobe, solid, spiculated, 17 mmpreviously 14 mm  Image 65, right upper lobe, solid, smooth bordered, 2 mm, unchanged  Image 117, right middle lobe, plaque like soft tissue thickening, 14 x 8 mmpreviously 20 x 11 mm    Image 123, right lower lobe, solid, smooth bordered, 11 mm, new compared to most recent prior  PLEURA:  Stable multifocal left pleural thickening  Small loculated left apical pleural effusion unchanged  Left anterior pleural calcification unchanged  HEART/GREAT VESSELS:  Heart is not enlarged  No pericardial effusion  Minimal aortic and coronary artery calcification    Tip of portacatheter in the distal SVC  Stable cardiomediastinal shift to the left  MEDIASTINUM AND ZABRINA:  Unremarkable  CHEST WALL AND LOWER NECK:   Right chest wall ev catheter  Otherwise unremarkable  VISUALIZED STRUCTURES IN THE UPPER ABDOMEN:  Unremarkable  OSSEOUS STRUCTURES:  No acute fracture or osseous destructive lesion identified  Degenerative changes of the spine  Impression Mild interval enlargement of right lower lobe mass measuring approximately 7 7 x 3 9 cm and previously 7 2 x 3 cm when measured in the same fashion  Mild interval enlargement of 17 mm left upper lobe nodule previously 14 mm and new right lung base 11 mm nodule  Plaque-like soft tissue thickening right middle lobe approximately 14 x 8 mm previously 20 x 11 mm  No other significant interval change  The examination demonstrates a significant  finding and was documented as such in [facility] for liaison and referring practitioner notification  Workstation performed: [id number]     Ct Chest Wo Contrast    Result Date: 1/31/2019  Narrative CT CHEST WITHOUT IV CONTRAST INDICATION:   C18 9: Malignant neoplasm of colon, unspecified C78 00: Secondary malignant neoplasm of unspecified lung  Dr Abdoul Banegas note from 12/5/2018 was reviewed, which states patient has history of colon cancer with liver metastases  COMPARISON:  PET/CT from 12/3/2018  TECHNIQUE: CT examination of the chest was performed without intravenous contrast   Axial, sagittal, and coronal 2D reformatted images were created from the source data and submitted for interpretation  Radiation dose length product (DLP) for this visit:  360 mGy-cm   This examination, like all CT scans performed in the [facility], was performed utilizing techniques to minimize radiation dose exposure, including the use of iterative reconstruction and automated exposure control  FINDINGS: LUNGS: Patient is status post left lower lobectomy  Again seen is moderate emphysema   Large spiculated right lower lobe/right hilar mass measures 6 1 x 3 2 cm, not significantly changed (series 2 image 48 ) Spiculated left upper lobe nodule is unchanged at 1 0 x 1 2 cm (series 2 image 21 ) There is an enlarging anterior right middle lobe pulmonary nodule, currently 2 0 x 1 4 cm, previously 1 5 x 0 8 cm (series 2 image 59) Previously seen left lung nodule along the left heart border is unchanged at 1 0 x 0 7 cm (series 2 image 47 ) PLEURA:  Unremarkable  HEART/GREAT VESSELS:  Unremarkable for patient's age  MEDIASTINUM AND ZABRINA:  See above regarding the confluent right lower lobe and right hilar mass  CHEST WALL AND LOWER NECK:   Unremarkable  VISUALIZED STRUCTURES IN THE UPPER ABDOMEN:  Unremarkable  OSSEOUS STRUCTURES:  No acute fracture or destructive osseous lesion  Impression Again seen are multiple nodular masses in the lung consistent with metastatic disease    Most of these are stable except for the anterior right middle lobe pulmonary nodule, currently 2 0 x 1 4 cm, increased from the PET/CT of 12/3/2018, where it measured  1 5 x 0 8 cm (series 2 image 59) Workstation performed: [id number]     Teaching: NCI RT packet given

## 2019-07-12 NOTE — TELEPHONE ENCOUNTER
Left message for pt ; per Ravi Lobo  there is an appt on 7/15/19 @1:30pm with Dr Kassy Tillman in [address] [place] [address] RaudelPrashant

## 2019-07-12 NOTE — PROGRESS NOTES
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 status post right hemicolectomy in , pathologic stage T3, N2, M1, moderately differentiated adenocarcinoma of the ascending colon followed by FOLFOX  In  he underwent wedge resection of left lower and right lower lobe lesions, then additional chemotherapy with Avastin and FOLFIRI  Right VATS and right lower lobe wedge resection , SBRT of left lung metastases 2014, left lower lobectomy 2015, Xeloda and Avastin from 2015 to 2017       He was last see on 17 by Dr Daniel Rodriguez for consideration of SBRT of a right lower lobe lesion  At that time the lesion was not amenable to wedge resection an he was not a candidate for another lobectomy  His case was discussed at thoracic tumor conference, the consensus was to defer SBRT, continue  observation and repeat CT scan of the chest in 2-3 months      2017 PET/CT showed the right lung nodule has increased in size  Patient was referred to [place] [facility]  No additional radiation was given       Patient did not return to medical oncology until 2018 after CT imaging, which showed enlarging right lower lobe nodular masses  He had PET CT in 2018 showing progression of disease and systemic therapy was recommended   He started Lonsurf in 19 CT chest showed multiple nodular masses in the lung consistent with metastatic disease, most of these are stable except for the anterior right middle lobe pulmonary nodule, currently 2 0 x 1 4 cm, increased from the PET/CT of 12/3/2018, where it measured  1 5 x 0 8 cm       5/1319 CT chest showed mild interval enlargement of right lower lobe mass measuring approximately 7 7 x 3 9 cm and previously 7 2 x 3 cm when measured in the same fashion     Mild interval enlargement of 17 mm left upper lobe nodule previously 14 mm and new right lung base 11 mm nodule    Plaque-like soft tissue thickening right middle lobe approximately 14 x 8 mm previously 20 x 11 mm    No other significant interval change      6/21/19 Second opinion at [facility] with Dr Sheeba Jerez form medical oncology     7/10/19 Dr Anna CRAVEN  - disease progression on recent imaging  - case reviewed with Dr Sheeba Jerez, discussed irinotecan, oxaliplatin and Avastin   - refer to radiation   - refer to GI for dysphagia     Patient reports he is feeling much better now that he has been off chemotherapy since May of 2019  He has a good energy level  He denies any pain in the chest   He has no shortness of breath at rest but does get some shortness of breath on exertion  He has a mild nonproductive cough  He denies any hemoptysis  He has some discomfort with swallowing over the last 2-3 months without any nausea or vomiting  His weight has been stable  His voice has been somewhat more raspy and less powerful  He denies any abdominal or pelvic pains or symptoms  He reports normal bowel movements  He has no neurologic symptoms with no headaches nor any dizziness                                        7/14/19 CT chest, abdomen and pelvis and  CT soft tissue neck  7/15/19 MRI brain  7/24/19 GI evaluation for possible EGD      Historical Information      Colon cancer metastasized to lung [facility])    2008 Initial Diagnosis     Colon cancer metastasized to lung ([facility] [address] )      2/18/2008 Surgery     Partial colectomy/Right hemicolectomy:  - stage IV (pT3,pN2,pM1a, G2)    - Dr Sera Stout        2008 - 2009 Chemotherapy     FOLFOX for two 6 month courses (3 month break in-between courses, according to the patient)  - Dr Arenas Fix      4/21/2010 Surgery     Right lower lobe lung nodule:  - metastatic colonic adenocarcinoma      8/19/2010 Surgery     Left lower lobe wedge resection:  - moderately differentiated adenocarcinoma, consistent with metastasis from known colonic primary, 1 3 cm      4/2011 - 10/2011 Chemotherapy     Avastin and FOLFIRI       2011 Surgery     Transverse colectomy      11/9/2011 Surgery     RAZ lung wedge resection:  - metastatic adenocarcinoma of the colon  - invasive carcinoma is 0 4 cm from parenchymal resection      2012 Surgery     Wedge resection for right lung nodule      11/29/2012 Surgery     I  [id number] (11/29/12)   Omental nodule, excision:   - Metastatic adenocarcinoma morphologically consistent with colonic primary  1/15/2014 Surgery     A  Lung, right lower lobe, wedge resection:   - 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, moderately to poorly differentiated, consistent with colorectal primary, see note  - Resection margin, no tumor seen  - Background lung with few non-necrotizing granulomas and emphysematous changes  3/4/2014 - 3/14/2014 Radiation     Plan ID Energy Fractions Dose per Fraction (cGy) Total Dose Delivered (cGy) Elapsed Days   SBRT Lt Lung 6X 5 / 5 1,000 5,000 8     - Dr Gama Lantigua      4/14/2015 Surgery     Left lower lobe lobectomy, cervical mediastinoscopy, Left posterolateral thoracotomy:  A   Lymph node Level 2R:    - 3/3 lymph nodes, positive for metastatic colonic adenocarcinoma  BAnnamaria Bottom node Level 7:   - 3/8 lymph nodes, positive for metastatic colonic adenocarcinoma       C   Left lower lobe:   - Metastatic colonic adenocarcinoma    - The vascular and bronchial margins of resection are negative for tumor    - 0/6 lymph nodes, negative for metastatic colonic adenocarcinoma     - The tumor is 0 5 cm away from the hilar staple line        6/2015 - 2/2017 Chemotherapy     Xeloda and Avastin 2018 - 2019 Chemotherapy     [facility]       Clinical Trial: no    Past Medical History:   Diagnosis Date    Atrial fibrillation ([address] )     Colon cancer (HCC)     Metastatic carcinoma to lung [facility])      Past Surgical History:   Procedure Laterality Date    ANTERIOR CRUCIATE LIGAMENT REPAIR      ANTERIOR CRUCIATE LIGAMENT REPAIR      2  on R knee    APPENDECTOMY      ATRIAL ABLATION SURGERY      BACK SURGERY      COLON SURGERY      Ascending and transverse colon    COLONOSCOPY      LUNG REMOVAL, PARTIAL      RIGHT COLECTOMY      and Transverse colon resection       Family History   Problem Relation Age of Onset    Diabetes Mother     Heart disease Mother     Heart disease Father     Skin cancer Sister        Social History   Social History     Substance and Sexual Activity   Alcohol Use Yes    Alcohol/week: 12 0 standard drinks    Types: 12 Cans of beer per week    Comment: Socially      Social History     Substance and Sexual Activity   Drug Use No     Social History     Tobacco Use   Smoking Status Former Smoker    Types: Cigarettes    Last attempt to quit:     Years since quittin 5   Smokeless Tobacco Never Used   Tobacco Comment    Cigars      Meds/Allergies     Current Outpatient Medications:     albuterol (VENTOLIN HFA) 90 mcg/act inhaler, 2 puffs every 4 hours as needed for shortness of breath  No more than 4 times a day, Disp: 1 Inhaler, Rfl: 2    ondansetron (ZOFRAN) 4 mg tablet, Take 1 tablet (4 mg total) by mouth every 6 (six) hours as needed for nausea or vomiting (Patient not taking: Reported on 2019), Disp: 50 tablet, Rfl: 2    Regorafenib (STIVARGA) 40 MG TABS, 2 tablets daily for 21 days  7 days off  (Patient not taking: Reported on 7/10/2019), Disp: 42 tablet, Rfl: 3  Allergies   Allergen Reactions    Aspirin Other (See Comments)     Nose bleed    Sulfa Antibiotics Other (See Comments)     Dizzy      Review of Systems  Constitutional: Negative      HENT: Positive for voice change (only if he talks too much)  Anterior throat/neck discomfort   Eyes: Negative  Respiratory: Positive for cough (occasional, especially when humid, with occasional white/clear mucous production), shortness of breath (labored when it's humid and occasionally with exertion) and wheezing (only when he gets over exerted)  Cardiovascular: Negative  Gastrointestinal: Negative  Endocrine: Negative  Genitourinary: Negative  Musculoskeletal: Positive for neck pain  Skin: Negative  Allergic/Immunologic: Negative  Neurological: Positive for headaches (when his jaw bothers him)  Hematological: Negative  Psychiatric/Behavioral: Negative    OBJECTIVE:   /64   Pulse 79   Temp 99 3 °F (37 4 °C) (Temporal)   Resp 18   Ht 6' 4 5" (1 943 m)   Wt 101 kg (222 lb)   SpO2 94%   BMI 26 67 kg/m²   Pain Assessment:  0  Performance Status: ECOG/Zubrod/WHO: 1 - Symptomatic but completely ambulatory    Physical Exam   Constitutional: He is oriented to person, place, and time  He appears well-developed and well-nourished  No distress  HENT:   Head: Normocephalic and atraumatic  Mouth/Throat: No oropharyngeal exudate  Eyes: Pupils are equal, round, and reactive to light  Conjunctivae and EOM are normal  No scleral icterus  Neck: Normal range of motion  Neck supple  No tracheal deviation present  No thyromegaly present  Cardiovascular: Normal rate, regular rhythm and normal heart sounds  Pulmonary/Chest: Effort normal and breath sounds normal  No respiratory distress  He has no wheezes  He has no rales  He exhibits no tenderness  Abdominal: Soft  Bowel sounds are normal  He exhibits no distension and no mass  There is no tenderness  Musculoskeletal: Normal range of motion  He exhibits no edema or tenderness  Lymphadenopathy:     He has no cervical adenopathy  Right: No inguinal and no supraclavicular adenopathy present          Left: No inguinal and no supraclavicular adenopathy present  Neurological: He is alert and oriented to person, place, and time  No cranial nerve deficit  Coordination normal    Skin: Skin is warm and dry  No rash noted  He is not diaphoretic  No erythema  No pallor  Psychiatric: He has a normal mood and affect  His behavior is normal  Judgment and thought content normal    Nursing note and vitals reviewed  RESULTS  Lab Results    Chemistry        Component Value Date/Time     10/19/2015 0740    K 4 3 07/11/2019 0804    K 4 4 10/19/2015 0740     07/11/2019 0804     10/19/2015 0740    CO2 31 07/11/2019 0804    CO2 28 8 10/19/2015 0740    BUN 8 07/11/2019 0804    BUN 15 10/19/2015 0740    CREATININE 0 96 07/11/2019 0804    CREATININE 1 13 12/21/2015 0733        Component Value Date/Time    CALCIUM 9 6 07/11/2019 0804    CALCIUM 8 8 10/19/2015 0740    ALKPHOS 86 07/11/2019 0804    ALKPHOS 89 10/19/2015 0740    AST 24 07/11/2019 0804    AST 21 10/19/2015 0740    ALT 27 07/11/2019 0804    ALT 21 10/19/2015 0740    BILITOT 1 24 (H) 10/19/2015 0740            Lab Results   Component Value Date    WBC 4 54 07/11/2019    HGB 15 8 07/11/2019    HCT 47 1 07/11/2019     (H) 07/11/2019    PLT 99 (L) 07/11/2019       Imaging Studies: See Above    Pathology:See Above      ASSESSMENT  1  Colon cancer metastasized to lung [facility])  Radiation Simulation Treatment   2  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 [facility])  Radiation Simulation Treatment     Cancer Staging  See Below      PLAN/DISCUSSION  Orders Placed This Encounter   Procedures    Radiation Simulation Treatment          Barbie Sanchez is a 63y o  year old male with history of 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 status post right hemicolectomy in 2008, pathologic stage T3, N2, M1, moderately differentiated adenocarcinoma of the ascending colon followed by FOLFOX   In 2010, he underwent wedge resection of left lower and right lower lobe lesions, then additional chemotherapy with Avastin and FOLFIRI  Right VATS and right lower lobe wedge resection 2014, SBRT of left lung metastases March 2014, left lower lobectomy April 2015, Xeloda and Avastin from June 2015 to February 2017  He was last see on 5/16/17 by Dr Eddie Briones for consideration of SBRT of a right lower lobe lesion  At that time, the lesion was not amenable to wedge resection and he was not a candidate for another lobectomy  His case was discussed at thoracic tumor conference, the consensus was to defer SBRT, continue  observation and repeat CT scan of the chest in 2-3 months  He has continued his systemic treatment with Dr Timmy Wyatt with his most recent treatment with Lonsurf ending in May 2019  His most recent chest CT from May 13, 2019 showed progression in his large right lower lobe lung mass along with slight enlargement of a small left upper lobe lung nodule and a new right lung base nodule  He was seen for a 2nd opinion at [address] [place] [address] on June 21, 2019 by Dr Nathalia Irby and recommendations were made to consider local radiation therapy to be followed by systemic treatment with irinotecan, oxaliplatin, and Avastin  He has repeat CT scans of the chest, abdomen, and pelvis scheduled for July 14, 2019 along with a CT scan of the neck because he is having some difficulty with dysphagia  He also has MRI of the brain scheduled for July 15, 2019  The only area where he has received radiation therapy is the SBRT to the left lung performed here in 2014  He is candidate for fractionated external beam radiation therapy to the right lower lobe extending into the right hilar region for his large 7 7 x 3 9 cm mass  This will likely be larger on repeat CT scans that are scheduled for July 14, 2019  We discussed a course of 3000 cGy in 10 fractions to the right lower lobe and hilar region  His other lung nodules will be observed and treated with chemotherapy    Should they progress in the future, then additional SBRT lung treatment could be considered  We discussed the acute side effects and potential chronic complications of external beam radiation treatment  He agrees to proceed with the treatment  He will return for simulation and treatment planning purposes on July 15, 2019  Sg Merino MD  7/12/2019,10:04 AM      Portions of the record may have been created with voice recognition software   Occasional wrong word or "sound a like" substitutions may have occurred due to the inherent limitations of voice recognition software   Read the chart carefully and recognize, using context, where substitutions have occurred

## 2019-07-12 NOTE — TELEPHONE ENCOUNTER
The pt was unable to make the 7/15/19 appointment due to other appointments that morning  He is scheduled is scheduled on 7/24/19 at 8:20 am with Dr Juliana Bob  ( Per MZ ok )  I did leave a message to make the patient aware of the new appointment and will mail him his appointment information

## 2019-07-14 ENCOUNTER — HOSPITAL ENCOUNTER (OUTPATIENT)
Dept: CT IMAGING | Facility: HOSPITAL | Age: 59
Discharge: HOME | End: 2019-07-14
Attending: INTERNAL MEDICINE
Payer: COMMERCIAL

## 2019-07-14 DIAGNOSIS — C18.9 METASTASIS FROM COLON CANCER (HCC): ICD-10-CM

## 2019-07-14 DIAGNOSIS — C18.9 COLON CANCER METASTASIZED TO LUNG (HCC): Chronic | ICD-10-CM

## 2019-07-14 DIAGNOSIS — C77.1 METASTASIS TO INTRATHORACIC LYMPH NODES (HCC): ICD-10-CM

## 2019-07-14 DIAGNOSIS — C18.9 COLON CANCER METASTASIZED TO LUNG (HCC): ICD-10-CM

## 2019-07-14 DIAGNOSIS — C79.9 METASTASIS FROM COLON CANCER (HCC): ICD-10-CM

## 2019-07-14 DIAGNOSIS — C78.00 COLON CANCER METASTASIZED TO LUNG (HCC): Chronic | ICD-10-CM

## 2019-07-14 DIAGNOSIS — C18.2 MALIGNANT NEOPLASM OF ASCENDING COLON (HCC): ICD-10-CM

## 2019-07-14 DIAGNOSIS — C78.00 COLON CANCER METASTASIZED TO LUNG (HCC): ICD-10-CM

## 2019-07-14 PROCEDURE — 70491 CT SOFT TISSUE NECK W/DYE: CPT

## 2019-07-14 PROCEDURE — 71260 CT THORAX DX C+: CPT

## 2019-07-14 PROCEDURE — 74177 CT ABD & PELVIS W/CONTRAST: CPT

## 2019-07-14 RX ADMIN — IOHEXOL 100 ML: 350 INJECTION, SOLUTION INTRAVENOUS at 09:44

## 2019-07-15 ENCOUNTER — APPOINTMENT (OUTPATIENT)
Dept: RADIATION ONCOLOGY | Facility: CLINIC | Age: 59
End: 2019-07-15
Attending: RADIOLOGY
Payer: COMMERCIAL

## 2019-07-15 ENCOUNTER — HOSPITAL ENCOUNTER (OUTPATIENT)
Dept: MRI IMAGING | Facility: HOSPITAL | Age: 59
Discharge: HOME | End: 2019-07-15
Attending: INTERNAL MEDICINE
Payer: COMMERCIAL

## 2019-07-15 ENCOUNTER — TELEPHONE (OUTPATIENT)
Dept: HEMATOLOGY ONCOLOGY | Facility: CLINIC | Age: 59
End: 2019-07-15

## 2019-07-15 DIAGNOSIS — C18.9 COLON CANCER METASTASIZED TO LUNG (HCC): Chronic | ICD-10-CM

## 2019-07-15 DIAGNOSIS — C78.00 COLON CANCER METASTASIZED TO LUNG (HCC): Chronic | ICD-10-CM

## 2019-07-15 DIAGNOSIS — C79.9 METASTASIS FROM COLON CANCER (HCC): ICD-10-CM

## 2019-07-15 DIAGNOSIS — C18.2 MALIGNANT NEOPLASM OF ASCENDING COLON (HCC): ICD-10-CM

## 2019-07-15 DIAGNOSIS — C18.9 METASTASIS FROM COLON CANCER (HCC): ICD-10-CM

## 2019-07-15 DIAGNOSIS — C77.1 METASTASIS TO INTRATHORACIC LYMPH NODES (HCC): ICD-10-CM

## 2019-07-15 PROCEDURE — 77370 RADIATION PHYSICS CONSULT: CPT | Performed by: RADIOLOGY

## 2019-07-15 PROCEDURE — A9585 GADOBUTROL INJECTION: HCPCS | Performed by: INTERNAL MEDICINE

## 2019-07-15 PROCEDURE — 77334 RADIATION TREATMENT AID(S): CPT | Performed by: RADIOLOGY

## 2019-07-15 PROCEDURE — 70553 MRI BRAIN STEM W/O & W/DYE: CPT

## 2019-07-15 PROCEDURE — 77290 THER RAD SIMULAJ FIELD CPLX: CPT | Performed by: RADIOLOGY

## 2019-07-15 RX ADMIN — GADOBUTROL 10 ML: 604.72 INJECTION INTRAVENOUS at 21:56

## 2019-07-15 NOTE — TELEPHONE ENCOUNTER
Call transferred from [address] [place] [address], SUNDAR Garcia Mass from radiology called to report Significant Findings on CT chest, abd, pelvis done 7/14/19  Xavier Flores RN confirmed receipt via IM

## 2019-07-16 ENCOUNTER — TELEPHONE (OUTPATIENT)
Dept: HEMATOLOGY ONCOLOGY | Facility: CLINIC | Age: 59
End: 2019-07-16

## 2019-07-16 DIAGNOSIS — J18.9 PNEUMONIA OF RIGHT LUNG DUE TO INFECTIOUS ORGANISM, UNSPECIFIED PART OF LUNG: Primary | ICD-10-CM

## 2019-07-16 RX ORDER — AMOXICILLIN AND CLAVULANATE POTASSIUM 875; 125 MG/1; MG/1
1 TABLET, FILM COATED ORAL EVERY 12 HOURS SCHEDULED
Qty: 28 TABLET | Refills: 1 | Status: SHIPPED | OUTPATIENT
Start: 2019-07-16 | End: 2019-07-30

## 2019-07-16 NOTE — TELEPHONE ENCOUNTER
I left message for the patient on his answering machine/voicemail that he has pneumonia behind the tumor and I could call in antibiotic for him  I requested a phone call back and I left my name and office phone number  Patient called back right away  He is okay with Augmentin  He said he had that before and there was no problem  Sending prescription to his pharmacy

## 2019-07-19 PROCEDURE — 77295 3-D RADIOTHERAPY PLAN: CPT | Performed by: RADIOLOGY

## 2019-07-19 PROCEDURE — 77334 RADIATION TREATMENT AID(S): CPT | Performed by: RADIOLOGY

## 2019-07-19 PROCEDURE — 77300 RADIATION THERAPY DOSE PLAN: CPT | Performed by: RADIOLOGY

## 2019-07-23 ENCOUNTER — APPOINTMENT (OUTPATIENT)
Dept: RADIATION ONCOLOGY | Facility: CLINIC | Age: 59
End: 2019-07-23
Attending: RADIOLOGY
Payer: COMMERCIAL

## 2019-07-23 PROCEDURE — 77280 THER RAD SIMULAJ FIELD SMPL: CPT | Performed by: RADIOLOGY

## 2019-07-24 ENCOUNTER — APPOINTMENT (OUTPATIENT)
Dept: RADIATION ONCOLOGY | Facility: CLINIC | Age: 59
End: 2019-07-24
Attending: INTERNAL MEDICINE
Payer: COMMERCIAL

## 2019-07-24 ENCOUNTER — CONSULT (OUTPATIENT)
Dept: GASTROENTEROLOGY | Facility: MEDICAL CENTER | Age: 59
End: 2019-07-24
Payer: COMMERCIAL

## 2019-07-24 VITALS
WEIGHT: 220 LBS | SYSTOLIC BLOOD PRESSURE: 114 MMHG | BODY MASS INDEX: 26.79 KG/M2 | HEART RATE: 73 BPM | TEMPERATURE: 97.8 F | HEIGHT: 76 IN | DIASTOLIC BLOOD PRESSURE: 70 MMHG

## 2019-07-24 DIAGNOSIS — C18.9 COLON CANCER METASTASIZED TO LUNG (HCC): Primary | Chronic | ICD-10-CM

## 2019-07-24 DIAGNOSIS — C34.31 SQUAMOUS CELL CARCINOMA OF BRONCHUS IN RIGHT LOWER LOBE (HCC): Primary | ICD-10-CM

## 2019-07-24 DIAGNOSIS — C78.00 COLON CANCER METASTASIZED TO LUNG (HCC): Primary | Chronic | ICD-10-CM

## 2019-07-24 DIAGNOSIS — R13.19 ESOPHAGEAL DYSPHAGIA: ICD-10-CM

## 2019-07-24 DIAGNOSIS — I48.91 ATRIAL FIBRILLATION, UNSPECIFIED TYPE (HCC): Chronic | ICD-10-CM

## 2019-07-24 DIAGNOSIS — R06.00 DYSPNEA ON EXERTION: ICD-10-CM

## 2019-07-24 PROCEDURE — 77412 RADIATION TX DELIVERY LVL 3: CPT | Performed by: RADIOLOGY

## 2019-07-24 PROCEDURE — 77387 GUIDANCE FOR RADJ TX DLVR: CPT | Performed by: RADIOLOGY

## 2019-07-24 PROCEDURE — 77331 SPECIAL RADIATION DOSIMETRY: CPT | Performed by: RADIOLOGY

## 2019-07-24 PROCEDURE — 99204 OFFICE O/P NEW MOD 45 MIN: CPT | Performed by: INTERNAL MEDICINE

## 2019-07-24 NOTE — PATIENT INSTRUCTIONS
Pt will call the office to schedule his colonoscopy with Dr Mansoor Banks at [facility] instructions given to patient

## 2019-07-24 NOTE — PROGRESS NOTES
[facility] - Outpatient Consultation  Rahel Martinez 63 y o  male MRN: [id number]  Encounter: [id number]          ASSESSMENT AND PLAN:      1  Esophageal dysphagia  Dysphagia is likely secondary to musculoskeletal origin as this focal to oropharyngeal region with symptoms improvement with positioning of the neck  Symptoms improve with flexion with pain focal to the level of the thyroid cartilage or sternothyroid muscle  Symptoms have been ongoing for 3 months now and have been stable  Symptoms are worse to both solids and liquids  He will not likely benefit from EGD evaluation as he does not have any esophageal symptoms  I would recommend esophagram, review his CT scan, discuss this case with ENT and decide on management based on this  - FL barium swallow; Future    2  Colon cancer metastasized to lung (HCC)  - Na Sulfate-K Sulfate-Mg Sulf (SUPREP BOWEL PREP KIT) 17 5-3 13-1 6 GM/177ML SOLN; Take 177 mL by mouth once for 1 dose  Dispense: 2 Bottle; Refill: 0  - Colonoscopy; Future    He is overdue for his colonoscopy last colonoscopy was approximately 2015  Will schedule him for this now  He currently has a pneumonia so would ideally like to wait after this has resolved  He does not have any symptoms such as shortness of breath with ambulation  He does not have chest pain  Will schedule at our outpatient ambulatory center  Dyspnea on exertion is not problematic and he had previously due to ongoing pneumonia  He has had multiple long surgery but is not oxygen dependent or does not have major issues with daily activities  3  History of AFib- currently in normal sinus rhythm  ______________________________________________________________________    HPI:      He is a 58-year-old male with history of metastatic colon cancer with pulmonary Mets diagnosed in 2008   He is status post right hemicolectomy in 2008 with pathological staging of T3 N2 M1 with moderately differentiated adenocarcinoma of the ascending colon  He underwent FOLFOX, wedge resection in 2010 of the left lobe and right lobe lesions, in addition to chemotherapy with FOFIRI and Avastin  He also required right fat and right lower lobe which resection 2014, he underwent left lower lobectomy in April of 2015, he was on Xeloda and Avastin in June of 2015 to February of 2017  He is planning on undergoing radiation therapy to the right lower lobe nodule or mass  Due to progression on PET scan in August of 2018 of the right lower lobe nodular lesion he was started on Lonsurf  His last colonoscopy was in approximately 5413-9426 and was asked repeat in 2 years  He was being followed at [facility] with colorectal surgeon  He presents here to establish care  He is also due for colonoscopy at this time  His chief complaint currently is oropharyngeal dysphagia which is positional and alleviated by flexion of his neck  This has been ongoing for 3 months now  Symptoms are both with liquids and solids  Symptoms alleviated with flexion of neck  He denies any acid reflux, odynophagia  He does have focal pain at the level of thyroid cartilage  Denies any overt GI symptoms at this time  REVIEW OF SYSTEMS:    CONSTITUTIONAL: Denies any fever, chills, rigors, and weight loss  HEENT: No earache or tinnitus  Denies hearing loss or visual disturbances  CARDIOVASCULAR: No chest pain or palpitations  RESPIRATORY: Denies any cough, hemoptysis, shortness of breath or dyspnea on exertion  GASTROINTESTINAL: As noted in the History of Present Illness  GENITOURINARY: No problems with urination  Denies any hematuria or dysuria  NEUROLOGIC: No dizziness or vertigo, denies headaches  MUSCULOSKELETAL: Denies any muscle or joint pain  SKIN: Denies skin rashes or itching  ENDOCRINE: Denies excessive thirst  Denies intolerance to heat or cold  PSYCHOSOCIAL: Denies depression or anxiety   Denies any recent memory loss  Historical Information   Past Medical History:   Diagnosis Date    Atrial fibrillation ([address] )     Colon cancer (HCC)     Metastatic carcinoma to lung [facility])      Past Surgical History:   Procedure Laterality Date    ANTERIOR CRUCIATE LIGAMENT REPAIR      ANTERIOR CRUCIATE LIGAMENT REPAIR      2  on R knee    APPENDECTOMY      ATRIAL ABLATION SURGERY      BACK SURGERY      COLON SURGERY      Ascending and transverse colon    COLONOSCOPY      LUNG REMOVAL, PARTIAL      RIGHT COLECTOMY      and Transverse colon resection     Social History   Social History     Substance and Sexual Activity   Alcohol Use Yes    Alcohol/week: 12 0 standard drinks    Types: 12 Cans of beer per week    Comment: Socially      Social History     Substance and Sexual Activity   Drug Use No     Social History     Tobacco Use   Smoking Status Former Smoker    Types: Cigarettes    Last attempt to quit:     Years since quittin 5   Smokeless Tobacco Never Used   Tobacco Comment    Cigars      Family History   Problem Relation Age of Onset    Diabetes Mother     Heart disease Mother     Heart disease Father     Skin cancer Sister        Meds/Allergies       Current Outpatient Medications:     albuterol (VENTOLIN HFA) 90 mcg/act inhaler    amoxicillin-clavulanate (AUGMENTIN) 875-125 mg per tablet    Na Sulfate-K Sulfate-Mg Sulf (SUPREP BOWEL PREP KIT) 17 5-3 13-1 6 GM/177ML SOLN    ondansetron (ZOFRAN) 4 mg tablet    Regorafenib (STIVARGA) 40 MG TABS    Allergies   Allergen Reactions    Aspirin Other (See Comments)     Nose bleed    Sulfa Antibiotics Other (See Comments)     Dizzy            Objective     Blood pressure 114/70, pulse 73, temperature 97 8 °F (36 6 °C), temperature source Tympanic, height 6' 4" (1 93 m), weight 99 8 kg (220 lb)  Body mass index is 26 78 kg/m²          PHYSICAL EXAM:      General Appearance:   Alert, cooperative, no distress   HEENT:   Normocephalic, atraumatic, anicteric      Neck:  Focal neck pain at the level of the thyroid cartilage  Lungs:   Clear to auscultation bilaterally; no rales, rhonchi or wheezing; respirations unlabored    Heart[de-identified]   Regular rate and rhythm; no murmur, rub, or gallop  Abdomen:   Soft, non-tender, non-distended; normal bowel sounds; no masses, no organomegaly    Genitalia:   Deferred    Rectal:   Deferred    Extremities:  No cyanosis, clubbing or edema    Pulses:  2+ and symmetric    Skin:  No jaundice, rashes, or lesions    Lymph nodes:  No palpable cervical lymphadenopathy        Lab Results:   No visits with results within 1 Day(s) from this visit  Latest known visit with results is:   Office Visit on 07/10/2019   Component Date Value    Color, UA 07/11/2019 Yellow     Clarity, UA 07/11/2019 Clear     Specific Gravity, UA 07/11/2019 1 015     pH, UA 07/11/2019 7 0     Leukocytes, UA 07/11/2019 Negative     Nitrite, UA 07/11/2019 Negative     Protein, UA 07/11/2019 Negative     Glucose, UA 07/11/2019 Negative     Ketones, UA 07/11/2019 Negative     Urobilinogen, UA 07/11/2019 0 2     Bilirubin, UA 07/11/2019 Negative     Blood, UA 07/11/2019 Negative          Radiology Results:   Ct Soft Tissue Neck W Contrast    Result Date: 7/16/2019  Narrative: CT NECK WITH CONTRAST INDICATION:   C18 2: Malignant neoplasm of ascending colon C18 9: Malignant neoplasm of colon, unspecified C78 00: Secondary malignant neoplasm of unspecified lung 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 C79 9: Secondary malignant neoplasm of unspecified site C18 9: Malignant neoplasm of colon, unspecified  COMPARISON:  MR the brain 8/28/2017, PET CT 12/3/2018 TECHNIQUE:  Axial, sagittal, and coronal 2D reformatted images were created from the axial source data and submitted for interpretation  Radiation dose length product (DLP) for this visit:  828 mGy-cm     This examination, like all CT scans performed in the [facility]  [address] [facility] [address], was performed utilizing techniques to minimize radiation dose exposure, including the use of iterative reconstruction and automated exposure control  IV Contrast:  100 mL of iohexol (OMNIPAQUE) IMAGE QUALITY:  Diagnostic  FINDINGS: VISUALIZED BRAIN PARENCHYMA:  No acute intracranial pathology of the visualized brain parenchyma  VISUALIZED ORBITS AND PARANASAL SINUSES:  Minor left sphenoid disease  NASAL CAVITY AND NASOPHARYNX:  Normal  SUPRAHYOID NECK:  Normal oral cavity, tongue base, tonsillar fossa and epiglottis  Parapharyngeal, , sublingual and submandibular spaces are normal  INFRAHYOID NECK:  Aryepiglottic folds and piriform sinuses are normal   Normal glottis and subglottic airway  Right IJ line THYROID GLAND: Incidental 14 mm hypodense nodule identified on this CT  This was not FDG avid on PET CT According to guidelines published in the February 2015 white paper on incidental thyroid nodules in the Journal of the [employer] of Radiology [facility]), because the nodule(s) are less than 1 5 cm in size and without suspicious feature, no further evaluation is recommended  PAROTID AND SUBMANDIBULAR GLANDS:  Normal  LYMPH NODES:  No pathologic or enlarged adenopathy  VASCULAR STRUCTURES:  Normal enhancement of the cervical vasculature  THORACIC INLET:  Known spiculated 2 1 cm metastatic nodule at the left apex  Emphysematous changes  See accompanying CT of the chest  BONY STRUCTURES: No acute fracture or destructive osseous lesion  Impression: Normal enhanced CT of the neck  No evidence for pathologic adenopathy  Previously documented left  apical mass   Workstation performed: [id number]     Mri Brain W Wo Contrast    Result Date: 7/16/2019  Narrative: MRI BRAIN WITH AND WITHOUT CONTRAST INDICATION: C18 2: Malignant neoplasm of ascending colon C18 9: Malignant neoplasm of colon, unspecified C78 00: Secondary malignant neoplasm of unspecified lung 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 C79 9: Secondary malignant neoplasm of unspecified site C18 9: Malignant neoplasm of colon, unspecified  COMPARISON:  MR brain 8/28/2017 TECHNIQUE: Sagittal T1, axial T2, axial FLAIR, axial T1, axial [place], axial diffusion  Sagittal, axial T1 postcontrast   Axial bravo postcontrast with coronal reconstructions  IV Contrast:  10 mL of gadobutrol injection (MULTI-DOSE)  IMAGE QUALITY:   Diagnostic  FINDINGS: BRAIN PARENCHYMA:  No acute disease  There is no acute ischemia, intracranial mass or mass effect  No edema or pathologic hemosiderin deposition  No pathologic enhancement  Ergo no indication of metastatic disease  Parenchymal changes consistent with chronic small vessel disease are identified, mild in degree, similar to prior study  Postcontrast imaging of the brain demonstrates no abnormal enhancement  VENTRICLES:  Normal  SELLA AND PITUITARY GLAND:  Normal  ORBITS:  Normal  PARANASAL SINUSES:  Minimal fluid right mastoid tip VASCULATURE:  Evaluation of the major intracranial vasculature demonstrates appropriate flow voids  CALVARIUM AND SKULL BASE:  Normal  EXTRACRANIAL SOFT TISSUES:  Normal      Impression: Stable MR appearance of the brain  No indication metastatic disease  Minor degree of chronic small vessel disease  Workstation performed: [id number]     Ct Chest Abdomen Pelvis W Contrast    Result Date: 7/15/2019  Narrative: CT CHEST, ABDOMEN AND PELVIS WITH IV CONTRAST INDICATION:   C18 2: Malignant neoplasm of ascending colon C18 9: Malignant neoplasm of colon, unspecified C78 00: Secondary malignant neoplasm of unspecified lung 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 C79 9: Secondary malignant neoplasm of unspecified site C18 9: Malignant neoplasm of colon, unspecified  Dr Juni Virk note from 7/10/2019 was reviewed    Patient has history of stage IV adenocarcinoma of the colon diagnosed in 2008 status post ascending colon resection and chemotherapy  In 2011 patient had additional resection of the transverse colon and additional chemotherapy  Patient also has had metastatic disease to lung, treated with multiple resections, and radiation therapy  COMPARISON: Comparison primarily made with the chest CT from 5/13/2019  PET/CT from 12/3/2018 was also reviewed  TECHNIQUE: CT examination of the chest, abdomen and pelvis was performed  Axial, sagittal, and coronal 2D reformatted images were created from the source data and submitted for interpretation  Radiation dose length product (DLP) for this visit:  1642 mGy-cm   This examination, like all CT scans performed in the [facility], was performed utilizing techniques to minimize radiation dose exposure, including the use of iterative reconstruction and automated exposure control  IV Contrast:  100 mL of iohexol (OMNIPAQUE) Enteric Contrast: Enteric contrast was administered  FINDINGS: CHEST LUNGS:  Post left partial pneumonectomy  Mild emphysema again noted  There is continued enlargement of a large right lower lobe perihilar lobulated and spiculated mass  This currently measures 8 2 x 5 3 x 5 2 cm, previously 7 7 x 3 9 x 3 7 cm (series 4 images 53 through 66 )  The mass severely narrows right lower lobe bronchi, and there is interval development of ground glass opacities and airspace consolidation throughout the basilar right lower lobe, likely postobstructive pneumonitis (series 4 images 63 through 78 ) There are additional multiple pulmonary nodules, which will be described on series 4: Image 33, left upper lobe, solid, spiculated, 21 mm, previously 17 mm  Image 44, anterior right upper lobe, solid, smooth bordered, 8 mm, previously 5 mm  Image 44, right upper lobe, solid, smooth bordered, 6 mm, new  Image 65, right middle lobe, solid, smooth bordered, 6 mm, new   PLEURA:  There are left-sided calcified pleural plaques, probably from prior pleurodesis  There are no pleural effusions  HEART/GREAT VESSELS:  Unremarkable for patient's age  There is a Port-A-Cath in place  MEDIASTINUM AND ZABRINA:  Unremarkable  CHEST WALL AND LOWER NECK:   Unremarkable  ABDOMEN LIVER/BILIARY TREE:  Unremarkable  GALLBLADDER:  No calcified gallstones  No pericholecystic inflammatory change  SPLEEN:  Unremarkable  PANCREAS:  Unremarkable  ADRENAL GLANDS:  Unremarkable  KIDNEYS/URETERS:  Unremarkable  No hydronephrosis  STOMACH AND BOWEL:  Status post right hemicolectomy  APPENDIX:  No findings to suggest appendicitis  ABDOMINOPELVIC CAVITY:  No ascites or free intraperitoneal air  No lymphadenopathy  VESSELS:  Unremarkable for patient's age  PELVIS REPRODUCTIVE ORGANS:  Unremarkable for patient's age  URINARY BLADDER:  Unremarkable  ABDOMINAL WALL/INGUINAL REGIONS:  Unremarkable  OSSEOUS STRUCTURES:  No acute fracture or destructive osseous lesion  Impression: There is continued enlargement of a large right lower lobe perihilar mass currently 8 2 x 5 3 x 5 2 cm, previously 7 7 x 3 9 x 3 7 cm (series 4 images 53 through 66 )  The mass severely narrows right lower lobe bronchi, and there is interval development of ground glass opacities and airspace consolidation throughout the basilar right lower lobe, likely postobstructive pneumonitis (series 4 images 63 through 78 ) Several additional smaller pulmonary nodular metastasis have increased in size or are new  No evidence of metastatic disease in the abdomen and pelvis  The study was marked in EPIC for significant notification   Workstation performed: [id number]

## 2019-07-25 ENCOUNTER — APPOINTMENT (OUTPATIENT)
Dept: RADIATION ONCOLOGY | Facility: CLINIC | Age: 59
End: 2019-07-25
Attending: INTERNAL MEDICINE
Payer: COMMERCIAL

## 2019-07-25 PROCEDURE — 77412 RADIATION TX DELIVERY LVL 3: CPT | Performed by: RADIOLOGY

## 2019-07-26 ENCOUNTER — TELEPHONE (OUTPATIENT)
Dept: GASTROENTEROLOGY | Facility: MEDICAL CENTER | Age: 59
End: 2019-07-26

## 2019-07-26 ENCOUNTER — APPOINTMENT (OUTPATIENT)
Dept: RADIATION ONCOLOGY | Facility: CLINIC | Age: 59
End: 2019-07-26
Attending: INTERNAL MEDICINE
Payer: COMMERCIAL

## 2019-07-26 ENCOUNTER — APPOINTMENT (OUTPATIENT)
Dept: RADIATION ONCOLOGY | Facility: CLINIC | Age: 59
End: 2019-07-26
Attending: RADIOLOGY
Payer: COMMERCIAL

## 2019-07-26 DIAGNOSIS — M54.2 NECK PAIN: Primary | ICD-10-CM

## 2019-07-26 PROCEDURE — 77412 RADIATION TX DELIVERY LVL 3: CPT | Performed by: RADIOLOGY

## 2019-07-26 PROCEDURE — 77387 GUIDANCE FOR RADJ TX DLVR: CPT | Performed by: RADIOLOGY

## 2019-07-26 NOTE — TELEPHONE ENCOUNTER
----- Message from Sophia Rich MD sent at 7/26/2019 11:37 AM EDT -----  Please let him know I spoke to the radiologist he could not figure out anything that maybe causing his active issues  We are thinking looking at the US of the neck and maybe repeating PET scan of the neck maybe helpful  We were thinking starting with the US of the neck  I have placed that order

## 2019-07-29 ENCOUNTER — APPOINTMENT (OUTPATIENT)
Dept: RADIATION ONCOLOGY | Facility: CLINIC | Age: 59
End: 2019-07-29
Attending: RADIOLOGY
Payer: COMMERCIAL

## 2019-07-29 ENCOUNTER — HOSPITAL ENCOUNTER (OUTPATIENT)
Dept: INFUSION CENTER | Facility: CLINIC | Age: 59
Discharge: HOME | End: 2019-07-29
Payer: COMMERCIAL

## 2019-07-29 DIAGNOSIS — C18.9 COLON CANCER METASTASIZED TO LUNG (HCC): Chronic | ICD-10-CM

## 2019-07-29 DIAGNOSIS — Z95.828 PORT-A-CATH IN PLACE: Primary | ICD-10-CM

## 2019-07-29 DIAGNOSIS — C78.00 COLON CANCER METASTASIZED TO LUNG (HCC): Chronic | ICD-10-CM

## 2019-07-29 DIAGNOSIS — C18.2 MALIGNANT NEOPLASM OF ASCENDING COLON (HCC): ICD-10-CM

## 2019-07-29 LAB
ALBUMIN SERPL BCP-MCNC: 3.4 G/DL (ref 3.5–5.7)
ALP SERPL-CCNC: 72 U/L (ref 46–116)
ALT SERPL W P-5'-P-CCNC: 19 U/L (ref 12–78)
ANION GAP SERPL CALCULATED.3IONS-SCNC: 5 MMOL/L (ref 4–13)
AST SERPL W P-5'-P-CCNC: 29 U/L (ref 5–45)
BASOPHILS # BLD AUTO: 0.01 THOUSANDS/ΜL (ref 0–0.1)
BASOPHILS NFR BLD AUTO: 0 % (ref 0–1)
BILIRUB SERPL-MCNC: 0.9 MG/DL (ref 0.2–1)
BUN SERPL-MCNC: 8 MG/DL (ref 5–25)
CALCIUM SERPL-MCNC: 9.5 MG/DL (ref 8.3–10.1)
CEA SERPL-MCNC: 36.5 NG/ML (ref 0–3)
CHLORIDE SERPL-SCNC: 105 MMOL/L (ref 98–108)
CO2 SERPL-SCNC: 29 MMOL/L (ref 21–32)
CREAT SERPL-MCNC: 0.7 MG/DL (ref 0.6–1.3)
EOSINOPHIL # BLD AUTO: 0.13 THOUSAND/ΜL (ref 0–0.61)
EOSINOPHIL NFR BLD AUTO: 3 % (ref 0–6)
ERYTHROCYTE [DISTWIDTH] IN BLOOD BY AUTOMATED COUNT: 11.6 % (ref 11.6–15.1)
GFR SERPL CREATININE-BSD FRML MDRD: 105 ML/MIN/1.73SQ M
GLUCOSE SERPL-MCNC: 185 MG/DL (ref 65–140)
HCT VFR BLD AUTO: 42 % (ref 36.5–49.3)
HGB BLD-MCNC: 15.5 G/DL (ref 12–17)
LYMPHOCYTES # BLD AUTO: 0.56 THOUSANDS/ΜL (ref 0.6–4.47)
LYMPHOCYTES NFR BLD AUTO: 11 % (ref 14–44)
MCH RBC QN AUTO: 36.6 PG (ref 26.8–34.3)
MCHC RBC AUTO-ENTMCNC: 36.9 G/DL (ref 31.4–37.4)
MCV RBC AUTO: 99 FL (ref 82–98)
MONOCYTES # BLD AUTO: 0.45 THOUSAND/ΜL (ref 0.17–1.22)
MONOCYTES NFR BLD AUTO: 9 % (ref 4–12)
NEUTROPHILS # BLD AUTO: 4.07 THOUSANDS/ΜL (ref 1.85–7.62)
NEUTS SEG NFR BLD AUTO: 77 % (ref 43–75)
PLATELET # BLD AUTO: 97 THOUSANDS/UL (ref 149–390)
PMV BLD AUTO: 8.7 FL (ref 8.9–12.7)
POTASSIUM SERPL-SCNC: 4.1 MMOL/L (ref 3.5–5.3)
PROT SERPL-MCNC: 6.7 G/DL (ref 6.4–8.2)
RBC # BLD AUTO: 4.23 MILLION/UL (ref 3.88–5.62)
SODIUM SERPL-SCNC: 139 MMOL/L (ref 136–145)
WBC # BLD AUTO: 5.22 THOUSAND/UL (ref 4.31–10.16)

## 2019-07-29 PROCEDURE — 77412 RADIATION TX DELIVERY LVL 3: CPT | Performed by: RADIOLOGY

## 2019-07-29 PROCEDURE — 77387 GUIDANCE FOR RADJ TX DLVR: CPT | Performed by: RADIOLOGY

## 2019-07-29 PROCEDURE — 85025 COMPLETE CBC W/AUTO DIFF WBC: CPT

## 2019-07-29 PROCEDURE — 82378 CARCINOEMBRYONIC ANTIGEN: CPT

## 2019-07-29 PROCEDURE — 80053 COMPREHEN METABOLIC PANEL: CPT

## 2019-07-29 NOTE — PROGRESS NOTES
Presented today for port flush & labs  Port accessed no blood return, refused cath-brittney today r/t  Conflicting appointment  Labs drawn peripherally

## 2019-07-30 ENCOUNTER — APPOINTMENT (OUTPATIENT)
Dept: RADIATION ONCOLOGY | Facility: CLINIC | Age: 59
End: 2019-07-30
Attending: INTERNAL MEDICINE
Payer: COMMERCIAL

## 2019-07-30 ENCOUNTER — HOSPITAL ENCOUNTER (OUTPATIENT)
Dept: RADIOLOGY | Facility: HOSPITAL | Age: 59
Discharge: HOME | End: 2019-07-30
Attending: INTERNAL MEDICINE
Payer: COMMERCIAL

## 2019-07-30 DIAGNOSIS — R13.19 ESOPHAGEAL DYSPHAGIA: ICD-10-CM

## 2019-07-30 PROCEDURE — 77412 RADIATION TX DELIVERY LVL 3: CPT | Performed by: RADIOLOGY

## 2019-07-30 PROCEDURE — 77417 THER RADIOLOGY PORT IMAGE(S): CPT | Performed by: RADIOLOGY

## 2019-07-30 PROCEDURE — 77336 RADIATION PHYSICS CONSULT: CPT | Performed by: RADIOLOGY

## 2019-07-30 PROCEDURE — 74220 X-RAY XM ESOPHAGUS 1CNTRST: CPT

## 2019-07-30 PROCEDURE — 77387 GUIDANCE FOR RADJ TX DLVR: CPT | Performed by: RADIOLOGY

## 2019-07-31 ENCOUNTER — OFFICE VISIT (OUTPATIENT)
Dept: HEMATOLOGY ONCOLOGY | Facility: CLINIC | Age: 59
End: 2019-07-31
Payer: COMMERCIAL

## 2019-07-31 ENCOUNTER — APPOINTMENT (OUTPATIENT)
Dept: RADIATION ONCOLOGY | Facility: CLINIC | Age: 59
End: 2019-07-31
Attending: INTERNAL MEDICINE
Payer: COMMERCIAL

## 2019-07-31 ENCOUNTER — TELEPHONE (OUTPATIENT)
Dept: HEMATOLOGY ONCOLOGY | Facility: CLINIC | Age: 59
End: 2019-07-31

## 2019-07-31 VITALS
RESPIRATION RATE: 16 BRPM | HEIGHT: 76 IN | BODY MASS INDEX: 26.79 KG/M2 | TEMPERATURE: 98.2 F | DIASTOLIC BLOOD PRESSURE: 80 MMHG | HEART RATE: 72 BPM | WEIGHT: 220 LBS | SYSTOLIC BLOOD PRESSURE: 120 MMHG

## 2019-07-31 DIAGNOSIS — C18.2 MALIGNANT NEOPLASM OF ASCENDING COLON (HCC): Primary | ICD-10-CM

## 2019-07-31 DIAGNOSIS — C18.9 COLON CANCER METASTASIZED TO LUNG (HCC): ICD-10-CM

## 2019-07-31 DIAGNOSIS — C18.9 COLON CANCER METASTASIZED TO LUNG (HCC): Chronic | ICD-10-CM

## 2019-07-31 DIAGNOSIS — C77.1 METASTASIS TO INTRATHORACIC LYMPH NODES (HCC): ICD-10-CM

## 2019-07-31 DIAGNOSIS — C78.00 COLON CANCER METASTASIZED TO LUNG (HCC): ICD-10-CM

## 2019-07-31 DIAGNOSIS — J18.9 PNEUMONIA OF RIGHT LOWER LOBE DUE TO INFECTIOUS ORGANISM: ICD-10-CM

## 2019-07-31 DIAGNOSIS — C78.00 COLON CANCER METASTASIZED TO LUNG (HCC): Chronic | ICD-10-CM

## 2019-07-31 PROCEDURE — 77387 GUIDANCE FOR RADJ TX DLVR: CPT | Performed by: RADIOLOGY

## 2019-07-31 PROCEDURE — 99214 OFFICE O/P EST MOD 30 MIN: CPT | Performed by: INTERNAL MEDICINE

## 2019-07-31 PROCEDURE — 77412 RADIATION TX DELIVERY LVL 3: CPT | Performed by: RADIOLOGY

## 2019-07-31 RX ORDER — ATROPINE SULFATE 1 MG/ML
0.25 INJECTION, SOLUTION INTRAMUSCULAR; INTRAVENOUS; SUBCUTANEOUS ONCE
Status: CANCELLED | OUTPATIENT
Start: 2019-08-13

## 2019-07-31 RX ORDER — DEXTROSE MONOHYDRATE 50 MG/ML
20 INJECTION, SOLUTION INTRAVENOUS ONCE
Status: CANCELLED | OUTPATIENT
Start: 2019-08-13

## 2019-07-31 RX ORDER — ATROPINE SULFATE 1 MG/ML
0.25 INJECTION, SOLUTION INTRAMUSCULAR; INTRAVENOUS; SUBCUTANEOUS ONCE AS NEEDED
Status: CANCELLED | OUTPATIENT
Start: 2019-08-13

## 2019-07-31 RX ORDER — SODIUM CHLORIDE 9 MG/ML
20 INJECTION, SOLUTION INTRAVENOUS ONCE AS NEEDED
Status: CANCELLED | OUTPATIENT
Start: 2019-08-13

## 2019-07-31 NOTE — PROGRESS NOTES
HPI:  Follow-up visit for metastatic colon 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 mediastinal/hilar lymph nodes and also postobstructive pneumonitis right lower lung  Patient is receiving radiation treatments, number 6th treatment today  Patient is on Augmentin for postobstructive pneumonitis  He states he is breathing better and is coughing less  For dysphagia he saw gastroenterologist and had barium swallow and will be going for ultrasound of neck  After completion of radiation he will be starting chemotherapy, a combination of oxaliplatin, irinotecan and Avastin, once every 2 weeks  He had been through several lines of systemic therapy for metastatic colon cancer that was 1st diagnosed in 2008  He had not have Stivarga  He had not have Cyramza plus chemo  Patient saw Dr Rhoda Encinas and he suggested IROX plus Avastin, a combination of irinotecan and oxaliplatin and Avastin  Patient had these drugs previously but long time ago and were packaged differently     Has generalized weakness and tiredness    In 2008 he was diagnosed to have  stage IV adenocarcinoma of the colon     He had resection of the ascending colon   Pathology showed involvement of lymph nodes and lung   Postsurgery he received FOLFOX plus Avastin for 6 months      Then, in 2011 patient had additional resection this time of the transverse colon   He received FOLFIRI plus Avastin for 6 months after surgery    He has residual grade 1 peripheral 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      In 2012 he had wedge resection for right lung nodule   No chemotherapy at that time   In January 2014 he had wedge resection of the right lower    No radiation following this         History of AFib/atrial flutter and had ablation      June 2015-February 2017 patient was on Xeloda plus Avastin   In February 2017 patient was noted to have a new lesion of the lung  Rosmery Cast was unable to do RFA to this lesion   Patient was seen by thoracic surgery   Due to location, wedge resection was not able to be done  He was referred to rad onc for SBRT       May 2017 lung nodule was noted to be stable compared to February 2017 August 2017 right lower lobe pulmonary nodule had enlarged compared to May 2017      Due to enlarging nodule in May 2017, a PET/CT was performed in August 2017   This showed a 2 8 x 1 8 cm right lower medial lung mass with hypermetabolism      Aug 2017 he was referred to [place] for Cyberknife of the lung      Patient then did not return for follow up with our office until Feb 2018  At this time patient then had repeat scans to be performed  CT C/A/P in March 2018 showed enlarging right lower lobe nodular masses -- consistent with metastatic disease      He was then seen in follow-up on 03/15/2018 after his CT scan result   Patient was presented with multiple options at included surgery, RFA, radiation, CyberKnife, systemic therapy   Patient opted for consultation for RFA in thoracic surgery and then decide      Patient then did not come back to the office until July 2018  Anthony Lamb needed up to date imaging studies and had repeat PET/CT in Aug 2018 which showed enlarging hypermetabolic right lower medial lung in right hilar lesions   New hypermetabolic left lower lung nodule   Multiple left pleural base lesions, however noted to likely be inflammatory related to prior talc pleurodesis  No new lesions in the neck, abdomen, pelvis    Due to increase in metastatic disease, patient was recommended to pursue systemic therapy   Patient began Loigu 42 September 2018    Most recent CT scan showed disease progression and Stivarga was offered               Current Outpatient Medications:     albuterol (VENTOLIN HFA) 90 mcg/act inhaler, 2 puffs every 4 hours as needed for shortness of breath    No more than 4 times a day, Disp: 1 Inhaler, Rfl: 2    Na Sulfate-K Sulfate-Mg Sulf (SUPREP BOWEL PREP KIT) 17 5-3 13-1 6 GM/177ML SOLN, Take 177 mL by mouth once for 1 dose, Disp: 2 Bottle, Rfl: 0    ondansetron (ZOFRAN) 4 mg tablet, Take 1 tablet (4 mg total) by mouth every 6 (six) hours as needed for nausea or vomiting (Patient not taking: Reported on 7/12/2019), Disp: 50 tablet, Rfl: 2    Regorafenib (STIVARGA) 40 MG TABS, 2 tablets daily for 21 days  7 days off  (Patient not taking: Reported on 7/10/2019), Disp: 42 tablet, Rfl: 3  No current facility-administered medications for this visit  Facility-Administered Medications Ordered in Other Visits:     heparin lock flush 100 units/mL injection 300 Units, 300 Units, Intracatheter, Continuous PRN, Silvana Thomas MD    Allergies   Allergen Reactions    Aspirin Other (See Comments)     Nose bleed    Sulfa Antibiotics Other (See Comments)     Dizzy           Colon cancer metastasized to lung [facility])    2008 Initial Diagnosis     Colon cancer metastasized to lung [facility])      2008 - 2009 Chemotherapy     FOLFOX for two 6 month courses (3 month break in-between courses, according to the patient)  - Dr Tyler Mejia      2/18/2008 Surgery     Partial colectomy/Right hemicolectomy:  - stage IV (pT3,pN2,pM1a, G2)    - Dr Tyler Nogueira        4/21/2010 Surgery     Right lower lobe lung nodule:  - metastatic colonic adenocarcinoma      8/19/2010 Surgery     Left lower lobe wedge resection:  - moderately differentiated adenocarcinoma, consistent with metastasis from known colonic primary, 1 3 cm      2011 Surgery     Transverse colectomy      4/2011 - 10/2011 Chemotherapy     Avastin and FOLFIRI       11/9/2011 Surgery     RAZ lung wedge resection:  - metastatic adenocarcinoma of the colon  - invasive carcinoma is 0 4 cm from parenchymal resection      2012 Surgery     Wedge resection for right lung nodule      11/29/2012 Surgery     I  [id number] (11/29/12)   Omental nodule, excision:   - Metastatic adenocarcinoma morphologically consistent with colonic primary  1/15/2014 Surgery     A   Lung, right lower lobe, wedge resection:   - 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, moderately to poorly differentiated, consistent with colorectal primary, see note  - Resection margin, no tumor seen  - Background lung with few non-necrotizing granulomas and emphysematous changes  3/4/2014 - 3/14/2014 Radiation     Plan ID Energy Fractions Dose per Fraction (cGy) Total Dose Delivered (cGy) Elapsed Days   SBRT Lt Lung 6X 5 / 5 1,000 5,000 8     - Dr Miguel Khan      4/14/2015 Surgery     Left lower lobe lobectomy, cervical mediastinoscopy, Left posterolateral thoracotomy:  A   Lymph node Level 2R:    - 3/3 lymph nodes, positive for metastatic colonic adenocarcinoma  BDionne Siemens node Level 7:   - 3/8 lymph nodes, positive for metastatic colonic adenocarcinoma       C   Left lower lobe:   - Metastatic colonic adenocarcinoma    - The vascular and bronchial margins of resection are negative for tumor    - 0/6 lymph nodes, negative for metastatic colonic adenocarcinoma     - The tumor is 0 5 cm away from the hilar staple line        6/2015 - 2/2017 Chemotherapy     Xeloda and Avastin      9/2018 - 5/2019 Chemotherapy     Lonsurf      8/4/2019 -  Chemotherapy     bevacizumab (AVASTIN) 505 mg in sodium chloride 0 9 % 100 mL IVPB, 5 mg/kg = 505 mg, Intravenous, Once, 0 of 12 cycles  irinotecan (CAMPTOSAR) 350 mg in dextrose 5 % 500 mL chemo infusion, 150 mg/m2 = 350 mg (83 3 % of original dose 180 mg/m2), Intravenous, Once, 0 of 12 cycles  Dose modification: 150 mg/m2 (original dose 180 mg/m2, Cycle 1, Reason: Dose Not Tolerated)  oxaliplatin (ELOXATIN) 198 05 mg in dextrose 5 % 250 mL chemo infusion, 85 mg/m2, Intravenous, Once, 0 of 12 cycles        Malignant neoplasm of ascending colon (HCC)    2/5/2018 Initial Diagnosis     Malignant neoplasm of ascending colon ([facility] [address] )      8/4/2019 -  Chemotherapy     bevacizumab (AVASTIN) 505 mg in sodium chloride 0 9 % 100 mL IVPB, 5 mg/kg = 505 mg, Intravenous, Once, 0 of 12 cycles  irinotecan (CAMPTOSAR) 350 mg in dextrose 5 % 500 mL chemo infusion, 150 mg/m2 = 350 mg (83 3 % of original dose 180 mg/m2), Intravenous, Once, 0 of 12 cycles  Dose modification: 150 mg/m2 (original dose 180 mg/m2, Cycle 1, Reason: Dose Not Tolerated)  oxaliplatin (ELOXATIN) 198 05 mg in dextrose 5 % 250 mL chemo infusion, 85 mg/m2, Intravenous, Once, 0 of 12 cycles        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)    2/5/2018 Initial Diagnosis     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)      8/4/2019 -  Chemotherapy     bevacizumab (AVASTIN) 505 mg in sodium chloride 0 9 % 100 mL IVPB, 5 mg/kg = 505 mg, Intravenous, Once, 0 of 12 cycles  irinotecan (CAMPTOSAR) 350 mg in dextrose 5 % 500 mL chemo infusion, 150 mg/m2 = 350 mg (83 3 % of original dose 180 mg/m2), Intravenous, Once, 0 of 12 cycles  Dose modification: 150 mg/m2 (original dose 180 mg/m2, Cycle 1, Reason: Dose Not Tolerated)  oxaliplatin (ELOXATIN) 198 05 mg in dextrose 5 % 250 mL chemo infusion, 85 mg/m2, Intravenous, Once, 0 of 12 cycles         ROS:  07/31/19 Reviewed 13 systems:  Presently no headaches, seizures, dizziness, diplopia,  hoarseness, chest pain, palpitations,  hemoptysis, abdominal pain, nausea, vomiting, change in bowel habits, melena, hematuria, fever, chills, bleeding, bone pains, skin rash, weight loss, arthritic symptoms,  numbness,  claudication and gait problem  No frequent infections  Not unusually sensitive to heat or cold  No swelling of the ankles  No swollen glands  Patient is anxious   Other symptoms are in HPI        /80 (BP Location: Right arm)   Pulse 72   Temp 98 2 °F (36 8 °C) (Tympanic)   Resp 16   Ht 6' 4" (1 93 m)   Wt 99 8 kg (220 lb)   BMI 26 78 kg/m²     Physical Exam:  Alert, oriented, not in distress, no icterus, no oral thrush, no palpable neck mass, clear lung fields, regular heart rate, abdomen  soft and non tender, no palpable abdominal mass, no ascites, no edema of ankles, no calf tenderness, no focal neurological deficit, no skin rash, no palpable lymphadenopathy, good arterial pulses, no clubbing  Patient is anxious  Performance status 1  IMAGING:     Stable MR appearance of the brain  No indication metastatic disease    Minor degree of chronic small vessel disease      Workstation performed: [id number]      Imaging     MRI brain w wo contrast (Order: [id number]) - 7/15/2019    There is continued enlargement of a large right lower lobe perihilar mass currently 8 2 x 5 3 x 5 2 cm, previously 7 7 x 3 9 x 3 7 cm (series 4 images 53 through 66 )  The mass severely narrows right lower lobe bronchi, and there is interval development   of ground glass opacities and airspace consolidation throughout the basilar right lower lobe, likely postobstructive pneumonitis (series 4 images 63 through 78 )     Several additional smaller pulmonary nodular metastasis have increased in size or are new      No evidence of metastatic disease in the abdomen and pelvis      The study was marked in EPIC for significant notification               Workstation performed: [id number]      Imaging     CT chest abdomen pelvis w contrast (Order: [id number]) - 7/14/2019     LABS:  Results for orders placed or performed during the hospital encounter of 07/29/19   CBC and differential   Result Value Ref Range    WBC 5 22 4 31 - 10 16 Thousand/uL    RBC 4 23 3 88 - 5 62 Million/uL    Hemoglobin 15 5 12 0 - 17 0 g/dL    Hematocrit 42 0 36 5 - 49 3 %    MCV 99 (H) 82 - 98 fL    MCH 36 6 (H) 26 8 - 34 3 pg    MCHC 36 9 31 4 - 37 4 g/dL    RDW 11 6 11 6 - 15 1 %    MPV 8 7 (L) 8 9 - 12 7 fL    Platelets 97 (L) 959 - 390 Thousands/uL    Neutrophils Relative 77 (H) 43 - 75 %    Lymphocytes Relative 11 (L) 14 - 44 %    Monocytes Relative 9 4 - 12 %    Eosinophils Relative 3 0 - 6 %    Basophils Relative 0 0 - 1 %    Neutrophils Absolute 4 07 1 85 - 7 62 Thousands/µL    Lymphocytes Absolute 0 56 (L) 0 60 - 4 47 Thousands/µL    Monocytes Absolute 0 45 0 17 - 1 22 Thousand/µL    Eosinophils Absolute 0 13 0 00 - 0 61 Thousand/µL    Basophils Absolute 0 01 0 00 - 0 10 Thousands/µL   CEA   Result Value Ref Range    CEA 36 5 (H) 0 0 - 3 0 ng/mL   Comprehensive metabolic panel   Result Value Ref Range    Sodium 139 136 - 145 mmol/L    Potassium 4 1 3 5 - 5 3 mmol/L    Chloride 105 98 - 108 mmol/L    CO2 29 21 - 32 mmol/L    ANION GAP 5 4 - 13 mmol/L    BUN 8 5 - 25 mg/dL    Creatinine 0 70 0 60 - 1 30 mg/dL    Glucose 185 (H) 65 - 140 mg/dL    Calcium 9 5 8 3 - 10 1 mg/dL    AST 29 5 - 45 U/L    ALT 19 12 - 78 U/L    Alkaline Phosphatase 72 46 - 116 U/L    Total Protein 6 7 6 4 - 8 2 g/dL    Albumin 3 4 (L) 3 5 - 5 7 g/dL    Total Bilirubin 0 90 0 20 - 1 00 mg/dL    eGFR 105 ml/min/1 73sq m     Labs, Imaging, & Other studies:   All pertinent labs and imaging studies were personally reviewed    Lab Results   Component Value Date     10/19/2015    K 4 1 07/29/2019     07/29/2019    CO2 29 07/29/2019    ANIONGAP 8 10/19/2015    BUN 8 07/29/2019    CREATININE 0 70 07/29/2019    GLUCOSE 92 10/19/2015    GLUF 101 (H) 07/11/2019    CALCIUM 9 5 07/29/2019    AST 29 07/29/2019    ALT 19 07/29/2019    ALKPHOS 72 07/29/2019    PROT 7 1 10/19/2015    BILITOT 1 24 (H) 10/19/2015    EGFR 105 07/29/2019     Lab Results   Component Value Date    WBC 5 22 07/29/2019    HGB 15 5 07/29/2019    HCT 42 0 07/29/2019    MCV 99 (H) 07/29/2019    PLT 97 (L) 07/29/2019     Reviewed and discussed with patient  Assessment and plan: Follow-up visit for metastatic colon 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 mediastinal/hilar lymph nodes and also postobstructive pneumonitis right lower lung  Patient is receiving radiation treatments, number 6th treatment today  Patient is on Augmentin for postobstructive pneumonitis  He states he is breathing better and is coughing less  For dysphagia he saw gastroenterologist and had barium swallow and will be going for ultrasound of neck    After completion of radiation he will be starting chemotherapy, a combination of oxaliplatin, irinotecan and Avastin, once every 2 weeks  He had been through several lines of systemic therapy for metastatic colon cancer that was 1st diagnosed in 2008  He had not have Stivarga  He had not have Cyramza plus chemo  Patient saw Dr Swapnil Thorpe and he suggested IROX plus Avastin, a combination of irinotecan and oxaliplatin and Avastin  Patient had these drugs previously but long time ago and were packaged differently  Has generalized weakness and tiredness    In 2008 he was diagnosed to have  stage IV adenocarcinoma of the colon     He had resection of the ascending colon   Pathology showed involvement of lymph nodes and lung   Postsurgery he received FOLFOX plus Avastin for 6 months      Then, in 2011 patient had additional resection this time of the transverse colon   He received FOLFIRI plus Avastin for 6 months after surgery    He has residual grade 1 peripheral 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      In 2012 he had wedge resection for right lung nodule   No chemotherapy at that time   In January 2014 he had wedge resection of the right lower    No radiation following this         History of AFib/atrial flutter and had ablation      June 2015-February 2017 patient was on Xeloda plus Avastin   In February 2017 patient was noted to have a new lesion of the lung  Jaden Pruitt was unable to do RFA to this lesion   Patient was seen by thoracic surgery   Due to location, wedge resection was not able to be done   He was referred to rad onc for SBRT       May 2017 lung nodule was noted to be stable compared to February 2017 August 2017 right lower lobe pulmonary nodule had enlarged compared to May 2017      Due to enlarging nodule in May 2017, a PET/CT was performed in August 2017   This showed a 2 8 x 1 8 cm right lower medial lung mass with hypermetabolism      Aug 2017 he was referred to [place] for Cyberknife of the lung      Patient then did not return for follow up with our office until Feb 2018  At this time patient then had repeat scans to be performed  CT C/A/P in March 2018 showed enlarging right lower lobe nodular masses -- consistent with metastatic disease      He was then seen in follow-up on 03/15/2018 after his CT scan result   Patient was presented with multiple options at included surgery, RFA, radiation, CyberKnife, systemic therapy   Patient opted for consultation for RFA in thoracic surgery and then decide      Patient then did not come back to the office until July 2018  Jonah Rahman needed up to date imaging studies and had repeat PET/CT in Aug 2018 which showed enlarging hypermetabolic right lower medial lung in right hilar lesions   New hypermetabolic left lower lung nodule   Multiple left pleural base lesions, however noted to likely be inflammatory related to prior talc pleurodesis  No new lesions in the neck, abdomen, pelvis    Due to increase in metastatic disease, patient was recommended to pursue systemic therapy   Patient began Loigu 42 September 2018    Most recent CT scan showed disease progression and Stivarga was offered  Krishan Murray Physical examination and test results are as recorded and discussed  No metastatic lesion to the brain and that was done for Avastin  Patient could be responding to radiation because of less pulmonary symptoms  No change in dysphagia  He will be starting systemic chemotherapy, combination of oxaliplatin, irinotecan and Avastin the week of 08/12/2019  Rediscussed this particular treatment plan with the patient  Rediscussed side effects to these chemotherapy drugs and Avastin  Condition and plan discussed  Questions answered  Krishan Murray He will continue taking Augmentin for another week, until he is done with radiation  Also discussed the importance of eating healthy foods as tolerated because of dysphagia,  staying active as tolerated and health screening tests  Patient is capable of self-care      1  Malignant neoplasm of ascending colon (HCC)    - CBC and differential; Standing  - Comprehensive metabolic panel; Standing  - CBC and differential  - Comprehensive metabolic panel    2  Colon cancer metastasized to lung (HCC)    - CBC and differential; Standing  - Comprehensive metabolic panel; Standing  - CBC and differential  - Comprehensive metabolic panel    3  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

## 2019-07-31 NOTE — TELEPHONE ENCOUNTER
I called the patient and left a message with a slight change in his follow up appointments  His appointment for 9/25 was changed to a week later to 10/2 @ 9:40 am  And I also stated I added another one for 10/30 2 9:20 am  I also verbalized if for some reason the appointments do not work for the patient to call the office back to reschedule

## 2019-08-01 ENCOUNTER — APPOINTMENT (OUTPATIENT)
Dept: RADIATION ONCOLOGY | Facility: CLINIC | Age: 59
End: 2019-08-01
Attending: INTERNAL MEDICINE
Payer: MEDICARE

## 2019-08-01 PROCEDURE — 77387 GUIDANCE FOR RADJ TX DLVR: CPT | Performed by: RADIOLOGY

## 2019-08-01 PROCEDURE — 77412 RADIATION TX DELIVERY LVL 3: CPT | Performed by: RADIOLOGY

## 2019-08-02 ENCOUNTER — APPOINTMENT (OUTPATIENT)
Dept: RADIATION ONCOLOGY | Facility: CLINIC | Age: 59
End: 2019-08-02
Attending: INTERNAL MEDICINE
Payer: MEDICARE

## 2019-08-02 PROCEDURE — 77387 GUIDANCE FOR RADJ TX DLVR: CPT | Performed by: RADIOLOGY

## 2019-08-02 PROCEDURE — 77412 RADIATION TX DELIVERY LVL 3: CPT | Performed by: RADIOLOGY

## 2019-08-05 ENCOUNTER — APPOINTMENT (OUTPATIENT)
Dept: RADIATION ONCOLOGY | Facility: CLINIC | Age: 59
End: 2019-08-05
Attending: INTERNAL MEDICINE
Payer: MEDICARE

## 2019-08-05 PROCEDURE — 77387 GUIDANCE FOR RADJ TX DLVR: CPT | Performed by: RADIOLOGY

## 2019-08-05 PROCEDURE — 77412 RADIATION TX DELIVERY LVL 3: CPT | Performed by: RADIOLOGY

## 2019-08-06 ENCOUNTER — APPOINTMENT (OUTPATIENT)
Dept: RADIATION ONCOLOGY | Facility: CLINIC | Age: 59
End: 2019-08-06
Attending: INTERNAL MEDICINE
Payer: MEDICARE

## 2019-08-06 PROCEDURE — 77336 RADIATION PHYSICS CONSULT: CPT | Performed by: RADIOLOGY

## 2019-08-06 PROCEDURE — 77412 RADIATION TX DELIVERY LVL 3: CPT | Performed by: RADIOLOGY

## 2019-08-08 RX ORDER — ATROPINE SULFATE 1 MG/ML
0.25 INJECTION, SOLUTION INTRAMUSCULAR; INTRAVENOUS; SUBCUTANEOUS ONCE
Status: CANCELLED | OUTPATIENT
Start: 2019-08-27

## 2019-08-08 RX ORDER — DEXTROSE MONOHYDRATE 50 MG/ML
20 INJECTION, SOLUTION INTRAVENOUS ONCE
Status: CANCELLED | OUTPATIENT
Start: 2019-08-27

## 2019-08-08 RX ORDER — SODIUM CHLORIDE 9 MG/ML
20 INJECTION, SOLUTION INTRAVENOUS ONCE AS NEEDED
Status: CANCELLED | OUTPATIENT
Start: 2019-08-27

## 2019-08-08 RX ORDER — ATROPINE SULFATE 1 MG/ML
0.25 INJECTION, SOLUTION INTRAMUSCULAR; INTRAVENOUS; SUBCUTANEOUS ONCE AS NEEDED
Status: CANCELLED | OUTPATIENT
Start: 2019-08-27

## 2019-08-09 ENCOUNTER — TRANSCRIBE ORDERS (OUTPATIENT)
Dept: ADMINISTRATIVE | Facility: HOSPITAL | Age: 59
End: 2019-08-09

## 2019-08-09 ENCOUNTER — APPOINTMENT (OUTPATIENT)
Dept: LAB | Facility: HOSPITAL | Age: 59
End: 2019-08-09
Attending: INTERNAL MEDICINE
Payer: MEDICARE

## 2019-08-09 DIAGNOSIS — C18.2 MALIGNANT NEOPLASM OF ASCENDING COLON (HCC): Primary | ICD-10-CM

## 2019-08-09 DIAGNOSIS — C78.00 MALIGNANT NEOPLASM METASTATIC TO LUNG, UNSPECIFIED LATERALITY (HCC): ICD-10-CM

## 2019-08-09 LAB
ALBUMIN SERPL BCP-MCNC: 3.4 G/DL (ref 3.5–5)
ALP SERPL-CCNC: 82 U/L (ref 46–116)
ALT SERPL W P-5'-P-CCNC: 17 U/L (ref 12–78)
ANION GAP SERPL CALCULATED.3IONS-SCNC: 7 MMOL/L (ref 4–13)
AST SERPL W P-5'-P-CCNC: 19 U/L (ref 5–45)
BASOPHILS # BLD AUTO: 0.02 THOUSANDS/ΜL (ref 0–0.1)
BASOPHILS NFR BLD AUTO: 0 % (ref 0–1)
BILIRUB SERPL-MCNC: 0.85 MG/DL (ref 0.2–1)
BUN SERPL-MCNC: 8 MG/DL (ref 5–25)
CALCIUM SERPL-MCNC: 9.3 MG/DL (ref 8.3–10.1)
CEA SERPL-MCNC: 32.6 NG/ML (ref 0–3)
CHLORIDE SERPL-SCNC: 104 MMOL/L (ref 100–108)
CO2 SERPL-SCNC: 29 MMOL/L (ref 21–32)
CREAT SERPL-MCNC: 0.78 MG/DL (ref 0.6–1.3)
EOSINOPHIL # BLD AUTO: 0.23 THOUSAND/ΜL (ref 0–0.61)
EOSINOPHIL NFR BLD AUTO: 5 % (ref 0–6)
ERYTHROCYTE [DISTWIDTH] IN BLOOD BY AUTOMATED COUNT: 11.6 % (ref 11.6–15.1)
GFR SERPL CREATININE-BSD FRML MDRD: 100 ML/MIN/1.73SQ M
GLUCOSE P FAST SERPL-MCNC: 93 MG/DL (ref 65–99)
HCT VFR BLD AUTO: 45.4 % (ref 36.5–49.3)
HGB BLD-MCNC: 15.7 G/DL (ref 12–17)
IMM GRANULOCYTES # BLD AUTO: 0.02 THOUSAND/UL (ref 0–0.2)
IMM GRANULOCYTES NFR BLD AUTO: 0 % (ref 0–2)
LYMPHOCYTES # BLD AUTO: 0.53 THOUSANDS/ΜL (ref 0.6–4.47)
LYMPHOCYTES NFR BLD AUTO: 11 % (ref 14–44)
MCH RBC QN AUTO: 35.4 PG (ref 26.8–34.3)
MCHC RBC AUTO-ENTMCNC: 34.6 G/DL (ref 31.4–37.4)
MCV RBC AUTO: 103 FL (ref 82–98)
MONOCYTES # BLD AUTO: 0.57 THOUSAND/ΜL (ref 0.17–1.22)
MONOCYTES NFR BLD AUTO: 12 % (ref 4–12)
NEUTROPHILS # BLD AUTO: 3.47 THOUSANDS/ΜL (ref 1.85–7.62)
NEUTS SEG NFR BLD AUTO: 72 % (ref 43–75)
NRBC BLD AUTO-RTO: 0 /100 WBCS
PLATELET # BLD AUTO: 116 THOUSANDS/UL (ref 149–390)
PMV BLD AUTO: 8.7 FL (ref 8.9–12.7)
POTASSIUM SERPL-SCNC: 4.2 MMOL/L (ref 3.5–5.3)
PROT SERPL-MCNC: 7.2 G/DL (ref 6.4–8.2)
RBC # BLD AUTO: 4.43 MILLION/UL (ref 3.88–5.62)
SODIUM SERPL-SCNC: 140 MMOL/L (ref 136–145)
WBC # BLD AUTO: 4.84 THOUSAND/UL (ref 4.31–10.16)

## 2019-08-09 PROCEDURE — 36415 COLL VENOUS BLD VENIPUNCTURE: CPT | Performed by: INTERNAL MEDICINE

## 2019-08-09 PROCEDURE — 80053 COMPREHEN METABOLIC PANEL: CPT | Performed by: INTERNAL MEDICINE

## 2019-08-09 PROCEDURE — 82378 CARCINOEMBRYONIC ANTIGEN: CPT | Performed by: INTERNAL MEDICINE

## 2019-08-09 PROCEDURE — 85025 COMPLETE CBC W/AUTO DIFF WBC: CPT | Performed by: INTERNAL MEDICINE

## 2019-08-13 ENCOUNTER — HOSPITAL ENCOUNTER (OUTPATIENT)
Dept: INFUSION CENTER | Facility: CLINIC | Age: 59
Discharge: HOME | End: 2019-08-13

## 2019-08-13 ENCOUNTER — HOSPITAL ENCOUNTER (OUTPATIENT)
Dept: INFUSION CENTER | Facility: CLINIC | Age: 59
Discharge: HOME | End: 2019-08-13
Payer: MEDICARE

## 2019-08-13 ENCOUNTER — DOCUMENTATION (OUTPATIENT)
Dept: HEMATOLOGY ONCOLOGY | Facility: CLINIC | Age: 59
End: 2019-08-13

## 2019-08-13 VITALS
HEART RATE: 87 BPM | RESPIRATION RATE: 16 BRPM | HEIGHT: 77 IN | TEMPERATURE: 98.4 F | SYSTOLIC BLOOD PRESSURE: 114 MMHG | BODY MASS INDEX: 25.8 KG/M2 | WEIGHT: 218.48 LBS | DIASTOLIC BLOOD PRESSURE: 77 MMHG

## 2019-08-13 DIAGNOSIS — C78.00 COLON CANCER METASTASIZED TO LUNG (HCC): ICD-10-CM

## 2019-08-13 DIAGNOSIS — C77.1 METASTASIS TO INTRATHORACIC LYMPH NODES (HCC): ICD-10-CM

## 2019-08-13 DIAGNOSIS — C18.2 MALIGNANT NEOPLASM OF ASCENDING COLON (HCC): Primary | ICD-10-CM

## 2019-08-13 DIAGNOSIS — C18.9 COLON CANCER METASTASIZED TO LUNG (HCC): ICD-10-CM

## 2019-08-13 PROCEDURE — 36593 DECLOT VASCULAR DEVICE: CPT

## 2019-08-13 PROCEDURE — 96417 CHEMO IV INFUS EACH ADDL SEQ: CPT

## 2019-08-13 PROCEDURE — 96413 CHEMO IV INFUSION 1 HR: CPT

## 2019-08-13 PROCEDURE — 96415 CHEMO IV INFUSION ADDL HR: CPT

## 2019-08-13 PROCEDURE — 96375 TX/PRO/DX INJ NEW DRUG ADDON: CPT

## 2019-08-13 RX ORDER — SODIUM CHLORIDE 9 MG/ML
20 INJECTION, SOLUTION INTRAVENOUS ONCE AS NEEDED
Status: DISCONTINUED | OUTPATIENT
Start: 2019-08-13 | End: 2019-08-16 | Stop reason: HOSPADM

## 2019-08-13 RX ORDER — ATROPINE SULFATE 1 MG/ML
0.25 INJECTION, SOLUTION INTRAMUSCULAR; INTRAVENOUS; SUBCUTANEOUS ONCE AS NEEDED
Status: DISCONTINUED | OUTPATIENT
Start: 2019-08-13 | End: 2019-08-16 | Stop reason: HOSPADM

## 2019-08-13 RX ORDER — ATROPINE SULFATE 1 MG/ML
0.25 INJECTION, SOLUTION INTRAMUSCULAR; INTRAVENOUS; SUBCUTANEOUS ONCE
Status: COMPLETED | OUTPATIENT
Start: 2019-08-13 | End: 2019-08-13

## 2019-08-13 RX ORDER — DEXTROSE MONOHYDRATE 50 MG/ML
20 INJECTION, SOLUTION INTRAVENOUS ONCE
Status: COMPLETED | OUTPATIENT
Start: 2019-08-13 | End: 2019-08-13

## 2019-08-13 RX ADMIN — ATROPINE SULFATE 0.25 MG: 1 INJECTION, SOLUTION INTRAMUSCULAR; INTRAVENOUS; SUBCUTANEOUS at 12:35

## 2019-08-13 RX ADMIN — SODIUM CHLORIDE 20 ML/HR: 0.9 INJECTION, SOLUTION INTRAVENOUS at 10:00

## 2019-08-13 RX ADMIN — DEXAMETHASONE SODIUM PHOSPHATE: 10 INJECTION, SOLUTION INTRAMUSCULAR; INTRAVENOUS at 10:06

## 2019-08-13 RX ADMIN — IRINOTECAN HYDROCHLORIDE 340 MG: 20 INJECTION, SOLUTION INTRAVENOUS at 12:38

## 2019-08-13 RX ADMIN — ALTEPLASE 2 MG: 2.2 INJECTION, POWDER, LYOPHILIZED, FOR SOLUTION INTRAVENOUS at 09:10

## 2019-08-13 RX ADMIN — OXALIPLATIN 200 MG: 5 INJECTION, SOLUTION INTRAVENOUS at 10:38

## 2019-08-13 RX ADMIN — DEXTROSE 20 ML/HR: 5 SOLUTION INTRAVENOUS at 10:34

## 2019-08-13 NOTE — PROGRESS NOTES
Pt  Denies new symptoms or concerns today  Avastin will be held until Cycle #2 due to recent completion of Radiation  Labs reviewed and within normal parameters  Port accessed with no blood return noted  Cathflo provided per protocol

## 2019-08-13 NOTE — PLAN OF CARE
Problem: PAIN - ADULT  Goal: Verbalizes/displays adequate comfort level or baseline comfort level  Description  Interventions:  - Encourage patient to monitor pain and request assistance  - Assess pain using appropriate pain scale  - Administer analgesics based on type and severity of pain and evaluate response  - Implement non-pharmacological measures as appropriate and evaluate response  - Consider cultural and social influences on pain and pain management  - Notify physician/advanced practitioner if interventions unsuccessful or patient reports new pain  Outcome: Progressing     Problem: INFECTION - ADULT  Goal: Absence or prevention of progression during hospitalization  Description  INTERVENTIONS:  - Assess and monitor for signs and symptoms of infection  - Monitor lab/diagnostic results  - Monitor all insertion sites, i e  indwelling lines, tubes, and drains  - Monitor endotracheal if appropriate and nasal secretions for changes in amount and color  - [place] appropriate cooling/warming therapies per order  - Administer medications as ordered  - Instruct and encourage patient and family to use good hand hygiene technique  - Identify and instruct in appropriate isolation precautions for identified infection/condition  Outcome: Progressing  Goal: Absence of fever/infection during neutropenic period  Description  INTERVENTIONS:  - Monitor WBC    Outcome: Progressing     Problem: Knowledge Deficit  Goal: Patient/family/caregiver demonstrates understanding of disease process, treatment plan, medications, and discharge instructions  Description  Complete learning assessment and assess knowledge base    Interventions:  - Provide teaching at level of understanding  - Provide teaching via preferred learning methods  Outcome: Progressing

## 2019-08-13 NOTE — PROGRESS NOTES
Pt  Tolerated Oxaliplatin and Irinotecan without adverse event  Future appointments reviewed  AVS provided

## 2019-08-13 NOTE — PROGRESS NOTES
recvd email from Joy Moe at [facility] [address] that pt got a denial for his radiation from [place]  pts [employer] is inactive  Called [employer] & spoke to tiffanie who  hung up on me  Called back & spoke to Flavia Be call ref# [id number] he sd tht pt has medicare primary however I didn't find any medicare card  & [employer] choice pos2 is secondary  they follow medicare guidelines  They pay the 20% that medicare doesn't pay but pt has to meet the $500 deductible then the co-ins of 85/15 then the out of pocket of $1200  Met the deduct but nothing has been  met towards  The out of pocket  The benefits are all the same  Called the pt & got his voiemail  left message for pt to call me back

## 2019-08-15 ENCOUNTER — HOSPITAL ENCOUNTER (OUTPATIENT)
Dept: INFUSION CENTER | Facility: CLINIC | Age: 59
End: 2019-08-15

## 2019-08-26 ENCOUNTER — APPOINTMENT (OUTPATIENT)
Dept: LAB | Facility: HOSPITAL | Age: 59
DRG: 871 | End: 2019-08-26
Attending: INTERNAL MEDICINE
Payer: MEDICARE

## 2019-08-27 ENCOUNTER — APPOINTMENT (EMERGENCY)
Dept: RADIOLOGY | Facility: HOSPITAL | Age: 59
DRG: 871 | End: 2019-08-27
Payer: MEDICARE

## 2019-08-27 ENCOUNTER — HOSPITAL ENCOUNTER (OUTPATIENT)
Dept: INFUSION CENTER | Facility: CLINIC | Age: 59
Discharge: HOME | DRG: 871 | End: 2019-08-27
Payer: MEDICARE

## 2019-08-27 ENCOUNTER — TELEPHONE (OUTPATIENT)
Dept: HEMATOLOGY ONCOLOGY | Facility: CLINIC | Age: 59
End: 2019-08-27

## 2019-08-27 ENCOUNTER — APPOINTMENT (EMERGENCY)
Dept: CT IMAGING | Facility: HOSPITAL | Age: 59
DRG: 871 | End: 2019-08-27
Payer: MEDICARE

## 2019-08-27 ENCOUNTER — HOSPITAL ENCOUNTER (INPATIENT)
Facility: HOSPITAL | Age: 59
LOS: 3 days | Discharge: HOME | DRG: 871 | End: 2019-08-30
Attending: EMERGENCY MEDICINE | Admitting: FAMILY MEDICINE
Payer: MEDICARE

## 2019-08-27 VITALS
HEART RATE: 112 BPM | BODY MASS INDEX: 26.69 KG/M2 | TEMPERATURE: 100.8 F | HEIGHT: 76 IN | RESPIRATION RATE: 28 BRPM | WEIGHT: 219.14 LBS | SYSTOLIC BLOOD PRESSURE: 134 MMHG | DIASTOLIC BLOOD PRESSURE: 81 MMHG | OXYGEN SATURATION: 99 %

## 2019-08-27 DIAGNOSIS — C18.2 MALIGNANT NEOPLASM OF ASCENDING COLON (HCC): Primary | ICD-10-CM

## 2019-08-27 DIAGNOSIS — D69.6 THROMBOCYTOPENIA (HCC): ICD-10-CM

## 2019-08-27 DIAGNOSIS — J18.9 PNEUMONIA: ICD-10-CM

## 2019-08-27 DIAGNOSIS — A41.9 SEPSIS (HCC): ICD-10-CM

## 2019-08-27 DIAGNOSIS — C18.9 COLON CANCER METASTASIZED TO LUNG (HCC): ICD-10-CM

## 2019-08-27 DIAGNOSIS — C78.00 COLON CANCER METASTASIZED TO LUNG (HCC): ICD-10-CM

## 2019-08-27 DIAGNOSIS — C78.00 COLON CANCER METASTASIZED TO LUNG (HCC): Chronic | ICD-10-CM

## 2019-08-27 DIAGNOSIS — D70.9 NEUTROPENIC FEVER (HCC): Primary | ICD-10-CM

## 2019-08-27 DIAGNOSIS — C18.2 MALIGNANT NEOPLASM OF ASCENDING COLON (HCC): ICD-10-CM

## 2019-08-27 DIAGNOSIS — N17.9 ACUTE KIDNEY INJURY (HCC): ICD-10-CM

## 2019-08-27 DIAGNOSIS — C18.9 COLON CANCER METASTASIZED TO LUNG (HCC): Chronic | ICD-10-CM

## 2019-08-27 DIAGNOSIS — N39.0 UTI (URINARY TRACT INFECTION): ICD-10-CM

## 2019-08-27 DIAGNOSIS — R50.81 NEUTROPENIC FEVER (HCC): Primary | ICD-10-CM

## 2019-08-27 DIAGNOSIS — C77.1 METASTASIS TO INTRATHORACIC LYMPH NODES (HCC): ICD-10-CM

## 2019-08-27 PROBLEM — J96.01 ACUTE RESPIRATORY FAILURE WITH HYPOXIA (HCC): Status: ACTIVE | Noted: 2019-08-27

## 2019-08-27 LAB
ALBUMIN SERPL BCP-MCNC: 3 G/DL (ref 3.5–5)
ALP SERPL-CCNC: 76 U/L (ref 46–116)
ALT SERPL W P-5'-P-CCNC: 18 U/L (ref 12–78)
AMORPH URATE CRY URNS QL MICRO: ABNORMAL /HPF
ANION GAP SERPL CALCULATED.3IONS-SCNC: 9 MMOL/L (ref 4–13)
APTT PPP: 34 SECONDS (ref 23–37)
AST SERPL W P-5'-P-CCNC: 58 U/L (ref 5–45)
BACTERIA UR QL AUTO: ABNORMAL /HPF
BASE EX.OXY STD BLDV CALC-SCNC: 67.1 % (ref 60–80)
BASE EXCESS BLDV CALC-SCNC: 2.8 MMOL/L
BASOPHILS # BLD AUTO: 0 THOUSANDS/ΜL (ref 0–0.1)
BASOPHILS NFR BLD AUTO: 0 % (ref 0–1)
BILIRUB SERPL-MCNC: 2.91 MG/DL (ref 0.2–1)
BILIRUB UR QL STRIP: ABNORMAL
BUN SERPL-MCNC: 10 MG/DL (ref 5–25)
CALCIUM SERPL-MCNC: 8.5 MG/DL (ref 8.3–10.1)
CHLORIDE SERPL-SCNC: 105 MMOL/L (ref 100–108)
CLARITY UR: ABNORMAL
CO2 SERPL-SCNC: 28 MMOL/L (ref 21–32)
COARSE GRAN CASTS URNS QL MICRO: ABNORMAL /LPF
COLOR UR: ABNORMAL
CREAT SERPL-MCNC: 1.03 MG/DL (ref 0.6–1.3)
DEPRECATED D DIMER PPP: ABNORMAL NG/ML (FEU)
EOSINOPHIL # BLD AUTO: 0 THOUSAND/ΜL (ref 0–0.61)
EOSINOPHIL NFR BLD AUTO: 0 % (ref 0–6)
ERYTHROCYTE [DISTWIDTH] IN BLOOD BY AUTOMATED COUNT: 12.7 % (ref 11.6–15.1)
GFR SERPL CREATININE-BSD FRML MDRD: 80 ML/MIN/1.73SQ M
GLUCOSE SERPL-MCNC: 122 MG/DL (ref 65–140)
GLUCOSE UR STRIP-MCNC: ABNORMAL MG/DL
HCO3 BLDV-SCNC: 28 MMOL/L (ref 24–30)
HCT VFR BLD AUTO: 37 % (ref 36.5–49.3)
HGB BLD-MCNC: 13 G/DL (ref 12–17)
HGB UR QL STRIP.AUTO: ABNORMAL
IMM GRANULOCYTES # BLD AUTO: 0 THOUSAND/UL (ref 0–0.2)
IMM GRANULOCYTES NFR BLD AUTO: 0 % (ref 0–2)
INR PPP: 1.25 (ref 0.84–1.19)
KETONES UR STRIP-MCNC: ABNORMAL MG/DL
LACTATE SERPL-SCNC: 1.8 MMOL/L (ref 0.5–2)
LACTATE SERPL-SCNC: 2.1 MMOL/L (ref 0.5–2)
LEUKOCYTE ESTERASE UR QL STRIP: NEGATIVE
LYMPHOCYTES # BLD AUTO: 0.02 THOUSANDS/ΜL (ref 0.6–4.47)
LYMPHOCYTES NFR BLD AUTO: 3 % (ref 14–44)
MCH RBC QN AUTO: 35.4 PG (ref 26.8–34.3)
MCHC RBC AUTO-ENTMCNC: 35.1 G/DL (ref 31.4–37.4)
MCV RBC AUTO: 101 FL (ref 82–98)
MONOCYTES # BLD AUTO: 0.02 THOUSAND/ΜL (ref 0.17–1.22)
MONOCYTES NFR BLD AUTO: 3 % (ref 4–12)
NEUTROPHILS # BLD AUTO: 0.77 THOUSANDS/ΜL (ref 1.85–7.62)
NEUTS SEG NFR BLD AUTO: 94 % (ref 43–75)
NITRITE UR QL STRIP: POSITIVE
NON-SQ EPI CELLS URNS QL MICRO: ABNORMAL /HPF
NRBC BLD AUTO-RTO: 0 /100 WBCS
O2 CT BLDV-SCNC: 13.4 ML/DL
PCO2 BLDV: 44.9 MM HG (ref 42–50)
PH BLDV: 7.41 [PH] (ref 7.3–7.4)
PH UR STRIP.AUTO: 6.5 [PH]
PLATELET # BLD AUTO: 74 THOUSANDS/UL (ref 149–390)
PMV BLD AUTO: 8.5 FL (ref 8.9–12.7)
PO2 BLDV: 34.9 MM HG (ref 35–45)
POTASSIUM SERPL-SCNC: 3.6 MMOL/L (ref 3.5–5.3)
PROT SERPL-MCNC: 6.3 G/DL (ref 6.4–8.2)
PROT UR STRIP-MCNC: >=300 MG/DL
PROTHROMBIN TIME: 15.9 SECONDS (ref 11.6–14.5)
RBC # BLD AUTO: 3.67 MILLION/UL (ref 3.88–5.62)
RBC #/AREA URNS AUTO: ABNORMAL /HPF
SODIUM SERPL-SCNC: 142 MMOL/L (ref 136–145)
SP GR UR STRIP.AUTO: 1.02 (ref 1–1.03)
T4 FREE SERPL-MCNC: 0.99 NG/DL (ref 0.76–1.46)
TROPONIN I SERPL-MCNC: <0.02 NG/ML
TSH SERPL DL<=0.05 MIU/L-ACNC: 4.37 UIU/ML (ref 0.36–3.74)
UROBILINOGEN UR QL STRIP.AUTO: 1 E.U./DL
WBC # BLD AUTO: 0.81 THOUSAND/UL (ref 4.31–10.16)
WBC #/AREA URNS AUTO: ABNORMAL /HPF

## 2019-08-27 PROCEDURE — 96375 TX/PRO/DX INJ NEW DRUG ADDON: CPT

## 2019-08-27 PROCEDURE — 96361 HYDRATE IV INFUSION ADD-ON: CPT

## 2019-08-27 PROCEDURE — 71046 X-RAY EXAM CHEST 2 VIEWS: CPT

## 2019-08-27 PROCEDURE — 96367 TX/PROPH/DG ADDL SEQ IV INF: CPT

## 2019-08-27 PROCEDURE — 94640 AIRWAY INHALATION TREATMENT: CPT

## 2019-08-27 PROCEDURE — 83605 ASSAY OF LACTIC ACID: CPT | Performed by: EMERGENCY MEDICINE

## 2019-08-27 PROCEDURE — 96417 CHEMO IV INFUS EACH ADDL SEQ: CPT

## 2019-08-27 PROCEDURE — 85610 PROTHROMBIN TIME: CPT | Performed by: EMERGENCY MEDICINE

## 2019-08-27 PROCEDURE — 85379 FIBRIN DEGRADATION QUANT: CPT | Performed by: EMERGENCY MEDICINE

## 2019-08-27 PROCEDURE — 87449 NOS EACH ORGANISM AG IA: CPT | Performed by: PHYSICIAN ASSISTANT

## 2019-08-27 PROCEDURE — 84443 ASSAY THYROID STIM HORMONE: CPT | Performed by: EMERGENCY MEDICINE

## 2019-08-27 PROCEDURE — 85025 COMPLETE CBC W/AUTO DIFF WBC: CPT | Performed by: EMERGENCY MEDICINE

## 2019-08-27 PROCEDURE — 87070 CULTURE OTHR SPECIMN AEROBIC: CPT | Performed by: PHYSICIAN ASSISTANT

## 2019-08-27 PROCEDURE — 36415 COLL VENOUS BLD VENIPUNCTURE: CPT | Performed by: EMERGENCY MEDICINE

## 2019-08-27 PROCEDURE — 96413 CHEMO IV INFUSION 1 HR: CPT

## 2019-08-27 PROCEDURE — 84439 ASSAY OF FREE THYROXINE: CPT | Performed by: EMERGENCY MEDICINE

## 2019-08-27 PROCEDURE — 81001 URINALYSIS AUTO W/SCOPE: CPT | Performed by: EMERGENCY MEDICINE

## 2019-08-27 PROCEDURE — 87147 CULTURE TYPE IMMUNOLOGIC: CPT | Performed by: PHYSICIAN ASSISTANT

## 2019-08-27 PROCEDURE — 99222 1ST HOSP IP/OBS MODERATE 55: CPT | Performed by: PHYSICIAN ASSISTANT

## 2019-08-27 PROCEDURE — 87205 SMEAR GRAM STAIN: CPT | Performed by: PHYSICIAN ASSISTANT

## 2019-08-27 PROCEDURE — 84484 ASSAY OF TROPONIN QUANT: CPT | Performed by: EMERGENCY MEDICINE

## 2019-08-27 PROCEDURE — 82805 BLOOD GASES W/O2 SATURATION: CPT | Performed by: EMERGENCY MEDICINE

## 2019-08-27 PROCEDURE — 93005 ELECTROCARDIOGRAM TRACING: CPT

## 2019-08-27 PROCEDURE — 80053 COMPREHEN METABOLIC PANEL: CPT | Performed by: EMERGENCY MEDICINE

## 2019-08-27 PROCEDURE — 96365 THER/PROPH/DIAG IV INF INIT: CPT

## 2019-08-27 PROCEDURE — 96415 CHEMO IV INFUSION ADDL HR: CPT

## 2019-08-27 PROCEDURE — 71275 CT ANGIOGRAPHY CHEST: CPT

## 2019-08-27 PROCEDURE — 99285 EMERGENCY DEPT VISIT HI MDM: CPT

## 2019-08-27 PROCEDURE — 85730 THROMBOPLASTIN TIME PARTIAL: CPT | Performed by: EMERGENCY MEDICINE

## 2019-08-27 PROCEDURE — 87040 BLOOD CULTURE FOR BACTERIA: CPT | Performed by: EMERGENCY MEDICINE

## 2019-08-27 PROCEDURE — 99285 EMERGENCY DEPT VISIT HI MDM: CPT | Performed by: EMERGENCY MEDICINE

## 2019-08-27 RX ORDER — ONDANSETRON 2 MG/ML
4 INJECTION INTRAMUSCULAR; INTRAVENOUS EVERY 6 HOURS PRN
Status: DISCONTINUED | OUTPATIENT
Start: 2019-08-27 | End: 2019-08-30 | Stop reason: HOSPADM

## 2019-08-27 RX ORDER — ATROPINE SULFATE 1 MG/ML
0.25 INJECTION, SOLUTION INTRAMUSCULAR; INTRAVENOUS; SUBCUTANEOUS ONCE
Status: COMPLETED | OUTPATIENT
Start: 2019-08-27 | End: 2019-08-27

## 2019-08-27 RX ORDER — EPINEPHRINE 1 MG/ML
0.3 INJECTION, SOLUTION, CONCENTRATE INTRAVENOUS ONCE
Status: DISCONTINUED | OUTPATIENT
Start: 2019-08-27 | End: 2019-08-27

## 2019-08-27 RX ORDER — RANITIDINE 25 MG/ML
50 INJECTION, SOLUTION INTRAMUSCULAR; INTRAVENOUS ONCE
Status: COMPLETED | OUTPATIENT
Start: 2019-08-27 | End: 2019-08-27

## 2019-08-27 RX ORDER — ALBUTEROL SULFATE 90 UG/1
2 AEROSOL, METERED RESPIRATORY (INHALATION) EVERY 4 HOURS PRN
Status: DISCONTINUED | OUTPATIENT
Start: 2019-08-27 | End: 2019-08-30 | Stop reason: HOSPADM

## 2019-08-27 RX ORDER — SODIUM CHLORIDE 9 MG/ML
50 INJECTION, SOLUTION INTRAVENOUS CONTINUOUS
Status: DISCONTINUED | OUTPATIENT
Start: 2019-08-27 | End: 2019-08-30 | Stop reason: HOSPADM

## 2019-08-27 RX ORDER — DEXTROSE MONOHYDRATE 50 MG/ML
20 INJECTION, SOLUTION INTRAVENOUS ONCE
Status: COMPLETED | OUTPATIENT
Start: 2019-08-27 | End: 2019-08-27

## 2019-08-27 RX ORDER — ACETAMINOPHEN 325 MG/1
650 TABLET ORAL EVERY 6 HOURS PRN
Status: DISCONTINUED | OUTPATIENT
Start: 2019-08-27 | End: 2019-08-30 | Stop reason: HOSPADM

## 2019-08-27 RX ORDER — LIDOCAINE 50 MG/G
1 PATCH TOPICAL DAILY
Status: DISCONTINUED | OUTPATIENT
Start: 2019-08-27 | End: 2019-08-30 | Stop reason: HOSPADM

## 2019-08-27 RX ORDER — ACETAMINOPHEN 325 MG/1
650 TABLET ORAL ONCE
Status: COMPLETED | OUTPATIENT
Start: 2019-08-27 | End: 2019-08-27

## 2019-08-27 RX ORDER — SODIUM CHLORIDE 9 MG/ML
20 INJECTION, SOLUTION INTRAVENOUS ONCE AS NEEDED
Status: DISCONTINUED | OUTPATIENT
Start: 2019-08-27 | End: 2019-08-30 | Stop reason: HOSPADM

## 2019-08-27 RX ORDER — ALBUTEROL SULFATE 2.5 MG/3ML
2.5 SOLUTION RESPIRATORY (INHALATION) ONCE
Status: COMPLETED | OUTPATIENT
Start: 2019-08-27 | End: 2019-08-27

## 2019-08-27 RX ORDER — LANOLIN ALCOHOL/MO/W.PET/CERES
6 CREAM (GRAM) TOPICAL
Status: DISCONTINUED | OUTPATIENT
Start: 2019-08-27 | End: 2019-08-30 | Stop reason: HOSPADM

## 2019-08-27 RX ORDER — LIDOCAINE 50 MG/G
1 PATCH TOPICAL DAILY
Status: DISCONTINUED | OUTPATIENT
Start: 2019-08-28 | End: 2019-08-27

## 2019-08-27 RX ORDER — DIPHENHYDRAMINE HYDROCHLORIDE 50 MG/ML
50 INJECTION INTRAMUSCULAR; INTRAVENOUS ONCE
Status: COMPLETED | OUTPATIENT
Start: 2019-08-27 | End: 2019-08-27

## 2019-08-27 RX ADMIN — CEFEPIME HYDROCHLORIDE 2000 MG: 2 INJECTION, POWDER, FOR SOLUTION INTRAVENOUS at 18:53

## 2019-08-27 RX ADMIN — RANITIDINE HYDROCHLORIDE 50 MG: 25 INJECTION INTRAMUSCULAR; INTRAVENOUS at 15:10

## 2019-08-27 RX ADMIN — IRINOTECAN HYDROCHLORIDE 340 MG: 20 INJECTION, SOLUTION INTRAVENOUS at 14:02

## 2019-08-27 RX ADMIN — ALBUTEROL SULFATE 2.5 MG: 2.5 SOLUTION RESPIRATORY (INHALATION) at 15:25

## 2019-08-27 RX ADMIN — VANCOMYCIN HYDROCHLORIDE 1250 MG: 1 INJECTION, POWDER, LYOPHILIZED, FOR SOLUTION INTRAVENOUS at 20:47

## 2019-08-27 RX ADMIN — SODIUM CHLORIDE 500 ML: 0.9 INJECTION, SOLUTION INTRAVENOUS at 15:10

## 2019-08-27 RX ADMIN — SODIUM CHLORIDE 1000 ML: 0.9 INJECTION, SOLUTION INTRAVENOUS at 16:53

## 2019-08-27 RX ADMIN — SODIUM CHLORIDE 75 ML/HR: 0.9 INJECTION, SOLUTION INTRAVENOUS at 22:42

## 2019-08-27 RX ADMIN — SODIUM CHLORIDE 20 ML/HR: 0.9 INJECTION, SOLUTION INTRAVENOUS at 09:31

## 2019-08-27 RX ADMIN — LIDOCAINE 1 PATCH: 50 PATCH TOPICAL at 21:43

## 2019-08-27 RX ADMIN — ACETAMINOPHEN 650 MG: 325 TABLET ORAL at 19:49

## 2019-08-27 RX ADMIN — ATROPINE SULFATE 0.25 MG: 1 INJECTION, SOLUTION INTRAMUSCULAR; INTRAVENOUS; SUBCUTANEOUS at 14:00

## 2019-08-27 RX ADMIN — DEXAMETHASONE SODIUM PHOSPHATE: 10 INJECTION, SOLUTION INTRAMUSCULAR; INTRAVENOUS at 09:31

## 2019-08-27 RX ADMIN — BEVACIZUMAB 500 MG: 400 INJECTION, SOLUTION INTRAVENOUS at 10:05

## 2019-08-27 RX ADMIN — ONDANSETRON 4 MG: 2 INJECTION INTRAMUSCULAR; INTRAVENOUS at 22:42

## 2019-08-27 RX ADMIN — OXALIPLATIN 200 MG: 5 INJECTION, SOLUTION, CONCENTRATE INTRAVENOUS at 11:41

## 2019-08-27 RX ADMIN — MELATONIN 6 MG: 3 TAB ORAL at 21:42

## 2019-08-27 RX ADMIN — HYDROCORTISONE SODIUM SUCCINATE 100 MG: 100 INJECTION, POWDER, FOR SOLUTION INTRAMUSCULAR; INTRAVENOUS at 15:07

## 2019-08-27 RX ADMIN — IODIXANOL 90 ML: 320 INJECTION, SOLUTION INTRAVASCULAR at 19:32

## 2019-08-27 RX ADMIN — DIPHENHYDRAMINE HYDROCHLORIDE 50 MG: 50 INJECTION, SOLUTION INTRAMUSCULAR; INTRAVENOUS at 15:08

## 2019-08-27 RX ADMIN — AZITHROMYCIN MONOHYDRATE 500 MG: 500 INJECTION, POWDER, LYOPHILIZED, FOR SOLUTION INTRAVENOUS at 19:38

## 2019-08-27 RX ADMIN — DEXTROSE 20 ML/HR: 5 SOLUTION INTRAVENOUS at 11:31

## 2019-08-27 NOTE — SEPSIS NOTE
Sepsis Note   Angela Winston 63 y o  male MRN: [id number]  Unit/Bed#: ED 19 Encounter: [id number]      qSOFA     Row Name 08/27/19 1722 08/27/19 1618             Altered mental status GCS < 15           Respiratory Rate > / =22  0  0       Systolic BP < / =834  0  0       Q Sofa Score  0  0           Initial Sepsis Screening     Row Name 08/27/19 1827                Is the patient's history suggestive of a new or worsening infection? (!) Yes (Proceed)  -GUERITA        Suspected source of infection  suspect infection, source unknown  -GUERITA        Are two or more of the following signs & symptoms of infection both present and new to the patient? (!) Yes (Proceed)  -GUERITA        Indicate SIRS criteria  Tachycardia > 90 bpm;Leukopenia (WBC < 4000 IJL); Tachypnea > 20 resp per min  -Mattie Beck        If the answer is yes to both questions, suspicion of sepsis is present          If severe sepsis is present AND tissue hypoperfusion perists in the hour after fluid resuscitation or lactate > 4, the patient meets criteria for SEPTIC SHOCK          Are any of the following organ dysfunction criteria present within 6 hours of suspected infection and SIRS criteria that are NOT considered to be chronic conditions? (!) Yes  -GUERITA        Organ dysfunction  Lactate > 2 0 mmol/L  -GUERITA        Date of presentation of severe sepsis  08/27/19  -GUERITA        Time of presentation of severe sepsis  1828  -GUERITA        Tissue hypoperfusion persists in the hour after crystalloid fluid administration, evidenced, by either:          Was hypotension present within one hour of the conclusion of crystalloid fluid administration?   No  -GUERITA        Date of presentation of septic shock          Time of presentation of septic shock            User Key  (r) = Recorded By, (t) = Taken By, (c) = Cosigned By    Initials Name Provider Lita Yanez MD Resident

## 2019-08-27 NOTE — ED PROVIDER NOTES
ASSESSMENT AND PLAN    Maribel Zepeda is a 63 y o  male with a history of stage IV metastatic adenocarcinoma of the colon who presents for evaluation of acute onset of flushing, nonspecific chest pain, and dyspnea, initially thought to be secondary to chemotherapy reaction, however found to have a low-grade fever in the emergency department a size 100 4  His fever did increase to 101 2 while he was in the ED  On arrival, the patient was initially tachycardic with a heart rate in the 120s, which improved with IV fluids  He was otherwise hemodynamically stable  His physical exam is overall largely unremarkable, though he does have a port on the right side of his chest   He does not have any urticaria, wheezing, abdominal pain, though he does have erythema to the upper trunk, which the patient states a recently sustained sunburn  I do not believe the patient's symptoms are present anaphylactic reaction, however concern for underlying infection  -initial lab work significant for neutropenia, significantly changed from yesterday  I do not believe this is a result of his chemotherapy given the acute onset, however more likely represents acute infectious process  -CT PE study displays a known right-sided lung mass, with postobstructive pneumonia  -urinalysis consistent with possible UTI  -patient meets sepsis criteria, and also has febrile neutropenia  Started empirically on vancomycin, cefepime, azithromycin  -1 L IV fluid bolus  -will admit to the medicine service for further management  I discussed this case in detail with the admitting medicine team       History  Chief Complaint   Patient presents with    Allergic Reaction     Pt was receiving chemotherapy and developed reaction from medications at cancer center  Pt was treated with diphenhydramine 50mg, ranitidine 50mg, hydrocortisone 100mg, albuterol sulfate 2 5mg/3ml, NSS 50ml prior to arrival   Pt reports feeling SOB, heavy, and warm    Pt reporting chest tightness  HPI this is a 72-year-old male with history of stage IV adenocarcinoma of the ascending colon with metastasis to the lung, status post resection of the ascending and transverse colon, right lung wedge resection x2, as well as several rounds of chemotherapy, currently on Avastin, Camptosar, Oxaliplatin which he has been since 08/19  The patient has been on oxaliplatin several times the past, and has had several doses over a year ago  The patient also has a history of atrial fibrillation status post cardioversion  The patient was at the infusion clinic earlier today, and suddenly had a reaction, so was given Benadryl, hydrocortisone, Pepcid, as well as an albuterol treatment and a small fluid bolus  He was sent to the emergency department for further evaluation  The patient describes initial symptoms as sudden cold, shakiness, as well as chest pain, described as a pressure  He states that his pain is still present  He was also found to be hypoxic with a room air saturation of 88%, which improved to 95% on 2 L nasal cannula  The patient states that this has never happened to him before with any of his prior infusions  He does not have any known allergies  He has not had any recent episodes of chest pain  He has no history of VTE or DVT  The patient states that EMS medications did make him feel significantly improved, and currently he feels pretty well, but he still does have chest pain  He does note low-grade fevers over the last several days  He denies any headache, dizziness, lightheadedness, nausea vomiting, diarrhea, abdominal pain, urinary symptoms  He has no recent travel, recent surgeries, recent hospitalizations, recent sick contacts  Prior to Admission Medications   Prescriptions Last Dose Informant Patient Reported? Taking?    Na Sulfate-K Sulfate-Mg Sulf (SUPREP BOWEL PREP KIT) 17 5-3 13-1 6 GM/177ML SOLN   No No   Sig: Take 177 mL by mouth once for 1 dose   Regorafenib (STIVARGA) 40 MG TABS Not Taking at Unknown time Self No No   Si tablets daily for 21 days  7 days off  Patient not taking: Reported on 2019   albuterol (VENTOLIN HFA) 90 mcg/act inhaler   No Yes   Si puffs every 4 hours as needed for shortness of breath  No more than 4 times a day   ondansetron (ZOFRAN) 4 mg tablet  Self No Yes   Sig: Take 1 tablet (4 mg total) by mouth every 6 (six) hours as needed for nausea or vomiting      Facility-Administered Medications: None       Past Medical History:   Diagnosis Date    [facility] [address] [facility] )     [facility]     [facility] cancer ([facility] )     Metastatic carcinoma to lung [facility])        Past Surgical History:   Procedure Laterality Date    ANTERIOR CRUCIATE LIGAMENT REPAIR      ANTERIOR CRUCIATE LIGAMENT REPAIR      2  on R knee    APPENDECTOMY      ATRIAL ABLATION SURGERY      BACK SURGERY      COLON SURGERY      Ascending and transverse colon    COLONOSCOPY      LUNG REMOVAL, PARTIAL      RIGHT COLECTOMY      and Transverse colon resection       Family History   Problem Relation Age of Onset    Diabetes Mother     Heart disease Mother     Heart disease Father     Skin cancer Sister      I have reviewed and agree with the history as documented  Social History     Tobacco Use    Smoking status: Former Smoker     Types: Cigarettes     Last attempt to quit:      Years since quittin 6    Smokeless tobacco: Never Used    Tobacco comment: Cigars    Substance Use Topics    Alcohol use: Yes     Alcohol/week: 12 0 standard drinks     Types: 12 Cans of beer per week     Comment: Socially     Drug use: No        Review of Systems   Constitutional: Positive for diaphoresis  Negative for chills, fever and unexpected weight change  HENT: Negative for congestion and sinus pain  Eyes: Negative for photophobia and visual disturbance  Respiratory: Positive for shortness of breath  Negative for cough      Cardiovascular: Positive for chest pain  Negative for palpitations and leg swelling  Gastrointestinal: Negative for abdominal pain, diarrhea, nausea and vomiting  Genitourinary: Negative for dysuria and hematuria  Musculoskeletal: Negative for neck pain and neck stiffness  Skin: Negative for pallor and rash  Neurological: Negative for light-headedness and headaches  Physical Exam  ED Triage Vitals   Temperature Pulse Respirations Blood Pressure SpO2   08/27/19 1618 08/27/19 1618 08/27/19 1618 08/27/19 1618 08/27/19 1618   100 5 °F (38 1 °C) (!) 117 20 129/73 (!) 88 %      Temp Source Heart Rate Source Patient Position - Orthostatic VS BP Location FiO2 (%)   08/27/19 1618 08/27/19 1618 08/27/19 1618 08/27/19 1618 --   Oral Monitor Lying Right arm       Pain Score       08/27/19 2041       7             Orthostatic Vital Signs  Vitals:    08/29/19 1110 08/29/19 1530 08/29/19 2300 08/30/19 0736   BP: 108/75 113/73 132/77 115/72   Pulse: 78 72 78 67   Patient Position - Orthostatic VS:  Lying Lying Lying       Physical Exam   Constitutional: He is oriented to person, place, and time  Awake and alert, well appearing, no acute distress  Nontoxic in appearance  Mental status appropriate  HENT:   Head: Normocephalic  Mouth/Throat: Oropharynx is clear and moist  No oropharyngeal exudate  Eyes: Pupils are equal, round, and reactive to light  EOM are normal  No scleral icterus  Neck: Normal range of motion  No JVD present  Cardiovascular: Regular rhythm and normal heart sounds  No murmur heard  Tachycardic   Pulmonary/Chest: Effort normal  No stridor  No respiratory distress  He has no wheezes  He has no rales  Abdominal: Soft  He exhibits no distension  There is no tenderness  Musculoskeletal: Normal range of motion  He exhibits no edema, tenderness or deformity  Neurological: He is alert and oriented to person, place, and time  No cranial nerve deficit  He exhibits normal muscle tone     Skin:   There is blanching erythematous flushing to the upper chest and bilateral shoulders, but without urticaria  There is no tenderness to palpation  ED Medications  Medications   albuterol (PROVENTIL HFA,VENTOLIN HFA) inhaler 2 puff (2 puffs Inhalation Given 8/28/19 1740)   sodium chloride 0 9 % infusion (0 mL/hr Intravenous Stopped 8/30/19 1316)   ondansetron (ZOFRAN) injection 4 mg (4 mg Intravenous Given 8/30/19 1017)   acetaminophen (TYLENOL) tablet 650 mg (650 mg Oral Given 8/29/19 2106)   melatonin tablet 6 mg (6 mg Oral Given 8/29/19 2212)   lidocaine (LIDODERM) 5 % patch 1 patch (0 patches Topical Hold 8/29/19 2112)   metroNIDAZOLE (FLAGYL) tablet 500 mg (500 mg Oral Given 8/30/19 1327)   levalbuterol (XOPENEX) inhalation solution 1 25 mg (1 25 mg Nebulization Given 8/30/19 1310)   ipratropium (ATROVENT) 0 02 % inhalation solution 0 5 mg (0 5 mg Nebulization Given 8/30/19 1310)   levofloxacin (LEVAQUIN) tablet 500 mg (500 mg Oral Given 8/30/19 1327)   sodium chloride 0 9 % bolus 1,000 mL (0 mL Intravenous Stopped 8/27/19 1806)   cefepime (MAXIPIME) 2 g/50 mL dextrose IVPB (0 mg Intravenous Stopped 8/27/19 1936)   azithromycin (ZITHROMAX) 500 mg in sodium chloride 0 9% 250mL IVPB 500 mg (0 mg Intravenous Stopped 8/27/19 2042)   vancomycin (VANCOCIN) 1,250 mg in sodium chloride 0 9 % 250 mL IVPB (0 mg Intravenous Stopped 8/27/19 2242)   iodixanol (VISIPAQUE) 320 MG/ML injection 90 mL (90 mL Intravenous Given 8/27/19 1932)   acetaminophen (TYLENOL) tablet 650 mg (650 mg Oral Given 8/27/19 1949)   ibuprofen (MOTRIN) tablet 400 mg (400 mg Oral Given 8/28/19 0415)       Diagnostic Studies  Results Reviewed     Procedure Component Value Units Date/Time    Blood culture #1 [[id number]] Collected:  08/27/19 1642    Lab Status:  Preliminary result Specimen:  Blood from Line, Venous Updated:  08/29/19 2201     Blood Culture No Growth at 48 hrs      Blood culture #2 [[id number]] Collected:  08/27/19 1649    Lab Status:  Preliminary result Specimen:  Blood from Arm, Right Updated:  08/29/19 2201     Blood Culture No Growth at 48 hrs  T4, free [[id number]]  (Normal) Collected:  08/27/19 1642    Lab Status:  Final result Specimen:  Blood from Line, Venous Updated:  08/27/19 2023     Free T4 0 99 ng/dL     Urine Microscopic [[id number]]  (Abnormal) Collected:  08/27/19 1854    Lab Status:  Final result Specimen:  Urine, Clean Catch Updated:  08/27/19 1948     RBC, UA 4-10 /hpf      WBC, UA None Seen /hpf      Epithelial Cells Occasional /hpf      Bacteria, UA Occasional /hpf      COARSE GRANULAR CASTS 5-10 /lpf      AMORPH URATES Innumerable /hpf     Lactic acid, plasma x2 [[id number]]  (Normal) Collected:  08/27/19 1848    Lab Status:  Final result Specimen:  Blood from Arm, Left Updated:  08/27/19 1925     LACTIC ACID 1 8 mmol/L     Narrative:       Result may be elevated if tourniquet was used during collection      UA w Reflex to Microscopic w Reflex to Culture [[id number]]  (Abnormal) Collected:  08/27/19 1854    Lab Status:  Final result Specimen:  Urine, Clean Catch Updated:  08/27/19 1915     Color, UA Red     Clarity, UA Cloudy     Specific [place], UA 1 020     pH, UA 6 5     Leukocytes, UA Negative     Nitrite, UA Positive     Protein, UA >=300 mg/dl      Glucose,  (1/10%) mg/dl      Ketones, UA Trace mg/dl      Urobilinogen, UA 1 0 E U /dl      Bilirubin, UA Interference- unable to analyze     Blood, UA Large    CBC and differential [[id number]]  (Abnormal) Collected:  08/27/19 1642    Lab Status:  Final result Specimen:  Blood from Line, Venous Updated:  08/27/19 1800     WBC 0 81 Thousand/uL      RBC 3 67 Million/uL      Hemoglobin 13 0 g/dL      Hematocrit 37 0 %       fL      MCH 35 4 pg      MCHC 35 1 g/dL      RDW 12 7 %      MPV 8 5 fL      Platelets 74 Thousands/uL      nRBC 0 /100 WBCs      Neutrophils Relative 94 %      Immat GRANS % 0 %      Lymphocytes Relative 3 %      Monocytes Relative 3 %      Eosinophils Relative 0 %      Basophils Relative 0 %      Neutrophils Absolute 0 77 Thousands/µL      Immature Grans Absolute 0 00 Thousand/uL      Lymphocytes Absolute 0 02 Thousands/µL      Monocytes Absolute 0 02 Thousand/µL      Eosinophils Absolute 0 00 Thousand/µL      Basophils Absolute 0 00 Thousands/µL     D-Dimer [[id number]]  (Abnormal) Collected:  08/27/19 1642    Lab Status:  Final result Specimen:  Blood from Line, Venous Updated:  08/27/19 1754     D-Dimer, Quant >10,000 ng/ml (FEU)     Comprehensive metabolic panel [[id number]]  (Abnormal) Collected:  08/27/19 1642    Lab Status:  Final result Specimen:  Blood from Line, Venous Updated:  08/27/19 1729     Sodium 142 mmol/L      Potassium 3 6 mmol/L      Chloride 105 mmol/L      CO2 28 mmol/L      ANION GAP 9 mmol/L      BUN 10 mg/dL      Creatinine 1 03 mg/dL      Glucose 122 mg/dL      Calcium 8 5 mg/dL      AST 58 U/L      ALT 18 U/L      Alkaline Phosphatase 76 U/L      Total Protein 6 3 g/dL      Albumin 3 0 g/dL      Total Bilirubin 2 91 mg/dL      eGFR 80 ml/min/1 73sq m     Narrative:       [facility] guidelines for Chronic Kidney Disease (CKD):     Stage 1 with normal or high GFR (GFR > 90 mL/min/1 73 square meters)    Stage 2 Mild CKD (GFR = 60-89 mL/min/1 73 square meters)    Stage 3A Moderate CKD (GFR = 45-59 mL/min/1 73 square meters)    Stage 3B Moderate CKD (GFR = 30-44 mL/min/1 73 square meters)    Stage 4 Severe CKD (GFR = 15-29 mL/min/1 73 square meters)    Stage 5 End Stage CKD (GFR <15 mL/min/1 73 square meters)  Note: GFR calculation is accurate only with a steady state creatinine    TSH, 3rd generation with Free T4 reflex [[id number]]  (Abnormal) Collected:  08/27/19 1642    Lab Status:  Final result Specimen:  Blood from Line, Venous Updated:  08/27/19 1729     TSH 3RD GENERATON 4 368 uIU/mL     Narrative:       Patients undergoing fluorescein dye angiography may retain small amounts of fluorescein in the body for 48-72 hours post procedure  Samples containing fluorescein can produce falsely depressed TSH values  If the patient had this procedure,a specimen should be resubmitted post fluorescein clearance  Lactic acid, plasma x2 [[id number]]  (Abnormal) Collected:  08/27/19 1642    Lab Status:  Final result Specimen:  Blood from Line, Venous Updated:  08/27/19 1715     LACTIC ACID 2 1 mmol/L     Narrative:       Result may be elevated if tourniquet was used during collection  APTT [[id number]]  (Normal) Collected:  08/27/19 1642    Lab Status:  Final result Specimen:  Blood from Line, Venous Updated:  08/27/19 1711     PTT 34 seconds     Protime-INR [[id number]]  (Abnormal) Collected:  08/27/19 1642    Lab Status:  Final result Specimen:  Blood from Line, Venous Updated:  08/27/19 1711     Protime 15 9 seconds      INR 1 25    Troponin I [[id number]]  (Normal) Collected:  08/27/19 1642    Lab Status:  Final result Specimen:  Blood from Line, Venous Updated:  08/27/19 1710     Troponin I <0 02 ng/mL     Blood gas, venous [[id number]]  (Abnormal) Collected:  08/27/19 1642    Lab Status:  Final result Specimen:  Blood from Line, Venous Updated:  08/27/19 1652     pH, Momo 7 412     pCO2, Momo 44 9 mm Hg      pO2, Momo 34 9 mm Hg      HCO3, Momo 28 0 mmol/L      Base Excess, Momo 2 8 mmol/L      O2 Content, Momo 13 4 ml/dL      O2 HGB, VENOUS 67 1 %                  CTA ED chest PE Study   Final Result by Phyllis Diggs MD (08/27 1955)         1  Limited evaluation of the pulmonary arteries  No evidence of main renal artery embolus  Distal order branches are not well evaluated  2   Right infrahilar and lower lobe mass is stable to slightly smaller, measuring 6 3 x 4 5 x 4 2 cm  Some pulmonary nodules in the bilateral lungs have resolved  Others are stable to slightly smaller  3   Stable severe narrowing of right lower lobe segmental pulmonary arteries and postobstructive pneumonia              Workstation performed: [id number]         XR chest 2 views   Final Result by Toni Rubio DO (08/28 7140)      Postop change within the left chest with focal consolidation at the apex  Consolidation within the posterior right lung base consistent with patient's known mass  See separate CT scan  Workstation performed: [id number]               Procedures  Procedures        ED Course                   Initial Sepsis Screening     Row Name 08/27/19 1827                Is the patient's history suggestive of a new or worsening infection? (!) Yes (Proceed)  -GUERITA        Suspected source of infection  suspect infection, source unknown  -GUERITA        Are two or more of the following signs & symptoms of infection both present and new to the patient? (!) Yes (Proceed)  -GUERITA        Indicate SIRS criteria  Tachycardia > 90 bpm;Leukopenia (WBC < 4000 IJL); Tachypnea > 20 resp per min  -Vilinda Cheadle        If the answer is yes to both questions, suspicion of sepsis is present          If severe sepsis is present AND tissue hypoperfusion perists in the hour after fluid resuscitation or lactate > 4, the patient meets criteria for SEPTIC SHOCK          Are any of the following organ dysfunction criteria present within 6 hours of suspected infection and SIRS criteria that are NOT considered to be chronic conditions? (!) Yes  -GUERITA        Organ dysfunction  Lactate > 2 0 mmol/L  -GUERITA        Date of presentation of severe sepsis  08/27/19  -GUERITA        Time of presentation of severe sepsis  1828  -GUERITA        Tissue hypoperfusion persists in the hour after crystalloid fluid administration, evidenced, by either:          Was hypotension present within one hour of the conclusion of crystalloid fluid administration?   No  -GUERIAT        Date of presentation of septic shock          Time of presentation of septic shock            User Key  (r) = Recorded By, (t) = Taken By, (c) = Cosigned By    Initials Name Provider Marcy Burrows MD Resident Default Flowsheet Data (last 720 hours)      Sepsis Reassess     Row Name 08/27/19 1828                   Repeat Volume Status and Tissue Perfusion Assessment Performed    Repeat Volume Status and Tissue Perfusion Assessment Performed  Yes  -GUERITA           Volume Status and Tissue Perfusion Post Fluid Resuscitation * Must Document All *    Vital Signs Reviewed (HR, RR, BP, T)  Yes  -GUERITA        Shock Index Reviewed  Yes  -GUERITA        Arterial Oxygen Saturation Reviewed (POx, SaO2 or SpO2)  Yes (comment %)  -GUERITA        Cardio  Regular rate and rhythm  -GUERITA        Pulmonary  Normal effort  -GUERITA        Capillary Refill  Brisk  -GUERITA        Peripheral Pulses  Radial  -GUERITA        Peripheral Pulse  +2  -GUERITA        Skin  Warm;Dry  -GUERITA        Urine output assessed  Decreased  -GUERITA           *OR*   Intensive Monitoring- Must Document One of the Following Four *:    Vital Signs Reviewed          * Central Venous Pressure (CVP or RAP)          * Central Venous Oxygen (SVO2, ScvO2 or Oxygen saturation via central catheter)          * Bedside Cardiovascular US in IVC diameter and % collapse          * Passive Leg Raise OR Crystalloid Challenge            User Key  (r) = Recorded By, (t) = Taken By, (c) = Cosigned By    Initials Name Provider Type    Lesley Bush MD Resident          Camejo Mulch' Criteria for PE      Most Recent Value   Wells' Criteria for PE   Clinical signs and symptoms of DVT  0 Filed at: 08/27/2019 1633   PE is primary diagnosis or equally likely  0 Filed at: 08/27/2019 1633   HR >100  1 5 Filed at: 08/27/2019 1633   Immobilization at least 3 days or Surgery in the previous 4 weeks  0 Filed at: 08/27/2019 1633   Previous, objectively diagnosed PE or DVT  0 Filed at: 08/27/2019 1633   Hemoptysis  0 Filed at: 08/27/2019 1633   Malignancy with treatment within 6 months or palliative  1 Filed at: 08/27/2019 1633   Wells' Criteria Total  2 5 Filed at: 08/27/2019 1633            MDM    Disposition  Final diagnoses: Neutropenic fever ([facility] [address] )   UTI (urinary tract infection)   Pneumonia   Sepsis ([facility] [address] )     Time reflects when diagnosis was documented in both MDM as applicable and the Disposition within this note     Time User Action Codes Description Comment    8/27/2019  8:47 PM Nasrin Ramosgi Add [D70 9,  R50 81] Neutropenic fever ([facility] [address] )     8/27/2019  8:47 PM Nasrin Ramosgi Add [N39 0] UTI (urinary tract infection)     8/27/2019  8:47 PM Nasrin Abelardo Add [J18 9] Pneumonia     8/27/2019  8:48 PM Nasrin Abelardo Add [A41 9] Sepsis ([facility] )     8/27/2019  9:24 PM Feliz Math Add [C18 9,  C78 00] Colon cancer [facility]as[facility] ([facility]     8/27/2019  9:24 PM Feliz Math Modify [C18 9,  C78 00] Colon cancer metastasized to lung ([facility] [address] )     8/28/2019 11:20 AM Lulú Mackenzie Add [N17 9] Acute kidney injury ([facility] )     8/30/2019  1:09 PM Lulú Mackenzie Add [D69 6] Thrombocytopenia ([facility] [address] )     8/30/2019  1:09 PM Lulú Mackenzie Add [C18 2] Malignant neoplasm of ascending colon [facility])       ED Disposition     ED Disposition Condition Date/Time Comment    Admit Stable Tue Aug 27, 2019  8:47 PM Case was discussed with BIMAL and the patient's admission status was agreed to be Admission Status: inpatient status to the service of Dr Dk Hooks   Follow-up Information    None         Current Discharge Medication List      START taking these medications    Details   levofloxacin (LEVAQUIN) 500 mg tablet Take 1 tablet (500 mg total) by mouth every 24 hours for 5 days  Qty: 5 tablet, Refills: 0    Associated Diagnoses: Neutropenic fever ([facility] [address] ); Pneumonia; Sepsis ([facility] ); Acute kidney injury ([facility] ); Thrombocytopenia ([facility] ); Malignant neoplasm of ascending colon (HCC)      metroNIDAZOLE (FLAGYL) 500 mg tablet Take 1 tablet (500 mg total) by mouth every 8 (eight) hours for 5 days  Qty: 15 tablet, Refills: 0    Associated Diagnoses: Neutropenic fever ([facility] ); Pneumonia; Sepsis ([facility] [facility] ); Acute kidney injury ([facility] ); Thrombocytopenia ([facility] );  Malignant neoplasm of ascending colon ([facility] )         CONTINUE these medications which have NOT CHANGED    Details   albuterol (VENTOLIN HFA) 90 mcg/act inhaler 2 puffs every 4 hours as needed for shortness of breath  No more than 4 times a day  Qty: 1 Inhaler, Refills: 2    Comments: Substitution to a formulary equivalent within the same pharmaceutical class is authorized  Associated Diagnoses: Colon cancer metastasized to lung (HCC)      ondansetron (ZOFRAN) 4 mg tablet Take 1 tablet (4 mg total) by mouth every 6 (six) hours as needed for nausea or vomiting  Qty: 50 tablet, Refills: 2    Associated Diagnoses: Chemotherapy induced nausea and vomiting      Regorafenib (STIVARGA) 40 MG TABS 2 tablets daily for 21 days  7 days off  Qty: 42 tablet, Refills: 3    Comments: Discontinue Lonsurf  Patient qualified for a stivarga one copay card wit $0 copay  Id# [id number]  BIN# [id number]  PCN# [id number]  [facility]# [id number]  Associated Diagnoses: Malignant neoplasm of ascending colon ([place] [address] )         STOP taking these medications       Na Sulfate-K Sulfate-Mg Sulf (SUPREP BOWEL PREP KIT) 17 5-3 13-1 6 GM/177ML SOLN Comments:   Reason for Stopping:             Outpatient Discharge Orders   CBC   Standing Status: Future Standing Exp  Date: 08/30/20     Basic metabolic panel   Standing Status: Future Standing Exp  Date: 08/29/20     Activity as tolerated       ED Provider  Attending physically available and evaluated Tylerterrygordo Trey ROBLERO managed the patient along with the ED Attending      Electronically Signed by         Adria Arguello MD  08/30/19 6972

## 2019-08-27 NOTE — PLAN OF CARE

## 2019-08-27 NOTE — ED NOTES
Pt was receiving chemotherapy tx at [facility] [place] and states he began shaking with [facility] called and informed Corbin Peacock RN pt received Willena Baldwin, and Ivinotecan  It is believed that Oxaliplatin is the cause of the allergic rxtn         Carlos Hayes RN  08/27/19 8936

## 2019-08-27 NOTE — TELEPHONE ENCOUNTER
Call from [address] [place] [address] at [facility] to report pt having reaction to chemo  On arrival pt's VSS with T 97, P 66, /81  During Oxaliplatin pt c/o being cold  Pt's romm changed  Oxaliplatin completed  Irinotecan started then at 3:05 pm pt's appearance changed  T 100 4, P 134, /90  C/O chest tightness and SOB  Hypersensitivity protocol given  Then T 98 3, P 120, /81  SOB continued and given nebulizer  Updated Dr Kari Myers and instructed Rupal Romero to send to ED via ambulance

## 2019-08-28 PROBLEM — N17.9 ACUTE KIDNEY INJURY (HCC): Status: ACTIVE | Noted: 2019-08-28

## 2019-08-28 LAB
ANION GAP SERPL CALCULATED.3IONS-SCNC: 10 MMOL/L (ref 4–13)
ATRIAL RATE: 93 BPM
ATRIAL RATE: 98 BPM
BASOPHILS # BLD MANUAL: 0 THOUSAND/UL (ref 0–0.1)
BASOPHILS NFR MAR MANUAL: 0 % (ref 0–1)
BLD SMEAR INTERP: NORMAL
BUN SERPL-MCNC: 24 MG/DL (ref 5–25)
CALCIUM SERPL-MCNC: 7.7 MG/DL (ref 8.3–10.1)
CHLORIDE SERPL-SCNC: 106 MMOL/L (ref 100–108)
CK SERPL-CCNC: 118 U/L (ref 39–308)
CO2 SERPL-SCNC: 25 MMOL/L (ref 21–32)
CREAT SERPL-MCNC: 2.06 MG/DL (ref 0.6–1.3)
CREAT UR-MCNC: 42.4 MG/DL
EOSINOPHIL # BLD MANUAL: 0 THOUSAND/UL (ref 0–0.4)
EOSINOPHIL NFR BLD MANUAL: 0 % (ref 0–6)
ERYTHROCYTE [DISTWIDTH] IN BLOOD BY AUTOMATED COUNT: 13.1 % (ref 11.6–15.1)
GFR SERPL CREATININE-BSD FRML MDRD: 35 ML/MIN/1.73SQ M
GLUCOSE SERPL-MCNC: 106 MG/DL (ref 65–140)
HCT VFR BLD AUTO: 34.1 % (ref 36.5–49.3)
HGB BLD-MCNC: 11.8 G/DL (ref 12–17)
L PNEUMO1 AG UR QL IA.RAPID: NEGATIVE
LDH SERPL-CCNC: 730 U/L (ref 81–234)
LYMPHOCYTES # BLD AUTO: 0.11 THOUSAND/UL (ref 0.6–4.47)
LYMPHOCYTES # BLD AUTO: 3 % (ref 14–44)
MACROCYTES BLD QL AUTO: PRESENT
MCH RBC QN AUTO: 35.2 PG (ref 26.8–34.3)
MCHC RBC AUTO-ENTMCNC: 34.6 G/DL (ref 31.4–37.4)
MCV RBC AUTO: 102 FL (ref 82–98)
METAMYELOCYTES NFR BLD MANUAL: 2 % (ref 0–1)
MONOCYTES # BLD AUTO: 0.07 THOUSAND/UL (ref 0–1.22)
MONOCYTES NFR BLD: 2 % (ref 4–12)
NEUTROPHILS # BLD MANUAL: 3.39 THOUSAND/UL (ref 1.85–7.62)
NEUTS BAND NFR BLD MANUAL: 36 % (ref 0–8)
NEUTS SEG NFR BLD AUTO: 57 % (ref 43–75)
NRBC BLD AUTO-RTO: 0 /100 WBCS
OVALOCYTES BLD QL SMEAR: PRESENT
P AXIS: 65 DEGREES
P AXIS: 72 DEGREES
PHOSPHATE SERPL-MCNC: 2.9 MG/DL (ref 2.7–4.5)
PLATELET # BLD AUTO: 31 THOUSANDS/UL (ref 149–390)
PLATELET BLD QL SMEAR: ABNORMAL
PMV BLD AUTO: 9.3 FL (ref 8.9–12.7)
POTASSIUM SERPL-SCNC: 4.3 MMOL/L (ref 3.5–5.3)
PR INTERVAL: 150 MS
PR INTERVAL: 150 MS
PROCALCITONIN SERPL-MCNC: 53.41 NG/ML
PROT UR-MCNC: 42 MG/DL
PROT/CREAT UR: 0.99 MG/G{CREAT} (ref 0–0.1)
QRS AXIS: 3 DEGREES
QRS AXIS: 4 DEGREES
QRSD INTERVAL: 100 MS
QRSD INTERVAL: 96 MS
QT INTERVAL: 326 MS
QT INTERVAL: 334 MS
QTC INTERVAL: 415 MS
QTC INTERVAL: 416 MS
RBC # BLD AUTO: 3.35 MILLION/UL (ref 3.88–5.62)
S PNEUM AG UR QL: NEGATIVE
SODIUM SERPL-SCNC: 141 MMOL/L (ref 136–145)
T WAVE AXIS: 75 DEGREES
T WAVE AXIS: 76 DEGREES
TOTAL CELLS COUNTED SPEC: 100
URATE SERPL-MCNC: 7.7 MG/DL (ref 4.2–8)
VANCOMYCIN SERPL-MCNC: 7.7 UG/ML
VENTRICULAR RATE: 93 BPM
VENTRICULAR RATE: 98 BPM
WBC # BLD AUTO: 3.64 THOUSAND/UL (ref 4.31–10.16)

## 2019-08-28 PROCEDURE — 93010 ELECTROCARDIOGRAM REPORT: CPT | Performed by: INTERNAL MEDICINE

## 2019-08-28 PROCEDURE — 84156 ASSAY OF PROTEIN URINE: CPT | Performed by: INTERNAL MEDICINE

## 2019-08-28 PROCEDURE — 99223 1ST HOSP IP/OBS HIGH 75: CPT | Performed by: INTERNAL MEDICINE

## 2019-08-28 PROCEDURE — 84550 ASSAY OF BLOOD/URIC ACID: CPT | Performed by: INTERNAL MEDICINE

## 2019-08-28 PROCEDURE — 84145 PROCALCITONIN (PCT): CPT | Performed by: PHYSICIAN ASSISTANT

## 2019-08-28 PROCEDURE — 85007 BL SMEAR W/DIFF WBC COUNT: CPT | Performed by: PHYSICIAN ASSISTANT

## 2019-08-28 PROCEDURE — 82550 ASSAY OF CK (CPK): CPT | Performed by: INTERNAL MEDICINE

## 2019-08-28 PROCEDURE — 99232 SBSQ HOSP IP/OBS MODERATE 35: CPT | Performed by: INTERNAL MEDICINE

## 2019-08-28 PROCEDURE — 85027 COMPLETE CBC AUTOMATED: CPT | Performed by: PHYSICIAN ASSISTANT

## 2019-08-28 PROCEDURE — 82570 ASSAY OF URINE CREATININE: CPT | Performed by: INTERNAL MEDICINE

## 2019-08-28 PROCEDURE — 80202 ASSAY OF VANCOMYCIN: CPT | Performed by: HOSPITALIST

## 2019-08-28 PROCEDURE — 84100 ASSAY OF PHOSPHORUS: CPT | Performed by: INTERNAL MEDICINE

## 2019-08-28 PROCEDURE — 83615 LACTATE (LD) (LDH) ENZYME: CPT | Performed by: INTERNAL MEDICINE

## 2019-08-28 PROCEDURE — 87205 SMEAR GRAM STAIN: CPT | Performed by: INTERNAL MEDICINE

## 2019-08-28 PROCEDURE — 80048 BASIC METABOLIC PNL TOTAL CA: CPT | Performed by: PHYSICIAN ASSISTANT

## 2019-08-28 RX ORDER — METRONIDAZOLE 500 MG/1
500 TABLET ORAL EVERY 8 HOURS SCHEDULED
Status: DISCONTINUED | OUTPATIENT
Start: 2019-08-28 | End: 2019-08-30 | Stop reason: HOSPADM

## 2019-08-28 RX ORDER — IBUPROFEN 400 MG/1
400 TABLET ORAL ONCE
Status: COMPLETED | OUTPATIENT
Start: 2019-08-28 | End: 2019-08-28

## 2019-08-28 RX ADMIN — ALBUTEROL SULFATE 2 PUFF: 90 AEROSOL, METERED RESPIRATORY (INHALATION) at 17:40

## 2019-08-28 RX ADMIN — METRONIDAZOLE 500 MG: 500 TABLET, FILM COATED ORAL at 13:23

## 2019-08-28 RX ADMIN — CEFEPIME HYDROCHLORIDE 2000 MG: 2 INJECTION, POWDER, FOR SOLUTION INTRAVENOUS at 09:09

## 2019-08-28 RX ADMIN — IBUPROFEN 400 MG: 400 TABLET ORAL at 04:15

## 2019-08-28 RX ADMIN — ACETAMINOPHEN 650 MG: 325 TABLET ORAL at 17:39

## 2019-08-28 RX ADMIN — LIDOCAINE 1 PATCH: 50 PATCH TOPICAL at 20:43

## 2019-08-28 RX ADMIN — ALBUTEROL SULFATE 2 PUFF: 90 AEROSOL, METERED RESPIRATORY (INHALATION) at 02:06

## 2019-08-28 RX ADMIN — ACETAMINOPHEN 650 MG: 325 TABLET ORAL at 02:03

## 2019-08-28 RX ADMIN — SODIUM CHLORIDE 75 ML/HR: 0.9 INJECTION, SOLUTION INTRAVENOUS at 14:40

## 2019-08-28 RX ADMIN — CEFEPIME HYDROCHLORIDE 2000 MG: 2 INJECTION, POWDER, FOR SOLUTION INTRAVENOUS at 20:43

## 2019-08-28 RX ADMIN — METRONIDAZOLE 500 MG: 500 TABLET, FILM COATED ORAL at 22:04

## 2019-08-28 RX ADMIN — MELATONIN 6 MG: 3 TAB ORAL at 22:04

## 2019-08-28 RX ADMIN — VANCOMYCIN HYDROCHLORIDE 1500 MG: 1 INJECTION, POWDER, LYOPHILIZED, FOR SOLUTION INTRAVENOUS at 10:47

## 2019-08-28 NOTE — H&P
H&P- Arsh Garcia 1960, 63 y o  male MRN: [id number]    Unit/Bed#: E2 -01 Encounter: [id number]    Primary Care Provider: Meghan Rizvi MD, MD   Date and time admitted to hospital: 8/27/2019  4:15 PM    * Sepsis [facility])  Assessment & Plan  · Presents to the emergency department with shaking chills and shortness of breath for presumed reaction to receiving chemotherapy  · Found to be febrile, tachycardic, hypoxic with lactic acid of 2 1  · Vital signs improved at time of admission  Lactic acid normalized  · Presentation likely secondary to postobstructive pneumonia/neutropenic fever  · Received cefepime, azithromycin, and vancomycin in the ED  · Consult ID    Acute respiratory failure with hypoxia (HCC)  Assessment & Plan  · SpO2 noted to be 88% on RA - improved to 94% on 2 L nasal cannula  · CTA PE study obtained due to elevated D-dimer: "Limited evaluation of the pulmonary arteries  No evidence of main renal artery embolus  Distal order branches are not well evaluated  Right infrahilar and lower lobe mass is stable to slightly smaller, measuring 6 3 x 4 5 x 4 2 cm  Some pulmonary nodules in the bilateral lungs have resolved  Others are stable to slightly smaller    Stable severe narrowing of right lower lobe segmental pulmonary arteries and postobstructive pneumonia "  · Continue O2 supplementation for SpO2 > 88%  · Continue IVF abx    Neutropenic fever (HCC)  Assessment & Plan  · WBC noted to be 0 81 decreased from 3 28 yesterday  · Continue neutropenic precautions  · Consult oncology    Thrombocytopenia ([facility] )  Assessment & Plan  · Platelets noted to be 74k at time of admission  · No signs of active bleeding  · Continue to monitor  · SCDs for DVT prophylaxis    Colon cancer metastasized to lung [facility])  Assessment & Plan  · Has been following with heme-onc for the last 13 years  · Currently receiving chemotherapy every other week  · Consult oncology    VTE Prophylaxis: Pharmacologic VTE Prophylaxis contraindicated due to Thrombocytopenia  / sequential compression device   Code Status:  Level 1 - full code  POLST: There is no POLST form on file for this patient (pre-hospital)  Discussion with family:  Discussed with patient and patient's wife and daughter at bedside    Anticipated Length of Stay:  Patient will be admitted on an Inpatient basis with an anticipated length of stay of  Greater than 2 midnights  Justification for Hospital Stay: sepsis, neutropenic fever, hypoxic respiratory failure    Total Time for Visit, including Counseling / Coordination of Care: 1 hour  Greater than 50% of this total time spent on direct patient counseling and coordination of care  Chief Complaint:   "I got shaky and cold during chemotherapy"    History of Present Illness:    Lida Mckeon is a 63 y o  male who presents with fever, weakness  Past medical history significant for metastatic colon cancer to the lung  Patient states he was receiving chemotherapy earlier today when he began to have shaking chills, diaphoresis and overall feeling of being unwell  He was treated for suspected reaction to chemotherapy and referred to the ED  He reports he has had ongoing cough, non-productive  Reports mild shortness of breath  Reports he had a fever after chemotherapy last week  Denies chest pain, palpitations, nausea/vomiting, abdominal pain, constipation or diarrhea  Currently feels improved after arrival to the ED  No known sick contacts  Review of Systems:    Review of Systems   Constitutional: Positive for chills and fever  Negative for fatigue and unexpected weight change  HENT: Negative for congestion, sore throat and trouble swallowing  Eyes: Negative for photophobia, pain and visual disturbance  Respiratory: Positive for cough and shortness of breath  Negative for wheezing  Cardiovascular: Negative for chest pain, palpitations and leg swelling     Gastrointestinal: Negative for abdominal pain, constipation, diarrhea, nausea and vomiting  Endocrine: Negative for polyuria  Genitourinary: Negative for difficulty urinating, dysuria, flank pain, hematuria and urgency  Musculoskeletal: Negative for back pain, myalgias, neck pain and neck stiffness  Skin: Negative for pallor and rash  Neurological: Positive for weakness  Negative for dizziness, tremors, syncope, speech difficulty, light-headedness and headaches  Hematological: Does not bruise/bleed easily  Psychiatric/Behavioral: Negative for agitation and confusion  Past Medical and Surgical History:     Past Medical History:   Diagnosis Date    [facility]     [facility] ([facility] )     [facility] cancer ([facility] )     Metastatic carcinoma to lung [facility])        Past Surgical History:   Procedure Laterality Date    ANTERIOR CRUCIATE LIGAMENT REPAIR      ANTERIOR CRUCIATE LIGAMENT REPAIR      2  on R knee    APPENDECTOMY      ATRIAL ABLATION SURGERY      BACK SURGERY      COLON SURGERY      Ascending and transverse colon    COLONOSCOPY      LUNG REMOVAL, PARTIAL      RIGHT COLECTOMY      and Transverse colon resection       Meds/Allergies:    Prior to Admission medications    Medication Sig Start Date End Date Taking? Authorizing Provider   albuterol (VENTOLIN HFA) 90 mcg/act inhaler 2 puffs every 4 hours as needed for shortness of breath  No more than 4 times a day 7/10/19  Yes Melissa Zhou MD   ondansetron (ZOFRAN) 4 mg tablet Take 1 tablet (4 mg total) by mouth every 6 (six) hours as needed for nausea or vomiting 12/5/18  Yes Melissa Zhou MD   Regorafenib (STIVARGA) 40 MG TABS 2 tablets daily for 21 days  7 days off  5/30/19  Yes Seth Gongora MD   Na Sulfate-K Sulfate-Mg Sulf (SUPREP BOWEL PREP KIT) 17 5-3 13-1 6 GM/177ML SOLN Take 177 mL by mouth once for 1 dose 7/24/19 7/24/19  Pritesh Leong MD     I have reviewed home medications with patient personally  Allergies:    Allergies   Allergen Reactions    Aspirin Other (See Comments)     Nose bleed    Sulfa Antibiotics Other (See Comments)     Dizzy        Social History:     Marital Status: /Civil Union   Occupation: Unemployed  Patient Pre-hospital Living Situation: Lives with family  Patient Pre-hospital Level of Mobility: Ambulatory  Patient Pre-hospital Diet Restrictions: None  Substance Use History:   Social History     Substance and Sexual Activity   Alcohol Use Yes    Alcohol/week: 12 0 standard drinks    Types: 12 Cans of beer per week    Comment: Socially      Social History     Tobacco Use   Smoking Status Former Smoker    Types: Cigarettes    Last attempt to quit:     Years since quittin 6   Smokeless Tobacco Never Used   Tobacco Comment    Cigars      Social History     Substance and Sexual Activity   Drug Use No       Family History:    Family History   Problem Relation Age of Onset    Diabetes Mother     Heart disease Mother     Heart disease Father     Skin cancer Sister        Physical Exam:     Vitals:   Blood Pressure: 106/67 (19)  Pulse: 84 (19)  Temperature: 99 5 °F (37 5 °C) (19)  Temp Source: Tympanic (19)  Respirations: 20 (19)  Weight - Scale: 101 kg (222 lb 14 2 oz) (19 161)  SpO2: 93 % (19)    Physical Exam   Constitutional: He is oriented to person, place, and time  Vital signs are normal  He appears well-developed  Appears comfortable, no acute distress   HENT:   Head: Normocephalic  Eyes: Pupils are equal, round, and reactive to light  Conjunctivae and EOM are normal  No scleral icterus  Neck: Normal range of motion  Cardiovascular: Normal rate, regular rhythm and normal heart sounds  No murmur heard  Pulmonary/Chest: Effort normal  No respiratory distress  He has decreased breath sounds  He has no wheezes  He has no rhonchi  He has no rales  Abdominal: Soft  Bowel sounds are normal  There is no tenderness   There is no rigidity, no rebound and no guarding  Musculoskeletal: He exhibits no edema, tenderness or deformity  No edema  Able to move upper and lower extremities bilaterally without difficulty   Neurological: He is alert and oriented to person, place, and time  Skin: Skin is warm and dry  Psychiatric: He has a normal mood and affect  His speech is normal and behavior is normal    Nursing note and vitals reviewed  Additional Data:     Lab Results: I have personally reviewed pertinent reports  Results from last 7 days   Lab Units 08/27/19  1642   WBC Thousand/uL 0 81*   HEMOGLOBIN g/dL 13 0   HEMATOCRIT % 37 0   PLATELETS Thousands/uL 74*   NEUTROS PCT % 94*   LYMPHS PCT % 3*   MONOS PCT % 3*   EOS PCT % 0     Results from last 7 days   Lab Units 08/27/19  1642   SODIUM mmol/L 142   POTASSIUM mmol/L 3 6   CHLORIDE mmol/L 105   CO2 mmol/L 28   BUN mg/dL 10   CREATININE mg/dL 1 03   ANION GAP mmol/L 9   CALCIUM mg/dL 8 5   ALBUMIN g/dL 3 0*   TOTAL BILIRUBIN mg/dL 2 91*   ALK PHOS U/L 76   ALT U/L 18   AST U/L 58*   GLUCOSE RANDOM mg/dL 122     Results from last 7 days   Lab Units 08/27/19  1642   INR  1 25*             Results from last 7 days   Lab Units 08/27/19  1848 08/27/19  1642   LACTIC ACID mmol/L 1 8 2 1*       Imaging: I have personally reviewed pertinent reports  CTA ED chest PE Study   Final Result by Kitty Zabala MD (08/27 1955)         1  Limited evaluation of the pulmonary arteries  No evidence of main renal artery embolus  Distal order branches are not well evaluated  2   Right infrahilar and lower lobe mass is stable to slightly smaller, measuring 6 3 x 4 5 x 4 2 cm  Some pulmonary nodules in the bilateral lungs have resolved  Others are stable to slightly smaller  3   Stable severe narrowing of right lower lobe segmental pulmonary arteries and postobstructive pneumonia              Workstation performed: [id number]         XR chest 2 views    (Results Pending) EKG, Pathology, and Other Studies Reviewed on Admission:   · CTA PE study reviewed  · Labs reviewed    Allscripts / Epic Records Reviewed: Yes     ** Please Note: This note has been constructed using a voice recognition system   **

## 2019-08-28 NOTE — PLAN OF CARE
Problem: Potential for Falls  Goal: Patient will remain free of falls  Description  INTERVENTIONS:  - Assess patient frequently for physical needs  -  Identify cognitive and physical deficits and behaviors that affect risk of falls  -  [place] fall precautions as indicated by assessment   - Educate patient/family on patient safety including physical limitations  - Instruct patient to call for assistance with activity based on assessment  - Modify environment to reduce risk of injury  - Consider OT/PT consult to assist with strengthening/mobility  Outcome: Progressing     Problem: Nutrition/Hydration-ADULT  Goal: Nutrient/Hydration intake appropriate for improving, restoring or maintaining nutritional needs  Description  Monitor and assess patient's nutrition/hydration status for malnutrition  Collaborate with interdisciplinary team and initiate plan and interventions as ordered  Monitor patient's weight and dietary intake as ordered or per policy  Utilize nutrition screening tool and intervene as necessary  Determine patient's food preferences and provide high-protein, high-caloric foods as appropriate       INTERVENTIONS:  - Monitor oral intake, urinary output, labs, and treatment plans  - Assess nutrition and hydration status and recommend course of action  - Evaluate amount of meals eaten  - Assist patient with eating if necessary   - Allow adequate time for meals  - Recommend/ encourage appropriate diets, oral nutritional supplements, and vitamin/mineral supplements  - Order, calculate, and assess calorie counts as needed  - Recommend, monitor, and adjust tube feedings and TPN/PPN based on assessed needs  - Assess need for intravenous fluids  - Provide specific nutrition/hydration education as appropriate  - Include patient/family/caregiver in decisions related to nutrition  Outcome: Progressing     Problem: PAIN - ADULT  Goal: Verbalizes/displays adequate comfort level or baseline comfort level  Description  Interventions:  - Encourage patient to monitor pain and request assistance  - Assess pain using appropriate pain scale  - Administer analgesics based on type and severity of pain and evaluate response  - Implement non-pharmacological measures as appropriate and evaluate response  - Consider cultural and social influences on pain and pain management  - Notify physician/advanced practitioner if interventions unsuccessful or patient reports new pain  Outcome: Progressing     Problem: INFECTION - ADULT  Goal: Absence or prevention of progression during hospitalization  Description  INTERVENTIONS:  - Assess and monitor for signs and symptoms of infection  - Monitor lab/diagnostic results  - Monitor all insertion sites, i e  indwelling lines, tubes, and drains  - Monitor endotracheal if appropriate and nasal secretions for changes in amount and color  - [place] appropriate cooling/warming therapies per order  - Administer medications as ordered  - Instruct and encourage patient and family to use good hand hygiene technique  - Identify and instruct in appropriate isolation precautions for identified infection/condition  Outcome: Progressing     Problem: DISCHARGE PLANNING  Goal: Discharge to home or other facility with appropriate resources  Description  INTERVENTIONS:  - Identify barriers to discharge w/patient and caregiver  - Arrange for needed discharge resources and transportation as appropriate  - Identify discharge learning needs (meds, wound care, etc )  - Arrange for interpretive services to assist at discharge as needed  - Refer to Case Management Department for coordinating discharge planning if the patient needs post-hospital services based on physician/advanced practitioner order or complex needs related to functional status, cognitive ability, or social support system  Outcome: Progressing     Problem: Knowledge Deficit  Goal: Patient/family/caregiver demonstrates understanding of disease process, treatment plan, medications, and discharge instructions  Description  Complete learning assessment and assess knowledge base    Interventions:  - Provide teaching at level of understanding  - Provide teaching via preferred learning methods  Outcome: Progressing     Problem: COPING  Goal: Pt/Family able to verbalize concerns and demonstrate effective coping strategies  Description  INTERVENTIONS:  - Assist patient/family to identify coping skills, available support systems and cultural and spiritual values  - Provide emotional support, including active listening and acknowledgement of concerns of patient and caregivers  - Reduce environmental stimuli, as able  - Provide patient education  - Assess for spiritual pain/suffering and initiate spiritual care, including notification of Pastoral Care or [place] based community as needed  - Assess effectiveness of coping strategies  Outcome: Progressing

## 2019-08-28 NOTE — PROGRESS NOTES
[facility] Progress Note  Patient: Nikia Peña 63 y o  male   MRN: [id number]  PCP: Evelyn Contreras MD, MD  Unit/Bed#: E2 -01 Encounter: [id number]  Date Of Visit: 08/28/19    Assessment:Ron Hazel is a 63 y o  male who presents with fever, weakness    Past medical history significant for metastatic colon cancer to the lung  Patient states he was receiving chemotherapy earlier date of presentation here when he began to have shaking chills, diaphoresis and overall feeling of being unwell  He was treated for suspected reaction to chemotherapy and referred to the ED  He reports he has had ongoing cough, non-productive  Reports mild shortness of breath  Reports he had a fever after chemotherapy last week  Denies chest pain, palpitations, nausea/vomiting, abdominal pain, constipation or diarrhea  Currently feels improved after arrival to the ED    No known sick contacts        Principal Problem:    Sepsis ([facility] )  Active Problems:    Colon cancer metastasized to lung [facility])    Acute respiratory failure with hypoxia (HCC)    Neutropenic fever (HCC)    Thrombocytopenia (HCC)    Acute kidney injury ([facility] )      Plan:    · Sepsis/with febrile illness tachycardia hypoxia mild elevation lactic acid now normalized CT suggestive of postobstructive pneumonia will continue on cefepime a Zithromax and vancomycin await Infectious Disease input and culture results/if blood cultures positive make have to consider med port also has source/may also have urinary tract infection based on urinalysis results  · Neutropenic fever noted on presentation yesterday with neutropenia of only 0 81 with absolute neutrophil count of 0 77 then but today white count up to 3 64 with absolute neutrophils up to 3 39 will treat as immune compromise pneumonia await Oncology and ID input  · Thrombocytopenia trending down now at 31,000 presume in relation to chemotherapy also presume this should titrate up at and follow white count which is now trending up/no signs of spontaneous bleeding or bruising  · Acute respiratory failure with hypoxia on presentation now improved on 2 L nasal flow CT of the chest was obtained due to elevated D-dimer no main pulmonary artery embolus noted again noting right infrahilar and right lower lobe mass possibly smaller than previous but with severe narrowing of the right lower lobe segmental pulmonary arteries and postobstructive pneumonia noted/strep and Legionella antigens pending would add procalcitonin  · Colon cancer with metastasis to lung currently undergoing chemotherapy every other week with Avastin, Camptosar,Oxaliplatin oncology has been following for last 13 years consultation with Oncology pending  · Acute kidney injury with creatinine up to 2 06 as recently as 2 months ago creatinine 0 7 will treat as prerenal component at at present with IV fluids but consult Nephrology may also have component of ATN from platinum chemotherapy  check urinalysis showed significant urine protein positive nitrites and large amount of blood with innumerable urate crystals and occasional bacteria her coarse granular casts also noted  · History of paroxysmal atrial fibrillation with cardioversion to sinus rhythm      VTE Pharmacologic Prophylaxis:   Pharmacologic: Pharmacologic VTE Prophylaxis contraindicated due to Thrombocytopenia  Mechanical VTE Prophylaxis in Place: Yes    Discussions with Specialists or Other Care Team Provider:     Time Spent for Care: 45 minutes  More than 50% of total time spent on counseling and coordination of care as described above  Subjective:   Feeling somewhat better today although had febrile course overnight and felt it with fatigue and myalgias  Also was treated for a course of throat infection with amoxicillin 2 weeks ago    He has been undergoing IV infusion chemotherapy only for last 3 treatments and last treatment excluded oxa li platinum which was included on treatment yesterday  Objective:     Vitals:   Temp (24hrs), Av 9 °F (37 7 °C), Min:98 3 °F (36 8 °C), Max:101 8 °F (38 8 °C)    Temp:  [98 3 °F (36 8 °C)-101 8 °F (38 8 °C)] 98 3 °F (36 8 °C)  HR:  [] 63  Resp:  [16-28] 16  BP: ()/(55-90) 91/55  SpO2:  [88 %-99 %] 96 %  Body mass index is 26 78 kg/m²  Input and Output Summary (last 24 hours): Intake/Output Summary (Last 24 hours) at 2019 1013  Last data filed at 2019 4802  Gross per 24 hour   Intake 2102 5 ml   Output    Net 2102 5 ml       Physical Exam:     Physical Exam:   General appearance: alert, appears stated age and cooperative  Head: Normocephalic, without obvious abnormality, atraumatic  Lungs: diminished breath sounds and Left base diminished breath sounds  Heart: regular rate and rhythm  Abdomen: soft, non-tender; bowel sounds normal; no masses,  no organomegaly  Back: negative  Extremities: extremities normal, atraumatic, no cyanosis or edema  Neurologic: Grossly normal      Additional Data:     Labs:    Results from last 7 days   Lab Units 19  0604 19  1642   WBC Thousand/uL 3 64* 0 81*   HEMOGLOBIN g/dL 11 8* 13 0   HEMATOCRIT % 34 1* 37 0   PLATELETS Thousands/uL 31* 74*   NEUTROS PCT %  --  94*   LYMPHS PCT %  --  3*   LYMPHO PCT % 3*  --    MONOS PCT %  --  3*   MONO PCT % 2*  --    EOS PCT % 0 0     Results from last 7 days   Lab Units 19  0604 19  1642   POTASSIUM mmol/L 4 3 3 6   CHLORIDE mmol/L 106 105   CO2 mmol/L 25 28   BUN mg/dL 24 10   CREATININE mg/dL 2 06* 1 03   CALCIUM mg/dL 7 7* 8 5   ALK PHOS U/L  --  76   ALT U/L  --  18   AST U/L  --  58*     Results from last 7 days   Lab Units 19  1642   INR  1 25*       * I Have Reviewed All Lab Data Listed Above  * Additional Pertinent Lab Tests Reviewed: All Labs For Current Hospital Admission Reviewed    Imaging:  Xr Chest 2 Views    Result Date: 2019  Narrative: CHEST INDICATION:   Tachycardia   COMPARISON:  None EXAM PERFORMED/VIEWS:  XR CHEST PA & LATERAL  The frontal view was performed utilizing dual energy radiographic technique  Images: 4 FINDINGS:  Right chest wall Port-A-Cath projects appropriately  Cardiomediastinal silhouette appears unremarkable  Elevation of the left hemidiaphragm  Gaseous distention of the stomach below the hemidiaphragm with a large air-fluid level  No congestive failure  Consolidation at the left apex with volume loss of the left lung consistent with prior surgery  There is a consolidation within the medial right lung base seen posteriorly  Osseous structures appear within normal limits for patient age  Impression: Postop change within the left chest with focal consolidation at the apex  Consolidation within the posterior right lung base consistent with patient's known mass  See separate CT scan  Workstation performed: [id number]     Fl Barium Swallow    Result Date: 7/30/2019  Narrative: BARIUM SWALLOW-ESOPHAGRAM INDICATION:   R13 10: Dysphagia, unspecified  Upper neck pain COMPARISON:  None IMAGES:  10 FLUOROSCOPY TIME:   2 2 MINUTES  TECHNIQUE: The patient was given effervescent granules and barium by mouth and images of the esophagus were obtained  FINDINGS: The esophagus is normal in caliber  Esophageal motility is normal and emptying of contrast from the esophagus is prompt  No mucosal lesion, ulceration or evidence of fold thickening is seen  Gastroesophageal reflux was not determined  There is no hiatal hernia  Impression: Unremarkable esophagram  Workstation performed: [id number]     Cta Ed Chest Pe Study    Result Date: 8/27/2019  Narrative: CTA - CHEST WITH IV CONTRAST - PULMONARY ANGIOGRAM INDICATION:   ?PE  COMPARISON: None  TECHNIQUE: CTA examination of the chest was performed using angiographic technique according to a protocol specifically tailored to evaluate for pulmonary embolism    Axial, sagittal, and coronal 2D reformatted images were created from the source data and  submitted for interpretation  In addition, coronal 3D MIP postprocessing was performed on the acquisition scanner  Radiation dose length product (DLP) for this visit:  573 mGy-cm   This examination, like all CT scans performed in the [facility], was performed utilizing techniques to minimize radiation dose exposure, including the use of iterative reconstruction and automated exposure control  IV Contrast:  90 mL of iodixanol (VISIPAQUE)  FINDINGS: PULMONARY ARTERIAL TREE:  Suboptimal opacification of the pulmonary arteries and respiratory motion artifact  No evidence of main pulmonary artery embolus  Distal or branches are not well evaluated  LUNGS:  Right infrahilar mass in the superior segment of the right lower lobe measures 6 3 x 4 5 x 4 2 cm compared to 8 2 x 5 3 x 5 2 cm on the prior exam   Surrounding airspace opacities in the right lower lobe, not significant changed since the prior exam   Stable 6 mm subpleural nodule versus scarring in the anterior aspect of the right upper lobe series 4, image 66  Other previously demonstrated pulmonary nodules in the right upper and middle lobes are no longer visualized  Stable volume loss in the left lung from partial pneumonectomy  Nodule versus nodular scarring in the left upper lung adjacent to the mediastinum measures 1 8 cm, slightly smaller  Small amount of secretions in the proximal right mainstem bronchus  Stable severe narrowing of right lower lobe proximal segmental branches  PLEURA:  No effusion  HEART/GREAT VESSELS:  Normal heart size  No thoracic aortic aneurysm or dissection  MEDIASTINUM AND ZABRINA:  Unremarkable  CHEST WALL AND LOWER NECK:   Unremarkable  VISUALIZED STRUCTURES IN THE UPPER ABDOMEN:  Unremarkable  OSSEOUS STRUCTURES:  No acute fracture or destructive osseous lesion  Impression: 1  Limited evaluation of the pulmonary arteries  No evidence of main renal artery embolus    Distal order branches are not well evaluated  2   Right infrahilar and lower lobe mass is stable to slightly smaller, measuring 6 3 x 4 5 x 4 2 cm  Some pulmonary nodules in the bilateral lungs have resolved  Others are stable to slightly smaller  3   Stable severe narrowing of right lower lobe segmental pulmonary arteries and postobstructive pneumonia  Workstation performed: [id number]     Imaging Reports Reviewed Today Include:   Imaging Personally Reviewed by Myself Includes:    Procedure: Xr Chest 2 Views    Result Date: 8/28/2019  Narrative: CHEST INDICATION:   Tachycardia  COMPARISON:  None EXAM PERFORMED/VIEWS:  XR CHEST PA & LATERAL  The frontal view was performed utilizing dual energy radiographic technique  Images: 4 FINDINGS:  Right chest wall Port-A-Cath projects appropriately  Cardiomediastinal silhouette appears unremarkable  Elevation of the left hemidiaphragm  Gaseous distention of the stomach below the hemidiaphragm with a large air-fluid level  No congestive failure  Consolidation at the left apex with volume loss of the left lung consistent with prior surgery  There is a consolidation within the medial right lung base seen posteriorly  Osseous structures appear within normal limits for patient age  Impression: Postop change within the left chest with focal consolidation at the apex  Consolidation within the posterior right lung base consistent with patient's known mass  See separate CT scan  Workstation performed: [id number]     Procedure: Cta Ed Chest Pe Study    Result Date: 8/27/2019  Narrative: CTA - CHEST WITH IV CONTRAST - PULMONARY ANGIOGRAM INDICATION:   ?PE  COMPARISON: None  TECHNIQUE: CTA examination of the chest was performed using angiographic technique according to a protocol specifically tailored to evaluate for pulmonary embolism  Axial, sagittal, and coronal 2D reformatted images were created from the source data and  submitted for interpretation    In addition, coronal 3D MIP postprocessing was performed on the acquisition scanner  Radiation dose length product (DLP) for this visit:  573 mGy-cm   This examination, like all CT scans performed in the [facility], was performed utilizing techniques to minimize radiation dose exposure, including the use of iterative reconstruction and automated exposure control  IV Contrast:  90 mL of iodixanol (VISIPAQUE)  FINDINGS: PULMONARY ARTERIAL TREE:  Suboptimal opacification of the pulmonary arteries and respiratory motion artifact  No evidence of main pulmonary artery embolus  Distal or branches are not well evaluated  LUNGS:  Right infrahilar mass in the superior segment of the right lower lobe measures 6 3 x 4 5 x 4 2 cm compared to 8 2 x 5 3 x 5 2 cm on the prior exam   Surrounding airspace opacities in the right lower lobe, not significant changed since the prior exam   Stable 6 mm subpleural nodule versus scarring in the anterior aspect of the right upper lobe series 4, image 66  Other previously demonstrated pulmonary nodules in the right upper and middle lobes are no longer visualized  Stable volume loss in the left lung from partial pneumonectomy  Nodule versus nodular scarring in the left upper lung adjacent to the mediastinum measures 1 8 cm, slightly smaller  Small amount of secretions in the proximal right mainstem bronchus  Stable severe narrowing of right lower lobe proximal segmental branches  PLEURA:  No effusion  HEART/GREAT VESSELS:  Normal heart size  No thoracic aortic aneurysm or dissection  MEDIASTINUM AND ZABRINA:  Unremarkable  CHEST WALL AND LOWER NECK:   Unremarkable  VISUALIZED STRUCTURES IN THE UPPER ABDOMEN:  Unremarkable  OSSEOUS STRUCTURES:  No acute fracture or destructive osseous lesion  Impression: 1  Limited evaluation of the pulmonary arteries  No evidence of main renal artery embolus  Distal order branches are not well evaluated   2   Right infrahilar and lower lobe mass is stable to slightly smaller, measuring 6 3 x 4 5 x 4 2 cm  Some pulmonary nodules in the bilateral lungs have resolved  Others are stable to slightly smaller  3   Stable severe narrowing of right lower lobe segmental pulmonary arteries and postobstructive pneumonia  Workstation performed: [id number]        Recent Cultures (last 7 days):     Results from last 7 days   Lab Units 08/27/19  9136   LEGIONELLA URINARY ANTIGEN  Negative       Last 24 Hours Medication List:     Current Facility-Administered Medications:  acetaminophen 650 mg Oral Q6H PRN Della Ming, PA-C    albuterol 2 puff Inhalation Q4H PRN Della Ming, PA-C    cefepime 2,000 mg Intravenous Q12H Belvia Creed Melissa, PA-C Last Rate: 2,000 mg (08/28/19 0909)   lidocaine 1 patch Topical Daily Ivone Carlson MD    melatonin 6 mg Oral HS Della Ming, PA-C    ondansetron 4 mg Intravenous Q6H PRN Belvia Creed Gyarmaty, PA-C    sodium chloride 75 mL/hr Intravenous Continuous Della Ming, PA-C Last Rate: 75 mL/hr (08/27/19 2242)     Facility-Administered Medications Ordered in Other Encounters:  [MAR Hold] sodium chloride 20 mL/hr Intravenous Once PRN Ev Ignacio MD Last Rate: Stopped (08/27/19 1340)        Today, Patient Was Seen By: Rafael Yates MD    ** Please Note: Dragon 360 Dictation voice to text software may have been used in the creation of this document   **

## 2019-08-28 NOTE — ASSESSMENT & PLAN NOTE
· SpO2 noted to be 88% on RA - improved to 94% on 2 L nasal cannula  · CTA PE study obtained due to elevated D-dimer: "Limited evaluation of the pulmonary arteries  No evidence of main renal artery embolus  Distal order branches are not well evaluated  Right infrahilar and lower lobe mass is stable to slightly smaller, measuring 6 3 x 4 5 x 4 2 cm  Some pulmonary nodules in the bilateral lungs have resolved  Others are stable to slightly smaller    Stable severe narrowing of right lower lobe segmental pulmonary arteries and postobstructive pneumonia "  · Continue O2 supplementation for SpO2 > 88%  · Continue IVF abx

## 2019-08-28 NOTE — ASSESSMENT & PLAN NOTE
· Has been following with heme-onc for the last 13 years  · Currently receiving chemotherapy every other week  · Consult oncology

## 2019-08-28 NOTE — PROGRESS NOTES
Vancomycin Assessment    Diane Irving is a 63 y o  male who is currently receiving vancomycin 2000mg iv q12h  Relevant clinical data and objective history reviewed:  Creatinine   Date Value Ref Range Status   08/27/2019 1 03 0 60 - 1 30 mg/dL Final     Comment:     Standardized to IDMS reference method   08/26/2019 0 85 0 60 - 1 30 mg/dL Final     Comment:     Standardized to IDMS reference method   08/09/2019 0 78 0 60 - 1 30 mg/dL Final     Comment:     Standardized to IDMS reference method   12/21/2015 1 13 0 60 - 1 30 mg/dL Final     Comment:     Standardized to IDMS reference method   10/19/2015 1 14 0 60 - 1 30 mg/dL Final     Comment:     Standardized to IDMS reference method   09/28/2015 1 04 0 60 - 1 30 mg/dL Final     Comment:     Standardized to IDMS reference method     /66 (BP Location: Left arm)   Pulse 80   Temp (!) 101 1 °F (38 4 °C) (Oral)   Resp 20   Wt 101 kg (222 lb 14 2 oz)   SpO2 96%   BMI 26 78 kg/m²   I/O last 3 completed shifts: In: 1000 [IV Piggyback:1000]  Out: -   Lab Results   Component Value Date/Time    BUN 10 08/27/2019 04:42 PM    BUN 15 10/19/2015 07:40 AM    WBC 0 81 (LL) 08/27/2019 04:42 PM    WBC 4 63 12/21/2015 07:33 AM    HGB 13 0 08/27/2019 04:42 PM    HGB 15 1 12/21/2015 07:33 AM    HCT 37 0 08/27/2019 04:42 PM    HCT 43 8 12/21/2015 07:33 AM     (H) 08/27/2019 04:42 PM    MCV 96 12/21/2015 07:33 AM    PLT 74 (L) 08/27/2019 04:42 PM     (L) 12/21/2015 07:33 AM     Temp Readings from Last 3 Encounters:   08/28/19 (!) 101 1 °F (38 4 °C) (Oral)   08/27/19 (!) 100 8 °F (38 2 °C)   08/13/19 98 4 °F (36 9 °C) (Tympanic)     Vancomycin Days of Therapy: 1      Assessment/Plan  The patient is currently on vancomycin utilizing scheduled dosing based on actual body weight  Baseline risks associated with therapy include: The patient is currently receiving 2000mg iv q12h and is clinically appropriate and dose will be continued    Pharmacy will also follow closely for s/sx of nephrotoxicity, infusion reactions and appropriateness of therapy  BMP and CBC will be ordered per protocol  Plan for trough as patient approaches steady state, prior to the 4th  dose at approximately 8/29 at 1000  Due to infection severity, will target a trough of 15-20 (appropriate for most indications)   Pharmacy will continue to follow the patients culture results and clinical progress daily      Edilia Troy, Pharmacist

## 2019-08-28 NOTE — CONSULTS
Consultation - Infectious Disease   Randi Briceño 63 y o  male MRN: [id number]  Unit/Bed#: E2 -01 Encounter: [id number]      IMPRESSION & RECOMMENDATIONS:   Impression/Recommendations: This is a 63 y o  male with colon cancer, metastatic to lung, currently on chemotherapy, was sent to the ER from [facility] center yesterday when he developed rigors and dyspnea after chemotherapy  Chest CT showed stable RLL mass with possible postobstructive pneumonia  Patient was admitted and started on broad-spectrum antibiotic  He is clinically improved overnight  1  Sepsis, POA, presented with fever and leukopenia/neutropenia  Source is most likely postobstructive pneumonia  Consider possible chemotherapy reaction but patient has not had similar reaction with the same chemotherapy regimen  In addition, procalcitonin level is highly elevated  It would be best to treat patient for pneumonia  Patient is clinically improved on antibiotic overnight  Antibiotic plan as in below  Monitor temperature/WBC  Monitor hemodynamics  Follow-up on blood cultures  2  Febrile neutropenia  Source is most likely postobstructive pneumonia  Patient has PAC in place  Therefore, we need to consider the possibility of PAC related bacteremia  Thus far, blood cultures are negative  Antibiotic plan as in below  Monitor WBC/ANC  3  Probable postobstructive pneumonia  Given extensive healthcare contact, patient does have risk for resistant GNR  He is low risk for MRSA pneumonia  Atypical infection not likely in this scenario  Continue IV cefepime  Add Flagyl for anaerobic coverage  No further need for vancomycin or azithromycin  I will discontinue  Monitor respiratory symptoms  4  RICHY, with creatinine rising overnight  Etiology of this is unclear  Patient is currently receiving IV fluid hydration  Antibiotic dosages adjusted accordingly  Monitor creatinine      5  Acute hypoxic respiratory failure, most likely secondary to pneumonia above  Respiratory status improved overnight  O2 support per primary service  6  Elevated procalcitonin, consistent with postobstructive pneumonia above  Will monitor procalcitonin with treatment  Monitor procalcitonin  7  Metastatic colon cancer, currently on chemotherapy  Last chemotherapy dose was yesterday  Patient was neutropenic on admission, with WBC rising today  However, it is expected that he will be neutropenic the next few days  Outpatient records reviewed in detail  Discussed with patient in detail regarding the above plan  Discussed with Dr Brandon Candelaria from slim service  Thank you for this consultation  We will follow along with you  HISTORY OF PRESENT ILLNESS:  Reason for Consult:  Febrile neutropenia  HPI: Randi Briceño is a 63 y o  male, with metastatic colon cancer, was sent to the ER when he developed rigors and dyspnea after receiving chemotherapy at the infusion center yesterday  On presentation to the ER, patient had fever and neutropenia  Chest CT showed stable RLL lung mass with possible postobstructive pneumonia  Patient was admitted  He was started on vancomycin/cefepime/azithromycin  We are asked to evaluate the patient  This morning, patient feels a lot better  He has no further chills  Dyspnea is improved  Has a mild nonproductive cough  No other focal symptoms  Patient has colon cancer with pulmonary metastases  He has been on various chemotherapy regimens for the last few years  He has had PAC in during this time frame  He has had a few PAC changes from mechanical recent  No prior history of PAC infection  Patient has had low-grade fever with chemotherapy previously but never had rigors like this time  REVIEW OF SYSTEMS:  A complete 12 point system-based review was done  Except for what is noted in HPI above, ROS of systems is otherwise negative      PAST MEDICAL HISTORY:  Past Medical History:   Diagnosis Date  [facility] [address] [facility]     [facility] [address] [facility]     [facility] [address] [facility]     Metastatic carcinoma to lung [facility])      Past Surgical History:   Procedure Laterality Date    ANTERIOR CRUCIATE LIGAMENT REPAIR      ANTERIOR CRUCIATE LIGAMENT REPAIR      2  on R knee    APPENDECTOMY      ATRIAL ABLATION SURGERY      BACK SURGERY      COLON SURGERY      Ascending and transverse colon    COLONOSCOPY      LUNG REMOVAL, PARTIAL      RIGHT COLECTOMY      and Transverse colon resection     Problem list reviewed  FAMILY HISTORY:  Non-contributory    SOCIAL HISTORY:  Social History     Substance and Sexual Activity   Alcohol Use Yes    Alcohol/week: 12 0 standard drinks    Types: 12 Cans of beer per week    Comment: Socially      Social History     Substance and Sexual Activity   Drug Use No     Social History     Tobacco Use   Smoking Status Former Smoker    Types: Cigarettes    Last attempt to quit:     Years since quittin 6   Smokeless Tobacco Never Used   Tobacco Comment    Cigars        ALLERGIES:  Allergies   Allergen Reactions    Aspirin Other (See Comments)     Nose bleed    Sulfa Antibiotics Other (See Comments)     Dizzy        MEDICATIONS:  All current active medications have been reviewed  Patient is currently on vancomycin/cefepime/azithromycin  PHYSICAL EXAM:  Vitals:  Temp:  [98 3 °F (36 8 °C)-101 8 °F (38 8 °C)] 98 3 °F (36 8 °C)  HR:  [] 63  Resp:  [16-28] 16  BP: ()/(55-90) 91/55  SpO2:  [88 %-99 %] 96 %  Temp (24hrs), Av 9 °F (37 7 °C), Min:98 3 °F (36 8 °C), Max:101 8 °F (38 8 °C)  Current: Temperature: 98 3 °F (36 8 °C)     Physical Exam:  General:  Well-nourished, well-developed, in no acute distress  Awake, alert and oriented x 3  Eyes:  Conjunctive clear with no hemorrhages or effusions  Oropharynx:  No ulcers, no lesions, pharynx benign, no tonsillitis  Neck:  Supple, no lymphadenopathy, no mass, nontender  Chest:  PAC site clean  No erythema/warmth  No tenderness  Lungs:  Expansion symmetric, no rales, no wheezing, no accessory muscle use  Cardiac:  Regular rate and rhythm, normal S1, normal S2, no murmurs  Abdomen:  Soft, nondistended, non-tender, no HSM  Extremities:  No edema, no erythema, nontender  No ulcers  Skin:  No rashes, no ulcers  Neurological:  Moves all four extremities spontaneously, sensation grossly intact    LABS, IMAGING, & OTHER STUDIES:  Lab Results:  I have personally reviewed pertinent labs  Results from last 7 days   Lab Units 08/28/19  0604 08/27/19  1642 08/26/19  0757   POTASSIUM mmol/L 4 3 3 6 4 1   CHLORIDE mmol/L 106 105 106   CO2 mmol/L 25 28 30   BUN mg/dL 24 10 7   CREATININE mg/dL 2 06* 1 03 0 85   EGFR ml/min/1 73sq m 35 80 97   CALCIUM mg/dL 7 7* 8 5 9 1   AST U/L  --  58* 16   ALT U/L  --  18 18   ALK PHOS U/L  --  76 73     Results from last 7 days   Lab Units 08/28/19  0604 08/27/19  1642 08/26/19  0757   WBC Thousand/uL 3 64* 0 81* 3 28*   HEMOGLOBIN g/dL 11 8* 13 0 14 3   PLATELETS Thousands/uL 31* 74* 109*     Results from last 7 days   Lab Units 08/27/19  2353   LEGIONELLA URINARY ANTIGEN  Negative       Imaging Studies:   I have personally reviewed pertinent imaging study reports and images in PACS  CXR reviewed personally  Stable RLL mass  Chest CT reviewed personally  Stable RLL mass  Possible postobstructive pneumonia  No PE     EKG, Pathology, and Other Studies:   I have personally reviewed pertinent reports

## 2019-08-28 NOTE — ASSESSMENT & PLAN NOTE
· Presents to the emergency department with shaking chills and shortness of breath for presumed reaction to receiving chemotherapy  · Found to be febrile, tachycardic, hypoxic with lactic acid of 2 1  · Vital signs improved at time of admission    Lactic acid normalized  · Presentation likely secondary to postobstructive pneumonia/neutropenic fever  · Received cefepime, azithromycin, and vancomycin in the ED  · Consult ID

## 2019-08-28 NOTE — ASSESSMENT & PLAN NOTE
· WBC noted to be 0 81 decreased from 3 28 yesterday  · Continue neutropenic precautions  · Consult oncology

## 2019-08-28 NOTE — PLAN OF CARE
Problem: METABOLIC, FLUID AND ELECTROLYTES - ADULT  Goal: Electrolytes maintained within normal limits  Description  INTERVENTIONS:  - Monitor labs and assess patient for signs and symptoms of electrolyte imbalances  - Administer electrolyte replacement as ordered  - Monitor response to electrolyte replacements, including repeat lab results as appropriate  - Instruct patient on fluid and nutrition as appropriate  Outcome: Not Progressing     Problem: HEMATOLOGIC - ADULT  Goal: Maintains hematologic stability  Description  INTERVENTIONS  - Assess for signs and symptoms of bleeding or hemorrhage  - Monitor labs  - Administer supportive blood products/factors as ordered and appropriate  Outcome: Not Progressing   Serum Creatinine increased to 2 06 today; random vanc trough checked peripherally Ok'd by pharmacy  WBCs increased to 3 64 today however platelets now 31 and Dr Flor Álvarez informed via [facility] text  Pt advised of interventions to reduce risk for bleeding and s/sx to report

## 2019-08-28 NOTE — CONSULTS
Consultation - Nephrology   Barbie Sanchez 63 y o  male MRN: [id number]  Unit/Bed#: E2 -01 Encounter: [id number]    ASSESSMENT/PLAN:  Acute kidney injury: likely ATN secondary to sepsis with neutropenic fever and hypotnesion  Also reports systolic blood pressure in the 200s during chemotherapy yesterday   - Receiving NSAID's, LD 08/28, would avoid further NSAIDs    - IV contrast on 08/27    -presented with creatinine of 1 0 on 08/27, today elevated to 2 0   -baseline creatinine 0 9-1 1   -UA:  Glucose, trace ketones, large blood, positive nitrites, greater than 300 protein, 4-10 RBCs  -continue to avoid nephrotoxins  - checking vanco trough 08/29    - Continue IVF  -I/O   -check bladder scan  Blood pressure:  Remains soft  Reports blood pressure being in the 200 prior to hospitalization yesterday   -avoid further hypotension   - not on antihypertensives  -currently on IV hydration  Sepsis:  Felt secondary to pneumonia/neutropenic fever  Status post IV antibiotics in the emergency department  Status post fluid bolus  -infectious Disease following  Colon cancer with mets to lungs:  Currently undergoing chemotherapy treatments  Last treatment was yesterday and there was suspected reaction to chemotherapy  -continued follow-up with heme/Oncology   -continues chemotherapy every other week  Neutropenic fever:  Likely secondary to postobstructive pneumonia  -oncology consulted  -continues on neutropenic precautions  HISTORY OF PRESENT ILLNESS:  Requesting Physician: Judson Gordillo MD  Reason for Consult: RICHY Sanchez is a 63y o  year old male with past medical history of metastatic colon cancer, chemotherapy, who was admitted to [facility] after presenting with diaphoresis, chills, chest tightness, shortness of breath and reporting not feeling well during his chemotherapy treatment yesterday  He states that he had recent pneumonia    He was experiencing fevers and chills 2 weeks ago   Otherwise he denies similar symptoms during previous chemotherapy treatments  He reports that his systolic blood pressure was initially in the 200s during chemotherapy when he was experiencing the above symptoms  It is noted that his blood pressure has been lower this hospitalization  A renal consultation is requested today for assistance in the management of RICHY  He states that last night he noticed that his urine was very dark in color  He admits to not drinking much fluids yesterday  He admits to taking Motrin on and off over the past couple of weeks  He denies any previous renal history  He denies changes to his appetite  He states that he was nauseous yesterday during the mentioned episode  He has also been experiencing loose bowel movements      PAST MEDICAL HISTORY:  Past Medical History:   Diagnosis Date    [facility] [address] [facility]     [facility] cancer [facility] [address] [facility]     [facility] cancer [facility] [address] [facility]     Metastatic carcinoma to lung ([facility] [address] )        PAST SURGICAL HISTORY:  Past Surgical History:   Procedure Laterality Date    ANTERIOR CRUCIATE LIGAMENT REPAIR      ANTERIOR CRUCIATE LIGAMENT REPAIR      2  on R knee    APPENDECTOMY      ATRIAL ABLATION SURGERY      BACK SURGERY      COLON SURGERY      Ascending and transverse colon    COLONOSCOPY      LUNG REMOVAL, PARTIAL      RIGHT COLECTOMY      and Transverse colon resection       ALLERGIES:  Allergies   Allergen Reactions    Aspirin Other (See Comments)     Nose bleed    Sulfa Antibiotics Other (See Comments)     Dizzy        SOCIAL HISTORY:  Social History     Substance and Sexual Activity   Alcohol Use Yes    Alcohol/week: 12 0 standard drinks    Types: 12 Cans of beer per week    Comment: Socially      Social History     Substance and Sexual Activity   Drug Use No     Social History     Tobacco Use   Smoking Status Former Smoker    Types: Cigarettes    Last attempt to quit:     Years since quittin 6   Smokeless Tobacco Never Used Tobacco Comment    Cigars        FAMILY HISTORY:  Family History   Problem Relation Age of Onset    Diabetes Mother     Heart disease Mother     Heart disease Father     Skin cancer Sister        MEDICATIONS:    Current Facility-Administered Medications:     acetaminophen (TYLENOL) tablet 650 mg, 650 mg, Oral, Q6H PRN, Savage Molina PA-C, 650 mg at 08/28/19 0203    albuterol (PROVENTIL HFA,VENTOLIN HFA) inhaler 2 puff, 2 puff, Inhalation, Q4H PRN, Savage Molina PA-C, 2 puff at 08/28/19 0206    cefepime (MAXIPIME) 2,000 mg in dextrose 5 % 50 mL IVPB, 2,000 mg, Intravenous, Q12H, Savage Molina PA-C, Last Rate: 100 mL/hr at 08/28/19 0909, 2,000 mg at 08/28/19 0909    lidocaine (LIDODERM) 5 % patch 1 patch, 1 patch, Topical, Daily, Joel Francois MD, 1 patch at 08/27/19 2143    melatonin tablet 6 mg, 6 mg, Oral, HS, TR Sandoval-C, 6 mg at 08/27/19 2142    metroNIDAZOLE (FLAGYL) tablet 500 mg, 500 mg, Oral, Q8H [address], Kayode Gonzalez MD    ondansetron [facility]) injection 4 mg, 4 mg, Intravenous, Q6H PRN, TR Sandoval-GABE, 4 mg at 08/27/19 2242    sodium chloride 0 9 % infusion, 75 mL/hr, Intravenous, Continuous, Savage Molina PA-C, Last Rate: 75 mL/hr at 08/27/19 2242, 75 mL/hr at 08/27/19 2242    Facility-Administered Medications Ordered in Other Encounters:     [MAR Hold] sodium chloride 0 9 % infusion, 20 mL/hr, Intravenous, Once PRN, Hayder Otto MD, Stopped at 08/27/19 1340    REVIEW OF SYSTEMS:  A complete review of systems was performed and found to be negative unless otherwise noted in the history of present illness  General: No fevers, chills  Cardiovascular:  - chest pain, - leg edema  Respiratory: No cough, sputum production,  + shortness of breath  Gastrointestinal:  + nausea/vomiting,  + diarrhea,  - abdominal pain  Genitourinary: No hematuria  No foamy urine    No dysuria    PHYSICAL EXAM:  Current Weight: Weight - Scale: 101 kg (222 lb 14 2 oz)  First Weight: Weight - Scale: 101 kg (222 lb 14 2 oz)  Vitals:    08/28/19 0008 08/28/19 0246 08/28/19 0600 08/28/19 0736   BP: 110/66   91/55   BP Location: Left arm   Right arm   Pulse: 80   63   Resp: 20   16   Temp: (!) 101 1 °F (38 4 °C) (!) 101 8 °F (38 8 °C) 98 5 °F (36 9 °C) 98 3 °F (36 8 °C)   TempSrc: Oral Oral Tympanic Tympanic   SpO2: 96%   96%   Weight:           Intake/Output Summary (Last 24 hours) at 8/28/2019 1228  Last data filed at 8/28/2019 0644  Gross per 24 hour   Intake 2102 5 ml   Output    Net 2102 5 ml     General: NAD  Skin: warm, dry, intact, no rash  HEENT: Moist mucous membranes, sclera anicteric, normocephalic, atraumatic  Neck: No apparent JVD appreciated  Chest:lung sounds clear B/L, on RA   CVS:Regular rate and rhythm, no murmer   Abdomen: Soft, round, non-tender, +BS  Extremities: No B/L LE edema present  Neuro: alert and oriented  Psych: appropriate mood and affect     Invasive Devices:      Lab Results:   Results from last 7 days   Lab Units 08/28/19  0604 08/27/19  1642 08/26/19  0757   WBC Thousand/uL 3 64* 0 81* 3 28*   HEMOGLOBIN g/dL 11 8* 13 0 14 3   HEMATOCRIT % 34 1* 37 0 41 9   PLATELETS Thousands/uL 31* 74* 109*   POTASSIUM mmol/L 4 3 3 6 4 1   CHLORIDE mmol/L 106 105 106   CO2 mmol/L 25 28 30   BUN mg/dL 24 10 7   CREATININE mg/dL 2 06* 1 03 0 85   CALCIUM mg/dL 7 7* 8 5 9 1   ALK PHOS U/L  --  76 73   ALT U/L  --  18 18   AST U/L  --  58* 16

## 2019-08-28 NOTE — PROGRESS NOTES
Vancomycin IV Pharmacy-to-Dose Consultation    Rosario Hutchins is a 63 y o  male who is currently receiving Vancomycin IV with management by the Pharmacy Consult service  Assessment/Plan:  The patient was reviewed  Renal function is unstable with the most recent serum Creatine 2 06 ( on 8-27-19 at 1642 was 1 03)  No signs or symptoms of nephrotoxicity and/or infusion reactions were documented in the chart  Based on todays assessment, give today Vancomycin 1500mg iv ONCE (day # 2) with a plan to recheck RANDOM level with am labs on 8-29-19  Based on random level result will redose (pulse-dosing)  We will continue to follow the patients culture results and clinical progress daily      Keanu Edwards, Pharmacist

## 2019-08-28 NOTE — UTILIZATION REVIEW
Initial Clinical Review    Admission: Date/Time/Statement: Inpatient Admission Orders (From admission, onward)     Ordered        08/27/19 2047  Inpatient Admission (expected length of stay for this patient Order details is greater than two midnights)  Once                   Orders Placed This Encounter   Procedures    Inpatient Admission (expected length of stay for this patient Order details is greater than two midnights)     Standing Status:   Standing     Number of Occurrences:   1     Order Specific Question:   Admitting Physician     Answer:   Yani Hickman     Order Specific Question:   Level of Care     Answer:   Med Surg [16]     Order Specific Question:   Estimated length of stay     Answer:   More than 2 Midnights     Order Specific Question:   Certification     Answer:   I certify that inpatient services are medically necessary for this patient for a duration of greater than two midnights  See H&P and MD Progress Notes for additional information about the patient's course of treatment  ED Arrival Information     Expected Arrival Acuity Means of Arrival Escorted By Service Admission Type    - 8/27/2019 16:13 Emergent Ambulance [address] [facility] Emergency    Arrival Complaint    Cough        Chief Complaint   Patient presents with    Allergic Reaction     Pt was receiving chemotherapy and developed reaction from medications at cancer center  Pt was treated with diphenhydramine 50mg, ranitidine 50mg, hydrocortisone 100mg, albuterol sulfate 2 5mg/3ml, NSS 50ml prior to arrival   Pt reports feeling SOB, heavy, and warm  Pt reporting chest tightness  Assessment/Plan:     63  Y O  Male  Presents to ED from infusion  Center with suspected reaction to chemo  Pt  Began with shaking chills,  Overall felt  Unwell and diaphoresis  While receiving  Chemo the day of adm  PMH is  Met  Colon/lung  CA  C/O  Ongoing non prod  Cough and mild  SOB    States he  Did have a  Fever After   Chemo the week prior to this  W/U  Revealed  Sepsis  ADMIT  IP  MED SURG  LOC  With Sepsis, acute respiratory failure with hypoxia, neutropenic fever and thrombocytopenia   And plan is  Monitor  Labs,  ID  Consult, neutropenic precautions,   IVF, MACKENZIE  And  O2  Supplement         ED Triage Vitals   Temperature Pulse Respirations Blood Pressure SpO2   08/27/19 1618 08/27/19 1618 08/27/19 1618 08/27/19 1618 08/27/19 1618   100 5 °F (38 1 °C) (!) 117 20 129/73 (!) 88 %      Temp Source Heart Rate Source Patient Position - Orthostatic VS BP Location FiO2 (%)   08/27/19 1618 08/27/19 1618 08/27/19 1618 08/27/19 1618 --   Oral Monitor Lying Right arm       Pain Score       08/27/19 2041       7        Wt Readings from Last 1 Encounters:   08/27/19 101 kg (222 lb 14 2 oz)     Additional Vital Signs:   /28/19 0921            None (Room air)     08/28/19 0736  98 3 °F (36 8 °C)  63  16  91/55  96 %  Nasal cannula  Lying   08/28/19 0600  98 5 °F (36 9 °C)               08/28/19 0246  101 8 °F (38 8 °C)Abnormal                08/28/19 0008  101 1 °F (38 4 °C)Abnormal   80  20  110/66  96 %  Nasal cannula  Lying   08/27/19 2147  99 5 °F (37 5 °C)  84  20  106/67  93 %  None (Room air)  Sitting   08/27/19 2041    79  20  104/58  97 %  Nasal cannula  Lying   08/27/19 1942  101 2 °F (38 4 °C)Abnormal   96  20  130/68  97 %  Nasal cannula  Lying   08/27/19 1843    83  20  117/60  97 %  Nasal cannula  Lying   08/27/19 1722  99 5 °F (37 5 °C)  94  18  107/56  97 %  Nasal cannula  Lying   08/27/19 1618  100 5 °F (38 1 °C)  117Abnormal   20  129/73  88 %Abnormal   None (Room air)           Pertinent Labs/Diagnostic Test Results:   Results from last 7 days   Lab Units 08/28/19  0604 08/27/19  1642 08/26/19  0757   WBC Thousand/uL 3 64* 0 81* 3 28*   HEMOGLOBIN g/dL 11 8* 13 0 14 3   HEMATOCRIT % 34 1* 37 0 41 9   PLATELETS Thousands/uL 31* 74* 109*   NEUTROS ABS Thousands/µL  --  0 77* 2 10   BANDS PCT % 36*  --   --          Results from last 7 days   Lab Units 08/28/19  0604 08/27/19  1642 08/26/19  0757   SODIUM mmol/L 141 142 142   POTASSIUM mmol/L 4 3 3 6 4 1   CHLORIDE mmol/L 106 105 106   CO2 mmol/L 25 28 30   ANION GAP mmol/L 10 9 6   BUN mg/dL 24 10 7   CREATININE mg/dL 2 06* 1 03 0 85   EGFR ml/min/1 73sq m 35 80 97   CALCIUM mg/dL 7 7* 8 5 9 1     Results from last 7 days   Lab Units 08/27/19  1642 08/26/19  0757   AST U/L 58* 16   ALT U/L 18 18   ALK PHOS U/L 76 73   TOTAL PROTEIN g/dL 6 3* 6 9   ALBUMIN g/dL 3 0* 3 3*   TOTAL BILIRUBIN mg/dL 2 91* 0 67         Results from last 7 days   Lab Units 08/28/19  0604 08/27/19  1642   GLUCOSE RANDOM mg/dL 106 122         Results from last 7 days   Lab Units 08/27/19  1642   PH PHIL  7 412*   PCO2 PHIL mm Hg 44 9   PO2 PHIL mm Hg 34 9*   HCO3 PHIL mmol/L 28 0   BASE EXC PHIL mmol/L 2 8   O2 CONTENT PHIL ml/dL 13 4   O2 HGB, VENOUS % 67 1             Results from last 7 days   Lab Units 08/27/19  1642   TROPONIN I ng/mL <0 02     Results from last 7 days   Lab Units 08/27/19  1642   D DIMER QUANT ng/ml (FEU) >10,000*     Results from last 7 days   Lab Units 08/27/19  1642   PROTIME seconds 15 9*   INR  1 25*   PTT seconds 34     Results from last 7 days   Lab Units 08/27/19  1642   TSH 3RD GENERATON uIU/mL 4 368*         Results from last 7 days   Lab Units 08/27/19  1848 08/27/19  1642   LACTIC ACID mmol/L 1 8 2 1*           Results from last 7 days   Lab Units 08/27/19  1854   CLARITY UA  Cloudy   COLOR UA  Red   SPEC GRAV UA  1 020   PH UA  6 5   GLUCOSE UA mg/dl 100 (1/10%)*   KETONES UA mg/dl Trace*   BLOOD UA  Large*   PROTEIN UA mg/dl >=300*   NITRITE UA  Positive*   BILIRUBIN UA  Interference- unable to analyze*   UROBILINOGEN UA E U /dl 1 0   LEUKOCYTES UA  Negative   WBC UA /hpf None Seen   RBC UA /hpf 4-10*   BACTERIA UA /hpf Occasional   EPITHELIAL CELLS WET PREP /hpf Occasional     Results from last 7 days   Lab Units 08/27/19  2499   STREP PNEUMONIAE ANTIGEN, URINE  Negative   LEGIONELLA URINARY ANTIGEN  Negative           Results from last 7 days   Lab Units 08/28/19  0604   TOTAL COUNTED  100       CTA  Chest  ( 8/27)   Limited evaluation of the pulmonary arteries   No evidence of main renal artery embolus   Distal order branches are not well evaluated  2   Right infrahilar and lower lobe mass is stable to slightly smaller, measuring 6 3 x 4 5 x 4 2 cm   Some pulmonary nodules in the bilateral lungs have resolved   Others are stable to slightly smaller  3   Stable severe narrowing of right lower lobe segmental pulmonary arteries and postobstructive pneumonia  CXR  (  8/28)     Postop change within the left chest with focal consolidation at the apex  Consolidation within the posterior right lung base consistent with patient's known mass   See separate CT scan      ED Treatment:   Medication Administration from 08/27/2019 1613 to 08/27/2019 2132       Date/Time Order Dose Route Action     08/27/2019 1622 EPINEPHrine PF (ADRENALIN) 1 mg/mL injection 0 3 mg 0 mg Intramuscular Hold     08/27/2019 1806 sodium chloride 0 9 % bolus 1,000 mL 0 mL Intravenous Stopped     08/27/2019 1653 sodium chloride 0 9 % bolus 1,000 mL 1,000 mL Intravenous New Bag     08/27/2019 1843 vancomycin (VANCOCIN) 2,500 mg in sodium chloride 0 9 % 500 mL IVPB   Intravenous Canceled Entry     08/27/2019 1936 cefepime (MAXIPIME) 2 g/50 mL dextrose IVPB 0 mg Intravenous Stopped     08/27/2019 1853 cefepime (MAXIPIME) 2 g/50 mL dextrose IVPB 2,000 mg Intravenous New Bag     08/27/2019 2042 azithromycin (ZITHROMAX) 500 mg in sodium chloride 0 9% 250mL IVPB 500 mg 0 mg Intravenous Stopped     08/27/2019 1938 azithromycin (ZITHROMAX) 500 mg in sodium chloride 0 9% 250mL IVPB 500 mg 500 mg Intravenous New Bag     08/27/2019 2047 vancomycin (VANCOCIN) 1,250 mg in sodium chloride 0 9 % 250 mL IVPB 1,250 mg Intravenous New Bag     08/27/2019 1932 iodixanol (VISIPAQUE) 320 MG/ML injection 90 mL 90 mL Intravenous Given     08/27/2019 1949 acetaminophen (TYLENOL) tablet 650 mg 650 mg Oral Given          Present on Admission:   Acute respiratory failure with hypoxia (HCC)   Colon cancer metastasized to lung (HCC)   [facility]   Neutropenic fever (HCC)   Thrombocytopenia (HCC)      Admitting Diagnosis: Allergic reaction [T78 40XA]  UTI (urinary tract infection) [N39 0]  Pneumonia [J18 9]  Neutropenic fever (HCC) [D70 9, R50 81]  Colon cancer metastasized to lung ([facility] ) [C18 9, C78 00]  Sepsis ([facility] 75 ) [A41 9]  Age/Sex: 63 y o  male  Admission Orders:    Current Facility-Administered Medications:  acetaminophen 650 mg Oral Q6H PRN   albuterol 2 puff Inhalation Q4H PRN   cefepime 2,000 mg Intravenous Q12H   lidocaine 1 patch Topical Daily   melatonin 6 mg Oral HS   ondansetron 4 mg Intravenous Q6H PRN    X1  8/27   sodium chloride 75 mL/hr Intravenous Continuous           IP CONSULT TO INFECTIOUS DISEASES  IP CONSULT TO ONCOLOGY  IP CONSULT TO PHARMACY     Neutropenic  precautions    Network Utilization Review Department  Phone: [phone]; Fax [phone]  [email or web address]  ATTENTION: Please call with any questions or concerns to [phone]  and carefully listen to the prompts so that you are directed to the right person  Send all requests for admission clinical reviews, approved or denied determinations and any other requests to fax [phone]   All voicemails are confidential

## 2019-08-28 NOTE — CONSULTS
Oncology Consult Note  Maribel Zepeda 63 y o  male MRN: [id number]  Unit/Bed#: E2 -01 Encounter: [id number]      Presenting Complaint:  Metastatic colorectal cancer  Fever a neutropenia  History of Presenting Illness:  A 60-year-old gentleman with long history of metastatic colon cancer, nearly 10 years  He has been under the care of Dr Noel Morgan  He has been extensively treated with 5 FU, leucovorin, oxaliplatin, irinotecan as well as bevacizumab in the past   He was recently on trifluridine tipiracil until August 2019, when Dr Noel Morgan switched his chemotherapy to oxaliplatin, irinotecan and bevacizumab  He had 2nd cycle treatment yesterday  During infusion, he developed fever  Therefore, he was hospitalized  Right before the 2nd cycle of chemotherapy, his CBC was acceptable  However, in the emergency room, he was neutropenic  Today, his WBC is normal range  He has dropping platelet count  Today's count was 36 with no bleeding symptoms  Currently, he is afebrile  He feels well  He has no complaint of pain  He has very minimal weight loss recently  He has good performance status  When he had 1st dose of this regimen 2 weeks ago, he had milder febrile episode  Review of Systems - As stated in the HPI otherwise the fourteen point review of systems was negative      Past Medical History:   Diagnosis Date    [facility] )     [facility]     [facility]     Metastatic carcinoma [facility] lung [facility])        Social History     Socioeconomic History    Marital status: /Civil Union     Spouse name: None    Number of children: None    Years of education: None    Highest education level: None   Occupational History    None   Social Needs    Financial resource strain: None    Food insecurity:     Worry: None     Inability: None    Transportation needs:     Medical: None     Non-medical: None   Tobacco Use    Smoking status: Former Smoker     Types: Cigarettes Last attempt to quit: 2010     Years since quittin 6    Smokeless tobacco: Never Used    Tobacco comment: Cigars    Substance and Sexual Activity    Alcohol use:  Yes     Alcohol/week: 12 0 standard drinks     Types: 12 Cans of beer per week     Comment: Socially     Drug use: No    Sexual activity: None   Lifestyle    Physical activity:     Days per week: None     Minutes per session: None    Stress: None   Relationships    Social connections:     Talks on phone: None     Gets together: None     Attends Restoration service: None     Active member of club or organization: None     Attends meetings of clubs or organizations: None     Relationship status: None    Intimate partner violence:     Fear of current or ex partner: None     Emotionally abused: None     Physically abused: None     Forced sexual activity: None   Other Topics Concern    None   Social History Narrative    Patient not questioned about family hx        Family History   Problem Relation Age of Onset    Diabetes Mother     Heart disease Mother     Heart disease Father     Skin cancer Sister        Allergies   Allergen Reactions    Aspirin Other (See Comments)     Nose bleed    Sulfa Antibiotics Other (See Comments)     Dizzy          Current Facility-Administered Medications:     acetaminophen (TYLENOL) tablet 650 mg, 650 mg, Oral, Q6H PRN, Guerita Obrien PA-C, 650 mg at 19 0203    albuterol (PROVENTIL HFA,VENTOLIN HFA) inhaler 2 puff, 2 puff, Inhalation, Q4H PRN, TR Mazariegos-C, 2 puff at 19 0206    cefepime (MAXIPIME) 2,000 mg in dextrose 5 % 50 mL IVPB, 2,000 mg, Intravenous, Q12H, Pamla Cogan Gyarmaty, PA-C, Last Rate: 100 mL/hr at 19 0909, 2,000 mg at 19 0909    lidocaine (LIDODERM) 5 % patch 1 patch, 1 patch, Topical, Daily, Luz Blandon MD, 1 patch at 19    melatonin tablet 6 mg, 6 mg, Oral, HS, Pamla Cogan Gyarmaty, PA-C, 6 mg at 19    metroNIDAZOLE (FLAGYL) tablet 500 mg, 500 mg, Oral, Q8H [facility], Obi Bush MD, 500 mg at 08/28/19 1323    ondansetron [facility] injection 4 mg, 4 mg, Intravenous, Q6H PRN, Mauraelizabeth Guzman PA-C, 4 mg at 08/27/19 2242    sodium chloride 0 9 % infusion, 75 mL/hr, Intravenous, Continuous, Maura [place], PA-C, Last Rate: 75 mL/hr at 08/28/19 1440, 75 mL/hr at 08/28/19 1440    Facility-Administered Medications Ordered in Other Encounters:     [MAR Hold] sodium chloride 0 9 % infusion, 20 mL/hr, Intravenous, Once PRN, Jian Hua MD, Stopped at 08/27/19 1340      /67 (BP Location: Right arm)   Pulse 72   Temp 99 5 °F (37 5 °C) (Temporal)   Resp 16   Wt 101 kg (222 lb 14 2 oz)   SpO2 96%   BMI 26 78 kg/m²     General Appearance:    Alert, oriented        Eyes:    PERRL   Ears:    Normal external ear canals, both ears   Nose:   Nares normal, septum midline   Throat:   Mucosa moist  Pharynx without injection  Neck:   Supple       Lungs:     Clear to auscultation bilaterally   Chest Wall:    No tenderness or deformity    Heart:    Regular rate and rhythm       Abdomen:     Soft, non-tender, bowel sounds +, no organomegaly           Extremities:   Extremities no cyanosis or edema       Skin:   no rash or icterus      Lymph nodes:   Cervical, supraclavicular, and axillary nodes normal   Neurologic:   CNII-XII intact, normal strength, sensation and reflexes     Throughout               Recent Results (from the past 48 hour(s))   Comprehensive metabolic panel    Collection Time: 08/27/19  4:42 PM   Result Value Ref Range    Sodium 142 136 - 145 mmol/L    Potassium 3 6 3 5 - 5 3 mmol/L    Chloride 105 100 - 108 mmol/L    CO2 28 21 - 32 mmol/L    ANION GAP 9 4 - 13 mmol/L    BUN 10 5 - 25 mg/dL    Creatinine 1 03 0 60 - 1 30 mg/dL    Glucose 122 65 - 140 mg/dL    Calcium 8 5 8 3 - 10 1 mg/dL    AST 58 (H) 5 - 45 U/L    ALT 18 12 - 78 U/L    Alkaline Phosphatase 76 46 - 116 U/L    Total Protein 6 3 (L) 6 4 - 8 2 g/dL    Albumin 3 0 (L) 3 5 - 5 0 g/dL    Total Bilirubin 2 91 (H) 0 20 - 1 00 mg/dL    eGFR 80 ml/min/1 73sq m   CBC and differential    Collection Time: 08/27/19  4:42 PM   Result Value Ref Range    WBC 0 81 (LL) 4 31 - 10 16 Thousand/uL    RBC 3 67 (L) 3 88 - 5 62 Million/uL    Hemoglobin 13 0 12 0 - 17 0 g/dL    Hematocrit 37 0 36 5 - 49 3 %     (H) 82 - 98 fL    MCH 35 4 (H) 26 8 - 34 3 pg    MCHC 35 1 31 4 - 37 4 g/dL    RDW 12 7 11 6 - 15 1 %    MPV 8 5 (L) 8 9 - 12 7 fL    Platelets 74 (L) 694 - 390 Thousands/uL    nRBC 0 /100 WBCs    Neutrophils Relative 94 (H) 43 - 75 %    Immat GRANS % 0 0 - 2 %    Lymphocytes Relative 3 (L) 14 - 44 %    Monocytes Relative 3 (L) 4 - 12 %    Eosinophils Relative 0 0 - 6 %    Basophils Relative 0 0 - 1 %    Neutrophils Absolute 0 77 (L) 1 85 - 7 62 Thousands/µL    Immature Grans Absolute 0 00 0 00 - 0 20 Thousand/uL    Lymphocytes Absolute 0 02 (L) 0 60 - 4 47 Thousands/µL    Monocytes Absolute 0 02 (L) 0 17 - 1 22 Thousand/µL    Eosinophils Absolute 0 00 0 00 - 0 61 Thousand/µL    Basophils Absolute 0 00 0 00 - 0 10 Thousands/µL   Protime-INR    Collection Time: 08/27/19  4:42 PM   Result Value Ref Range    Protime 15 9 (H) 11 6 - 14 5 seconds    INR 1 25 (H) 0 84 - 1 19   APTT    Collection Time: 08/27/19  4:42 PM   Result Value Ref Range    PTT 34 23 - 37 seconds   TSH, 3rd generation with Free T4 reflex    Collection Time: 08/27/19  4:42 PM   Result Value Ref Range    TSH 3RD GENERATON 4 368 (H) 0 358 - 3 740 uIU/mL   Troponin I    Collection Time: 08/27/19  4:42 PM   Result Value Ref Range    Troponin I <0 02 <=0 04 ng/mL   D-Dimer    Collection Time: 08/27/19  4:42 PM   Result Value Ref Range    D-Dimer, Quant >10,000 (H) <500 ng/ml (FEU)   Lactic acid, plasma x2    Collection Time: 08/27/19  4:42 PM   Result Value Ref Range    LACTIC ACID 2 1 (HH) 0 5 - 2 0 mmol/L   Blood gas, venous    Collection Time: 08/27/19  4:42 PM   Result Value Ref Range    pH, Momo 7 412 (H) 7 300 - 7 400 pCO2, Momo 44 9 42 0 - 50 0 mm Hg    pO2, Momo 34 9 (L) 35 0 - 45 0 mm Hg    HCO3, Momo 28 0 24 - 30 mmol/L    Base Excess, Momo 2 8 mmol/L    O2 Content, Momo 13 4 ml/dL    O2 HGB, VENOUS 67 1 60 0 - 80 0 %   T4, free    Collection Time: 08/27/19  4:42 PM   Result Value Ref Range    Free T4 0 99 0 76 - 1 46 ng/dL   ECG 12 lead    Collection Time: 08/27/19  4:57 PM   Result Value Ref Range    Ventricular Rate 98 BPM    Atrial Rate 98 BPM    MT Interval 150 ms    QRSD Interval 96 ms    QT Interval 326 ms    QTC Interval 416 ms    P Axis 65 degrees    QRS Axis 3 degrees    T Wave Axis 76 degrees   ECG 12 lead    Collection Time: 08/27/19  5:21 PM   Result Value Ref Range    Ventricular Rate 93 BPM    Atrial Rate 93 BPM    MT Interval 150 ms    QRSD Interval 100 ms    QT Interval 334 ms    QTC Interval 415 ms    P Axis 72 degrees    QRS Axis 4 degrees    T Wave Axis 75 degrees   Lactic acid, plasma x2    Collection Time: 08/27/19  6:48 PM   Result Value Ref Range    LACTIC ACID 1 8 0 5 - 2 0 mmol/L   UA w Reflex to Microscopic w Reflex to Culture    Collection Time: 08/27/19  6:54 PM   Result Value Ref Range    Color, UA Red     Clarity, UA Cloudy     Specific [place], UA 1 020 1 003 - 1 030    pH, UA 6 5 4 5, 5 0, 5 5, 6 0, 6 5, 7 0, 7 5, 8 0    Leukocytes, UA Negative Negative    Nitrite, UA Positive (A) Negative    Protein, UA >=300 (A) Negative mg/dl    Glucose,  (1/10%) (A) Negative mg/dl    Ketones, UA Trace (A) Negative mg/dl    Urobilinogen, UA 1 0 0 2, 1 0 E U /dl E U /dl    Bilirubin, UA Interference- unable to analyze (A) Negative    Blood, UA Large (A) Negative   Urine Microscopic    Collection Time: 08/27/19  6:54 PM   Result Value Ref Range    RBC, UA 4-10 (A) None Seen, 0-5 /hpf    WBC, UA None Seen None Seen, 0-5, 5-55, 5-65 /hpf    Epithelial Cells Occasional None Seen, Occasional /hpf    Bacteria, UA Occasional None Seen, Occasional /hpf    COARSE GRANULAR CASTS 5-10 /lpf    AMORPH URATES Innumerable /hpf   Sputum culture and Gram stain    Collection Time: 08/27/19 11:52 PM   Result Value Ref Range    Gram Stain Result 2+ Polys (A)     Gram Stain Result No polys seen (A)     Gram Stain Result 2+ Gram positive cocci in pairs (A)    Legionella antigen, urine    Collection Time: 08/27/19 11:53 PM   Result Value Ref Range    Legionella Urinary Antigen Negative Negative   Strep Pneumoniae, Urine    Collection Time: 08/27/19 11:53 PM   Result Value Ref Range    Strep pneumoniae antigen, urine Negative Negative   Basic metabolic panel    Collection Time: 08/28/19  6:04 AM   Result Value Ref Range    Sodium 141 136 - 145 mmol/L    Potassium 4 3 3 5 - 5 3 mmol/L    Chloride 106 100 - 108 mmol/L    CO2 25 21 - 32 mmol/L    ANION GAP 10 4 - 13 mmol/L    BUN 24 5 - 25 mg/dL    Creatinine 2 06 (H) 0 60 - 1 30 mg/dL    Glucose 106 65 - 140 mg/dL    Calcium 7 7 (L) 8 3 - 10 1 mg/dL    eGFR 35 ml/min/1 73sq m   CBC and differential    Collection Time: 08/28/19  6:04 AM   Result Value Ref Range    WBC 3 64 (L) 4 31 - 10 16 Thousand/uL    RBC 3 35 (L) 3 88 - 5 62 Million/uL    Hemoglobin 11 8 (L) 12 0 - 17 0 g/dL    Hematocrit 34 1 (L) 36 5 - 49 3 %     (H) 82 - 98 fL    MCH 35 2 (H) 26 8 - 34 3 pg    MCHC 34 6 31 4 - 37 4 g/dL    RDW 13 1 11 6 - 15 1 %    MPV 9 3 8 9 - 12 7 fL    Platelets 31 (LL) 909 - 390 Thousands/uL    nRBC 0 /100 WBCs   Procalcitonin    Collection Time: 08/28/19  6:04 AM   Result Value Ref Range    Procalcitonin 53 41 (H) <=0 25 ng/ml   Manual Differential(PHLEBS Do Not Order)    Collection Time: 08/28/19  6:04 AM   Result Value Ref Range    Segmented % 57 43 - 75 %    Bands % 36 (H) 0 - 8 %    Lymphocytes % 3 (L) 14 - 44 %    Monocytes % 2 (L) 4 - 12 %    Eosinophils, % 0 0 - 6 %    Basophils % 0 0 - 1 %    Metamyelocytes% 2 (H) 0 - 1 %    Absolute Neutrophils 3 39 1 85 - 7 62 Thousand/uL    Lymphocytes Absolute 0 11 (L) 0 60 - 4 47 Thousand/uL    Monocytes Absolute 0 07 0 00 - 1 22 Thousand/uL Eosinophils Absolute 0 00 0 00 - 0 40 Thousand/uL    Basophils Absolute 0 00 0 00 - 0 10 Thousand/uL    Total Counted 100     Macrocytes Present     Ovalocytes Present     Platelet Estimate Decreased (A) Adequate   Lactate dehydrogenase    Collection Time: 08/28/19  6:04 AM   Result Value Ref Range     (H) 81 - 234 U/L   Uric acid    Collection Time: 08/28/19  6:04 AM   Result Value Ref Range    Uric Acid 7 7 4 2 - 8 0 mg/dL   Phosphorus    Collection Time: 08/28/19  6:04 AM   Result Value Ref Range    Phosphorus 2 9 2 7 - 4 5 mg/dL   CK (with reflex to MB)    Collection Time: 08/28/19  6:04 AM   Result Value Ref Range    Total  39 - 308 U/L   Vancomycin, random    Collection Time: 08/28/19  9:05 AM   Result Value Ref Range    Vancomycin Rm 7 7 ug/mL   Hemolysis Smear    Collection Time: 08/28/19  2:34 PM   Result Value Ref Range    Hemolysis Smear No Schistocytes or Helmet Cells noted          Xr Chest 2 Views    Result Date: 8/28/2019  Narrative: CHEST INDICATION:   Tachycardia  COMPARISON:  None EXAM PERFORMED/VIEWS:  XR CHEST PA & LATERAL  The frontal view was performed utilizing dual energy radiographic technique  Images: 4 FINDINGS:  Right chest wall Port-A-Cath projects appropriately  Cardiomediastinal silhouette appears unremarkable  Elevation of the left hemidiaphragm  Gaseous distention of the stomach below the hemidiaphragm with a large air-fluid level  No congestive failure  Consolidation at the left apex with volume loss of the left lung consistent with prior surgery  There is a consolidation within the medial right lung base seen posteriorly  Osseous structures appear within normal limits for patient age  Impression: Postop change within the left chest with focal consolidation at the apex  Consolidation within the posterior right lung base consistent with patient's known mass  See separate CT scan   Workstation performed: [id number]     Fl Barium Swallow    Result Date: 7/30/2019  Narrative: Santy Saenz INDICATION:   R13 10: Dysphagia, unspecified  Upper neck pain COMPARISON:  None IMAGES:  10 FLUOROSCOPY TIME:   2 2 MINUTES  TECHNIQUE: The patient was given effervescent granules and barium by mouth and images of the esophagus were obtained  FINDINGS: The esophagus is normal in caliber  Esophageal motility is normal and emptying of contrast from the esophagus is prompt  No mucosal lesion, ulceration or evidence of fold thickening is seen  Gastroesophageal reflux was not determined  There is no hiatal hernia  Impression: Unremarkable esophagram  Workstation performed: [id number]     Cta Ed Chest Pe Study    Result Date: 8/27/2019  Narrative: CTA - CHEST WITH IV CONTRAST - PULMONARY ANGIOGRAM INDICATION:   ?PE  COMPARISON: None  TECHNIQUE: CTA examination of the chest was performed using angiographic technique according to a protocol specifically tailored to evaluate for pulmonary embolism  Axial, sagittal, and coronal 2D reformatted images were created from the source data and  submitted for interpretation  In addition, coronal 3D MIP postprocessing was performed on the acquisition scanner  Radiation dose length product (DLP) for this visit:  573 mGy-cm   This examination, like all CT scans performed in the [facility], was performed utilizing techniques to minimize radiation dose exposure, including the use of iterative reconstruction and automated exposure control  IV Contrast:  90 mL of iodixanol (VISIPAQUE)  FINDINGS: PULMONARY ARTERIAL TREE:  Suboptimal opacification of the pulmonary arteries and respiratory motion artifact  No evidence of main pulmonary artery embolus  Distal or branches are not well evaluated   LUNGS:  Right infrahilar mass in the superior segment of the right lower lobe measures 6 3 x 4 5 x 4 2 cm compared to 8 2 x 5 3 x 5 2 cm on the prior exam   Surrounding airspace opacities in the right lower lobe, not significant changed since the prior exam   Stable 6 mm subpleural nodule versus scarring in the anterior aspect of the right upper lobe series 4, image 66  Other previously demonstrated pulmonary nodules in the right upper and middle lobes are no longer visualized  Stable volume loss in the left lung from partial pneumonectomy  Nodule versus nodular scarring in the left upper lung adjacent to the mediastinum measures 1 8 cm, slightly smaller  Small amount of secretions in the proximal right mainstem bronchus  Stable severe narrowing of right lower lobe proximal segmental branches  PLEURA:  No effusion  HEART/GREAT VESSELS:  Normal heart size  No thoracic aortic aneurysm or dissection  MEDIASTINUM AND ZABRINA:  Unremarkable  CHEST WALL AND LOWER NECK:   Unremarkable  VISUALIZED STRUCTURES IN THE UPPER ABDOMEN:  Unremarkable  OSSEOUS STRUCTURES:  No acute fracture or destructive osseous lesion  Impression: 1  Limited evaluation of the pulmonary arteries  No evidence of main renal artery embolus  Distal order branches are not well evaluated  2   Right infrahilar and lower lobe mass is stable to slightly smaller, measuring 6 3 x 4 5 x 4 2 cm  Some pulmonary nodules in the bilateral lungs have resolved  Others are stable to slightly smaller  3   Stable severe narrowing of right lower lobe segmental pulmonary arteries and postobstructive pneumonia  Workstation performed: [id number]       Assessment:  Metastatic colorectal cancer  Brief febrile neutropenia episode right after the 2nd cycle of chemotherapy  Thrombocytopenia  Plan:  A 27-year-old gentleman with history as described above  He has metastatic colorectal cancer for which he has been heavily pretreated for last 8-9 years  Dr Allison Lpee recycled  chemotherapeutic agents  He started new regimen of bevacizumab, irinotecan and oxaliplatin with no 5 FU  With this regimen, he had febrile episode twice    He was briefly neutropenic right after the 2nd chemotherapy which has resolved  His fever resolved  He does not appear to be toxic  This may not be due to the infection  This may be reactive process with systemic chemotherapy since he had milder febrile episode with 1st cycle of this regimen  Supportive care is recommended  The etiology of thrombocytopenia is not totally clear since it is too soon from 2nd cycle of chemotherapy  If he has improvement of platelet count tomorrow, he may be discharged and close follow-up with Dr Damien Kimball as outpatient  Since he still have very good performance status, continuing systemic chemotherapy is reasonable

## 2019-08-28 NOTE — ASSESSMENT & PLAN NOTE
· Platelets noted to be 74k at time of admission  · No signs of active bleeding  · Continue to monitor  · SCDs for DVT prophylaxis

## 2019-08-29 LAB
ANION GAP SERPL CALCULATED.3IONS-SCNC: 9 MMOL/L (ref 4–13)
BASOPHILS # BLD AUTO: 0.01 THOUSANDS/ΜL (ref 0–0.1)
BASOPHILS NFR BLD AUTO: 1 % (ref 0–1)
BUN SERPL-MCNC: 29 MG/DL (ref 5–25)
CALCIUM SERPL-MCNC: 8.1 MG/DL (ref 8.3–10.1)
CHLORIDE SERPL-SCNC: 107 MMOL/L (ref 100–108)
CO2 SERPL-SCNC: 25 MMOL/L (ref 21–32)
CREAT SERPL-MCNC: 1.88 MG/DL (ref 0.6–1.3)
EOSINOPHIL # BLD AUTO: 0.11 THOUSAND/ΜL (ref 0–0.61)
EOSINOPHIL NFR BLD AUTO: 6 % (ref 0–6)
EOSINOPHIL NFR URNS MANUAL: 0 %
ERYTHROCYTE [DISTWIDTH] IN BLOOD BY AUTOMATED COUNT: 12.8 % (ref 11.6–15.1)
GFR SERPL CREATININE-BSD FRML MDRD: 39 ML/MIN/1.73SQ M
GLUCOSE SERPL-MCNC: 82 MG/DL (ref 65–140)
HCT VFR BLD AUTO: 33.2 % (ref 36.5–49.3)
HGB BLD-MCNC: 11.8 G/DL (ref 12–17)
IMM GRANULOCYTES # BLD AUTO: 0.16 THOUSAND/UL (ref 0–0.2)
IMM GRANULOCYTES NFR BLD AUTO: 9 % (ref 0–2)
LYMPHOCYTES # BLD AUTO: 0.02 THOUSANDS/ΜL (ref 0.6–4.47)
LYMPHOCYTES NFR BLD AUTO: 1 % (ref 14–44)
MCH RBC QN AUTO: 35.3 PG (ref 26.8–34.3)
MCHC RBC AUTO-ENTMCNC: 35.5 G/DL (ref 31.4–37.4)
MCV RBC AUTO: 99 FL (ref 82–98)
MONOCYTES # BLD AUTO: 0.01 THOUSAND/ΜL (ref 0.17–1.22)
MONOCYTES NFR BLD AUTO: 1 % (ref 4–12)
NEUTROPHILS # BLD AUTO: 1.53 THOUSANDS/ΜL (ref 1.85–7.62)
NEUTS SEG NFR BLD AUTO: 82 % (ref 43–75)
NRBC BLD AUTO-RTO: 0 /100 WBCS
PLATELET # BLD AUTO: 18 THOUSANDS/UL (ref 149–390)
PMV BLD AUTO: 11.2 FL (ref 8.9–12.7)
POTASSIUM SERPL-SCNC: 4.1 MMOL/L (ref 3.5–5.3)
PROCALCITONIN SERPL-MCNC: 36.01 NG/ML
RBC # BLD AUTO: 3.34 MILLION/UL (ref 3.88–5.62)
SODIUM SERPL-SCNC: 141 MMOL/L (ref 136–145)
WBC # BLD AUTO: 1.84 THOUSAND/UL (ref 4.31–10.16)

## 2019-08-29 PROCEDURE — 30233R1 TRANSFUSION OF NONAUTOLOGOUS PLATELETS INTO PERIPHERAL VEIN, PERCUTANEOUS APPROACH: ICD-10-PCS | Performed by: INTERNAL MEDICINE

## 2019-08-29 PROCEDURE — 83010 ASSAY OF HAPTOGLOBIN QUANT: CPT | Performed by: INTERNAL MEDICINE

## 2019-08-29 PROCEDURE — 80048 BASIC METABOLIC PNL TOTAL CA: CPT | Performed by: INTERNAL MEDICINE

## 2019-08-29 PROCEDURE — 94760 N-INVAS EAR/PLS OXIMETRY 1: CPT

## 2019-08-29 PROCEDURE — P9037 PLATE PHERES LEUKOREDU IRRAD: HCPCS

## 2019-08-29 PROCEDURE — 99232 SBSQ HOSP IP/OBS MODERATE 35: CPT | Performed by: INTERNAL MEDICINE

## 2019-08-29 PROCEDURE — 94640 AIRWAY INHALATION TREATMENT: CPT

## 2019-08-29 PROCEDURE — 84145 PROCALCITONIN (PCT): CPT | Performed by: INTERNAL MEDICINE

## 2019-08-29 PROCEDURE — 85025 COMPLETE CBC W/AUTO DIFF WBC: CPT | Performed by: INTERNAL MEDICINE

## 2019-08-29 RX ORDER — LEVALBUTEROL 1.25 MG/.5ML
1.25 SOLUTION, CONCENTRATE RESPIRATORY (INHALATION)
Status: DISCONTINUED | OUTPATIENT
Start: 2019-08-29 | End: 2019-08-30 | Stop reason: HOSPADM

## 2019-08-29 RX ADMIN — LEVALBUTEROL 1.25 MG: 1.25 SOLUTION, CONCENTRATE RESPIRATORY (INHALATION) at 19:29

## 2019-08-29 RX ADMIN — CEFEPIME HYDROCHLORIDE 2000 MG: 2 INJECTION, POWDER, FOR SOLUTION INTRAVENOUS at 06:48

## 2019-08-29 RX ADMIN — IPRATROPIUM BROMIDE 0.5 MG: 0.5 SOLUTION RESPIRATORY (INHALATION) at 13:55

## 2019-08-29 RX ADMIN — METRONIDAZOLE 500 MG: 500 TABLET, FILM COATED ORAL at 06:30

## 2019-08-29 RX ADMIN — METRONIDAZOLE 500 MG: 500 TABLET, FILM COATED ORAL at 22:12

## 2019-08-29 RX ADMIN — ACETAMINOPHEN 650 MG: 325 TABLET ORAL at 21:06

## 2019-08-29 RX ADMIN — METRONIDAZOLE 500 MG: 500 TABLET, FILM COATED ORAL at 15:00

## 2019-08-29 RX ADMIN — IPRATROPIUM BROMIDE 0.5 MG: 0.5 SOLUTION RESPIRATORY (INHALATION) at 19:29

## 2019-08-29 RX ADMIN — MELATONIN 6 MG: 3 TAB ORAL at 22:12

## 2019-08-29 RX ADMIN — CEFEPIME HYDROCHLORIDE 2000 MG: 2 INJECTION, POWDER, FOR SOLUTION INTRAVENOUS at 21:06

## 2019-08-29 RX ADMIN — SODIUM CHLORIDE 50 ML/HR: 0.9 INJECTION, SOLUTION INTRAVENOUS at 19:06

## 2019-08-29 RX ADMIN — SODIUM CHLORIDE 75 ML/HR: 0.9 INJECTION, SOLUTION INTRAVENOUS at 02:29

## 2019-08-29 RX ADMIN — LEVALBUTEROL 1.25 MG: 1.25 SOLUTION, CONCENTRATE RESPIRATORY (INHALATION) at 13:55

## 2019-08-29 NOTE — PLAN OF CARE
Problem: Potential for Falls  Goal: Patient will remain free of falls  Description  INTERVENTIONS:  - Assess patient frequently for physical needs  -  Identify cognitive and physical deficits and behaviors that affect risk of falls    -  [place] fall precautions as indicated by assessment   - Educate patient/family on patient safety including physical limitations  - Instruct patient to call for assistance with activity based on assessment  - Modify environment to reduce risk of injury  - Consider OT/PT consult to assist with strengthening/mobility  Outcome: Progressing     Problem: Nutrition/Hydration-ADULT  Goal: Nutrient/Hydration intake appropriate for improving, restoring or maintaining nutritional needs  Description  INTERVENTIONS:  - Monitor oral intake, urinary output, labs, and treatment plans  - Assess nutrition and hydration status and recommend course of action  - Evaluate amount of meals eaten  - Recommend/ encourage appropriate diets, oral nutritional supplements, and vitamin/mineral supplements  - Assess need for intravenous fluids  - Provide specific nutrition/hydration education as appropriate  - Include patient in decisions related to nutrition   Outcome: Progressing     Problem: PAIN - ADULT  Goal: Verbalizes/displays adequate comfort level or baseline comfort level  Description  Interventions:  - Encourage patient to monitor pain and request assistance  - Assess pain using appropriate pain scale  - Administer analgesics based on type and severity of pain and evaluate response  - Implement non-pharmacological measures as appropriate and evaluate response  - Consider cultural and social influences on pain and pain management  - Notify physician/advanced practitioner if interventions unsuccessful or patient reports new pain  Outcome: Progressing     Problem: INFECTION - ADULT  Goal: Absence or prevention of progression during hospitalization  Description  INTERVENTIONS:  - Assess and monitor for signs and symptoms of infection while patient is neutropenic  - Monitor lab/diagnostic results  - Monitor all insertion sites,  - Monitor endotracheal if appropriate and nasal secretions for changes in amount and color  - [place] appropriate cooling/warming therapies per order  - Administer medications as ordered  - Instruct and encourage patient and family to use good hand hygiene technique  - Identify and instruct in appropriate neutropenic precautions for identified infection/condition   Outcome: Progressing     Problem: DISCHARGE PLANNING  Goal: Discharge to home or other facility with appropriate resources  Description  INTERVENTIONS:  - Identify barriers to discharge w/patient and caregiver  - Arrange for needed discharge resources and transportation as appropriate  - Identify discharge learning needs   - Refer to Case Management Department for coordinating discharge planning if the patient needs post-hospital services based on physician/advanced practitioner order    Outcome: Progressing     Problem: Knowledge Deficit  Goal: Patient/family/caregiver demonstrates understanding of disease process, treatment plan, medications, and discharge instructions  Description  Complete learning assessment and assess knowledge base    Interventions:  - Provide teaching at level of understanding  - Provide teaching via preferred learning methods  Outcome: Progressing     Problem: COPING  Goal: Pt/Family able to verbalize concerns and demonstrate effective coping strategies  Description  INTERVENTIONS:  - Assist patient/family to identify coping skills, available support systems and cultural and spiritual values  - Provide emotional support, including active listening and acknowledgement of concerns of patient and caregivers  - Reduce environmental stimuli, as able  - Provide patient education  - Assess for spiritual pain/suffering and initiate spiritual care, including notification of Pastoral Care or [place]una based community as needed  - Assess effectiveness of coping strategies  Outcome: Progressing     Problem: METABOLIC, FLUID AND ELECTROLYTES - ADULT  Goal: Electrolytes maintained within normal limits  Description  INTERVENTIONS:  - Monitor labs and assess patient for signs and symptoms of electrolyte imbalances  - Administer electrolyte replacement as ordered  - Monitor response to electrolyte replacements, including repeat lab results as appropriate  - Instruct patient on fluid and nutrition as appropriate  Outcome: Progressing     Problem: HEMATOLOGIC - ADULT  Goal: Maintains hematologic stability  Description  INTERVENTIONS  - Assess for signs and symptoms of bleeding or hemorrhage  - Monitor labs  - Administer supportive blood products/factors as ordered and appropriate  Outcome: Progressing

## 2019-08-29 NOTE — PROGRESS NOTES
[facility] Progress Note  Patient: Lupis Grove 63 y o  male   MRN: [id number]  PCP: Sajan Calixto MD, MD  Unit/Bed#: E2 -01 Encounter: [id number]  Date Of Visit: 08/29/19    Assessment:Ron Sheridan is a 63 y o  male who presents with fever, weakness    Past medical history significant for metastatic colon cancer to the lung  Patient states he was receiving chemotherapy earlier date of presentation here when he began to have shaking chills, diaphoresis and overall feeling of being unwell  He was treated for suspected reaction to chemotherapy and referred to the ED  He reports he has had ongoing cough, non-productive  Reports mild shortness of breath  Reports he had a fever after chemotherapy last week  Denies chest pain, palpitations, nausea/vomiting, abdominal pain, constipation or diarrhea  Currently feels improved after arrival to the ED    No known sick contacts        Principal Problem:    Sepsis ([facility] )  Active Problems:    Colon cancer metastasized to lung [facility])    Acute respiratory failure with hypoxia (HCC)    Neutropenic fever (HCC)    Thrombocytopenia (HCC)    Acute kidney injury ([facility] )      Plan:    · Sepsis/with febrile illness tachycardia hypoxia mild elevation lactic acid now normalized CT suggestive of postobstructive pneumonia will continue on cefepime and Flagyl after Infectious Disease input procalcitonin significantly elevated now trending down /if blood cultures positive may have to consider med port as source/may also have urinary tract infection based on urinalysis results  do not believe cultures were sent however/add nebulizer therapy  · Neutropenic fever noted on presentation  with neutropenia of only 0 81 with absolute neutrophil count of 0 77 then but yesterday white count up to 3 64 with absolute neutrophils up to 3 39 today 1 84  · Thrombocytopenia trending down now at 18,000 presume in relation to chemotherapy also presume this should titrate up at and follow white count which is now trending up/no signs of spontaneous bleeding or bruising spoke to Oncology who would prefer 1 unit of platelets given /ordered  · Acute respiratory failure with hypoxia on presentation now improved on 2 L nasal flow CT of the chest was obtained due to elevated D-dimer no main pulmonary artery embolus noted again noting right infrahilar and right lower lobe mass possibly smaller than previous but with severe narrowing of the right lower lobe segmental pulmonary arteries and postobstructive pneumonia noted/strep and Legionella antigens pending would add procalcitonin serially  · Colon cancer with metastasis to lung currently undergoing chemotherapy every other week with Avastin, Camptosar,Oxaliplatin oncology has been following for last 13 years consultation with Oncology pending  · Acute kidney injury with creatinine up to 2 06 as recently as 2 months ago creatinine 0 7 will treat as prerenal component at at present with IV fluids but consult Nephrology may also have component of ATN from platinum chemotherapy  check urinalysis showed significant urine protein positive nitrites and large amount of blood with innumerable urate crystals and occasional bacteria her coarse granular casts also noted/appreciate Nephrology input check of LDH elevated at 730 and peripheral smear showing no schistocytes  · History of paroxysmal atrial fibrillation with cardioversion to sinus rhythm      VTE Pharmacologic Prophylaxis:   Pharmacologic: Pharmacologic VTE Prophylaxis contraindicated due to Thrombocytopenia  Mechanical VTE Prophylaxis in Place: Yes    Discussions with Specialists or Other Care Team Provider:     Time Spent for Care: 45 minutes  More than 50% of total time spent on counseling and coordination of care as described above  Subjective:   Feeling somewhat better today although had febrile course overnight and felt it with fatigue and myalgias    Also was treated for a course of throat infection with amoxicillin 2 weeks ago  He has been undergoing IV infusion chemotherapy only for last 3 treatments and last treatment excluded oxa li platinum which was included on treatment yesterday  He has also had episodes of febrile illness with previous chemotherapy but not as severe  He has continued coughing with some production especially with movement    Objective:     Vitals:   Temp (24hrs), Av 9 °F (37 2 °C), Min:98 3 °F (36 8 °C), Max:99 5 °F (37 5 °C)    Temp:  [98 3 °F (36 8 °C)-99 5 °F (37 5 °C)] 98 6 °F (37 °C)  HR:  [] 78  Resp:  [16-20] 20  BP: (102-118)/(67-75) 108/75  SpO2:  [96 %] 96 %  Body mass index is 26 78 kg/m²  Input and Output Summary (last 24 hours):        Intake/Output Summary (Last 24 hours) at 2019 1237  Last data filed at 2019 1110  Gross per 24 hour   Intake 4183 05 ml   Output 1500 ml   Net 2683 05 ml       Physical Exam:     Physical Exam:   General appearance: alert, appears stated age and cooperative  Head: Normocephalic, without obvious abnormality, atraumatic  Lungs: diminished breath sounds and Left base diminished breath sounds  Heart: regular rate and rhythm  Abdomen: soft, non-tender; bowel sounds normal; no masses,  no organomegaly  Back: negative  Extremities: extremities normal, atraumatic, no cyanosis or edema  Neurologic: Grossly normal      Additional Data:     Labs:    Results from last 7 days   Lab Units 19  0611   WBC Thousand/uL 1 84*   HEMOGLOBIN g/dL 11 8*   HEMATOCRIT % 33 2*   PLATELETS Thousands/uL 18*   NEUTROS PCT % 82*   LYMPHS PCT % 1*   MONOS PCT % 1*   EOS PCT % 6     Results from last 7 days   Lab Units 19  0611  19  1642   POTASSIUM mmol/L 4 1   < > 3 6   CHLORIDE mmol/L 107   < > 105   CO2 mmol/L 25   < > 28   BUN mg/dL 29*   < > 10   CREATININE mg/dL 1 88*   < > 1 03   CALCIUM mg/dL 8 1*   < > 8 5   ALK PHOS U/L  --   --  76   ALT U/L  --   --  18   AST U/L  --   --  58*    < > = values in this interval not displayed  Results from last 7 days   Lab Units 08/27/19  1642   INR  1 25*       * I Have Reviewed All Lab Data Listed Above  * Additional Pertinent Lab Tests Reviewed: All Labs For Current Hospital Admission Reviewed    Imaging:  Xr Chest 2 Views    Result Date: 8/28/2019  Narrative: CHEST INDICATION:   Tachycardia  COMPARISON:  None EXAM PERFORMED/VIEWS:  XR CHEST PA & LATERAL  The frontal view was performed utilizing dual energy radiographic technique  Images: 4 FINDINGS:  Right chest wall Port-A-Cath projects appropriately  Cardiomediastinal silhouette appears unremarkable  Elevation of the left hemidiaphragm  Gaseous distention of the stomach below the hemidiaphragm with a large air-fluid level  No congestive failure  Consolidation at the left apex with volume loss of the left lung consistent with prior surgery  There is a consolidation within the medial right lung base seen posteriorly  Osseous structures appear within normal limits for patient age  Impression: Postop change within the left chest with focal consolidation at the apex  Consolidation within the posterior right lung base consistent with patient's known mass  See separate CT scan  Workstation performed: [id number]     Fl Barium Swallow    Result Date: 7/30/2019  Narrative: BARIUM SWALLOW-ESOPHAGRAM INDICATION:   R13 10: Dysphagia, unspecified  Upper neck pain COMPARISON:  None IMAGES:  10 FLUOROSCOPY TIME:   2 2 MINUTES  TECHNIQUE: The patient was given effervescent granules and barium by mouth and images of the esophagus were obtained  FINDINGS: The esophagus is normal in caliber  Esophageal motility is normal and emptying of contrast from the esophagus is prompt  No mucosal lesion, ulceration or evidence of fold thickening is seen  Gastroesophageal reflux was not determined  There is no hiatal hernia       Impression: Unremarkable esophagram  Workstation performed: [id number]     Cta Ed Chest Pe Study    Result Date: 8/27/2019  Narrative: CTA - CHEST WITH IV CONTRAST - PULMONARY ANGIOGRAM INDICATION:   ?PE  COMPARISON: None  TECHNIQUE: CTA examination of the chest was performed using angiographic technique according to a protocol specifically tailored to evaluate for pulmonary embolism  Axial, sagittal, and coronal 2D reformatted images were created from the source data and  submitted for interpretation  In addition, coronal 3D MIP postprocessing was performed on the acquisition scanner  Radiation dose length product (DLP) for this visit:  573 mGy-cm   This examination, like all CT scans performed in the [facility], was performed utilizing techniques to minimize radiation dose exposure, including the use of iterative reconstruction and automated exposure control  IV Contrast:  90 mL of iodixanol (VISIPAQUE)  FINDINGS: PULMONARY ARTERIAL TREE:  Suboptimal opacification of the pulmonary arteries and respiratory motion artifact  No evidence of main pulmonary artery embolus  Distal or branches are not well evaluated  LUNGS:  Right infrahilar mass in the superior segment of the right lower lobe measures 6 3 x 4 5 x 4 2 cm compared to 8 2 x 5 3 x 5 2 cm on the prior exam   Surrounding airspace opacities in the right lower lobe, not significant changed since the prior exam   Stable 6 mm subpleural nodule versus scarring in the anterior aspect of the right upper lobe series 4, image 66  Other previously demonstrated pulmonary nodules in the right upper and middle lobes are no longer visualized  Stable volume loss in the left lung from partial pneumonectomy  Nodule versus nodular scarring in the left upper lung adjacent to the mediastinum measures 1 8 cm, slightly smaller  Small amount of secretions in the proximal right mainstem bronchus  Stable severe narrowing of right lower lobe proximal segmental branches  PLEURA:  No effusion  HEART/GREAT VESSELS:  Normal heart size    No thoracic aortic aneurysm or dissection  MEDIASTINUM AND ZABRINA:  Unremarkable  CHEST WALL AND LOWER NECK:   Unremarkable  VISUALIZED STRUCTURES IN THE UPPER ABDOMEN:  Unremarkable  OSSEOUS STRUCTURES:  No acute fracture or destructive osseous lesion  Impression: 1  Limited evaluation of the pulmonary arteries  No evidence of main renal artery embolus  Distal order branches are not well evaluated  2   Right infrahilar and lower lobe mass is stable to slightly smaller, measuring 6 3 x 4 5 x 4 2 cm  Some pulmonary nodules in the bilateral lungs have resolved  Others are stable to slightly smaller  3   Stable severe narrowing of right lower lobe segmental pulmonary arteries and postobstructive pneumonia  Workstation performed: [id number]     Imaging Reports Reviewed Today Include:   Imaging Personally Reviewed by Myself Includes:    Procedure: Xr Chest 2 Views    Result Date: 8/28/2019  Narrative: CHEST INDICATION:   Tachycardia  COMPARISON:  None EXAM PERFORMED/VIEWS:  XR CHEST PA & LATERAL  The frontal view was performed utilizing dual energy radiographic technique  Images: 4 FINDINGS:  Right chest wall Port-A-Cath projects appropriately  Cardiomediastinal silhouette appears unremarkable  Elevation of the left hemidiaphragm  Gaseous distention of the stomach below the hemidiaphragm with a large air-fluid level  No congestive failure  Consolidation at the left apex with volume loss of the left lung consistent with prior surgery  There is a consolidation within the medial right lung base seen posteriorly  Osseous structures appear within normal limits for patient age  Impression: Postop change within the left chest with focal consolidation at the apex  Consolidation within the posterior right lung base consistent with patient's known mass  See separate CT scan   Workstation performed: [id number]     Procedure: Cta Ed Chest Pe Study    Result Date: 8/27/2019  Narrative: CTA - CHEST WITH IV CONTRAST - PULMONARY ANGIOGRAM INDICATION: ? PE  COMPARISON: None  TECHNIQUE: CTA examination of the chest was performed using angiographic technique according to a protocol specifically tailored to evaluate for pulmonary embolism  Axial, sagittal, and coronal 2D reformatted images were created from the source data and  submitted for interpretation  In addition, coronal 3D MIP postprocessing was performed on the acquisition scanner  Radiation dose length product (DLP) for this visit:  573 mGy-cm   This examination, like all CT scans performed in the [facility], was performed utilizing techniques to minimize radiation dose exposure, including the use of iterative reconstruction and automated exposure control  IV Contrast:  90 mL of iodixanol (VISIPAQUE)  FINDINGS: PULMONARY ARTERIAL TREE:  Suboptimal opacification of the pulmonary arteries and respiratory motion artifact  No evidence of main pulmonary artery embolus  Distal or branches are not well evaluated  LUNGS:  Right infrahilar mass in the superior segment of the right lower lobe measures 6 3 x 4 5 x 4 2 cm compared to 8 2 x 5 3 x 5 2 cm on the prior exam   Surrounding airspace opacities in the right lower lobe, not significant changed since the prior exam   Stable 6 mm subpleural nodule versus scarring in the anterior aspect of the right upper lobe series 4, image 66  Other previously demonstrated pulmonary nodules in the right upper and middle lobes are no longer visualized  Stable volume loss in the left lung from partial pneumonectomy  Nodule versus nodular scarring in the left upper lung adjacent to the mediastinum measures 1 8 cm, slightly smaller  Small amount of secretions in the proximal right mainstem bronchus  Stable severe narrowing of right lower lobe proximal segmental branches  PLEURA:  No effusion  HEART/GREAT VESSELS:  Normal heart size  No thoracic aortic aneurysm or dissection  MEDIASTINUM AND ZABRINA:  Unremarkable  CHEST WALL AND LOWER NECK:   Unremarkable  VISUALIZED STRUCTURES IN THE UPPER ABDOMEN:  Unremarkable  OSSEOUS STRUCTURES:  No acute fracture or destructive osseous lesion  Impression: 1  Limited evaluation of the pulmonary arteries  No evidence of main renal artery embolus  Distal order branches are not well evaluated  2   Right infrahilar and lower lobe mass is stable to slightly smaller, measuring 6 3 x 4 5 x 4 2 cm  Some pulmonary nodules in the bilateral lungs have resolved  Others are stable to slightly smaller  3   Stable severe narrowing of right lower lobe segmental pulmonary arteries and postobstructive pneumonia  Workstation performed: [id number]        Recent Cultures (last 7 days):     Results from last 7 days   Lab Units 08/27/19  2353 08/27/19  2352 08/27/19  1649 08/27/19  1642   BLOOD CULTURE   --   --  No Growth at 24 hrs  No Growth at 24 hrs     SPUTUM CULTURE   --  Culture too young- will reincubate  --   --    GRAM STAIN RESULT   --  2+ Polys*  No polys seen*  2+ Gram positive cocci in pairs*  --   --    LEGIONELLA URINARY ANTIGEN  Negative  --   --   --        Last 24 Hours Medication List:     Current Facility-Administered Medications:  acetaminophen 650 mg Oral Q6H PRN Wolf Dilolay, PA-C    albuterol 2 puff Inhalation Q4H PRN Wolf Dijorge luis, PA-C    cefepime 2,000 mg Intravenous Q12H Tan Torres PA-C Last Rate: 2,000 mg (08/29/19 2103)   lidocaine 1 patch Topical Daily Luz Blandon MD    melatonin 6 mg Oral HS Wolf Dijorge luis, PA-C    metroNIDAZOLE 500 mg Oral [facility] Anna Vizcarra MD    ondansetron 4 mg Intravenous Q6H PRN Tan Young, PA-C    sodium chloride 75 mL/hr Intravenous Continuous Wolf Ditty, PA-C Last Rate: 75 mL/hr (08/29/19 0229)     Facility-Administered Medications Ordered in Other Encounters:  [MAR Hold] sodium chloride 20 mL/hr Intravenous Once PRN Mark Layton MD Last Rate: Stopped (08/27/19 1340)        Today, Patient Was Seen By: Ade Hager MD    ** Please Note: Dragon 360 Dictation voice to text software may have been used in the creation of this document   **

## 2019-08-29 NOTE — PROGRESS NOTES
NEPHROLOGY PROGRESS NOTE   Ruddy Caballero 63 y o  male MRN: [id number]  Unit/Bed#: E2 -01 Encounter: [id number]      ASSESSMENT & PLAN:    1  Acute kidney injury likely secondary to ATN in the setting neutropenic fevers and hypotension  - uric acid not elevated, LDH slightly, haptoglobin pending, phosphorus stable creatinine improving, less likely tumor lysis and  -no schistocytes on peripheral smear, platelet count is 33, but creatinine is did have hematuria on urinalysis, Oncology following-hemolytic uremic syndrome is low on the differential as well  -urine eosinophils 0%, CPK not high so less likely interstitial nephritis or rhabdo myolysis  -on antibiotics  -good urine output will change normal saline to isolate at 75 cc an hour if continues to improve and tolerating p o  Intake will potentially decrease or discontinue tomorrow    2  Hypotension  -blood pressure is stable      3  Thrombocytopenia  -being evaluated by Oncology  -status post platelet transfusion    4   Colon cancer with metastatic disease to the lungs  -ongoing oncology treatment    SUBJECTIVE:    Patient was seen today feeling stable no chest pain or shortness of breath no fevers or chills    OBJECTIVE:  Current Weight: Weight - Scale: 101 kg (222 lb 14 2 oz)  Vitals:    08/29/19 1355   BP:    Pulse:    Resp:    Temp:    SpO2: 96%       Intake/Output Summary (Last 24 hours) at 8/29/2019 1402  Last data filed at 8/29/2019 1305  Gross per 24 hour   Intake 4603 05 ml   Output 1500 ml   Net 3103 05 ml       General: conscious, cooperative, in not acute distress, currently receiving breathing treatment  Eyes: conjunctivae pink, anicteric sclerae  ENT: lips and mucous membranes moist  Neck: supple, no JVD  Chest: clear breath sounds bilateral, no crackles, ronchus or wheezings  CVS: distinct S1 & S2, normal rate, regular rhythm  Abdomen: soft, non-tender, non-distended, normoactive bowel sounds  Extremities: no edema of both legs  Skin: no rash  Neuro: awake, alert, oriented        Medications:    Current Facility-Administered Medications:     acetaminophen (TYLENOL) tablet 650 mg, 650 mg, Oral, Q6H PRN, Karen Young PA-C, 650 mg at 08/28/19 1739    albuterol (PROVENTIL HFA,VENTOLIN HFA) inhaler 2 puff, 2 puff, Inhalation, Q4H PRN, Sammy Ramirez PA-C, 2 puff at 08/28/19 1740    cefepime (MAXIPIME) 2,000 mg in dextrose 5 % 50 mL IVPB, 2,000 mg, Intravenous, Q12H, Karen Young PA-C, Last Rate: 100 mL/hr at 08/29/19 0648, 2,000 mg at 08/29/19 8125    ipratropium (ATROVENT) 0 02 % inhalation solution 0 5 mg, 0 5 mg, Nebulization, TID, Melina Banegas MD, 0 5 mg at 08/29/19 1355    levalbuterol (Norvel Peaks) inhalation solution 1 25 mg, 1 25 mg, Nebulization, TID, Melina Banegas MD, 1 25 mg at 08/29/19 1355    lidocaine (LIDODERM) 5 % patch 1 patch, 1 patch, Topical, Daily, Luz Blandon MD, 1 patch at 08/28/19 2043    melatonin tablet 6 mg, 6 mg, Oral, HS, Karen Young PA-C, 6 mg at 08/28/19 2204    metroNIDAZOLE (FLAGYL) tablet 500 mg, 500 mg, Oral, Q8H [address], Walter Aldana MD, 500 mg at 08/29/19 0630    [facility] injection 4 mg, 4 mg, Intravenous, Q6H PRN, Karen Young PA-C, 4 mg at 08/27/19 2242    sodium chloride 0 9 % infusion, 50 mL/hr, Intravenous, Continuous, Melina Banegas MD, Last Rate: 50 mL/hr at 08/29/19 1304, 50 mL/hr at 08/29/19 1304    Facility-Administered Medications Ordered in Other Encounters:     [MAR Hold] sodium chloride 0 9 % infusion, 20 mL/hr, Intravenous, Once PRN, Shruti Sanchez MD, Stopped at 08/27/19 1340    Invasive Devices:      Lab Results:   Results from last 7 days   Lab Units 08/29/19  0611 08/28/19  0604 08/27/19  1854 08/27/19  1649 08/27/19  1642 08/26/19  0757   WBC Thousand/uL 1 84* 3 64*  --   --  0 81* 3 28*   HEMOGLOBIN g/dL 11 8* 11 8*  --   --  13 0 14 3   HEMATOCRIT % 33 2* 34 1*  --   --  37 0 41 9   PLATELETS Thousands/uL 18* 31*  --   --  74* 109* POTASSIUM mmol/L 4 1 4 3  --   --  3 6 4 1   CHLORIDE mmol/L 107 106  --   --  105 106   CO2 mmol/L 25 25  --   --  28 30   BUN mg/dL 29* 24  --   --  10 7   CREATININE mg/dL 1 88* 2 06*  --   --  1 03 0 85   CALCIUM mg/dL 8 1* 7 7*  --   --  8 5 9 1   PHOSPHORUS mg/dL  --  2 9  --   --   --   --    ALK PHOS U/L  --   --   --   --  76 73   ALT U/L  --   --   --   --  18 18   AST U/L  --   --   --   --  58* 16   BLOOD CULTURE   --   --   --  No Growth at 24 hrs  No Growth at 24 hrs    --    LEUKOCYTES UA   --   --  Negative  --   --   --    BLOOD UA   --   --  Large*  --   --   --        Previous work up:  Please see previous notes

## 2019-08-29 NOTE — PROGRESS NOTES
Progress Note - Infectious Disease   Silvestre Lilly 63 y o  male MRN: [id number]  Unit/Bed#: E2 -01 Encounter: [id number]      Impression/Recommendations:  1  Sepsis, POA, presented with fever and leukopenia/neutropenia  Source is most likely postobstructive pneumonia  Consider possible chemotherapy reaction but patient has not had similar reaction with the same chemotherapy regimen  In addition, procalcitonin level is highly elevated  It would be best to treat patient for pneumonia  Patient is clinically much improved  Temperature is down  Procalcitonin decreasing  Admission blood cultures remain negative  Antibiotic plan as in below  Monitor temperature/WBC  Monitor hemodynamics  Follow-up on blood cultures  Monitor procalcitonin  Recheck in a m        2  Febrile neutropenia  Source is most likely postobstructive pneumonia  Patient has PAC in place  Therefore, we need to consider the possibility of PAC related bacteremia  Thus far, blood cultures are negative  Antibiotic plan as in below  Monitor WBC/ANC      3  Probable postobstructive pneumonia  Given extensive healthcare contact, patient does have risk for resistant GNR  He is low risk for MRSA pneumonia  Atypical infection not likely in this scenario  Patient is clinically much improved  Continue IV cefepime/Flagyl  Monitor respiratory symptoms  Monitor procalcitonin  Likely transition to p o  antibiotic in next 24 hours, if patient continues to improve clinically  Treat x7 days total      4  AK   Etiology unclear  Creatinine improving with IV fluid  Antibiotic dosages adjusted accordingly  Monitor creatinine      5  Acute hypoxic respiratory failure, most likely secondary to pneumonia above  Respiratory status improved  O2 support per primary service      6  Elevated procalcitonin, consistent with postobstructive pneumonia above  Procalcitonin decreasing with treatment    Monitor procalcitonin      7  Metastatic colon cancer, currently on chemotherapy  Last chemotherapy dose was 2 days ago  Patient is stable      Discussed with patient in detail regarding the above plan  Discussed with Dr Judit Mccormick from [facility] service  Antibiotics:  Cefepime/Flagyl # 2    Subjective:  Patient feels better today  Dyspnea and cough improved  Cough productive of clear sputum  Temperature is down  No further chills  He is tolerating antibiotics well  No nausea, vomiting or diarrhea  Objective:  Vitals:  Temp:  [98 3 °F (36 8 °C)-99 5 °F (37 5 °C)] 98 6 °F (37 °C)  HR:  [] 78  Resp:  [16-20] 20  BP: (102-118)/(67-75) 108/75  SpO2:  [96 %] 96 %  Temp (24hrs), Av 9 °F (37 2 °C), Min:98 3 °F (36 8 °C), Max:99 5 °F (37 5 °C)  Current: Temperature: 98 6 °F (37 °C)    Physical Exam:     General: Awake, alert, cooperative, no distress  Neck:  Supple  No mass  No lymphadenopathy  Lungs: Expansion symmetric, no rales, no wheezing, respirations unlabored  Heart:  Regular rate and rhythm, S1 and S2 normal, no murmur  Abdomen: Soft, nondistended, non-tender, bowel sounds active all four quadrants,        no masses, no organomegaly  Extremities: No edema  No erythema/warmth  No ulcer  Nontender to palpation  Skin:  No rash  Neuro: Moves all extremities  Invasive Devices     Central Venous Catheter Line            Port A Cath Right Subclavian -- days                Labs studies:   I have personally reviewed pertinent labs    Results from last 7 days   Lab Units 19  0619  0604 19  1642 19  0757   POTASSIUM mmol/L 4 1 4 3 3 6 4 1   CHLORIDE mmol/L 107 106 105 106   CO2 mmol/L 25 25 28 30   BUN mg/dL 29* 24 10 7   CREATININE mg/dL 1 88* 2 06* 1 03 0 85   EGFR ml/min/1 73sq m 39 35 80 97   CALCIUM mg/dL 8 1* 7 7* 8 5 9 1   AST U/L  --   --  58* 16   ALT U/L  --   --  18 18   ALK PHOS U/L  --   --  76 73     Results from last 7 days   Lab Units 19  0611 19  0604 19  1642   WBC Thousand/uL 1 84* 3 64* 0 81*   HEMOGLOBIN g/dL 11 8* 11 8* 13 0   PLATELETS Thousands/uL 18* 31* 74*     Results from last 7 days   Lab Units 08/27/19  2353 08/27/19  2352 08/27/19  1649 08/27/19  1642   BLOOD CULTURE   --   --  No Growth at 24 hrs  No Growth at 24 hrs  SPUTUM CULTURE   --  Culture too young- will reincubate  --   --    GRAM STAIN RESULT   --  2+ Polys*  No polys seen*  2+ Gram positive cocci in pairs*  --   --    LEGIONELLA URINARY ANTIGEN  Negative  --   --   --        Imaging Studies:   I have personally reviewed pertinent imaging study reports and images in PACS  EKG, Pathology, and Other Studies:   I have personally reviewed pertinent reports

## 2019-08-30 VITALS
SYSTOLIC BLOOD PRESSURE: 115 MMHG | HEIGHT: 77 IN | OXYGEN SATURATION: 95 % | RESPIRATION RATE: 17 BRPM | WEIGHT: 222.88 LBS | TEMPERATURE: 97.9 F | BODY MASS INDEX: 26.32 KG/M2 | HEART RATE: 67 BPM | DIASTOLIC BLOOD PRESSURE: 72 MMHG

## 2019-08-30 LAB
ABO GROUP BLD BPU: NORMAL
ANION GAP SERPL CALCULATED.3IONS-SCNC: 6 MMOL/L (ref 4–13)
BASOPHILS # BLD AUTO: 0.01 THOUSANDS/ΜL (ref 0–0.1)
BASOPHILS NFR BLD AUTO: 1 % (ref 0–1)
BPU ID: NORMAL
BUN SERPL-MCNC: 24 MG/DL (ref 5–25)
CALCIUM SERPL-MCNC: 8.9 MG/DL (ref 8.3–10.1)
CHLORIDE SERPL-SCNC: 106 MMOL/L (ref 100–108)
CO2 SERPL-SCNC: 29 MMOL/L (ref 21–32)
CREAT SERPL-MCNC: 1.61 MG/DL (ref 0.6–1.3)
EOSINOPHIL # BLD AUTO: 0.27 THOUSAND/ΜL (ref 0–0.61)
EOSINOPHIL NFR BLD AUTO: 16 % (ref 0–6)
ERYTHROCYTE [DISTWIDTH] IN BLOOD BY AUTOMATED COUNT: 12.7 % (ref 11.6–15.1)
GFR SERPL CREATININE-BSD FRML MDRD: 47 ML/MIN/1.73SQ M
GLUCOSE SERPL-MCNC: 85 MG/DL (ref 65–140)
HAPTOGLOB SERPL-MCNC: 35 MG/DL (ref 34–200)
HCT VFR BLD AUTO: 33.4 % (ref 36.5–49.3)
HGB BLD-MCNC: 11.6 G/DL (ref 12–17)
IMM GRANULOCYTES # BLD AUTO: 0.11 THOUSAND/UL (ref 0–0.2)
IMM GRANULOCYTES NFR BLD AUTO: 6 % (ref 0–2)
LYMPHOCYTES # BLD AUTO: 0.12 THOUSANDS/ΜL (ref 0.6–4.47)
LYMPHOCYTES NFR BLD AUTO: 7 % (ref 14–44)
MCH RBC QN AUTO: 34.4 PG (ref 26.8–34.3)
MCHC RBC AUTO-ENTMCNC: 34.7 G/DL (ref 31.4–37.4)
MCV RBC AUTO: 99 FL (ref 82–98)
MONOCYTES # BLD AUTO: 0.03 THOUSAND/ΜL (ref 0.17–1.22)
MONOCYTES NFR BLD AUTO: 2 % (ref 4–12)
NEUTROPHILS # BLD AUTO: 1.18 THOUSANDS/ΜL (ref 1.85–7.62)
NEUTS SEG NFR BLD AUTO: 68 % (ref 43–75)
NRBC BLD AUTO-RTO: 0 /100 WBCS
PLATELET # BLD AUTO: 20 THOUSANDS/UL (ref 149–390)
PMV BLD AUTO: 11.7 FL (ref 8.9–12.7)
POTASSIUM SERPL-SCNC: 4.2 MMOL/L (ref 3.5–5.3)
PROCALCITONIN SERPL-MCNC: 13.85 NG/ML
RBC # BLD AUTO: 3.37 MILLION/UL (ref 3.88–5.62)
SODIUM SERPL-SCNC: 141 MMOL/L (ref 136–145)
UNIT DISPENSE STATUS: NORMAL
UNIT PRODUCT CODE: NORMAL
UNIT RH: NORMAL
WBC # BLD AUTO: 1.72 THOUSAND/UL (ref 4.31–10.16)

## 2019-08-30 PROCEDURE — 84145 PROCALCITONIN (PCT): CPT | Performed by: INTERNAL MEDICINE

## 2019-08-30 PROCEDURE — 94640 AIRWAY INHALATION TREATMENT: CPT

## 2019-08-30 PROCEDURE — 99232 SBSQ HOSP IP/OBS MODERATE 35: CPT | Performed by: INTERNAL MEDICINE

## 2019-08-30 PROCEDURE — 94760 N-INVAS EAR/PLS OXIMETRY 1: CPT

## 2019-08-30 PROCEDURE — 80048 BASIC METABOLIC PNL TOTAL CA: CPT | Performed by: INTERNAL MEDICINE

## 2019-08-30 PROCEDURE — 99239 HOSP IP/OBS DSCHRG MGMT >30: CPT | Performed by: INTERNAL MEDICINE

## 2019-08-30 PROCEDURE — 85025 COMPLETE CBC W/AUTO DIFF WBC: CPT | Performed by: INTERNAL MEDICINE

## 2019-08-30 RX ORDER — METRONIDAZOLE 500 MG/1
500 TABLET ORAL EVERY 8 HOURS SCHEDULED
Qty: 15 TABLET | Refills: 0 | Status: SHIPPED | OUTPATIENT
Start: 2019-08-30 | End: 2019-09-04

## 2019-08-30 RX ORDER — LEVOFLOXACIN 250 MG/1
500 TABLET ORAL EVERY 24 HOURS
Status: DISCONTINUED | OUTPATIENT
Start: 2019-08-30 | End: 2019-08-30 | Stop reason: HOSPADM

## 2019-08-30 RX ORDER — LEVOFLOXACIN 500 MG/1
500 TABLET, FILM COATED ORAL EVERY 24 HOURS
Qty: 5 TABLET | Refills: 0 | Status: SHIPPED | OUTPATIENT
Start: 2019-08-30 | End: 2019-09-04

## 2019-08-30 RX ADMIN — LEVOFLOXACIN 500 MG: 250 TABLET, FILM COATED ORAL at 13:27

## 2019-08-30 RX ADMIN — METRONIDAZOLE 500 MG: 500 TABLET, FILM COATED ORAL at 13:27

## 2019-08-30 RX ADMIN — LEVALBUTEROL 1.25 MG: 1.25 SOLUTION, CONCENTRATE RESPIRATORY (INHALATION) at 08:07

## 2019-08-30 RX ADMIN — IPRATROPIUM BROMIDE 0.5 MG: 0.5 SOLUTION RESPIRATORY (INHALATION) at 08:07

## 2019-08-30 RX ADMIN — IPRATROPIUM BROMIDE 0.5 MG: 0.5 SOLUTION RESPIRATORY (INHALATION) at 13:10

## 2019-08-30 RX ADMIN — LEVALBUTEROL 1.25 MG: 1.25 SOLUTION, CONCENTRATE RESPIRATORY (INHALATION) at 13:10

## 2019-08-30 RX ADMIN — ONDANSETRON 4 MG: 2 INJECTION INTRAMUSCULAR; INTRAVENOUS at 10:17

## 2019-08-30 RX ADMIN — METRONIDAZOLE 500 MG: 500 TABLET, FILM COATED ORAL at 06:22

## 2019-08-30 RX ADMIN — CEFEPIME HYDROCHLORIDE 2000 MG: 2 INJECTION, POWDER, FOR SOLUTION INTRAVENOUS at 08:59

## 2019-08-30 NOTE — SOCIAL WORK
CM met with pt at bedside to plan for d/c  Pt lives with his wife in a ranch style home  Pt does not have a PCP but he states he does not want one and uses his oncologist, Dr Luke Party as a PCP  Pt was independent PTA  He denied any d/c needs  CM following as needed

## 2019-08-30 NOTE — PLAN OF CARE
Problem: Potential for Falls  Goal: Patient will remain free of falls  Description  INTERVENTIONS:  - Assess patient frequently for physical needs  -  Identify cognitive and physical deficits and behaviors that affect risk of falls    -  [place] fall precautions as indicated by assessment   - Educate patient/family on patient safety including physical limitations  - Instruct patient to call for assistance with activity based on assessment  - Modify environment to reduce risk of injury  - Consider OT/PT consult to assist with strengthening/mobility  Outcome: Progressing     Problem: Nutrition/Hydration-ADULT  Goal: Nutrient/Hydration intake appropriate for improving, restoring or maintaining nutritional needs  Description  INTERVENTIONS:  - Monitor oral intake, urinary output, labs, and treatment plans  - Assess nutrition and hydration status and recommend course of action  - Evaluate amount of meals eaten  - Recommend/ encourage appropriate diets, oral nutritional supplements, and vitamin/mineral supplements  - Assess need for intravenous fluids  - Provide specific nutrition/hydration education as appropriate  - Include patient in decisions related to nutrition   Outcome: Progressing     Problem: PAIN - ADULT  Goal: Verbalizes/displays adequate comfort level or baseline comfort level  Description  Interventions:  - Encourage patient to monitor pain and request assistance  - Assess pain using appropriate pain scale  - Administer analgesics based on type and severity of pain and evaluate response  - Implement non-pharmacological measures as appropriate and evaluate response  - Consider cultural and social influences on pain and pain management  - Notify physician/advanced practitioner if interventions unsuccessful or patient reports new pain  Outcome: Progressing     Problem: INFECTION - ADULT  Goal: Absence or prevention of progression during hospitalization  Description  INTERVENTIONS:  - Assess and monitor for signs and symptoms of infection while patient is neutropenic  - Monitor lab/diagnostic results  - Monitor all insertion sites,  - Monitor endotracheal if appropriate and nasal secretions for changes in amount and color  - [place] appropriate cooling/warming therapies per order  - Administer medications as ordered  - Instruct and encourage patient and family to use good hand hygiene technique  - Identify and instruct in appropriate neutropenic precautions for identified infection/condition   Outcome: Progressing     Problem: DISCHARGE PLANNING  Goal: Discharge to home or other facility with appropriate resources  Description  INTERVENTIONS:  - Identify barriers to discharge w/patient and caregiver  - Arrange for needed discharge resources and transportation as appropriate  - Identify discharge learning needs   - Refer to Case Management Department for coordinating discharge planning if the patient needs post-hospital services based on physician/advanced practitioner order    Outcome: Progressing     Problem: Knowledge Deficit  Goal: Patient/family/caregiver demonstrates understanding of disease process, treatment plan, medications, and discharge instructions  Description  Complete learning assessment and assess knowledge base    Interventions:  - Provide teaching at level of understanding  - Provide teaching via preferred learning methods  Outcome: Progressing     Problem: COPING  Goal: Pt/Family able to verbalize concerns and demonstrate effective coping strategies  Description  INTERVENTIONS:  - Assist patient/family to identify coping skills, available support systems and cultural and spiritual values  - Provide emotional support, including active listening and acknowledgement of concerns of patient and caregivers  - Reduce environmental stimuli, as able  - Provide patient education  - Assess for spiritual pain/suffering and initiate spiritual care, including notification of Pastoral Care or luna based community as needed  - Assess effectiveness of coping strategies  Outcome: Progressing     Problem: METABOLIC, FLUID AND ELECTROLYTES - ADULT  Goal: Electrolytes maintained within normal limits  Description  INTERVENTIONS:  - Monitor labs and assess patient for signs and symptoms of electrolyte imbalances  - Administer electrolyte replacement as ordered  - Monitor response to electrolyte replacements, including repeat lab results as appropriate  - Instruct patient on fluid and nutrition as appropriate  Outcome: Progressing

## 2019-08-30 NOTE — PROGRESS NOTES
NEPHROLOGY PROGRESS NOTE   Hay Gresham 63 y o  male MRN: [id number]  Unit/Bed#: E2 -01 Encounter: [id number]      ASSESSMENT & PLAN:    1  Acute kidney injury likely secondary to ATN in the setting neutropenic fevers and hypotension, postobstructive pneumonia  - uric acid not elevated, LDH slightly up, haptoglobin 35 stable, phosphorus stable creatinine improving, less likely tumor lysis syndrome  -no schistocytes on peripheral smear, platelet count is 33, but creatinine is did have hematuria on urinalysis, Oncology following-hemolytic uremic syndrome is low on the differential as well  -urine eosinophils 0%, CPK not high so less likely interstitial nephritis or rhabdo myolysis  -on antibiotics  -will continue isolate, creatinine continues to trend downward  -currently on IV antibiotics    2  Hypotension  -blood pressure is stable  -115/72 to 132/77    3  Thrombocytopenia  -being evaluated by Oncology  -status post platelet transfusion    4  Colon cancer with metastatic disease to the lungs  -ongoing oncology treatment    SUBJECTIVE:    Patient seen today, ambulating breathing has improved tolerating p o   Intake urine output robust    OBJECTIVE:  Current Weight: Weight - Scale: 101 kg (222 lb 14 2 oz)  Vitals:    08/30/19 0808   BP:    Pulse:    Resp:    Temp:    SpO2: 96%       Intake/Output Summary (Last 24 hours) at 8/30/2019 1007  Last data filed at 8/30/2019 0600  Gross per 24 hour   Intake 3233 83 ml   Output 1050 ml   Net 2183 83 ml       General: conscious, cooperative, in not acute distress  Eyes: conjunctivae pink, anicteric sclerae  ENT: lips and mucous membranes moist  Neck: supple, no JVD  Chest: clear breath sounds bilateral, no crackles, ronchus or wheezings  CVS: distinct S1 & S2, normal rate, regular rhythm  Abdomen: soft, non-tender, non-distended, normoactive bowel sounds  Extremities: no edema of both legs  Skin: no rash  Neuro: awake, alert, oriented      Medications:    Current Facility-Administered Medications:     acetaminophen (TYLENOL) tablet 650 mg, 650 mg, Oral, Q6H PRN, Adriana Young PA-C, 650 mg at 08/29/19 2106    albuterol (PROVENTIL HFA,VENTOLIN HFA) inhaler 2 puff, 2 puff, Inhalation, Q4H PRN, TR Zayas-C, 2 puff at 08/28/19 1740    cefepime (MAXIPIME) 2,000 mg in dextrose 5 % 50 mL IVPB, 2,000 mg, Intravenous, Q12H, TR Zayas-C, Last Rate: 100 mL/hr at 08/30/19 0859, 2,000 mg at 08/30/19 0859    ipratropium (ATROVENT) 0 02 % inhalation solution 0 5 mg, 0 5 mg, Nebulization, TID, Sridhar Darling MD, 0 5 mg at 08/30/19 0807    levalbuterol [facility]) inhalation solution 1 25 mg, 1 25 mg, Nebulization, TID, Sridhar Darling MD, 1 25 mg at 08/30/19 0807    lidocaine (LIDODERM) 5 % patch 1 patch, 1 patch, Topical, Daily, Marc Nixon MD, Stopped at 08/29/19 2112    melatonin tablet 6 mg, 6 mg, Oral, HS, TR Olivas-C, 6 mg at 08/29/19 2212    metroNIDAZOLE (FLAGYL) tablet 500 mg, 500 mg, Oral, Q8H [address], Bebeto Arguello MD, 500 mg at 08/30/19 0622    ondansetron [facility]) injection 4 mg, 4 mg, Intravenous, Q6H PRN, TR Olivas-GABE, 4 mg at 08/27/19 2242    sodium chloride 0 9 % infusion, 50 mL/hr, Intravenous, Continuous, Sridhar Darling MD, Last Rate: 50 mL/hr at 08/29/19 1906, 50 mL/hr at 08/29/19 1906    Invasive Devices:      Lab Results:   Results from last 7 days   Lab Units 08/30/19  0636 08/29/19  0611 08/28/19  0604 08/27/19  1854 08/27/19  1649 08/27/19  1642 08/26/19  0757   WBC Thousand/uL 1 72* 1 84* 3 64*  --   --  0 81* 3 28*   HEMOGLOBIN g/dL 11 6* 11 8* 11 8*  --   --  13 0 14 3   HEMATOCRIT % 33 4* 33 2* 34 1*  --   --  37 0 41 9   PLATELETS Thousands/uL 20* 18* 31*  --   --  74* 109*   POTASSIUM mmol/L 4 2 4 1 4 3  --   --  3 6 4 1   CHLORIDE mmol/L 106 107 106  --   --  105 106   CO2 mmol/L 29 25 25  --   --  28 30   BUN mg/dL 24 29* 24  --   --  10 7   CREATININE mg/dL 1 61* 1 88* 2 06*  --   --  1 03 0  85   CALCIUM mg/dL 8 9 8 1* 7 7*  --   --  8 5 9 1   PHOSPHORUS mg/dL  --   --  2 9  --   --   --   --    ALK PHOS U/L  --   --   --   --   --  76 73   ALT U/L  --   --   --   --   --  18 18   AST U/L  --   --   --   --   --  58* 16   BLOOD CULTURE   --   --   --   --  No Growth at 48 hrs  No Growth at 48 hrs    --    LEUKOCYTES UA   --   --   --  Negative  --   --   --    BLOOD UA   --   --   --  Large*  --   --   --        Previous work up:  Please see previous notes

## 2019-08-30 NOTE — PROGRESS NOTES
Progress Note - Infectious Disease   More Lyman 63 y o  male MRN: [id number]  Unit/Bed#: E2 -01 Encounter: [id number]      Impression/Recommendations:  1  Sepsis, POA, presented with fever and leukopenia/neutropenia   Source is most likely postobstructive pneumonia   Consider possible chemotherapy reaction but patient has not had similar reaction with the same chemotherapy regimen   In addition, procalcitonin level is highly elevated   It would be best to treat patient for pneumonia   Patient is clinically much improved  Temperature is down  Procalcitonin decreasing  Admission blood cultures remain negative  Antibiotic plan as in below  Monitor temperature/WBC  Monitor hemodynamics  Follow-up on blood cultures  Monitor procalcitonin      2  Febrile neutropenia   Source is most likely postobstructive pneumonia   Patient has PAC in place   Therefore, we need to consider the possibility of PAC related bacteremia  However, negative blood cultures make it unlikely  Temperature is down with antibiotic  Patient remains leukopenic but ANC remains > 1000  Antibiotic plan as in below  Monitor WBC/ANC      3  Probable postobstructive pneumonia   Given extensive healthcare contact, patient does have risk for resistant Johnella Ever is low risk for MRSA pneumonia   Atypical infection not likely in this scenario  Patient is clinically much improved  Change antibiotic to p o  Levaquin/Flagyl  Monitor respiratory symptoms  Treat x7 days total      4  RICHY  Etiology unclear  Creatinine improving with IV fluid  Antibiotic dosages adjusted accordingly  Monitor creatinine      5  Acute hypoxic respiratory failure, most likely secondary to pneumonia above   Respiratory status improved  O2 support per primary service      6  Elevated procalcitonin, consistent with postobstructive pneumonia above    Procalcitonin decreasing with treatment    Monitor procalcitonin      7  Metastatic colon cancer, currently on chemotherapy   Last chemotherapy dose was  the day of admission  Patient is stable      Discussed with patient and his family in detail regarding the above plan  Discussed with Dr Jeaneth Luu [facility]  Okay for discharge from ID viewpoint      Antibiotics:  Cefepime/Flagyl # 3     Subjective:  Patient feels better every day  Dyspnea and cough continue to improve  Cough productive of clear sputum  Temperature stays down  No further chills  He is tolerating antibiotics well  No nausea, vomiting or diarrhea  Objective:  Vitals:  Temp:  [97 9 °F (36 6 °C)-98 8 °F (37 1 °C)] 97 9 °F (36 6 °C)  HR:  [67-78] 67  Resp:  [17-20] 17  BP: (113-132)/(72-77) 115/72  SpO2:  [96 %-98 %] 96 %  Temp (24hrs), Av 3 °F (36 8 °C), Min:97 9 °F (36 6 °C), Max:98 8 °F (37 1 °C)  Current: Temperature: 97 9 °F (36 6 °C)    Physical Exam:     General: Awake, alert, cooperative, no distress  Neck:  Supple  No mass  No lymphadenopathy  Lungs: Expansion symmetric, no rales, no wheezing, respirations unlabored  Heart:  Regular rate and rhythm, S1 and S2 normal, no murmur  Abdomen: Soft, nondistended, non-tender, bowel sounds active all four quadrants,        no masses, no organomegaly  Extremities: No edema  No erythema/warmth  No ulcer  Nontender to palpation  Skin:  No rash  Neuro: Moves all extremities  Invasive Devices     Central Venous Catheter Line            Port A Cath Right Subclavian -- days                Labs studies:   I have personally reviewed pertinent labs    Results from last 7 days   Lab Units 19  0636 19  0611 19  0604 19  1642 19  0757   POTASSIUM mmol/L 4 2 4 1 4 3 3 6 4 1   CHLORIDE mmol/L 106 107 106 105 106   CO2 mmol/L 29 25 25 28 30   BUN mg/dL 24 29* 24 10 7   CREATININE mg/dL 1 61* 1 88* 2 06* 1 03 0 85   EGFR ml/min/1 73sq m 47 39 35 80 97   CALCIUM mg/dL 8 9 8 1* 7 7* 8 5 9 1   AST U/L  --   --   --  58* 16   ALT U/L  --   --   --  18 18   ALK PHOS U/L  --   --   --  76 73     Results from last 7 days   Lab Units 08/30/19  0636 08/29/19  0611 08/28/19  0604   WBC Thousand/uL 1 72* 1 84* 3 64*   HEMOGLOBIN g/dL 11 6* 11 8* 11 8*   PLATELETS Thousands/uL 20* 18* 31*     Results from last 7 days   Lab Units 08/27/19  2353 08/27/19  2352 08/27/19  1649 08/27/19  1642   BLOOD CULTURE   --   --  No Growth at 48 hrs  No Growth at 48 hrs  SPUTUM CULTURE   --  2+ Growth of   --   --    GRAM STAIN RESULT   --  2+ Polys*  No polys seen*  2+ Gram positive cocci in pairs*  --   --    LEGIONELLA URINARY ANTIGEN  Negative  --   --   --        Imaging Studies:   I have personally reviewed pertinent imaging study reports and images in PACS  EKG, Pathology, and Other Studies:   I have personally reviewed pertinent reports

## 2019-08-30 NOTE — DISCHARGE SUMMARY
Discharge Summary - [facility]    Patient Information: More Lyman 63 y o  male MRN: [id number]  Unit/Bed#: E2 -01 Encounter: [id number]    Discharging Physician / Practitioner: Mary Ann Green MD  PCP: Nadir Virk MD, MD  Admission Date: 8/27/2019  Discharge Date: 08/30/19    Reason for Admission:  Sepsis present on admission    Discharge Diagnoses:  Febrile neutropenia, postobstructive pneumonia    Principal Problem:    Sepsis ([facility] [address] )  Active Problems:    Colon cancer metastasized to lung [facility])    Acute respiratory failure with hypoxia ([facility] [address] )    Neutropenic fever ([facility] [address] )    Thrombocytopenia ([facility] [address] )    Acute kidney injury ([facility] [address] )  Resolved Problems:    * No resolved hospital problems  *      Consultations During Hospital Stay:  · Infectious Disease, Oncology, Nephrology    Procedures Performed:     Xr Chest 2 Views    Result Date: 8/28/2019  Narrative: CHEST INDICATION:   Tachycardia  COMPARISON:  None EXAM PERFORMED/VIEWS:  XR CHEST PA & LATERAL  The frontal view was performed utilizing dual energy radiographic technique  Images: 4 FINDINGS:  Right chest wall Port-A-Cath projects appropriately  Cardiomediastinal silhouette appears unremarkable  Elevation of the left hemidiaphragm  Gaseous distention of the stomach below the hemidiaphragm with a large air-fluid level  No congestive failure  Consolidation at the left apex with volume loss of the left lung consistent with prior surgery  There is a consolidation within the medial right lung base seen posteriorly  Osseous structures appear within normal limits for patient age  Impression: Postop change within the left chest with focal consolidation at the apex  Consolidation within the posterior right lung base consistent with patient's known mass  See separate CT scan  Workstation performed: [id number]     Judeen Patch Ed Chest Pe Study    Result Date: 8/27/2019  Narrative: CTA - CHEST WITH IV CONTRAST - PULMONARY ANGIOGRAM INDICATION:   ?PE  COMPARISON: None  TECHNIQUE: CTA examination of the chest was performed using angiographic technique according to a protocol specifically tailored to evaluate for pulmonary embolism  Axial, sagittal, and coronal 2D reformatted images were created from the source data and  submitted for interpretation  In addition, coronal 3D MIP postprocessing was performed on the acquisition scanner  Radiation dose length product (DLP) for this visit:  573 mGy-cm   This examination, like all CT scans performed in the [facility], was performed utilizing techniques to minimize radiation dose exposure, including the use of iterative reconstruction and automated exposure control  IV Contrast:  90 mL of iodixanol (VISIPAQUE)  FINDINGS: PULMONARY ARTERIAL TREE:  Suboptimal opacification of the pulmonary arteries and respiratory motion artifact  No evidence of main pulmonary artery embolus  Distal or branches are not well evaluated  LUNGS:  Right infrahilar mass in the superior segment of the right lower lobe measures 6 3 x 4 5 x 4 2 cm compared to 8 2 x 5 3 x 5 2 cm on the prior exam   Surrounding airspace opacities in the right lower lobe, not significant changed since the prior exam   Stable 6 mm subpleural nodule versus scarring in the anterior aspect of the right upper lobe series 4, image 66  Other previously demonstrated pulmonary nodules in the right upper and middle lobes are no longer visualized  Stable volume loss in the left lung from partial pneumonectomy  Nodule versus nodular scarring in the left upper lung adjacent to the mediastinum measures 1 8 cm, slightly smaller  Small amount of secretions in the proximal right mainstem bronchus  Stable severe narrowing of right lower lobe proximal segmental branches  PLEURA:  No effusion  HEART/GREAT VESSELS:  Normal heart size  No thoracic aortic aneurysm or dissection  MEDIASTINUM AND ZABRINA:  Unremarkable  CHEST WALL AND LOWER NECK:   Unremarkable  VISUALIZED STRUCTURES IN THE UPPER ABDOMEN:  Unremarkable  OSSEOUS STRUCTURES:  No acute fracture or destructive osseous lesion  Impression: 1  Limited evaluation of the pulmonary arteries  No evidence of main renal artery embolus  Distal order branches are not well evaluated  2   Right infrahilar and lower lobe mass is stable to slightly smaller, measuring 6 3 x 4 5 x 4 2 cm  Some pulmonary nodules in the bilateral lungs have resolved  Others are stable to slightly smaller  3   Stable severe narrowing of right lower lobe segmental pulmonary arteries and postobstructive pneumonia  Workstation performed: [id number]         Significant Findings:  67-year-old male undergoing chemotherapy for known metastatic colon cancer to lung shortly after undergoing most recent chemotherapy developed febrile course and respiratory distress with obstructive postobstructive pneumonia seen on CT imaging after initial evaluation in the ED and presentation of febrile leukopenia  Could not rule out possibility of chemotherapy reaction as he has had that previously but not with as high of fever on this occasion  No evidence of PE in noted on CT imaging    · Patient was started on empiric antibiotic coverage with vancomycin cefepime and Flagyl  · Procalcitonin was significantly elevated and trended down with initiation of therapy  · He was seen by both the Oncology and Infectious Disease service  After initial significant neutropenia of only white count of 0 81 later mickey to 3 5 and thereafter dropping to 1 7 with significant thrombocytopenia of his low as 18,000 requiring 1 unit of platelets given on day prior to discharge  · He has had no active bleeding he has remained afebrile and on further consultation both with the Oncology service and Infectious Disease service recommendation is for outpatient therapy with completion of a 5 day further course of Levaquin and metronidazole    Prescriptions written further recommendations from the Oncology service is to follow-up CBC next week and to seek physician referral if should develop any bleeding should avoid any trauma activities  · Patient is also seen by the Nephrology service and relation to a presentation of acute kidney injury noting no evidence of uric acid elevation presumably in relation to possible chemotherapy however LDH was elevated with a haptoglobin remains stable at 35 all of which suggesting not likely in relation to tumor lysis syndrome  There is no schistocytes noted on peripheral smear and no evidence of any interstitial nephritis or rhabdomyolysis  With creatinine trending down deemed stable for discharge after discussion will repeat a chemistry profile next week  · For further management decision on chemotherapy to patient and Oncology    Incidental Findings:   · Discharge 1 61 from 1 88  · White blood cell count 1 72 with ANC of 1 18  · Platelet count 31282 at time of discharge from 18,000 after 1 unit of platelet its yesterday    Test Results Pending at Discharge (will require follow up):   ·      Outpatient Tests Requested:  ·     Complications:      Hospital Course:     Rosario Hutchins is a 63 y o  male patient who originally presented to the hospital on 8/27/2019 due to presentation of sepsis hypoxic respiratory failure, please see above significant findings for hospital course and treatment plan    Condition at Discharge: good     Discharge Day Visit / Exam:     * Please refer to separate progress for these details *    Discharge instructions/Information to patient and family:   See after visit summary for information provided to patient and family  Provisions for Follow-Up Care:  See after visit summary for information related to follow-up care and any pertinent home health orders        Disposition:     Home    For Discharges to [facility] SNF:   · Not Applicable to this Patient - Not Applicable to this Patient      Discharge Statement:  I spent 45 minutes discharging the patient  This time was spent on the day of discharge  I had direct contact with the patient on the day of discharge  Greater than 50% of the total time was spent examining patient, answering all patient questions, arranging and discussing plan of care with patient as well as directly providing post-discharge instructions  Additional time then spent on discharge activities  Discharge Medications:  See after visit summary for reconciled discharge medications provided to patient and family  ** Please Note: Dragon 360 Dictation voice to text software may have been used in the creation of this document   **

## 2019-08-31 LAB
BACTERIA SPT RESP CULT: ABNORMAL
BACTERIA SPT RESP CULT: ABNORMAL
GRAM STN SPEC: ABNORMAL

## 2019-09-01 LAB
BACTERIA BLD CULT: NORMAL
BACTERIA BLD CULT: NORMAL

## 2019-09-03 ENCOUNTER — APPOINTMENT (OUTPATIENT)
Dept: LAB | Facility: HOSPITAL | Age: 59
End: 2019-09-03
Attending: INTERNAL MEDICINE
Payer: MEDICARE

## 2019-09-03 DIAGNOSIS — N17.9 ACUTE KIDNEY INJURY (HCC): ICD-10-CM

## 2019-09-03 DIAGNOSIS — R50.81 NEUTROPENIC FEVER (HCC): ICD-10-CM

## 2019-09-03 DIAGNOSIS — D69.6 THROMBOCYTOPENIA (HCC): ICD-10-CM

## 2019-09-03 DIAGNOSIS — C18.2 MALIGNANT NEOPLASM OF ASCENDING COLON (HCC): ICD-10-CM

## 2019-09-03 DIAGNOSIS — A41.9 SEPSIS (HCC): ICD-10-CM

## 2019-09-03 DIAGNOSIS — J18.9 PNEUMONIA: ICD-10-CM

## 2019-09-03 DIAGNOSIS — D70.9 NEUTROPENIC FEVER (HCC): ICD-10-CM

## 2019-09-03 LAB
ANION GAP SERPL CALCULATED.3IONS-SCNC: 8 MMOL/L (ref 4–13)
BUN SERPL-MCNC: 15 MG/DL (ref 5–25)
CALCIUM SERPL-MCNC: 9.1 MG/DL (ref 8.3–10.1)
CHLORIDE SERPL-SCNC: 104 MMOL/L (ref 100–108)
CO2 SERPL-SCNC: 28 MMOL/L (ref 21–32)
CREAT SERPL-MCNC: 1.44 MG/DL (ref 0.6–1.3)
GFR SERPL CREATININE-BSD FRML MDRD: 54 ML/MIN/1.73SQ M
GLUCOSE P FAST SERPL-MCNC: 98 MG/DL (ref 65–99)
POTASSIUM SERPL-SCNC: 4.2 MMOL/L (ref 3.5–5.3)
SODIUM SERPL-SCNC: 140 MMOL/L (ref 136–145)

## 2019-09-03 PROCEDURE — 80048 BASIC METABOLIC PNL TOTAL CA: CPT

## 2019-09-03 PROCEDURE — 36415 COLL VENOUS BLD VENIPUNCTURE: CPT

## 2019-09-05 ENCOUNTER — TELEPHONE (OUTPATIENT)
Dept: HEMATOLOGY ONCOLOGY | Facility: CLINIC | Age: 59
End: 2019-09-05

## 2019-09-06 ENCOUNTER — APPOINTMENT (OUTPATIENT)
Dept: LAB | Facility: HOSPITAL | Age: 59
End: 2019-09-06
Attending: INTERNAL MEDICINE
Payer: COMMERCIAL

## 2019-09-06 DIAGNOSIS — D69.6 THROMBOCYTOPENIA (HCC): ICD-10-CM

## 2019-09-06 DIAGNOSIS — C78.00 COLON CANCER METASTASIZED TO LUNG (HCC): ICD-10-CM

## 2019-09-06 DIAGNOSIS — J18.9 PNEUMONIA: ICD-10-CM

## 2019-09-06 DIAGNOSIS — R50.81 NEUTROPENIC FEVER (HCC): ICD-10-CM

## 2019-09-06 DIAGNOSIS — C18.9 COLON CANCER METASTASIZED TO LUNG (HCC): ICD-10-CM

## 2019-09-06 DIAGNOSIS — N17.9 ACUTE KIDNEY INJURY (HCC): ICD-10-CM

## 2019-09-06 DIAGNOSIS — D70.9 NEUTROPENIC FEVER (HCC): ICD-10-CM

## 2019-09-06 DIAGNOSIS — C77.1 METASTASIS TO INTRATHORACIC LYMPH NODES (HCC): ICD-10-CM

## 2019-09-06 DIAGNOSIS — A41.9 SEPSIS (HCC): ICD-10-CM

## 2019-09-06 DIAGNOSIS — C18.2 MALIGNANT NEOPLASM OF ASCENDING COLON (HCC): ICD-10-CM

## 2019-09-06 LAB
ALBUMIN SERPL BCP-MCNC: 3.3 G/DL (ref 3.5–5)
ALP SERPL-CCNC: 75 U/L (ref 46–116)
ALT SERPL W P-5'-P-CCNC: 41 U/L (ref 12–78)
ANION GAP SERPL CALCULATED.3IONS-SCNC: 6 MMOL/L (ref 4–13)
AST SERPL W P-5'-P-CCNC: 33 U/L (ref 5–45)
BASOPHILS # BLD MANUAL: 0 THOUSAND/UL (ref 0–0.1)
BASOPHILS NFR MAR MANUAL: 0 % (ref 0–1)
BILIRUB SERPL-MCNC: 0.84 MG/DL (ref 0.2–1)
BUN SERPL-MCNC: 9 MG/DL (ref 5–25)
CALCIUM SERPL-MCNC: 8.8 MG/DL (ref 8.3–10.1)
CHLORIDE SERPL-SCNC: 107 MMOL/L (ref 100–108)
CO2 SERPL-SCNC: 29 MMOL/L (ref 21–32)
CREAT SERPL-MCNC: 1.14 MG/DL (ref 0.6–1.3)
EOSINOPHIL # BLD MANUAL: 0.12 THOUSAND/UL (ref 0–0.4)
EOSINOPHIL NFR BLD MANUAL: 5 % (ref 0–6)
ERYTHROCYTE [DISTWIDTH] IN BLOOD BY AUTOMATED COUNT: 12.5 % (ref 11.6–15.1)
GFR SERPL CREATININE-BSD FRML MDRD: 71 ML/MIN/1.73SQ M
GLUCOSE P FAST SERPL-MCNC: 90 MG/DL (ref 65–99)
HCT VFR BLD AUTO: 35.3 % (ref 36.5–49.3)
HGB BLD-MCNC: 12.2 G/DL (ref 12–17)
LYMPHOCYTES # BLD AUTO: 1.76 THOUSAND/UL (ref 0.6–4.47)
LYMPHOCYTES # BLD AUTO: 76 % (ref 14–44)
MCH RBC QN AUTO: 33.8 PG (ref 26.8–34.3)
MCHC RBC AUTO-ENTMCNC: 34.6 G/DL (ref 31.4–37.4)
MCV RBC AUTO: 98 FL (ref 82–98)
MONOCYTES # BLD AUTO: 0.12 THOUSAND/UL (ref 0–1.22)
MONOCYTES NFR BLD: 5 % (ref 4–12)
NEUTROPHILS # BLD MANUAL: 0.28 THOUSAND/UL (ref 1.85–7.62)
NEUTS SEG NFR BLD AUTO: 12 % (ref 43–75)
NRBC BLD AUTO-RTO: 0 /100 WBCS
OVALOCYTES BLD QL SMEAR: PRESENT
PLATELET # BLD AUTO: 87 THOUSANDS/UL (ref 149–390)
PLATELET BLD QL SMEAR: ABNORMAL
PMV BLD AUTO: 9.3 FL (ref 8.9–12.7)
POLYCHROMASIA BLD QL SMEAR: PRESENT
POTASSIUM SERPL-SCNC: 4.4 MMOL/L (ref 3.5–5.3)
PROT SERPL-MCNC: 6.9 G/DL (ref 6.4–8.2)
RBC # BLD AUTO: 3.61 MILLION/UL (ref 3.88–5.62)
SODIUM SERPL-SCNC: 142 MMOL/L (ref 136–145)
TOTAL CELLS COUNTED SPEC: 100
VARIANT LYMPHS # BLD AUTO: 2 %
WBC # BLD AUTO: 2.31 THOUSAND/UL (ref 4.31–10.16)

## 2019-09-06 PROCEDURE — 80053 COMPREHEN METABOLIC PANEL: CPT

## 2019-09-06 PROCEDURE — 85027 COMPLETE CBC AUTOMATED: CPT

## 2019-09-06 PROCEDURE — 36415 COLL VENOUS BLD VENIPUNCTURE: CPT

## 2019-09-06 PROCEDURE — 85007 BL SMEAR W/DIFF WBC COUNT: CPT

## 2019-09-08 NOTE — ED ATTENDING ATTESTATION
Herminia Wood MD, saw and evaluated the patient  I have discussed the patient with the resident/non-physician practitioner and agree with the resident's/non-physician practitioner's findings, Plan of Care, and MDM as documented in the resident's/non-physician practitioner's note, except where noted  All available labs and Radiology studies were reviewed  I was present for key portions of any procedure(s) performed by the resident/non-physician practitioner and I was immediately available to provide assistance  At this point I agree with the current assessment done in the Emergency Department  I have conducted an independent evaluation of this patient a history and physical is as follows: this is a 64 y/o male that presents to the ED after episode of SOB during chemotherapy infusion  Fairly sudden onset  No prior hx of reaction to this medication  Heart tachycardic, lungs CTA  Abdomen soft, nontender  No facial swelling  no hives         Critical Care Time  Procedures

## 2019-09-10 ENCOUNTER — HOSPITAL ENCOUNTER (OUTPATIENT)
Dept: INFUSION CENTER | Facility: CLINIC | Age: 59
End: 2019-09-10

## 2019-09-11 ENCOUNTER — CLINICAL SUPPORT (OUTPATIENT)
Dept: RADIATION ONCOLOGY | Facility: CLINIC | Age: 59
End: 2019-09-11
Attending: RADIOLOGY
Payer: MEDICARE

## 2019-09-11 VITALS
WEIGHT: 218.48 LBS | TEMPERATURE: 98.4 F | DIASTOLIC BLOOD PRESSURE: 60 MMHG | HEIGHT: 77 IN | OXYGEN SATURATION: 99 % | BODY MASS INDEX: 25.8 KG/M2 | HEART RATE: 88 BPM | SYSTOLIC BLOOD PRESSURE: 100 MMHG | RESPIRATION RATE: 20 BRPM

## 2019-09-11 DIAGNOSIS — C77.1 METASTASIS TO INTRATHORACIC LYMPH NODES (HCC): ICD-10-CM

## 2019-09-11 DIAGNOSIS — C18.9 COLON CANCER METASTASIZED TO LUNG (HCC): Primary | Chronic | ICD-10-CM

## 2019-09-11 DIAGNOSIS — C78.00 COLON CANCER METASTASIZED TO LUNG (HCC): Primary | Chronic | ICD-10-CM

## 2019-09-11 PROCEDURE — 99211 OFF/OP EST MAY X REQ PHY/QHP: CPT | Performed by: RADIOLOGY

## 2019-09-11 NOTE — PROGRESS NOTES
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 stast right hemicolectomy in 2008, pathologic stage T3, N2, M1, moderately differentiated adenocarcinoma of the ascending colon followed by FOLFOX  In 2010, he underwent wedge resection of left lower and right lower lobe lesions, then additional chemotherapy with Avastin and FOLFIRI  Right VATS and right lower lobe wedge resection 2014, SBRT of left lung metastases March 2014, left lower lobectomy April 2015, Xeloda and Avastin from June 2015 to February 2017  He was last see on 5/16/17 by Dr Rosy Chapman for consideration of SBRT of a right lower lobe lesion  At that time, the lesion was not amenable to wedge resection and he was not a candidate for another lobectomy    His case was discussed at thoracic tumor conference, the consensus was to defer SBRT, continue  observation and repeat CT scan of the chest in 2-3 months     [facility] has continued his systemic treatment with Dr Ronnie Irby with his most recent treatment with Lonsurf ending in May 2019    His most recent chest CT from May 13, 2019 showed progression in his large right lower lobe lung mass along with slight enlargement of a small left upper lobe lung nodule and a new right lung base nodule  We discussed a course of 3000 cGy in 10 fractions to the right lower lobe and hilar region   His other lung nodules will be observed and treated with chemotherapy   Should they progress in the future, then additional SBRT lung treatment could be considered  He has now completed  Radiation to the RLL hilum, on 8/6/19, at which time he noted less sob  No dysphagia  He returns for his first f/u post this most recent course of radiation       Interval History:  8/27/19 continued chemotherapy and he had febrile neutropenia, sepsis, admitted 8/27/19, post chemo , His chemo may have also caused the reaction  He was to discuss with [facility] med onc, since they recommended the current regimen  Chemo on hold  He called the [facility] doctors yesterday, awaiting call back, as to what plans for new chemo regimen  Dr Imtiaz Cavazos  Med Onc    Today he feels good, feels up  to 80% of his regular   Last week felt terrible, has completed his antibiotic course  He reports no respiratory symptoms with no cough, shortness of breath nor hemoptysis  10/2/19 next f/u with Dr Mike Gomez  Historical Information      Colon cancer metastasized to lung [facility])    2008 Initial Diagnosis     Colon cancer metastasized to lung [facility])      2008 - 2009 Chemotherapy     FOLFOX for two 6 month courses (3 month break in-between courses, according to the patient)  - Dr Godfrey Pickard      2/18/2008 Surgery     Partial colectomy/Right hemicolectomy:  - stage IV (pT3,pN2,pM1a, G2)    - Dr Lesly Jackson        4/21/2010 Surgery     Right lower lobe lung nodule:  - metastatic colonic adenocarcinoma      8/19/2010 Surgery     Left lower lobe wedge resection:  - moderately differentiated adenocarcinoma, consistent with metastasis from known colonic primary, 1 3 cm      2011 Surgery     Transverse colectomy      4/2011 - 10/2011 Chemotherapy     Avastin and FOLFIRI       11/9/2011 Surgery     RAZ lung wedge resection:  - metastatic adenocarcinoma of the colon  - invasive carcinoma is 0 4 cm from parenchymal resection      2012 Surgery     Wedge resection for right lung nodule      11/29/2012 Surgery     I  [id number] (11/29/12)   Omental nodule, excision:   - Metastatic adenocarcinoma morphologically consistent with colonic primary  1/15/2014 Surgery     A  Lung, right lower lobe, wedge resection:   - 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, moderately to poorly differentiated, consistent with colorectal primary, see note  - Resection margin, no tumor seen     - Background lung with few non-necrotizing granulomas and emphysematous changes  3/4/2014 - 3/14/2014 Radiation     Plan ID Energy Fractions Dose per Fraction (cGy) Total Dose Delivered (cGy) Elapsed Days   SBRT Lt Lung 6X 5 / 5 1,000 5,000 8     - Dr Darian Mckee      4/14/2015 Surgery     Left lower lobe lobectomy, cervical mediastinoscopy, Left posterolateral thoracotomy:  A   Lymph node Level 2R:    - 3/3 lymph nodes, positive for metastatic colonic adenocarcinoma  BDebbrah Hock node Level 7:   - 3/8 lymph nodes, positive for metastatic colonic adenocarcinoma       C   Left lower lobe:   - Metastatic colonic adenocarcinoma    - The vascular and bronchial margins of resection are negative for tumor    - 0/6 lymph nodes, negative for metastatic colonic adenocarcinoma     - The tumor is 0 5 cm away from the hilar staple line        6/2015 - 2/2017 Chemotherapy     Xeloda and Avastin      9/2018 - 5/2019 Chemotherapy     Lonsurf      7/31/2019 -  Chemotherapy     bevacizumab (AVASTIN) 500 mg in sodium chloride 0 9 % 100 mL IVPB, 505 mg, Intravenous, Once, 1 of 11 cycles  Administration: 500 mg (8/27/2019)  irinotecan (CAMPTOSAR) 340 mg in dextrose 5 % 500 mL chemo infusion, 330 mg (83 3 % of original dose 180 mg/m2), Intravenous, Once, 2 of 12 cycles  Dose modification: 150 mg/m2 (original dose 180 mg/m2, Cycle 1, Reason: Dose Not Tolerated)  Administration: 340 mg (8/13/2019), 340 mg (8/27/2019)  oxaliplatin (ELOXATIN) 200 mg in dextrose 5 % 250 mL chemo infusion, 198 05 mg, Intravenous, Once, 2 of 12 cycles  Administration: 200 mg (8/13/2019), 200 mg (8/27/2019)        Malignant neoplasm of ascending colon (HCC)    2/5/2018 Initial Diagnosis     Malignant neoplasm of ascending colon (HCC)      7/31/2019 -  Chemotherapy     bevacizumab (AVASTIN) 500 mg in sodium chloride 0 9 % 100 mL IVPB, 505 mg, Intravenous, Once, 1 of 11 cycles  Administration: 500 mg (8/27/2019)  irinotecan (CAMPTOSAR) 340 mg in dextrose 5 % 500 mL chemo infusion, 330 mg (83 3 % of original dose 180 mg/m2), Intravenous, Once, 2 of 12 cycles  Dose modification: 150 mg/m2 (original dose 180 mg/m2, Cycle 1, Reason: Dose Not Tolerated)  Administration: 340 mg (8/13/2019), 340 mg (8/27/2019)  oxaliplatin (ELOXATIN) 200 mg in dextrose 5 % 250 mL chemo infusion, 198 05 mg, Intravenous, Once, 2 of 12 cycles  Administration: 200 mg (8/13/2019), 200 mg (8/27/2019)        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)    2/5/2018 Initial Diagnosis     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[address] )      7/24/2019 - 8/6/2019 Radiation     Treatment:  Course: C2    Plan ID Energy Fractions Dose per Fraction (cGy) Dose Correction (cGy) Total Dose Delivered (cGy) Elapsed Days   R LL_Hilum 10X 10 / 10 300 0 3,000 13            7/31/2019 -  Chemotherapy     bevacizumab (AVASTIN) 500 mg in sodium chloride 0 9 % 100 mL IVPB, 505 mg, Intravenous, Once, 1 of 11 cycles  Administration: 500 mg (8/27/2019)  irinotecan (CAMPTOSAR) 340 mg in dextrose 5 % 500 mL chemo infusion, 330 mg (83 3 % of original dose 180 mg/m2), Intravenous, Once, 2 of 12 cycles  Dose modification: 150 mg/m2 (original dose 180 mg/m2, Cycle 1, Reason: Dose Not Tolerated)  Administration: 340 mg (8/13/2019), 340 mg (8/27/2019)  oxaliplatin (ELOXATIN) 200 mg in dextrose 5 % 250 mL chemo infusion, 198 05 mg, Intravenous, Once, 2 of 12 cycles  Administration: 200 mg (8/13/2019), 200 mg (8/27/2019)         Past Medical History:   Diagnosis Date    Atrial fibrillation (HCC)     Colon cancer ([address] )     Colon cancer ([address] )     Metastatic carcinoma to lung ([address] )      Past Surgical History:   Procedure Laterality Date    ANTERIOR CRUCIATE LIGAMENT REPAIR      ANTERIOR CRUCIATE LIGAMENT REPAIR      2  on R knee    APPENDECTOMY      ATRIAL ABLATION SURGERY      BACK SURGERY      COLON SURGERY      Ascending and transverse colon    COLONOSCOPY      LUNG REMOVAL, PARTIAL      RIGHT COLECTOMY      and Transverse colon resection       Social History   Social History     Substance and Sexual Activity   Alcohol Use Yes    Alcohol/week: 12 0 standard drinks    Types: 12 Cans of beer per week    Comment: Socially      Social History     Substance and Sexual Activity   Drug Use No     Social History     Tobacco Use   Smoking Status Former Smoker    Types: Cigarettes    Last attempt to quit: 2010    Years since quittin 6   Smokeless Tobacco Never Used   Tobacco Comment    Cigars          Meds/Allergies     Current Outpatient Medications:     albuterol (VENTOLIN HFA) 90 mcg/act inhaler, 2 puffs every 4 hours as needed for shortness of breath  No more than 4 times a day, Disp: 1 Inhaler, Rfl: 2    ascorbic acid (VITAMIN C) 250 MG CHEW, Chew 250 mg daily, Disp: , Rfl:     ondansetron (ZOFRAN) 4 mg tablet, Take 1 tablet (4 mg total) by mouth every 6 (six) hours as needed for nausea or vomiting, Disp: 50 tablet, Rfl: 2    Regorafenib (STIVARGA) 40 MG TABS, 2 tablets daily for 21 days  7 days off  (Patient not taking: Reported on 2019), Disp: 42 tablet, Rfl: 3  Allergies   Allergen Reactions    Aspirin Other (See Comments)     Nose bleed    Sulfa Antibiotics Other (See Comments)     Dizzy      Review of Systems   Constitutional: Positive for fatigue (2/10) and unexpected weight change (has improved post diarrhea  and poor appetite after antibiotics)  HENT:        Has tenderness of the anterior neck, for several mons  Doesn't interfere with swallowing  Had w/u and unable to find cause  Eyes: Negative  Respiratory: Positive for cough (last week, improving, productive clear sputum) and shortness of breath ([place], unchaged )  Cardiovascular: Negative  Gastrointestinal: Positive for diarrhea (resolved now)  Endocrine: Negative  Genitourinary:        Had dark urine last week when hospitalized, and had renal dysfunction  Now resolved   Musculoskeletal: Negative  Skin: Negative      Allergic/Immunologic: Negative  Neurological: Positive for light-headedness (getting up too fast, or really hungry  )  Hematological: Negative  Psychiatric/Behavioral: Negative        OBJECTIVE:   /60   Pulse 88   Temp 98 4 °F (36 9 °C)   Resp 20   Ht 6' 4 5" (1 943 m)   Wt 99 1 kg (218 lb 7 6 oz)   SpO2 99%   BMI 26 25 kg/m²   Pain Assessment:  0  ECOG/Zubrod/WHO: 1 - Symptomatic but completely ambulatory    Physical Exam  Constitutional: He is oriented to person, place, and time  He appears well-developed and well-nourished  No distress  HENT:   Head: Normocephalic and atraumatic  Mouth/Throat: No oropharyngeal exudate  Eyes: Pupils are equal, round, and reactive to light  Conjunctivae and EOM are normal  No scleral icterus  Neck: Normal range of motion  Neck supple  No tracheal deviation present  No thyromegaly present  Cardiovascular: Normal rate, regular rhythm and normal heart sounds  Pulmonary/Chest: Effort normal and breath sounds normal  No respiratory distress  He has no wheezes  He has no rales  He exhibits no tenderness  Abdominal: Soft  Bowel sounds are normal  He exhibits no distension and no mass  There is no tenderness  Musculoskeletal: Normal range of motion  He exhibits no edema or tenderness  Lymphadenopathy:     He has no cervical adenopathy  Right: No inguinal and no supraclavicular adenopathy present  Left: No inguinal and no supraclavicular adenopathy present  Neurological: He is alert and oriented to person, place, and time  No cranial nerve deficit  Coordination normal    Skin: Skin is warm and dry  No rash noted  He is not diaphoretic  No erythema  No pallor  Psychiatric: He has a normal mood and affect  His behavior is normal  Judgment and thought content normal    Nursing note and vitals reviewed        RESULTS    Lab Results:   Recent Results (from the past 672 hour(s))   CBC and differential    Collection Time: 08/26/19  7:57 AM   Result Value Ref Range WBC 3 28 (L) 4 31 - 10 16 Thousand/uL    RBC 4 09 3 88 - 5 62 Million/uL    Hemoglobin 14 3 12 0 - 17 0 g/dL    Hematocrit 41 9 36 5 - 49 3 %     (H) 82 - 98 fL    MCH 35 0 (H) 26 8 - 34 3 pg    MCHC 34 1 31 4 - 37 4 g/dL    RDW 12 6 11 6 - 15 1 %    MPV 8 6 (L) 8 9 - 12 7 fL    Platelets 151 (L) 912 - 390 Thousands/uL    nRBC 0 /100 WBCs    Neutrophils Relative 65 43 - 75 %    Immat GRANS % 0 0 - 2 %    Lymphocytes Relative 17 14 - 44 %    Monocytes Relative 12 4 - 12 %    Eosinophils Relative 6 0 - 6 %    Basophils Relative 0 0 - 1 %    Neutrophils Absolute 2 10 1 85 - 7 62 Thousands/µL    Immature Grans Absolute 0 01 0 00 - 0 20 Thousand/uL    Lymphocytes Absolute 0 57 (L) 0 60 - 4 47 Thousands/µL    Monocytes Absolute 0 40 0 17 - 1 22 Thousand/µL    Eosinophils Absolute 0 19 0 00 - 0 61 Thousand/µL    Basophils Absolute 0 01 0 00 - 0 10 Thousands/µL   Comprehensive metabolic panel    Collection Time: 08/26/19  7:57 AM   Result Value Ref Range    Sodium 142 136 - 145 mmol/L    Potassium 4 1 3 5 - 5 3 mmol/L    Chloride 106 100 - 108 mmol/L    CO2 30 21 - 32 mmol/L    ANION GAP 6 4 - 13 mmol/L    BUN 7 5 - 25 mg/dL    Creatinine 0 85 0 60 - 1 30 mg/dL    Glucose, Fasting 92 65 - 99 mg/dL    Calcium 9 1 8 3 - 10 1 mg/dL    AST 16 5 - 45 U/L    ALT 18 12 - 78 U/L    Alkaline Phosphatase 73 46 - 116 U/L    Total Protein 6 9 6 4 - 8 2 g/dL    Albumin 3 3 (L) 3 5 - 5 0 g/dL    Total Bilirubin 0 67 0 20 - 1 00 mg/dL    eGFR 97 ml/min/1 73sq m   Comprehensive metabolic panel    Collection Time: 08/27/19  4:42 PM   Result Value Ref Range    Sodium 142 136 - 145 mmol/L    Potassium 3 6 3 5 - 5 3 mmol/L    Chloride 105 100 - 108 mmol/L    CO2 28 21 - 32 mmol/L    ANION GAP 9 4 - 13 mmol/L    BUN 10 5 - 25 mg/dL    Creatinine 1 03 0 60 - 1 30 mg/dL    Glucose 122 65 - 140 mg/dL    Calcium 8 5 8 3 - 10 1 mg/dL    AST 58 (H) 5 - 45 U/L    ALT 18 12 - 78 U/L    Alkaline Phosphatase 76 46 - 116 U/L    Total Protein 6 3 (L) 6 4 - 8 2 g/dL    Albumin 3 0 (L) 3 5 - 5 0 g/dL    Total Bilirubin 2 91 (H) 0 20 - 1 00 mg/dL    eGFR 80 ml/min/1 73sq m   CBC and differential    Collection Time: 08/27/19  4:42 PM   Result Value Ref Range    WBC 0 81 (LL) 4 31 - 10 16 Thousand/uL    RBC 3 67 (L) 3 88 - 5 62 Million/uL    Hemoglobin 13 0 12 0 - 17 0 g/dL    Hematocrit 37 0 36 5 - 49 3 %     (H) 82 - 98 fL    MCH 35 4 (H) 26 8 - 34 3 pg    MCHC 35 1 31 4 - 37 4 g/dL    RDW 12 7 11 6 - 15 1 %    MPV 8 5 (L) 8 9 - 12 7 fL    Platelets 74 (L) 054 - 390 Thousands/uL    nRBC 0 /100 WBCs    Neutrophils Relative 94 (H) 43 - 75 %    Immat GRANS % 0 0 - 2 %    Lymphocytes Relative 3 (L) 14 - 44 %    Monocytes Relative 3 (L) 4 - 12 %    Eosinophils Relative 0 0 - 6 %    Basophils Relative 0 0 - 1 %    Neutrophils Absolute 0 77 (L) 1 85 - 7 62 Thousands/µL    Immature Grans Absolute 0 00 0 00 - 0 20 Thousand/uL    Lymphocytes Absolute 0 02 (L) 0 60 - 4 47 Thousands/µL    Monocytes Absolute 0 02 (L) 0 17 - 1 22 Thousand/µL    Eosinophils Absolute 0 00 0 00 - 0 61 Thousand/µL    Basophils Absolute 0 00 0 00 - 0 10 Thousands/µL   Protime-INR    Collection Time: 08/27/19  4:42 PM   Result Value Ref Range    Protime 15 9 (H) 11 6 - 14 5 seconds    INR 1 25 (H) 0 84 - 1 19   APTT    Collection Time: 08/27/19  4:42 PM   Result Value Ref Range    PTT 34 23 - 37 seconds   TSH, 3rd generation with Free T4 reflex    Collection Time: 08/27/19  4:42 PM   Result Value Ref Range    TSH 3RD GENERATON 4 368 (H) 0 358 - 3 740 uIU/mL   Troponin I    Collection Time: 08/27/19  4:42 PM   Result Value Ref Range    Troponin I <0 02 <=0 04 ng/mL   D-Dimer    Collection Time: 08/27/19  4:42 PM   Result Value Ref Range    D-Dimer, Quant >10,000 (H) <500 ng/ml (FEU)   Lactic acid, plasma x2    Collection Time: 08/27/19  4:42 PM   Result Value Ref Range    LACTIC ACID 2 1 (HH) 0 5 - 2 0 mmol/L   Blood gas, venous    Collection Time: 08/27/19  4:42 PM   Result Value Ref Range pH, Momo 7 412 (H) 7 300 - 7 400    pCO2, Momo 44 9 42 0 - 50 0 mm Hg    pO2, Momo 34 9 (L) 35 0 - 45 0 mm Hg    HCO3, Momo 28 0 24 - 30 mmol/L    Base Excess, Mmoo 2 8 mmol/L    O2 Content, Momo 13 4 ml/dL    O2 HGB, VENOUS 67 1 60 0 - 80 0 %   Blood culture #1    Collection Time: 08/27/19  4:42 PM   Result Value Ref Range    Blood Culture No Growth After 5 Days  T4, free    Collection Time: 08/27/19  4:42 PM   Result Value Ref Range    Free T4 0 99 0 76 - 1 46 ng/dL   Blood culture #2    Collection Time: 08/27/19  4:49 PM   Result Value Ref Range    Blood Culture No Growth After 5 Days      ECG 12 lead    Collection Time: 08/27/19  4:57 PM   Result Value Ref Range    Ventricular Rate 98 BPM    Atrial Rate 98 BPM    MO Interval 150 ms    QRSD Interval 96 ms    QT Interval 326 ms    QTC Interval 416 ms    P Axis 65 degrees    QRS Axis 3 degrees    T Wave Axis 76 degrees   ECG 12 lead    Collection Time: 08/27/19  5:21 PM   Result Value Ref Range    Ventricular Rate 93 BPM    Atrial Rate 93 BPM    MO Interval 150 ms    QRSD Interval 100 ms    QT Interval 334 ms    QTC Interval 415 ms    P Axis 72 degrees    QRS Axis 4 degrees    T Wave Axis 75 degrees   Lactic acid, plasma x2    Collection Time: 08/27/19  6:48 PM   Result Value Ref Range    LACTIC ACID 1 8 0 5 - 2 0 mmol/L   UA w Reflex to Microscopic w Reflex to Culture    Collection Time: 08/27/19  6:54 PM   Result Value Ref Range    Color, UA Red     Clarity, UA Cloudy     Specific [place], UA 1 020 1 003 - 1 030    pH, UA 6 5 4 5, 5 0, 5 5, 6 0, 6 5, 7 0, 7 5, 8 0    Leukocytes, UA Negative Negative    Nitrite, UA Positive (A) Negative    Protein, UA >=300 (A) Negative mg/dl    Glucose,  (1/10%) (A) Negative mg/dl    Ketones, UA Trace (A) Negative mg/dl    Urobilinogen, UA 1 0 0 2, 1 0 E U /dl E U /dl    Bilirubin, UA Interference- unable to analyze (A) Negative    Blood, UA Large (A) Negative   Urine Microscopic    Collection Time: 08/27/19  6:54 PM Result Value Ref Range    RBC, UA 4-10 (A) None Seen, 0-5 /hpf    WBC, UA None Seen None Seen, 0-5, 5-55, 5-65 /hpf    Epithelial Cells Occasional None Seen, Occasional /hpf    Bacteria, UA Occasional None Seen, Occasional /hpf    COARSE GRANULAR CASTS 5-10 /lpf    AMORPH URATES Innumerable /hpf   Sputum culture and Gram stain    Collection Time: 08/27/19 11:52 PM   Result Value Ref Range    Sputum Culture 2+ Growth of      Sputum Culture 1+ Growth of Beta Hemolytic Streptococcus Group C (A)     Gram Stain Result 2+ Polys (A)     Gram Stain Result No polys seen (A)     Gram Stain Result 2+ Gram positive cocci in pairs (A)    Legionella antigen, urine    Collection Time: 08/27/19 11:53 PM   Result Value Ref Range    Legionella Urinary Antigen Negative Negative   Strep Pneumoniae, Urine    Collection Time: 08/27/19 11:53 PM   Result Value Ref Range    Strep pneumoniae antigen, urine Negative Negative   Basic metabolic panel    Collection Time: 08/28/19  6:04 AM   Result Value Ref Range    Sodium 141 136 - 145 mmol/L    Potassium 4 3 3 5 - 5 3 mmol/L    Chloride 106 100 - 108 mmol/L    CO2 25 21 - 32 mmol/L    ANION GAP 10 4 - 13 mmol/L    BUN 24 5 - 25 mg/dL    Creatinine 2 06 (H) 0 60 - 1 30 mg/dL    Glucose 106 65 - 140 mg/dL    Calcium 7 7 (L) 8 3 - 10 1 mg/dL    eGFR 35 ml/min/1 73sq m   CBC and differential    Collection Time: 08/28/19  6:04 AM   Result Value Ref Range    WBC 3 64 (L) 4 31 - 10 16 Thousand/uL    RBC 3 35 (L) 3 88 - 5 62 Million/uL    Hemoglobin 11 8 (L) 12 0 - 17 0 g/dL    Hematocrit 34 1 (L) 36 5 - 49 3 %     (H) 82 - 98 fL    MCH 35 2 (H) 26 8 - 34 3 pg    MCHC 34 6 31 4 - 37 4 g/dL    RDW 13 1 11 6 - 15 1 %    MPV 9 3 8 9 - 12 7 fL    Platelets 31 (LL) 498 - 390 Thousands/uL    nRBC 0 /100 WBCs   Procalcitonin    Collection Time: 08/28/19  6:04 AM   Result Value Ref Range    Procalcitonin 53 41 (H) <=0 25 ng/ml   Manual Differential(PHLEBS Do Not Order)    Collection Time: 08/28/19 6:04 AM   Result Value Ref Range    Segmented % 57 43 - 75 %    Bands % 36 (H) 0 - 8 %    Lymphocytes % 3 (L) 14 - 44 %    Monocytes % 2 (L) 4 - 12 %    Eosinophils, % 0 0 - 6 %    Basophils % 0 0 - 1 %    Metamyelocytes% 2 (H) 0 - 1 %    Absolute Neutrophils 3 39 1 85 - 7 62 Thousand/uL    Lymphocytes Absolute 0 11 (L) 0 60 - 4 47 Thousand/uL    Monocytes Absolute 0 07 0 00 - 1 22 Thousand/uL    Eosinophils Absolute 0 00 0 00 - 0 40 Thousand/uL    Basophils Absolute 0 00 0 00 - 0 10 Thousand/uL    Total Counted 100     Macrocytes Present     Ovalocytes Present     Platelet Estimate Decreased (A) Adequate   Lactate dehydrogenase    Collection Time: 08/28/19  6:04 AM   Result Value Ref Range     (H) 81 - 234 U/L   Uric acid    Collection Time: 08/28/19  6:04 AM   Result Value Ref Range    Uric Acid 7 7 4 2 - 8 0 mg/dL   Phosphorus    Collection Time: 08/28/19  6:04 AM   Result Value Ref Range    Phosphorus 2 9 2 7 - 4 5 mg/dL   CK (with reflex to MB)    Collection Time: 08/28/19  6:04 AM   Result Value Ref Range    Total  39 - 308 U/L   Vancomycin, random    Collection Time: 08/28/19  9:05 AM   Result Value Ref Range    Vancomycin Rm 7 7 ug/mL   Hemolysis Smear    Collection Time: 08/28/19  2:34 PM   Result Value Ref Range    Hemolysis Smear No Schistocytes or Helmet Cells noted    Urine Eosinophils    Collection Time: 08/28/19  3:24 PM   Result Value Ref Range    Eosinophil, Urine 0 %   Protein / creatinine ratio, urine    Collection Time: 08/28/19  3:24 PM   Result Value Ref Range    Creatinine, Ur 42 4 mg/dL    Protein Urine Random 42 mg/dL    Prot/Creat Ratio, Ur 0 99 (H) 0 00 - 0 10   Procalcitonin    Collection Time: 08/29/19  6:11 AM   Result Value Ref Range    Procalcitonin 36 01 (H) <=0 25 ng/ml   CBC and differential    Collection Time: 08/29/19  6:11 AM   Result Value Ref Range    WBC 1 84 (LL) 4 31 - 10 16 Thousand/uL    RBC 3 34 (L) 3 88 - 5 62 Million/uL    Hemoglobin 11 8 (L) 12 0 - 17 0 g/dL    Hematocrit 33 2 (L) 36 5 - 49 3 %    MCV 99 (H) 82 - 98 fL    MCH 35 3 (H) 26 8 - 34 3 pg    MCHC 35 5 31 4 - 37 4 g/dL    RDW 12 8 11 6 - 15 1 %    MPV 11 2 8 9 - 12 7 fL    Platelets 18 (LL) 857 - 390 Thousands/uL    nRBC 0 /100 WBCs    Neutrophils Relative 82 (H) 43 - 75 %    Immat GRANS % 9 (H) 0 - 2 %    Lymphocytes Relative 1 (L) 14 - 44 %    Monocytes Relative 1 (L) 4 - 12 %    Eosinophils Relative 6 0 - 6 %    Basophils Relative 1 0 - 1 %    Neutrophils Absolute 1 53 (L) 1 85 - 7 62 Thousands/µL    Immature Grans Absolute 0 16 0 00 - 0 20 Thousand/uL    Lymphocytes Absolute 0 02 (L) 0 60 - 4 47 Thousands/µL    Monocytes Absolute 0 01 (L) 0 17 - 1 22 Thousand/µL    Eosinophils Absolute 0 11 0 00 - 0 61 Thousand/µL    Basophils Absolute 0 01 0 00 - 0 10 Thousands/µL   Basic metabolic panel    Collection Time: 08/29/19  6:11 AM   Result Value Ref Range    Sodium 141 136 - 145 mmol/L    Potassium 4 1 3 5 - 5 3 mmol/L    Chloride 107 100 - 108 mmol/L    CO2 25 21 - 32 mmol/L    ANION GAP 9 4 - 13 mmol/L    BUN 29 (H) 5 - 25 mg/dL    Creatinine 1 88 (H) 0 60 - 1 30 mg/dL    Glucose 82 65 - 140 mg/dL    Calcium 8 1 (L) 8 3 - 10 1 mg/dL    eGFR 39 ml/min/1 73sq m   Haptoglobin    Collection Time: 08/29/19  6:11 AM   Result Value Ref Range    Haptoglobin 35 34 - 200 mg/dL   Prepare platelet pheresis:Has consent been obtained?  Yes, 1 Units    Collection Time: 08/30/19  5:30 AM   Result Value Ref Range    Unit Product Code C4845D03     Unit Number [id number]     Unit ABO B     Unit [facility] POS     Unit Dispense Status Presumed Trans    CBC and differential    Collection Time: 08/30/19  6:36 AM   Result Value Ref Range    WBC 1 72 (LL) 4 31 - 10 16 Thousand/uL    RBC 3 37 (L) 3 88 - 5 62 Million/uL    Hemoglobin 11 6 (L) 12 0 - 17 0 g/dL    Hematocrit 33 4 (L) 36 5 - 49 3 %    MCV 99 (H) 82 - 98 fL    MCH 34 4 (H) 26 8 - 34 3 pg    MCHC 34 7 31 4 - 37 4 g/dL    RDW 12 7 11 6 - 15 1 %    MPV 11 7 8 9 - 12 7 fL Platelets 20 (LL) 393 - 390 Thousands/uL    nRBC 0 /100 WBCs    Neutrophils Relative 68 43 - 75 %    Immat GRANS % 6 (H) 0 - 2 %    Lymphocytes Relative 7 (L) 14 - 44 %    Monocytes Relative 2 (L) 4 - 12 %    Eosinophils Relative 16 (H) 0 - 6 %    Basophils Relative 1 0 - 1 %    Neutrophils Absolute 1 18 (L) 1 85 - 7 62 Thousands/µL    Immature Grans Absolute 0 11 0 00 - 0 20 Thousand/uL    Lymphocytes Absolute 0 12 (L) 0 60 - 4 47 Thousands/µL    Monocytes Absolute 0 03 (L) 0 17 - 1 22 Thousand/µL    Eosinophils Absolute 0 27 0 00 - 0 61 Thousand/µL    Basophils Absolute 0 01 0 00 - 0 10 Thousands/µL   Basic metabolic panel    Collection Time: 08/30/19  6:36 AM   Result Value Ref Range    Sodium 141 136 - 145 mmol/L    Potassium 4 2 3 5 - 5 3 mmol/L    Chloride 106 100 - 108 mmol/L    CO2 29 21 - 32 mmol/L    ANION GAP 6 4 - 13 mmol/L    BUN 24 5 - 25 mg/dL    Creatinine 1 61 (H) 0 60 - 1 30 mg/dL    Glucose 85 65 - 140 mg/dL    Calcium 8 9 8 3 - 10 1 mg/dL    eGFR 47 ml/min/1 73sq m   Procalcitonin    Collection Time: 08/30/19 10:12 AM   Result Value Ref Range    Procalcitonin 13 85 (H) <=0 25 ng/ml   Basic metabolic panel    Collection Time: 09/03/19  8:48 AM   Result Value Ref Range    Sodium 140 136 - 145 mmol/L    Potassium 4 2 3 5 - 5 3 mmol/L    Chloride 104 100 - 108 mmol/L    CO2 28 21 - 32 mmol/L    ANION GAP 8 4 - 13 mmol/L    BUN 15 5 - 25 mg/dL    Creatinine 1 44 (H) 0 60 - 1 30 mg/dL    Glucose, Fasting 98 65 - 99 mg/dL    Calcium 9 1 8 3 - 10 1 mg/dL    eGFR 54 ml/min/1 73sq m   CBC and differential    Collection Time: 09/06/19  9:06 AM   Result Value Ref Range    WBC 2 31 (L) 4 31 - 10 16 Thousand/uL    RBC 3 61 (L) 3 88 - 5 62 Million/uL    Hemoglobin 12 2 12 0 - 17 0 g/dL    Hematocrit 35 3 (L) 36 5 - 49 3 %    MCV 98 82 - 98 fL    MCH 33 8 26 8 - 34 3 pg    MCHC 34 6 31 4 - 37 4 g/dL    RDW 12 5 11 6 - 15 1 %    MPV 9 3 8 9 - 12 7 fL    Platelets 87 (L) 274 - 390 Thousands/uL    nRBC 0 /100 WBCs Comprehensive metabolic panel    Collection Time: 09/06/19  9:06 AM   Result Value Ref Range    Sodium 142 136 - 145 mmol/L    Potassium 4 4 3 5 - 5 3 mmol/L    Chloride 107 100 - 108 mmol/L    CO2 29 21 - 32 mmol/L    ANION GAP 6 4 - 13 mmol/L    BUN 9 5 - 25 mg/dL    Creatinine 1 14 0 60 - 1 30 mg/dL    Glucose, Fasting 90 65 - 99 mg/dL    Calcium 8 8 8 3 - 10 1 mg/dL    AST 33 5 - 45 U/L    ALT 41 12 - 78 U/L    Alkaline Phosphatase 75 46 - 116 U/L    Total Protein 6 9 6 4 - 8 2 g/dL    Albumin 3 3 (L) 3 5 - 5 0 g/dL    Total Bilirubin 0 84 0 20 - 1 00 mg/dL    eGFR 71 ml/min/1 73sq m   Manual Differential(PHLEBS Do Not Order)    Collection Time: 09/06/19  9:06 AM   Result Value Ref Range    Segmented % 12 (L) 43 - 75 %    Lymphocytes % 76 (H) 14 - 44 %    Monocytes % 5 4 - 12 %    Eosinophils, % 5 0 - 6 %    Basophils % 0 0 - 1 %    Atypical Lymphocytes % 2 (H) <=0 %    Absolute Neutrophils 0 28 (L) 1 85 - 7 62 Thousand/uL    Lymphocytes Absolute 1 76 0 60 - 4 47 Thousand/uL    Monocytes Absolute 0 12 0 00 - 1 22 Thousand/uL    Eosinophils Absolute 0 12 0 00 - 0 40 Thousand/uL    Basophils Absolute 0 00 0 00 - 0 10 Thousand/uL    Total Counted 100     Ovalocytes Present     Polychromasia Present     Platelet Estimate Decreased (A) Adequate       Imaging Studies:Xr Chest 2 Views    Result Date: 8/28/2019  Narrative: CHEST INDICATION:   Tachycardia  COMPARISON:  None EXAM PERFORMED/VIEWS:  XR CHEST PA & LATERAL  The frontal view was performed utilizing dual energy radiographic technique  Images: 4 FINDINGS:  Right chest wall Port-A-Cath projects appropriately  Cardiomediastinal silhouette appears unremarkable  Elevation of the left hemidiaphragm  Gaseous distention of the stomach below the hemidiaphragm with a large air-fluid level  No congestive failure  Consolidation at the left apex with volume loss of the left lung consistent with prior surgery   There is a consolidation within the medial right lung base seen posteriorly  Osseous structures appear within normal limits for patient age  Impression: Postop change within the left chest with focal consolidation at the apex  Consolidation within the posterior right lung base consistent with patient's known mass  See separate CT scan  Workstation performed: [id number]     Martina Camacho Ed Chest Pe Study    Result Date: 8/27/2019  Narrative: CTA - CHEST WITH IV CONTRAST - PULMONARY ANGIOGRAM INDICATION:   ?PE  COMPARISON: None  TECHNIQUE: CTA examination of the chest was performed using angiographic technique according to a protocol specifically tailored to evaluate for pulmonary embolism  Axial, sagittal, and coronal 2D reformatted images were created from the source data and  submitted for interpretation  In addition, coronal 3D MIP postprocessing was performed on the acquisition scanner  Radiation dose length product (DLP) for this visit:  573 mGy-cm   This examination, like all CT scans performed in the [facility], was performed utilizing techniques to minimize radiation dose exposure, including the use of iterative reconstruction and automated exposure control  IV Contrast:  90 mL of iodixanol (VISIPAQUE)  FINDINGS: PULMONARY ARTERIAL TREE:  Suboptimal opacification of the pulmonary arteries and respiratory motion artifact  No evidence of main pulmonary artery embolus  Distal or branches are not well evaluated  LUNGS:  Right infrahilar mass in the superior segment of the right lower lobe measures 6 3 x 4 5 x 4 2 cm compared to 8 2 x 5 3 x 5 2 cm on the prior exam   Surrounding airspace opacities in the right lower lobe, not significant changed since the prior exam   Stable 6 mm subpleural nodule versus scarring in the anterior aspect of the right upper lobe series 4, image 66  Other previously demonstrated pulmonary nodules in the right upper and middle lobes are no longer visualized  Stable volume loss in the left lung from partial pneumonectomy  Nodule versus nodular scarring in the left upper lung adjacent to the mediastinum measures 1 8 cm, slightly smaller  Small amount of secretions in the proximal right mainstem bronchus  Stable severe narrowing of right lower lobe proximal segmental branches  PLEURA:  No effusion  HEART/GREAT VESSELS:  Normal heart size  No thoracic aortic aneurysm or dissection  MEDIASTINUM AND ZABRINA:  Unremarkable  CHEST WALL AND LOWER NECK:   Unremarkable  VISUALIZED STRUCTURES IN THE UPPER ABDOMEN:  Unremarkable  OSSEOUS STRUCTURES:  No acute fracture or destructive osseous lesion  Impression: 1  Limited evaluation of the pulmonary arteries  No evidence of main renal artery embolus  Distal order branches are not well evaluated  2   Right infrahilar and lower lobe mass is stable to slightly smaller, measuring 6 3 x 4 5 x 4 2 cm  Some pulmonary nodules in the bilateral lungs have resolved  Others are stable to slightly smaller  3   Stable severe narrowing of right lower lobe segmental pulmonary arteries and postobstructive pneumonia  Workstation performed: [id number]       Assessment/Plan:  No orders of the defined types were placed in this encounter  Anisa Naylor is a 63y o  year old male with a history of 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 status post right hemicolectomy in 2008, pathologic stage T3, N2, M1, moderately differentiated adenocarcinoma of the ascending colon followed by [facility]OLFOX  In 2010, he underwent wedge resection of left lower and right lower lobe lesions, then additional chemotherapy with Avastin and FOLFIRI  Right VATS and right lower lobe wedge resection 2014, SBRT of left lung metastases March 2014, left lower lobectomy April 2015, Xeloda and Avastin from June 2015 to February 2017  He seen on 5/16/17 by Dr Gene Watkins for consideration of SBRT of a right lower lobe lesion   At that time, the lesion was not amenable to wedge resection and he was not a candidate for another lobectomy    His case was discussed at thoracic tumor conference, the consensus was to defer SBRT, continue  observation and repeat CT scan of the chest in 2-3 months         He has continued his systemic treatment with Dr Jered Banerjee with his most recent treatment with Lonsurf ending in May 2019  His chest CT from May 13, 2019 showed progression in his large right lower lobe lung mass along with slight enlargement of a small left upper lobe lung nodule and a new right lung base nodule  He was seen for a 2nd opinion at [address] [place] [address] on June 21, 2019 by Dr Andre Thomas and and recommendations were made to consider local radiation therapy to be followed by systemic treatment with irinotecan, oxaliplatin, and Avastin  He had repeat CT scans of the chest, abdomen, and pelvis on July 14, 2019 that showed further progression of the right lower lobe lung mass with several additional smaller pulmonary nodules that increased in size  CT scan of the neck because he was having some difficulty with dysphagia was normal   He also had MRI of the brain on July 15, 2019 that was normal   The only area where he has received radiation therapy is the SBRT to the left lung performed here in 2014  We recommended palliative fractionated external beam radiation therapy to the right lower lobe extending into the right hilar region  This was completed on August 6, 2019  He returns today for follow-up examination and is feeling well  He is having no respiratory complaints more pain in the chest region  He has follow-up October 2, 2019 with Dr Jered Banerjee to discuss his systemic treatment options    We do not recommend any additional radiation therapy and he will be seen here on a p r n  basis        Iban Burgos MD  9/11/2019,8:38 AM    Portions of the record may have been created with voice recognition software   Occasional wrong word or "sound a like" substitutions may have occurred due to the inherent limitations of voice recognition software   Read the chart carefully and recognize, using context, where substitutions have occurred

## 2019-09-11 NOTE — PROGRESS NOTES
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 stast right hemicolectomy in 2008, pathologic stage T3, N2, M1, moderately differentiated adenocarcinoma of the ascending colon followed by FOLFOX  In 2010, he underwent wedge resection of left lower and right lower lobe lesions, then additional chemotherapy with Avastin and FOLFIRI  Right VATS and right lower lobe wedge resection 2014, SBRT of left lung metastases March 2014, left lower lobectomy April 2015, Xeloda and Avastin from June 2015 to February 2017  He was last see on 5/16/17 by Dr Kera Cade for consideration of SBRT of a right lower lobe lesion  At that time, the lesion was not amenable to wedge resection and he was not a candidate for another lobectomy    His case was discussed at thoracic tumor conference, the consensus was to defer SBRT, continue  observation and repeat CT scan of the chest in 2-3 months  He has continued his systemic treatment with Dr Marianna Beard with his most recent treatment with Lonsurf ending in May 2019  His most recent chest CT from May 13, 2019 showed progression in his large right lower lobe lung mass along with slight enlargement of a small left upper lobe lung nodule and a new right lung base nodule  We discussed a course of 3000 cGy in 10 fractions to the right lower lobe and hilar region  His other lung nodules will be observed and treated with chemotherapy  Should they progress in the future, then additional SBRT lung treatment could be considered  He has now completed  Radiation to the RLL hilum, on 8/6/19, at which time he noted less sob  No dysphagia  He returns for his first f/u post this most recent course of radiation  8/27/19 continued chemo  Had febrile neutropenia, sepsis, admitted 8/27/19, post chemo , His chemo may have also caused the reaction     He was to discuss with [facility] med onc, since they recommended the current regimen  Chemo on hold  He called the [facility] doctors yesterday, awaiting call back, as to what plans for new chemo regimen  Dr Delgado Loo  Med Onc    10/2/19 next f/u with Dr Jered Banerjee  Today he feels good, feels up  to 80% of his regular   Last week felt terrible, has completed his antibiotic course       Colon cancer metastasized to lung [facility])    2008 Initial Diagnosis     Colon cancer metastasized to lung [facility])      2008 - 2009 Chemotherapy     FOLFOX for two 6 month courses (3 month break in-between courses, according to the patient)  - Dr Cristiane Anderson      2/18/2008 Surgery     Partial colectomy/Right hemicolectomy:  - stage IV (pT3,pN2,pM1a, G2)    - Dr Devorah Simpson        4/21/2010 Surgery     Right lower lobe lung nodule:  - metastatic colonic adenocarcinoma      8/19/2010 Surgery     Left lower lobe wedge resection:  - moderately differentiated adenocarcinoma, consistent with metastasis from known colonic primary, 1 3 cm      2011 Surgery     Transverse colectomy      4/2011 - 10/2011 Chemotherapy     Avastin and FOLFIRI       11/9/2011 Surgery     RAZ lung wedge resection:  - metastatic adenocarcinoma of the colon  - invasive carcinoma is 0 4 cm from parenchymal resection      2012 Surgery     Wedge resection for right lung nodule      11/29/2012 Surgery     I  [id number] (11/29/12)   Omental nodule, excision:   - Metastatic adenocarcinoma morphologically consistent with colonic primary  1/15/2014 Surgery     A  Lung, right lower lobe, wedge resection:   - 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, moderately to poorly differentiated, consistent with colorectal primary, see note  - Resection margin, no tumor seen  - Background lung with few non-necrotizing granulomas and emphysematous changes           3/4/2014 - 3/14/2014 Radiation     Plan ID Energy Fractions Dose per Fraction (cGy) Total Dose Delivered (cGy) Elapsed Days   SBRT Lt Lung 6X 5 / 5 1,000 5,000 10     - Dr Toby aSmuel      4/14/2015 Surgery     Left lower lobe lobectomy, cervical mediastinoscopy, Left posterolateral thoracotomy:  A   Lymph node Level 2R:    - 3/3 lymph nodes, positive for metastatic colonic adenocarcinoma  BSan Cuellar node Level 7:   - 3/8 lymph nodes, positive for metastatic colonic adenocarcinoma       C   Left lower lobe:   - Metastatic colonic adenocarcinoma    - The vascular and bronchial margins of resection are negative for tumor    - 0/6 lymph nodes, negative for metastatic colonic adenocarcinoma     - The tumor is 0 5 cm away from the hilar staple line        6/2015 - 2/2017 Chemotherapy     Xeloda and Avastin      9/2018 - 5/2019 Chemotherapy     Lonsurf      7/31/2019 -  Chemotherapy     bevacizumab (AVASTIN) 500 mg in sodium chloride 0 9 % 100 mL IVPB, 505 mg, Intravenous, Once, 1 of 11 cycles  Administration: 500 mg (8/27/2019)  irinotecan (CAMPTOSAR) 340 mg in dextrose 5 % 500 mL chemo infusion, 330 mg (83 3 % of original dose 180 mg/m2), Intravenous, Once, 2 of 12 cycles  Dose modification: 150 mg/m2 (original dose 180 mg/m2, Cycle 1, Reason: Dose Not Tolerated)  Administration: 340 mg (8/13/2019), 340 mg (8/27/2019)  oxaliplatin (ELOXATIN) 200 mg in dextrose 5 % 250 mL chemo infusion, 198 05 mg, Intravenous, Once, 2 of 12 cycles  Administration: 200 mg (8/13/2019), 200 mg (8/27/2019)        Malignant neoplasm of ascending colon (HCC)    2/5/2018 Initial Diagnosis     Malignant neoplasm of ascending colon (HCC)      7/31/2019 -  Chemotherapy     bevacizumab (AVASTIN) 500 mg in sodium chloride 0 9 % 100 mL IVPB, 505 mg, Intravenous, Once, 1 of 11 cycles  Administration: 500 mg (8/27/2019)  irinotecan (CAMPTOSAR) 340 mg in dextrose 5 % 500 mL chemo infusion, 330 mg (83 3 % of original dose 180 mg/m2), Intravenous, Once, 2 of 12 cycles  Dose modification: 150 mg/m2 (original dose 180 mg/m2, Cycle 1, Reason: Dose Not Tolerated)  Administration: 340 mg (8/13/2019), 340 mg (8/27/2019)  oxaliplatin (ELOXATIN) 200 mg in dextrose 5 % 250 mL chemo infusion, 198 05 mg, Intravenous, Once, 2 of 12 cycles  Administration: 200 mg (8/13/2019), 200 mg (8/27/2019)        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)    2/5/2018 Initial Diagnosis     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[facility] [address] )      7/24/2019 - 8/6/2019 Radiation     Treatment:  Course: C2    Plan ID Energy Fractions Dose per Fraction (cGy) Dose Correction (cGy) Total Dose Delivered (cGy) Elapsed Days   R LL_Hilum 10X 10 / 10 300 0 3,000 13            7/31/2019 -  Chemotherapy     bevacizumab (AVASTIN) 500 mg in sodium chloride 0 9 % 100 mL IVPB, 505 mg, Intravenous, Once, 1 of 11 cycles  Administration: 500 mg (8/27/2019)  irinotecan (CAMPTOSAR) 340 mg in dextrose 5 % 500 mL chemo infusion, 330 mg (83 3 % of original dose 180 mg/m2), Intravenous, Once, 2 of 12 cycles  Dose modification: 150 mg/m2 (original dose 180 mg/m2, Cycle 1, Reason: Dose Not Tolerated)  Administration: 340 mg (8/13/2019), 340 mg (8/27/2019)  oxaliplatin (ELOXATIN) 200 mg in dextrose 5 % 250 mL chemo infusion, 198 05 mg, Intravenous, Once, 2 of 12 cycles  Administration: 200 mg (8/13/2019), 200 mg (8/27/2019)         Clinical Trial: no      Health Maintenance   Topic Date Due    Hepatitis C Screening  1960    Medicare Annual Wellness Visit (AWV)  1960    CRC Screening: Colonoscopy  1960    Pneumococcal Vaccine: Pediatrics (0 to 5 Years) and At-Risk Patients (6 to 59 Years) (1 of 3 - PCV13) 11/13/1967    BMI: Followup Plan  11/13/1979    DTaP,Tdap,and Td Vaccines (1 - Tdap) 11/13/1982    INFLUENZA VACCINE  07/01/2019    Depression Screening PHQ  07/12/2020    BMI: Adult  08/27/2020    Pneumococcal Vaccine: 65+ Years (1 of 2 - PCV13) 11/13/2026    HEPATITIS B VACCINES  Aged Out       Patient Active Problem List   Diagnosis    Flu-like symptoms    Fever    Colon cancer metastasized to lung ([address] )    Chronic back pain    Diarrhea    Malignant neoplasm of ascending colon (HCC)    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)    Dyspnea on exertion    Port-A-Cath in place    Esophageal dysphagia    Pneumonia due to infectious organism    Acute respiratory failure with hypoxia (HCC)    Sepsis (HCC)    Neutropenic fever (HCC)    Thrombocytopenia (HCC)    Acute kidney injury ([facility] [address] )     Past Medical History:   Diagnosis Date    Atrial fibrillation ([facility] [address] )     Colon cancer ([facility] [address] )     Colon cancer ([facility] [address] )     Metastatic carcinoma to lung [facility])      Past Surgical History:   Procedure Laterality Date    ANTERIOR CRUCIATE LIGAMENT REPAIR      ANTERIOR CRUCIATE LIGAMENT REPAIR      2  on R knee    APPENDECTOMY      ATRIAL ABLATION SURGERY      BACK SURGERY      COLON SURGERY      Ascending and transverse colon    COLONOSCOPY      LUNG REMOVAL, PARTIAL      RIGHT COLECTOMY      and Transverse colon resection     Family History   Problem Relation Age of Onset    Diabetes Mother     Heart disease Mother     Heart disease Father     Skin cancer Sister      Social History     Socioeconomic History    Marital status: /Civil Union     Spouse name: Not on file    Number of children: Not on file    Years of education: Not on file    Highest education level: Not on file   Occupational History    Not on file   Social Needs    Financial resource strain: Not on file    Food insecurity:     Worry: Not on file     Inability: Not on file    Transportation needs:     Medical: Not on file     Non-medical: Not on file   Tobacco Use    Smoking status: Former Smoker     Types: Cigarettes     Last attempt to quit: 2010     Years since quittin 6    Smokeless tobacco: Never Used    Tobacco comment: Cigars    Substance and Sexual Activity    Alcohol use:  Yes     Alcohol/week: 12 0 standard drinks     Types: 12 Cans of beer per week     Comment: Socially     Drug use: No    Sexual activity: Not on file Lifestyle    Physical activity:     Days per week: Not on file     Minutes per session: Not on file    Stress: Not on file   Relationships    Social connections:     Talks on phone: Not on file     Gets together: Not on file     Attends Christian service: Not on file     Active member of club or organization: Not on file     Attends meetings of clubs or organizations: Not on file     Relationship status: Not on file    Intimate partner violence:     Fear of current or ex partner: Not on file     Emotionally abused: Not on file     Physically abused: Not on file     Forced sexual activity: Not on file   Other Topics Concern    Not on file   Social History Narrative    Patient not questioned about family hx        Current Outpatient Medications:     albuterol (VENTOLIN HFA) 90 mcg/act inhaler, 2 puffs every 4 hours as needed for shortness of breath  No more than 4 times a day, Disp: 1 Inhaler, Rfl: 2    ascorbic acid (VITAMIN C) 250 MG CHEW, Chew 250 mg daily, Disp: , Rfl:     ondansetron (ZOFRAN) 4 mg tablet, Take 1 tablet (4 mg total) by mouth every 6 (six) hours as needed for nausea or vomiting, Disp: 50 tablet, Rfl: 2    Regorafenib (STIVARGA) 40 MG TABS, 2 tablets daily for 21 days  7 days off  (Patient not taking: Reported on 8/28/2019), Disp: 42 tablet, Rfl: 3  Allergies   Allergen Reactions    Aspirin Other (See Comments)     Nose bleed    Sulfa Antibiotics Other (See Comments)     Dizzy        Review of Systems:  Review of Systems   Constitutional: Positive for fatigue (2/10) and unexpected weight change (has improved post diarrhea  and poor appetite after antibiotics)  HENT:        Has tenderness of the anterior neck, for several mons  Doesn't interfere with swallowing  Had w/u and unable to find cause  Eyes: Negative  Respiratory: Positive for cough (last week, improving, productive clear sputum) and shortness of breath ([place], unchaged )  Cardiovascular: Negative  Gastrointestinal: Positive for diarrhea (resolved now)  Endocrine: Negative  Genitourinary:        Had dark urine last week when hospitalized, and had renal dysfunction  Now resolved   Musculoskeletal: Negative  Skin: Negative  Allergic/Immunologic: Negative  Neurological: Positive for light-headedness (getting up too fast, or really hungry  )  Hematological: Negative  Psychiatric/Behavioral: Negative  Vitals:    09/11/19 0759   BP: 100/60   Pulse: 88   Resp: 20   Temp: 98 4 °F (36 9 °C)   SpO2: 99%   Weight: 99 1 kg (218 lb 7 6 oz)   Height: 6' 4 5" (1 943 m)        Pain assessment:0         No results found for: PSA:    Imaging:Xr Chest 2 Views    Result Date: 8/28/2019  Narrative: CHEST INDICATION:   Tachycardia  COMPARISON:  None EXAM PERFORMED/VIEWS:  XR CHEST PA & LATERAL  The frontal view was performed utilizing dual energy radiographic technique  Images: 4 FINDINGS:  Right chest wall Port-A-Cath projects appropriately  Cardiomediastinal silhouette appears unremarkable  Elevation of the left hemidiaphragm  Gaseous distention of the stomach below the hemidiaphragm with a large air-fluid level  No congestive failure  Consolidation at the left apex with volume loss of the left lung consistent with prior surgery  There is a consolidation within the medial right lung base seen posteriorly  Osseous structures appear within normal limits for patient age  Impression: Postop change within the left chest with focal consolidation at the apex  Consolidation within the posterior right lung base consistent with patient's known mass  See separate CT scan  Workstation performed: [id number]     Brendan Dunn Ed Chest Pe Study    Result Date: 8/27/2019  Narrative: CTA - CHEST WITH IV CONTRAST - PULMONARY ANGIOGRAM INDICATION:   ?PE  COMPARISON: None   TECHNIQUE: CTA examination of the chest was performed using angiographic technique according to a protocol specifically tailored to evaluate for pulmonary embolism  Axial, sagittal, and coronal 2D reformatted images were created from the source data and  submitted for interpretation  In addition, coronal 3D MIP postprocessing was performed on the acquisition scanner  Radiation dose length product (DLP) for this visit:  573 mGy-cm   This examination, like all CT scans performed in the [facility], was performed utilizing techniques to minimize radiation dose exposure, including the use of iterative reconstruction and automated exposure control  IV Contrast:  90 mL of iodixanol (VISIPAQUE)  FINDINGS: PULMONARY ARTERIAL TREE:  Suboptimal opacification of the pulmonary arteries and respiratory motion artifact  No evidence of main pulmonary artery embolus  Distal or branches are not well evaluated  LUNGS:  Right infrahilar mass in the superior segment of the right lower lobe measures 6 3 x 4 5 x 4 2 cm compared to 8 2 x 5 3 x 5 2 cm on the prior exam   Surrounding airspace opacities in the right lower lobe, not significant changed since the prior exam   Stable 6 mm subpleural nodule versus scarring in the anterior aspect of the right upper lobe series 4, image 66  Other previously demonstrated pulmonary nodules in the right upper and middle lobes are no longer visualized  Stable volume loss in the left lung from partial pneumonectomy  Nodule versus nodular scarring in the left upper lung adjacent to the mediastinum measures 1 8 cm, slightly smaller  Small amount of secretions in the proximal right mainstem bronchus  Stable severe narrowing of right lower lobe proximal segmental branches  PLEURA:  No effusion  HEART/GREAT VESSELS:  Normal heart size  No thoracic aortic aneurysm or dissection  MEDIASTINUM AND ZABRINA:  Unremarkable  CHEST WALL AND LOWER NECK:   Unremarkable  VISUALIZED STRUCTURES IN THE UPPER ABDOMEN:  Unremarkable  OSSEOUS STRUCTURES:  No acute fracture or destructive osseous lesion  Impression: 1    Limited evaluation of the pulmonary arteries  No evidence of main renal artery embolus  Distal order branches are not well evaluated  2   Right infrahilar and lower lobe mass is stable to slightly smaller, measuring 6 3 x 4 5 x 4 2 cm  Some pulmonary nodules in the bilateral lungs have resolved  Others are stable to slightly smaller  3   Stable severe narrowing of right lower lobe segmental pulmonary arteries and postobstructive pneumonia   Workstation performed: [id number]       Teaching: wear sunscreen

## 2019-09-19 ENCOUNTER — TELEPHONE (OUTPATIENT)
Dept: HEMATOLOGY ONCOLOGY | Facility: CLINIC | Age: 59
End: 2019-09-19

## 2019-09-19 NOTE — TELEPHONE ENCOUNTER
Left VM reviewing I had called patient before to have him follow up after chemo reaction at [facility] since they were recommending tx  Reviewed I would like patient to call me back with what IRWIN is recommending so we can schedule follow up with Dr Beau Keller and adjust schedule as needed

## 2019-09-20 ENCOUNTER — TELEPHONE (OUTPATIENT)
Dept: HEMATOLOGY ONCOLOGY | Facility: CLINIC | Age: 59
End: 2019-09-20

## 2019-09-24 ENCOUNTER — HOSPITAL ENCOUNTER (OUTPATIENT)
Dept: INFUSION CENTER | Facility: CLINIC | Age: 59
End: 2019-09-24

## 2019-10-02 ENCOUNTER — DOCUMENTATION (OUTPATIENT)
Dept: HEMATOLOGY ONCOLOGY | Facility: CLINIC | Age: 59
End: 2019-10-02

## 2019-10-02 ENCOUNTER — OFFICE VISIT (OUTPATIENT)
Dept: HEMATOLOGY ONCOLOGY | Facility: CLINIC | Age: 59
End: 2019-10-02
Payer: MEDICARE

## 2019-10-02 VITALS
RESPIRATION RATE: 18 BRPM | HEART RATE: 85 BPM | HEIGHT: 77 IN | SYSTOLIC BLOOD PRESSURE: 134 MMHG | TEMPERATURE: 97.1 F | DIASTOLIC BLOOD PRESSURE: 82 MMHG | WEIGHT: 219.2 LBS | BODY MASS INDEX: 25.88 KG/M2 | OXYGEN SATURATION: 94 %

## 2019-10-02 DIAGNOSIS — C18.2 MALIGNANT NEOPLASM OF ASCENDING COLON (HCC): Primary | ICD-10-CM

## 2019-10-02 DIAGNOSIS — C78.00 COLON CANCER METASTASIZED TO LUNG (HCC): Chronic | ICD-10-CM

## 2019-10-02 DIAGNOSIS — C18.9 COLON CANCER METASTASIZED TO LUNG (HCC): Chronic | ICD-10-CM

## 2019-10-02 DIAGNOSIS — C77.1 METASTASIS TO INTRATHORACIC LYMPH NODES (HCC): ICD-10-CM

## 2019-10-02 DIAGNOSIS — C18.2 MALIGNANT NEOPLASM OF ASCENDING COLON (HCC): ICD-10-CM

## 2019-10-02 PROCEDURE — 99215 OFFICE O/P EST HI 40 MIN: CPT | Performed by: INTERNAL MEDICINE

## 2019-10-02 NOTE — PROGRESS NOTES
HPI:    Patient has stage IV metastatic colon cancer   to lungs and mediastinal/hilar lymph nodes  He was recently hospitalized with fevers and shaking chills secondary to  postobstructive pneumonitis right lower lung and febrile neutropenia  Patient has recovered  He has much less cough and shortness of breath  No more fevers and chills  Less dysphagia to solids  No dysphagia to liquids  Has some tiredness  He is willing to try Klaudia Liseth next that was the original plan before he had dose of irinotecan and oxaliplatin and was hospitalized after that   Prior to that he had palliative radiation to the lung mass causing bronchial obstruction and dysphagia  He had been through several lines of systemic therapy for metastatic colon cancer that was 1st diagnosed in 2008  He had not have Stivarga  He had not have Cyramza plus chemo   Patient saw Dr Triston Calixto and he suggested IROX plus Avastin, a combination of irinotecan and oxaliplatin and Avastin     In 2008 he was diagnosed to have  stage IV adenocarcinoma of the colon     He had resection of the ascending colon   Pathology showed involvement of lymph nodes and lung   Postsurgery he received FOLFOX plus Avastin for 6 months      Then, in 2011 patient had additional resection this time of the transverse colon   He received FOLFIRI plus Avastin for 6 months after surgery    He has residual grade 1 peripheral 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      In 2012 he had wedge resection for right lung nodule   No chemotherapy at that time   In January 2014 he had wedge resection of the right lower    No radiation following this         History of AFib/atrial flutter and had ablation      June 2015-February 2017 patient was on Xeloda plus Avastin   In February 2017 patient was noted to have a new lesion of the lung  Cathy Muniz was unable to do RFA to this lesion   Patient was seen by thoracic surgery   Due to location, wedge resection was not able to be done   He was referred to rad onc for SBRT       May 2017 lung nodule was noted to be stable compared to February 2017 August 2017 right lower lobe pulmonary nodule had enlarged compared to May 2017      Due to enlarging nodule in May 2017, a PET/CT was performed in August 2017   This showed a 2 8 x 1 8 cm right lower medial lung mass with hypermetabolism      Aug 2017 he was referred to [place] for Cyberknife of the lung      Patient then did not return for follow up with our office until Feb 2018  At this time patient then had repeat scans to be performed  CT C/A/P in March 2018 showed enlarging right lower lobe nodular masses -- consistent with metastatic disease      He was then seen in follow-up on 03/15/2018 after his CT scan result   Patient was presented with multiple options at included surgery, RFA, radiation, CyberKnife, systemic therapy   Patient opted for consultation for RFA in thoracic surgery and then decide      Patient then did not come back to the office until July 2018  Kevin Biggs needed up to date imaging studies and had repeat PET/CT in Aug 2018 which showed enlarging hypermetabolic right lower medial lung in right hilar lesions   New hypermetabolic left lower lung nodule   Multiple left pleural base lesions, however noted to likely be inflammatory related to prior talc pleurodesis  No new lesions in the neck, abdomen, pelvis    Due to increase in metastatic disease, patient was recommended to pursue systemic therapy   Patient began [address] September 2018    Follow-up CT scan showed disease progression and Stivarga was offered but he did not have that as above                     Current Outpatient Medications:     albuterol (VENTOLIN HFA) 90 mcg/act inhaler, 2 puffs every 4 hours as needed for shortness of breath    No more than 4 times a day, Disp: 1 Inhaler, Rfl: 2    ascorbic acid (VITAMIN C) 250 MG CHEW, Chew 250 mg daily, Disp: , Rfl:     ondansetron (ZOFRAN) 4 mg tablet, Take 1 tablet (4 mg total) by mouth every 6 (six) hours as needed for nausea or vomiting, Disp: 50 tablet, Rfl: 2    Regorafenib (STIVARGA) 40 MG TABS, 2 tablets daily for 21 days  7 days off  (Patient not taking: Reported on 8/28/2019), Disp: 42 tablet, Rfl: 3    Allergies   Allergen Reactions    Aspirin Other (See Comments)     Nose bleed    Sulfa Antibiotics Other (See Comments)     Dizzy           Colon cancer metastasized to lung [facility])    2008 Initial Diagnosis     Colon cancer metastasized to lung [facility])      2008 - 2009 Chemotherapy     FOLFOX for two 6 month courses (3 month break in-between courses, according to the patient)  - Dr Romi Ceballos      2/18/2008 Surgery     Partial colectomy/Right hemicolectomy:  - stage IV (pT3,pN2,pM1a, G2)    - Dr Stephanie Garg        4/21/2010 Surgery     Right lower lobe lung nodule:  - metastatic colonic adenocarcinoma      8/19/2010 Surgery     Left lower lobe wedge resection:  - moderately differentiated adenocarcinoma, consistent with metastasis from known colonic primary, 1 3 cm      2011 Surgery     Transverse colectomy      4/2011 - 10/2011 Chemotherapy     Avastin and FOLFIRI       11/9/2011 Surgery     RAZ lung wedge resection:  - metastatic adenocarcinoma of the colon  - invasive carcinoma is 0 4 cm from parenchymal resection      2012 Surgery     Wedge resection for right lung nodule      11/29/2012 Surgery     I  [id number] (11/29/12)   Omental nodule, excision:   - Metastatic adenocarcinoma morphologically consistent with colonic primary  1/15/2014 Surgery     A  Lung, right lower lobe, wedge resection:   - 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, moderately to poorly differentiated, consistent with colorectal primary, see note  - Resection margin, no tumor seen  - Background lung with few non-necrotizing granulomas and emphysematous changes           3/4/2014 - 3/14/2014 Radiation     Plan ID Energy Fractions Dose per Fraction (cGy) Total Dose Delivered (cGy) Elapsed Days   SBRT Lt Lung 6X 5 / 5 1,000 5,000 8     - Dr Kera Cade      4/14/2015 Surgery     Left lower lobe lobectomy, cervical mediastinoscopy, Left posterolateral thoracotomy:  A   Lymph node Level 2R:    - 3/3 lymph nodes, positive for metastatic colonic adenocarcinoma  BGenora Contras node Level 7:   - 3/8 lymph nodes, positive for metastatic colonic adenocarcinoma       C   Left lower lobe:   - Metastatic colonic adenocarcinoma    - The vascular and bronchial margins of resection are negative for tumor    - 0/6 lymph nodes, negative for metastatic colonic adenocarcinoma     - The tumor is 0 5 cm away from the hilar staple line        6/2015 - 2/2017 Chemotherapy     Xeloda and Avastin      9/2018 - 5/2019 Chemotherapy     Lonsurf      7/31/2019 -  Chemotherapy     bevacizumab (AVASTIN) 500 mg in sodium chloride 0 9 % 100 mL IVPB, 505 mg, Intravenous, Once, 1 of 11 cycles  Administration: 500 mg (8/27/2019)  irinotecan (CAMPTOSAR) 340 mg in dextrose 5 % 500 mL chemo infusion, 330 mg (83 3 % of original dose 180 mg/m2), Intravenous, Once, 2 of 12 cycles  Dose modification: 150 mg/m2 (original dose 180 mg/m2, Cycle 1, Reason: Dose Not Tolerated)  Administration: 340 mg (8/13/2019), 340 mg (8/27/2019)  oxaliplatin (ELOXATIN) 200 mg in dextrose 5 % 250 mL chemo infusion, 198 05 mg, Intravenous, Once, 2 of 12 cycles  Administration: 200 mg (8/13/2019), 200 mg (8/27/2019)        Malignant neoplasm of ascending colon (HCC)    2/5/2018 Initial Diagnosis     Malignant neoplasm of ascending colon (HCC)      7/31/2019 -  Chemotherapy     bevacizumab (AVASTIN) 500 mg in sodium chloride 0 9 % 100 mL IVPB, 505 mg, Intravenous, Once, 1 of 11 cycles  Administration: 500 mg (8/27/2019)  irinotecan (CAMPTOSAR) 340 mg in dextrose 5 % 500 mL chemo infusion, 330 mg (83 3 % of original dose 180 mg/m2), Intravenous, Once, 2 of 12 cycles  Dose modification: 150 mg/m2 (original dose 180 mg/m2, Cycle 1, Reason: Dose Not Tolerated)  Administration: 340 mg (8/13/2019), 340 mg (8/27/2019)  oxaliplatin (ELOXATIN) 200 mg in dextrose 5 % 250 mL chemo infusion, 198 05 mg, Intravenous, Once, 2 of 12 cycles  Administration: 200 mg (8/13/2019), 200 mg (8/27/2019)        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)    2/5/2018 Initial Diagnosis     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[address] )      7/24/2019 - 8/6/2019 Radiation     Treatment:  Course: C2    Plan ID Energy Fractions Dose per Fraction (cGy) Dose Correction (cGy) Total Dose Delivered (cGy) Elapsed Days   R LL_Hilum 10X 10 / 10 300 0 3,000 13            7/31/2019 -  Chemotherapy     bevacizumab (AVASTIN) 500 mg in sodium chloride 0 9 % 100 mL IVPB, 505 mg, Intravenous, Once, 1 of 11 cycles  Administration: 500 mg (8/27/2019)  irinotecan (CAMPTOSAR) 340 mg in dextrose 5 % 500 mL chemo infusion, 330 mg (83 3 % of original dose 180 mg/m2), Intravenous, Once, 2 of 12 cycles  Dose modification: 150 mg/m2 (original dose 180 mg/m2, Cycle 1, Reason: Dose Not Tolerated)  Administration: 340 mg (8/13/2019), 340 mg (8/27/2019)  oxaliplatin (ELOXATIN) 200 mg in dextrose 5 % 250 mL chemo infusion, 198 05 mg, Intravenous, Once, 2 of 12 cycles  Administration: 200 mg (8/13/2019), 200 mg (8/27/2019)         ROS:  10/02/19 Reviewed 13 systems:  Presently no other neurological, cardiac, pulmonary, GI and  symptoms other than mentioned above  No other symptoms like fever, chills, bleeding, bone pains, skin rash, weight loss, arthritic symptoms,  weakness, numbness,  claudication and gait problem  Not unusually sensitive to heat or cold  No swelling of the ankles  No swollen glands  Patient is anxious  Other symptoms are in HPI        /82 (BP Location: Left arm, Patient Position: Sitting, Cuff Size: Adult)   Pulse 85   Temp (!) 97 1 °F (36 2 °C)   Resp 18   Ht 6' 4 5" (1 943 m)   Wt 99 4 kg (219 lb 3 2 oz)   SpO2 94%   BMI 26 33 kg/m²     Physical Exam:      Patient is alert and oriented  Patient is not in distress  Vital signs are as above  There is no scleral icterus,   no oral thrush, no palpable neck mass, decreased breath sounds at left lung base, regular heart rate, abdomen  soft and non tender, no palpable abdominal mass, no ascites, no edema of ankles, no calf tenderness, no focal neurological deficit, no skin rash, no palpable lymphadenopathy, good arterial pulses, no clubbing  Patient is anxious  Performance status 1  IMAGING:   Limited evaluation of the pulmonary arteries  No evidence of main renal artery embolus  Distal order branches are not well evaluated  2   Right infrahilar and lower lobe mass is stable to slightly smaller, measuring 6 3 x 4 5 x 4 2 cm  Some pulmonary nodules in the bilateral lungs have resolved  Others are stable to slightly smaller    3   Stable severe narrowing of right lower lobe segmental pulmonary arteries and postobstructive pneumonia            Workstation performed: [id number]      Imaging     CTA ED chest PE Study (Order: [id number]) - 8/27/2019       LABS:  Results for orders placed or performed in visit on 09/06/19   CBC and differential   Result Value Ref Range    WBC 2 31 (L) 4 31 - 10 16 Thousand/uL    RBC 3 61 (L) 3 88 - 5 62 Million/uL    Hemoglobin 12 2 12 0 - 17 0 g/dL    Hematocrit 35 3 (L) 36 5 - 49 3 %    MCV 98 82 - 98 fL    MCH 33 8 26 8 - 34 3 pg    MCHC 34 6 31 4 - 37 4 g/dL    RDW 12 5 11 6 - 15 1 %    MPV 9 3 8 9 - 12 7 fL    Platelets 87 (L) 671 - 390 Thousands/uL    nRBC 0 /100 WBCs   Comprehensive metabolic panel   Result Value Ref Range    Sodium 142 136 - 145 mmol/L    Potassium 4 4 3 5 - 5 3 mmol/L    Chloride 107 100 - 108 mmol/L    CO2 29 21 - 32 mmol/L    ANION GAP 6 4 - 13 mmol/L    BUN 9 5 - 25 mg/dL    Creatinine 1 14 0 60 - 1 30 mg/dL    Glucose, Fasting 90 65 - 99 mg/dL    Calcium 8 8 8 3 - 10 1 mg/dL    AST 33 5 - 45 U/L    ALT 41 12 - 78 U/L    Alkaline Phosphatase 75 46 - 116 U/L    Total Protein 6 9 6 4 - 8 2 g/dL    Albumin 3 3 (L) 3 5 - 5 0 g/dL    Total Bilirubin 0 84 0 20 - 1 00 mg/dL    eGFR 71 ml/min/1 73sq m   Manual Differential(PHLEBS Do Not Order)   Result Value Ref Range    Segmented % 12 (L) 43 - 75 %    Lymphocytes % 76 (H) 14 - 44 %    Monocytes % 5 4 - 12 %    Eosinophils, % 5 0 - 6 %    Basophils % 0 0 - 1 %    Atypical Lymphocytes % 2 (H) <=0 %    Absolute Neutrophils 0 28 (L) 1 85 - 7 62 Thousand/uL    Lymphocytes Absolute 1 76 0 60 - 4 47 Thousand/uL    Monocytes Absolute 0 12 0 00 - 1 22 Thousand/uL    Eosinophils Absolute 0 12 0 00 - 0 40 Thousand/uL    Basophils Absolute 0 00 0 00 - 0 10 Thousand/uL    Total Counted 100     Ovalocytes Present     Polychromasia Present     Platelet Estimate Decreased (A) Adequate     Labs, Imaging, & Other studies:   All pertinent labs and imaging studies were personally reviewed    Lab Results   Component Value Date     10/19/2015    K 4 4 09/06/2019     09/06/2019    CO2 29 09/06/2019    ANIONGAP 8 10/19/2015    BUN 9 09/06/2019    CREATININE 1 14 09/06/2019    GLUCOSE 92 10/19/2015    GLUF 90 09/06/2019    CALCIUM 8 8 09/06/2019    AST 33 09/06/2019    ALT 41 09/06/2019    ALKPHOS 75 09/06/2019    PROT 7 1 10/19/2015    BILITOT 1 24 (H) 10/19/2015    EGFR 71 09/06/2019     Lab Results   Component Value Date    WBC 2 31 (L) 09/06/2019    HGB 12 2 09/06/2019    HCT 35 3 (L) 09/06/2019    MCV 98 09/06/2019    PLT 87 (L) 09/06/2019       Reviewed and discussed with patient  Assessment and plan:  Patient has stage IV metastatic colon cancer   to lungs and mediastinal/hilar lymph nodes  He was recently hospitalized with fevers and shaking chills secondary to  postobstructive pneumonitis right lower lung and febrile neutropenia  Patient has recovered  He has much less cough and shortness of breath  No more fevers and chills  Less dysphagia to solids  No dysphagia to liquids  Has some tiredness    He is willing to try [place] next that was the original plan before he had dose of irinotecan and oxaliplatin and was hospitalized after that   Prior to that he had palliative radiation to the lung mass causing bronchial obstruction and dysphagia  He had been through several lines of systemic therapy for metastatic colon cancer that was 1st diagnosed in 2008  He had not have Stivarga  He had not have Cyramza plus chemo   Patient saw Dr Leonard Jorge and he suggested IROX plus Avastin, a combination of irinotecan and oxaliplatin and Avastin     In 2008 he was diagnosed to have  stage IV adenocarcinoma of the colon     He had resection of the ascending colon   Pathology showed involvement of lymph nodes and lung   Postsurgery he received FOLFOX plus Avastin for 6 months      Then, in 2011 patient had additional resection this time of the transverse colon   He received FOLFIRI plus Avastin for 6 months after surgery    He has residual grade 1 peripheral 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      In 2012 he had wedge resection for right lung nodule   No chemotherapy at that time   In January 2014 he had wedge resection of the right lower    No radiation following this         History of AFib/atrial flutter and had ablation      June 2015-February 2017 patient was on Xeloda plus Avastin   In February 2017 patient was noted to have a new lesion of the lung  Lucas Price was unable to do RFA to this lesion   Patient was seen by thoracic surgery   Due to location, wedge resection was not able to be done   He was referred to rad onc for SBRT       May 2017 lung nodule was noted to be stable compared to February 2017 August 2017 right lower lobe pulmonary nodule had enlarged compared to May 2017      Due to enlarging nodule in May 2017, a PET/CT was performed in August 2017   This showed a 2 8 x 1 8 cm right lower medial lung mass with hypermetabolism      Aug 2017 he was referred to [place] for Cyberknife of the lung      Patient then did not return for follow up with our office until Feb 2018  At this time patient then had repeat scans to be performed  CT C/A/P in March 2018 showed enlarging right lower lobe nodular masses -- consistent with metastatic disease      He was then seen in follow-up on 03/15/2018 after his CT scan result   Patient was presented with multiple options at included surgery, RFA, radiation, CyberKnife, systemic therapy   Patient opted for consultation for RFA in thoracic surgery and then decide      Patient then did not come back to the office until July 2018  Mellisa Birch needed up to date imaging studies and had repeat PET/CT in Aug 2018 which showed enlarging hypermetabolic right lower medial lung in right hilar lesions   New hypermetabolic left lower lung nodule   Multiple left pleural base lesions, however noted to likely be inflammatory related to prior talc pleurodesis  No new lesions in the neck, abdomen, pelvis    Due to increase in metastatic disease, patient was recommended to pursue systemic therapy   Patient began Lonsurf in September 2018    Follow-up CT scan showed disease progression and Stivarga was offered but he did not have that as above     Physical examination and test results are as recorded and discussed  No metastatic lesion to the brain  Patient had some response to palliative radiation seen on CT scan  Lizet Castellano He will be starting Stivarga  Rediscussed side effects to Stivarga  Patient has signed informed consent for Stivarga     All discussed in detail  Questions answered  He will be started on 80 mg dose and if tolerated dose will be increased to 120 mg  Condition and plan discussed  Questions answered     Also discussed the importance of eating healthy low-fat foods ,,  staying active as tolerated and health screening tests  Patient is capable of self-care     Patient to monitor blood pressure at home    1   Malignant neoplasm of ascending colon (HCC)    - CBC and differential; Standing  - CEA; Standing  - Comprehensive metabolic panel; Standing  - CBC and differential  - CEA  - Comprehensive metabolic panel    2  Colon cancer metastasized to lung (HCC)    - CBC and differential; Standing  - CEA; Standing  - Comprehensive metabolic panel; Standing  - CBC and differential  - CEA  - Comprehensive metabolic panel    3  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

## 2019-10-02 NOTE — PROGRESS NOTES
10/2/2019  Received notification from clinical pt will be starting Stivarga 40 mg      Pt has [employer] [facility]  ID #[id number]  BIN #[id number]  PCN #[id number]  GRP #[id number]    Submitted for auth through cover my meds  Received notification from [employer] [facility] this medication has already been approved  Current [place] [facility] #[id number] is valid from 5/29/2019 through 5/29/2020  Notified clinical, finance, and [employer]    Per [employer], this has to go through [employer]  He was getting this medication from them before  Requested clinical to send the rx to [employer]  Requested [facility] to inform us of the copay amount

## 2019-10-03 NOTE — PROGRESS NOTES
DISCONTINUED PATIENT CHEMO PLAN AS PATIENT HAD ALLERGIC RXN  PATIENT SAW DR MANNING IN OFFICE YESTERDAY AND WAS PUT BACK ON ORAL MEDICATION STIVARGA  PATIENT WILL NEED TO HAVE HIS PORT FLUSHED Q6-8 WEEKS HOWEVER

## 2019-10-25 ENCOUNTER — TELEPHONE (OUTPATIENT)
Dept: HEMATOLOGY ONCOLOGY | Facility: CLINIC | Age: 59
End: 2019-10-25

## 2019-10-25 NOTE — TELEPHONE ENCOUNTER
LVM informing patient to call our office  There was no way that I can leave message due to the time frame the message services allows  Patients appt that was on 10/30/19 needed to be R/S  His new appt is 11/6/19 at 1:40 pm with Dr Elizabeth Shrestha at the [facility] office  If patient calls back please inform him of the appt change information

## 2019-11-05 ENCOUNTER — TELEPHONE (OUTPATIENT)
Dept: HEMATOLOGY ONCOLOGY | Facility: CLINIC | Age: 59
End: 2019-11-05

## 2019-11-06 ENCOUNTER — OFFICE VISIT (OUTPATIENT)
Dept: HEMATOLOGY ONCOLOGY | Facility: CLINIC | Age: 59
End: 2019-11-06
Payer: MEDICARE

## 2019-11-06 ENCOUNTER — APPOINTMENT (OUTPATIENT)
Dept: LAB | Facility: HOSPITAL | Age: 59
End: 2019-11-06
Attending: INTERNAL MEDICINE
Payer: MEDICARE

## 2019-11-06 VITALS
TEMPERATURE: 97.8 F | SYSTOLIC BLOOD PRESSURE: 134 MMHG | DIASTOLIC BLOOD PRESSURE: 86 MMHG | BODY MASS INDEX: 25.98 KG/M2 | HEART RATE: 82 BPM | RESPIRATION RATE: 16 BRPM | OXYGEN SATURATION: 95 % | WEIGHT: 220 LBS | HEIGHT: 77 IN

## 2019-11-06 DIAGNOSIS — C18.2 MALIGNANT NEOPLASM OF ASCENDING COLON (HCC): Primary | ICD-10-CM

## 2019-11-06 DIAGNOSIS — C18.2 MALIGNANT NEOPLASM OF ASCENDING COLON (HCC): ICD-10-CM

## 2019-11-06 DIAGNOSIS — L27.0 DRUG RASH: ICD-10-CM

## 2019-11-06 DIAGNOSIS — C78.00 COLON CANCER METASTASIZED TO LUNG (HCC): ICD-10-CM

## 2019-11-06 DIAGNOSIS — C18.9 COLON CANCER METASTASIZED TO LUNG (HCC): ICD-10-CM

## 2019-11-06 PROCEDURE — 99214 OFFICE O/P EST MOD 30 MIN: CPT | Performed by: INTERNAL MEDICINE

## 2019-11-06 NOTE — TELEPHONE ENCOUNTER
Patient saw Dr Neha Abbott in office today and he increased dose from 80 mg PO daily for 3 weeks on, 1 week off, to 120mg PO daily for 3 weeks on, 1 week off  NEW RX sent to [facility] and email sent to oral chemo team to make them aware of the dose increase

## 2019-11-06 NOTE — PROGRESS NOTES
HPI:     Since October 2019 patient has been  on stivarga 80 mg daily, 3 weeks on and 1 week off for   stage IV metastatic colon cancer   to lungs and mediastinal/hilar lymph nodes  He has rash on his arms secondary to stivarga and he uses steroid cream for it   He states rashes tolerable   He is willing to try higher dose , 120 mg dose   He has chronic exertional dyspnea   He has minimal dry cough   No more dysphagia  Has some tiredness  Prior to [place] he had a cycle  irinotecan and oxaliplatin and was hospitalized after that   Prior to that he had palliative radiation to the lung mass causing bronchial obstruction and dysphagia      He had been through several lines of systemic therapy for metastatic colon cancer that was 1st diagnosed in 2008  He had not have Maddie Dust had not have Cyramza plus chemo   Patient saw Dr Crista White and he had suggested IROX plus Avastin, a combination of irinotecan and oxaliplatin and Avastin     In 2008 he was diagnosed to have  stage IV adenocarcinoma of the colon     He had resection of the ascending colon   Pathology showed involvement of lymph nodes and lung   Postsurgery he received FOLFOX plus Avastin for 6 months      Then, in 2011 patient had additional resection this time of the transverse colon   He received FOLFIRI plus Avastin for 6 months after surgery    He has residual grade 1 peripheral 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      In 2012 he had wedge resection for right lung nodule   No chemotherapy at that time   In January 2014 he had wedge resection of the right lower    No radiation following this         History of AFib/atrial flutter and had ablation      June 2015-February 2017 patient was on Xeloda plus Avastin   In February 2017 patient was noted to have a new lesion of the lung  Snehal Lord was unable to do RFA to this lesion   Patient was seen by thoracic surgery   Due to location, wedge resection was not able to be done   He was referred to rad onc for SBRT       May 2017 lung nodule was noted to be stable compared to February 2017 August 2017 right lower lobe pulmonary nodule had enlarged compared to May 2017      Due to enlarging nodule in May 2017, a PET/CT was performed in August 2017   This showed a 2 8 x 1 8 cm right lower medial lung mass with hypermetabolism      Aug 2017 he was referred to [place] for Cyberknife of the lung      Patient then did not return for follow up with our office until Feb 2018  At this time patient then had repeat scans to be performed  CT C/A/P in March 2018 showed enlarging right lower lobe nodular masses -- consistent with metastatic disease      He was then seen in follow-up on 03/15/2018 after his CT scan result   Patient was presented with multiple options at included surgery, RFA, radiation, CyberKnife, systemic therapy   Patient opted for consultation for RFA in thoracic surgery and then decide      Patient then did not come back to the office until July 2018  [facility] needed up to date imaging studies and had repeat PET/CT in Aug 2018 which showed enlarging hypermetabolic right lower medial lung in right hilar lesions   New hypermetabolic left lower lung nodule   Multiple left pleural base lesions, however noted to likely be inflammatory related to prior talc pleurodesis  No new lesions in the neck, abdomen, pelvis    Due to increase in metastatic disease, patient was recommended to pursue systemic therapy   Patient began [address] September 2018    Follow-up CT scan showed disease progression and Stivarga was offered but he did not have that as above                            Current Outpatient Medications:     albuterol (VENTOLIN HFA) 90 mcg/act inhaler, 2 puffs every 4 hours as needed for shortness of breath    No more than 4 times a day, Disp: 1 Inhaler, Rfl: 2    ascorbic acid (VITAMIN C) 250 MG CHEW, Chew 250 mg daily, Disp: , Rfl:     ondansetron (ZOFRAN) 4 mg tablet, Take 1 tablet (4 mg total) by mouth every 6 (six) hours as needed for nausea or vomiting, Disp: 50 tablet, Rfl: 2    Regorafenib (STIVARGA) 40 MG TABS, Take 2 tablets (80mg) PO daily for 3 weeks on, then 1 week off , Disp: 42 tablet, Rfl: 3    Allergies   Allergen Reactions    Aspirin Other (See Comments)     Nose bleed    Sulfa Antibiotics Other (See Comments)     Dizzy           Colon cancer metastasized to lung [facility])    2008 Initial Diagnosis     Colon cancer metastasized to lung [facility])      2008 - 2009 Chemotherapy     FOLFOX for two 6 month courses (3 month break in-between courses, according to the patient)  - Dr Brit Weiss      2/18/2008 Surgery     Partial colectomy/Right hemicolectomy:  - stage IV (pT3,pN2,pM1a, G2)    - Dr Tyree Romero        4/21/2010 Surgery     Right lower lobe lung nodule:  - metastatic colonic adenocarcinoma      8/19/2010 Surgery     Left lower lobe wedge resection:  - moderately differentiated adenocarcinoma, consistent with metastasis from known colonic primary, 1 3 cm      2011 Surgery     Transverse colectomy      4/2011 - 10/2011 Chemotherapy     Avastin and FOLFIRI       11/9/2011 Surgery     RAZ lung wedge resection:  - metastatic adenocarcinoma of the colon  - invasive carcinoma is 0 4 cm from parenchymal resection      2012 Surgery     Wedge resection for right lung nodule      11/29/2012 Surgery     I  [id number] (11/29/12)   Omental nodule, excision:   - Metastatic adenocarcinoma morphologically consistent with colonic primary  1/15/2014 Surgery     A  Lung, right lower lobe, wedge resection:   - 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, moderately to poorly differentiated, consistent with colorectal primary, see note  - Resection margin, no tumor seen  - Background lung with few non-necrotizing granulomas and emphysematous changes           3/4/2014 - 3/14/2014 Radiation     Plan ID Energy Fractions Dose per Fraction (cGy) Total Dose Delivered (cGy) Elapsed Days   SBRT Lt Lung 6X 5 / 5 1,000 5,000 10     - Dr Riya Carrillo      4/14/2015 Surgery     Left lower lobe lobectomy, cervical mediastinoscopy, Left posterolateral thoracotomy:  A   Lymph node Level 2R:    - 3/3 lymph nodes, positive for metastatic colonic adenocarcinoma  BCleave [place] node Level 7:   - 3/8 lymph nodes, positive for metastatic colonic adenocarcinoma       C   Left lower lobe:   - Metastatic colonic adenocarcinoma    - The vascular and bronchial margins of resection are negative for tumor    - 0/6 lymph nodes, negative for metastatic colonic adenocarcinoma     - The tumor is 0 5 cm away from the hilar staple line        6/2015 - 2/2017 Chemotherapy     Xeloda and Avastin      9/2018 - 5/2019 Chemotherapy     Lonsurf      8/13/2019 - 9/9/2019 Chemotherapy     bevacizumab (AVASTIN) 500 mg in sodium chloride 0 9 % 100 mL IVPB, 505 mg, Intravenous, Once, 1 of 11 cycles  Administration: 500 mg (8/27/2019)  irinotecan (CAMPTOSAR) 340 mg in dextrose 5 % 500 mL chemo infusion, 330 mg (83 3 % of original dose 180 mg/m2), Intravenous, Once, 2 of 12 cycles  Dose modification: 150 mg/m2 (original dose 180 mg/m2, Cycle 1, Reason: Dose Not Tolerated)  Administration: 340 mg (8/13/2019), 340 mg (8/27/2019)  oxaliplatin (ELOXATIN) 200 mg in dextrose 5 % 250 mL chemo infusion, 198 05 mg, Intravenous, Once, 2 of 12 cycles  Administration: 200 mg (8/13/2019), 200 mg (8/27/2019)        Malignant neoplasm of ascending colon (HCC)    2/5/2018 Initial Diagnosis     Malignant neoplasm of ascending colon ([facility] [address] )      8/13/2019 - 9/9/2019 Chemotherapy     bevacizumab (AVASTIN) 500 mg in sodium chloride 0 9 % 100 mL IVPB, 505 mg, Intravenous, Once, 1 of 11 cycles  Administration: 500 mg (8/27/2019)  irinotecan (CAMPTOSAR) 340 mg in dextrose 5 % 500 mL chemo infusion, 330 mg (83 3 % of original dose 180 mg/m2), Intravenous, Once, 2 of 12 cycles  Dose modification: 150 mg/m2 (original dose 180 mg/m2, Cycle 1, Reason: Dose Not Tolerated)  Administration: 340 mg (8/13/2019), 340 mg (8/27/2019)  oxaliplatin (ELOXATIN) 200 mg in dextrose 5 % 250 mL chemo infusion, 198 05 mg, Intravenous, Once, 2 of 12 cycles  Administration: 200 mg (8/13/2019), 200 mg (8/27/2019)        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)    2/5/2018 Initial Diagnosis     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[address] )      7/24/2019 - 8/6/2019 Radiation     Treatment:  Course: C2    Plan ID Energy Fractions Dose per Fraction (cGy) Dose Correction (cGy) Total Dose Delivered (cGy) Elapsed Days   R LL_Hilum 10X 10 / 10 300 0 3,000 13            8/13/2019 - 9/9/2019 Chemotherapy     bevacizumab (AVASTIN) 500 mg in sodium chloride 0 9 % 100 mL IVPB, 505 mg, Intravenous, Once, 1 of 11 cycles  Administration: 500 mg (8/27/2019)  irinotecan (CAMPTOSAR) 340 mg in dextrose 5 % 500 mL chemo infusion, 330 mg (83 3 % of original dose 180 mg/m2), Intravenous, Once, 2 of 12 cycles  Dose modification: 150 mg/m2 (original dose 180 mg/m2, Cycle 1, Reason: Dose Not Tolerated)  Administration: 340 mg (8/13/2019), 340 mg (8/27/2019)  oxaliplatin (ELOXATIN) 200 mg in dextrose 5 % 250 mL chemo infusion, 198 05 mg, Intravenous, Once, 2 of 12 cycles  Administration: 200 mg (8/13/2019), 200 mg (8/27/2019)         ROS:  11/06/19 Reviewed 13 systems:  Presently no headaches, seizures, dizziness, diplopia, no more  dysphagia, hoarseness, chest pain, palpitations, hemoptysis, abdominal pain, nausea, vomiting, change in bowel habits, melena, hematuria, fever, chills, bleeding, bone pains, weight loss, arthritic symptoms,    weakness, numbness,  claudication and gait problem  No frequent infections  Not unusually sensitive to heat or cold  No swelling of the ankles  No swollen glands  Patient is anxious   Other symptoms are in HPI        /86 (BP Location: Left arm, Patient Position: Sitting, Cuff Size: Adult)   Pulse 82   Temp 97 8 °F (36 6 °C)   Resp 16   Ht 6' 4 5" (1 943 m)   Wt 99 8 kg (220 lb)   SpO2 95%   BMI 26 43 kg/m²     Physical Exam:  Alert, oriented, not in distress, no icterus, no oral thrush, no palpable neck mass,, decreased breath sounds at lung bases, regular heart rate, abdomen  soft and non tender, no palpable abdominal mass, no ascites, no edema of ankles, no calf tenderness, no focal neurological deficit, no skin rash, no palpable lymphadenopathy, good arterial pulses, no clubbing  Patient is anxious  Performance status 1      IMAGING:      LABS:  Results for orders placed or performed in visit on 10/25/19   CBC and differential   Result Value Ref Range    WBC 4 22 (L) 4 31 - 10 16 Thousand/uL    RBC 4 74 3 88 - 5 62 Million/uL    Hemoglobin 16 2 12 0 - 17 0 g/dL    Hematocrit 47 8 36 5 - 49 3 %     (H) 82 - 98 fL    MCH 34 2 26 8 - 34 3 pg    MCHC 33 9 31 4 - 37 4 g/dL    RDW 12 0 11 6 - 15 1 %    MPV 8 9 8 9 - 12 7 fL    Platelets 96 (L) 888 - 390 Thousands/uL    nRBC 0 /100 WBCs    Neutrophils Relative 63 43 - 75 %    Immat GRANS % 0 0 - 2 %    Lymphocytes Relative 25 14 - 44 %    Monocytes Relative 7 4 - 12 %    Eosinophils Relative 4 0 - 6 %    Basophils Relative 1 0 - 1 %    Neutrophils Absolute 2 68 1 85 - 7 62 Thousands/µL    Immature Grans Absolute 0 01 0 00 - 0 20 Thousand/uL    Lymphocytes Absolute 1 04 0 60 - 4 47 Thousands/µL    Monocytes Absolute 0 29 0 17 - 1 22 Thousand/µL    Eosinophils Absolute 0 18 0 00 - 0 61 Thousand/µL    Basophils Absolute 0 02 0 00 - 0 10 Thousands/µL   CEA   Result Value Ref Range    CEA 15 9 (H) 0 0 - 3 0 ng/mL   Comprehensive metabolic panel   Result Value Ref Range    Sodium 139 136 - 145 mmol/L    Potassium 4 4 3 5 - 5 3 mmol/L    Chloride 103 100 - 108 mmol/L    CO2 31 21 - 32 mmol/L    ANION GAP 5 4 - 13 mmol/L    BUN 7 5 - 25 mg/dL    Creatinine 0 98 0 60 - 1 30 mg/dL    Glucose, Fasting 73 65 - 99 mg/dL    Calcium 9 3 8 3 - 10 1 mg/dL    AST 34 5 - 45 U/L    ALT 26 12 - 78 U/L    Alkaline Phosphatase 93 46 - 116 U/L    Total Protein 7 9 6 4 - 8 2 g/dL    Albumin 3 8 3 5 - 5 0 g/dL    Total Bilirubin 0 55 0 20 - 1 00 mg/dL    eGFR 85 ml/min/1 73sq m     Labs, Imaging, & Other studies:   All pertinent labs and imaging studies were personally reviewed    Lab Results   Component Value Date     10/19/2015    K 4 4 11/06/2019     11/06/2019    CO2 31 11/06/2019    ANIONGAP 8 10/19/2015    BUN 7 11/06/2019    CREATININE 0 98 11/06/2019    GLUCOSE 92 10/19/2015    GLUF 73 11/06/2019    CALCIUM 9 3 11/06/2019    AST 34 11/06/2019    ALT 26 11/06/2019    ALKPHOS 93 11/06/2019    PROT 7 1 10/19/2015    BILITOT 1 24 (H) 10/19/2015    EGFR 85 11/06/2019     Lab Results   Component Value Date    WBC 4 22 (L) 11/06/2019    HGB 16 2 11/06/2019    HCT 47 8 11/06/2019     (H) 11/06/2019    PLT 96 (L) 11/06/2019       Reviewed and discussed with patient  Assessment and plan:    HPI:     Since October 2019 patient has been  on stivarga 80 mg daily, 3 weeks on and 1 week off for   stage IV metastatic colon cancer   to lungs and mediastinal/hilar lymph nodes  He has rash on his arms secondary to stivarga and he uses steroid cream for it   He states rashes tolerable   He is willing to try higher dose , 120 mg dose   He has chronic exertional dyspnea   He has minimal dry cough   No more dysphagia  Has some tiredness  Prior to [facility] he had a cycle  irinotecan and oxaliplatin and was hospitalized after that   Prior to that he had palliative radiation to the lung mass causing bronchial obstruction and dysphagia      He had been through several lines of systemic therapy for metastatic colon cancer that was 1st diagnosed in 2008   He had not have Cindia Score had not have Cyramza plus chemo   Patient saw Dr Tari Haq and he had suggested IROX plus Avastin, a combination of irinotecan and oxaliplatin and Avastin     In 2008 he was diagnosed to have  stage IV adenocarcinoma of the colon     He had resection of the ascending colon   Pathology showed involvement of lymph nodes and lung   Postsurgery he received FOLFOX plus Avastin for 6 months      Then, in 2011 patient had additional resection this time of the transverse colon   He received FOLFIRI plus Avastin for 6 months after surgery    He has residual grade 1 peripheral 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      In 2012 he had wedge resection for right lung nodule   No chemotherapy at that time   In January 2014 he had wedge resection of the right lower    No radiation following this         History of AFib/atrial flutter and had ablation      June 2015-February 2017 patient was on Xeloda plus Avastin   In February 2017 patient was noted to have a new lesion of the lung  Kerwin Weems was unable to do RFA to this lesion   Patient was seen by thoracic surgery   Due to location, wedge resection was not able to be done  He was referred to [facility] onc for SBRT       May 2017 lung nodule was noted to be stable compared to February 2017 August 2017 right lower lobe pulmonary nodule had enlarged compared to May 2017      Due to enlarging nodule in May 2017, a PET/CT was performed in August 2017   This showed a 2 8 x 1 8 cm right lower medial lung mass with hypermetabolism      Aug 2017 he was referred to [place] for Cyberknife of the lung      Patient then did not return for follow up with our office until Feb 2018  At this time patient then had repeat scans to be performed   CT C/A/P in March 2018 showed enlarging right lower lobe nodular masses -- consistent with metastatic disease      He was then seen in follow-up on 03/15/2018 after his CT scan result   Patient was presented with multiple options at included surgery, RFA, radiation, CyberKnife, systemic therapy   Patient opted for consultation for RFA in thoracic surgery and then decide      Patient then did not come back to the office until July 2018  [facility] needed up to date imaging studies and had repeat PET/CT in Aug 2018 which showed enlarging hypermetabolic right lower medial lung in right hilar lesions   New hypermetabolic left lower lung nodule   Multiple left pleural base lesions, however noted to likely be inflammatory related to prior talc pleurodesis  No new lesions in the neck, abdomen, pelvis    Due to increase in metastatic disease, patient was recommended to pursue systemic therapy   Patient began [address] September 2018    Follow-up CT scan showed disease progression and Stivarga was offered but he did not have that as above      Physical examination and test results are as recorded and discussed   No metastatic lesion to the brain  Patient had some response to palliative radiation seen on CT scan       Increasing dose of Stivarga and he could not tolerate that he will cut the dose down to 80 mg    Rediscussed side effects to Stivarga     All discussed in detail  Questions answered  Also discussed the importance of eating healthy low-fat foods ,,  staying active as tolerated and health screening tests   Patient is capable of self-care     Patient to monitor blood pressure at home     1  Malignant neoplasm of ascending colon ([facility] [address] )    - CT chest wo contrast; Future    2  Colon cancer metastasized to lung [facility])    - CT chest wo contrast; Future  3  Drug rash      Patient voiced understanding and agreement in the discussion  Counseling / Coordination of Care   Greater than 50% of total time was spent with the patient and / or family counseling and / or coordination of care

## 2019-11-07 ENCOUNTER — DOCUMENTATION (OUTPATIENT)
Dept: HEMATOLOGY ONCOLOGY | Facility: CLINIC | Age: 59
End: 2019-11-07

## 2019-11-07 NOTE — PROGRESS NOTES
11/6/2019 received notification from clinical the pt's dose for the stivarga is increasing to 120 mg qd    Pt has [employer]   ID #[id number]  BIN #[id number]  PCN #[id number]  GRP #[id number]    11/7/2019 submitted for auth through cover my meds  Received letter from [employer] [facility] stating that this medication has already been approved  Price Kirby #[id number] is valid from 5/29/2019 through 5/29/2020  No additional Price Kirby is needed for the dose increase      Notified clinical

## 2019-11-12 ENCOUNTER — HOSPITAL ENCOUNTER (OUTPATIENT)
Dept: INFUSION CENTER | Facility: CLINIC | Age: 59
Discharge: HOME | End: 2019-11-12
Payer: MEDICARE

## 2019-11-12 DIAGNOSIS — Z95.828 PORT-A-CATH IN PLACE: Primary | ICD-10-CM

## 2019-11-12 PROCEDURE — 96523 IRRIG DRUG DELIVERY DEVICE: CPT

## 2019-11-12 NOTE — PROGRESS NOTES
Port flushed per protocol  Pt offers no complaints  Pt is aware of all future appointments   Refused AVS

## 2019-12-03 ENCOUNTER — TELEPHONE (OUTPATIENT)
Dept: HEMATOLOGY ONCOLOGY | Facility: CLINIC | Age: 59
End: 2019-12-03

## 2019-12-03 NOTE — TELEPHONE ENCOUNTER
Reviewed with patient Mehdi Camacho sent our office a fax reviewing they have been reaching out to the patient to schedule delivery and have no been able to get a hold of patient  Patient stated he was waiting for them and did not get a call from them  He will call [id number] now at [phone] to schedule his delivery of Stivarga  Confirmed with patient on 11/6 RX for Stivarga 120mg (3 pills) was escribed and confirmed received by Mehdi Camacho  Patient was appreciative

## 2019-12-16 ENCOUNTER — HOSPITAL ENCOUNTER (OUTPATIENT)
Dept: CT IMAGING | Facility: HOSPITAL | Age: 59
Discharge: HOME | End: 2019-12-16
Attending: INTERNAL MEDICINE
Payer: MEDICARE

## 2019-12-16 DIAGNOSIS — C18.9 COLON CANCER METASTASIZED TO LUNG (HCC): ICD-10-CM

## 2019-12-16 DIAGNOSIS — C78.00 COLON CANCER METASTASIZED TO LUNG (HCC): ICD-10-CM

## 2019-12-16 DIAGNOSIS — C18.2 MALIGNANT NEOPLASM OF ASCENDING COLON (HCC): ICD-10-CM

## 2019-12-16 PROCEDURE — 71250 CT THORAX DX C-: CPT

## 2019-12-20 ENCOUNTER — TELEPHONE (OUTPATIENT)
Dept: HEMATOLOGY ONCOLOGY | Facility: CLINIC | Age: 59
End: 2019-12-20

## 2019-12-20 NOTE — TELEPHONE ENCOUNTER
Spoke to Antonio San from the radiology reading room stating that we needed a CT read from the 16th of December, patient is coming into office on Monday 12/23/19

## 2019-12-21 DIAGNOSIS — C78.00 COLON CANCER METASTASIZED TO LUNG (HCC): Chronic | ICD-10-CM

## 2019-12-21 DIAGNOSIS — C18.9 COLON CANCER METASTASIZED TO LUNG (HCC): Chronic | ICD-10-CM

## 2019-12-21 RX ORDER — ALBUTEROL SULFATE 90 UG/1
AEROSOL, METERED RESPIRATORY (INHALATION)
Qty: 18 G | Refills: 1 | Status: SHIPPED | OUTPATIENT
Start: 2019-12-21 | End: 2020-08-22 | Stop reason: HOSPADM

## 2019-12-23 ENCOUNTER — OFFICE VISIT (OUTPATIENT)
Dept: HEMATOLOGY ONCOLOGY | Facility: CLINIC | Age: 59
End: 2019-12-23
Payer: COMMERCIAL

## 2019-12-23 VITALS
OXYGEN SATURATION: 95 % | WEIGHT: 231 LBS | HEART RATE: 75 BPM | HEIGHT: 77 IN | DIASTOLIC BLOOD PRESSURE: 84 MMHG | TEMPERATURE: 97.8 F | BODY MASS INDEX: 27.28 KG/M2 | RESPIRATION RATE: 18 BRPM | SYSTOLIC BLOOD PRESSURE: 136 MMHG

## 2019-12-23 DIAGNOSIS — L27.0 DRUG RASH: ICD-10-CM

## 2019-12-23 DIAGNOSIS — J18.9 PNEUMONITIS: ICD-10-CM

## 2019-12-23 DIAGNOSIS — C77.1 METASTASIS TO INTRATHORACIC LYMPH NODES (HCC): ICD-10-CM

## 2019-12-23 DIAGNOSIS — Z95.828 PORT-A-CATH IN PLACE: ICD-10-CM

## 2019-12-23 DIAGNOSIS — C18.9 COLON CANCER METASTASIZED TO LUNG (HCC): Chronic | ICD-10-CM

## 2019-12-23 DIAGNOSIS — C78.00 COLON CANCER METASTASIZED TO LUNG (HCC): Chronic | ICD-10-CM

## 2019-12-23 DIAGNOSIS — C18.2 MALIGNANT NEOPLASM OF ASCENDING COLON (HCC): Primary | ICD-10-CM

## 2019-12-23 PROCEDURE — 99215 OFFICE O/P EST HI 40 MIN: CPT | Performed by: INTERNAL MEDICINE

## 2019-12-23 NOTE — PROGRESS NOTES
HPI:  Patient has been on Stivarga since October 2019 for stage IV colon 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 intrathoracic lymph nodes  Glenny Serum He received 80 mg dose for the   1st 2 cycles and now he is on 120 mg type workup  He has some tiredness and rash on his arms and legs  He applies lotion  Rash is not getting worse  Minimal exertional dyspnea and dry cough as before  No new or worsening lung symptoms  Prior to [place] he had a cycle  irinotecan and oxaliplatin and was hospitalized after that   Danette Joe to that he had palliative radiation to the lung mass causing bronchial obstruction and dysphagia      He had been through several lines of systemic therapy for metastatic colon cancer that was 1st diagnosed in 2008  [facility] had not have Cyramza plus chemo   Patient saw    In 2008 he was diagnosed to have  stage IV adenocarcinoma of the colon     He had resection of the ascending colon   Pathology showed involvement of lymph nodes and lung   Postsurgery he received FOLFOX plus Avastin for 6 months  Then, in 2011 patient had additional resection this time of the transverse colon   He received FOLFIRI plus Avastin for 6 months after surgery  [facility] has residual grade 1 peripheral 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    In 2012 he had wedge resection for right lung nodule   No chemotherapy at that time   In January 2014 he had wedge resection of the right lower    No radiation following this     History of AFib/atrial flutter and had ablation   Renee Castelan 2015-February 2017 patient was on Xeloda plus Avastin   In February 2017 patient was noted to have a new lesion of the lung  Nicola Wolf was unable to do RFA to this lesion   Patient was seen by thoracic surgery   Due to location, wedge resection was not able to be done   He was referred to rad onc for SBRT       May 2017 lung nodule was noted to be stable compared to February 2017 August 2017 right lower lobe pulmonary nodule had enlarged compared to May 2017      Due to enlarging nodule in May 2017, a PET/CT was performed in August 2017   This showed a 2 8 x 1 8 cm right lower medial lung mass with hypermetabolism      Aug 2017 he was referred to [place] for Cyberknife of the lung      Patient then did not return for follow up with our office until Feb 2018  At this time patient then had repeat scans to be performed  CT C/A/P in March 2018 showed enlarging right lower lobe nodular masses -- consistent with metastatic disease      He was then seen in follow-up on 03/15/2018 after his CT scan result   Patient was presented with multiple options at included surgery, RFA, radiation, CyberKnife, systemic therapy   Patient opted for consultation for RFA in thoracic surgery and then decide      Patient then did not come back to the office until July 2018  [facility] needed up to date imaging studies and had repeat PET/CT in Aug 2018 which showed enlarging hypermetabolic right lower medial lung in right hilar lesions   New hypermetabolic left lower lung nodule   Multiple left pleural base lesions, however noted to likely be inflammatory related to prior talc pleurodesis  No new lesions in the neck, abdomen, pelvis    Due to increase in metastatic disease, patient was recommended to pursue systemic therapy   Patient began Loigu 42 September 2018    Follow-up CT scan showed disease progression       Physical examination and test results are as recorded and discussed   No metastatic lesion to the brain   Patient had some response to palliative radiation seen on CT scan  David Pond   Increasing dose of Stivarga and he could not tolerate that he will cut the dose down to 80 mg    Rediscussed side effects to Stivarga    Palomo Meneses discussed in detail   Questions answered     Also discussed the importance of eating healthy low-fat foods ,,  staying active as tolerated and health screening tests   Patient is capable of self-care     Patient to monitor blood pressure at home                 Current Outpatient Medications:    albuterol (VENTOLIN HFA) 90 mcg/act inhaler, INHALE TWO PUFFS BY MOUTH EVERY 4 HOURS AS NEEDED FOR SHORTNESS OF BREATH   no more than 4 times daily, Disp: 18 g, Rfl: 1    ascorbic acid (VITAMIN C) 250 MG CHEW, Chew 250 mg daily, Disp: , Rfl:     Regorafenib (STIVARGA) 40 MG TABS, Take 3 tablets (120 mg) PO daily for 3 weeks on, then 1 week off , Disp: 63 tablet, Rfl: 11    ondansetron (ZOFRAN) 4 mg tablet, Take 1 tablet (4 mg total) by mouth every 6 (six) hours as needed for nausea or vomiting (Patient not taking: Reported on 12/23/2019), Disp: 50 tablet, Rfl: 2    Allergies   Allergen Reactions    Aspirin Other (See Comments)     Nose bleed    Sulfa Antibiotics Other (See Comments)     Dizzy           Colon cancer metastasized to lung [facility])    2008 Initial Diagnosis     Colon cancer metastasized to lung [facility])      2008 - 2009 Chemotherapy     FOLFOX for two 6 month courses (3 month break in-between courses, according to the patient)  - Dr Godfrey Pickard      2/18/2008 Surgery     Partial colectomy/Right hemicolectomy:  - stage IV (pT3,pN2,pM1a, G2)    - Dr Lesly Jackson        4/21/2010 Surgery     Right lower lobe lung nodule:  - metastatic colonic adenocarcinoma      8/19/2010 Surgery     Left lower lobe wedge resection:  - moderately differentiated adenocarcinoma, consistent with metastasis from known colonic primary, 1 3 cm      2011 Surgery     Transverse colectomy      4/2011 - 10/2011 Chemotherapy     Avastin and FOLFIRI       11/9/2011 Surgery     RAZ lung wedge resection:  - metastatic adenocarcinoma of the colon  - invasive carcinoma is 0 4 cm from parenchymal resection      2012 Surgery     Wedge resection for right lung nodule      11/29/2012 Surgery     I  [id number] (11/29/12)   Omental nodule, excision:   - Metastatic adenocarcinoma morphologically consistent with colonic primary  1/15/2014 Surgery     A   Lung, right lower lobe, wedge resection:   - 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, moderately to poorly differentiated, consistent with colorectal primary, see note  - Resection margin, no tumor seen  - Background lung with few non-necrotizing granulomas and emphysematous changes  3/4/2014 - 3/14/2014 Radiation     Plan ID Energy Fractions Dose per Fraction (cGy) Total Dose Delivered (cGy) Elapsed Days   SBRT Lt Lung 6X 5 / 5 1,000 5,000 8     - Dr Eddie Sosa      4/14/2015 Surgery     Left lower lobe lobectomy, cervical mediastinoscopy, Left posterolateral thoracotomy:  A   Lymph node Level 2R:    - 3/3 lymph nodes, positive for metastatic colonic adenocarcinoma  BBonne Coffin node Level 7:   - 3/8 lymph nodes, positive for metastatic colonic adenocarcinoma       C   Left lower lobe:   - Metastatic colonic adenocarcinoma    - The vascular and bronchial margins of resection are negative for tumor    - 0/6 lymph nodes, negative for metastatic colonic adenocarcinoma     - The tumor is 0 5 cm away from the hilar staple line        6/2015 - 2/2017 Chemotherapy     Xeloda and Avastin      9/2018 - 5/2019 Chemotherapy     Lonsurf      8/13/2019 - 9/9/2019 Chemotherapy     bevacizumab (AVASTIN) 500 mg in sodium chloride 0 9 % 100 mL IVPB, 505 mg, Intravenous, Once, 1 of 11 cycles  Administration: 500 mg (8/27/2019)  irinotecan (CAMPTOSAR) 340 mg in dextrose 5 % 500 mL chemo infusion, 330 mg (83 3 % of original dose 180 mg/m2), Intravenous, Once, 2 of 12 cycles  Dose modification: 150 mg/m2 (original dose 180 mg/m2, Cycle 1, Reason: Dose Not Tolerated)  Administration: 340 mg (8/13/2019), 340 mg (8/27/2019)  oxaliplatin (ELOXATIN) 200 mg in dextrose 5 % 250 mL chemo infusion, 198 05 mg, Intravenous, Once, 2 of 12 cycles  Administration: 200 mg (8/13/2019), 200 mg (8/27/2019)        Malignant neoplasm of ascending colon (HCC)    2/5/2018 Initial Diagnosis     Malignant neoplasm of ascending colon ([facility] [address] )      8/13/2019 - 9/9/2019 Chemotherapy     bevacizumab (AVASTIN) 500 mg in sodium chloride 0 9 % 100 mL IVPB, 505 mg, Intravenous, Once, 1 of 11 cycles  Administration: 500 mg (8/27/2019)  irinotecan (CAMPTOSAR) 340 mg in dextrose 5 % 500 mL chemo infusion, 330 mg (83 3 % of original dose 180 mg/m2), Intravenous, Once, 2 of 12 cycles  Dose modification: 150 mg/m2 (original dose 180 mg/m2, Cycle 1, Reason: Dose Not Tolerated)  Administration: 340 mg (8/13/2019), 340 mg (8/27/2019)  oxaliplatin (ELOXATIN) 200 mg in dextrose 5 % 250 mL chemo infusion, 198 05 mg, Intravenous, Once, 2 of 12 cycles  Administration: 200 mg (8/13/2019), 200 mg (8/27/2019)        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)    2/5/2018 Initial Diagnosis     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)      7/24/2019 - 8/6/2019 Radiation     Treatment:  Course: C2    Plan ID Energy Fractions Dose per Fraction (cGy) Dose Correction (cGy) Total Dose Delivered (cGy) Elapsed Days   R LL_Hilum 10X 10 / 10 300 0 3,000 13            8/13/2019 - 9/9/2019 Chemotherapy     bevacizumab (AVASTIN) 500 mg in sodium chloride 0 9 % 100 mL IVPB, 505 mg, Intravenous, Once, 1 of 11 cycles  Administration: 500 mg (8/27/2019)  irinotecan (CAMPTOSAR) 340 mg in dextrose 5 % 500 mL chemo infusion, 330 mg (83 3 % of original dose 180 mg/m2), Intravenous, Once, 2 of 12 cycles  Dose modification: 150 mg/m2 (original dose 180 mg/m2, Cycle 1, Reason: Dose Not Tolerated)  Administration: 340 mg (8/13/2019), 340 mg (8/27/2019)  oxaliplatin (ELOXATIN) 200 mg in dextrose 5 % 250 mL chemo infusion, 198 05 mg, Intravenous, Once, 2 of 12 cycles  Administration: 200 mg (8/13/2019), 200 mg (8/27/2019)         ROS:  12/23/19 Reviewed 13 systems:  Presently no other neurological, cardiac, pulmonary, GI and  symptoms other than mentioned above  No other symptoms like  fever, chills, bleeding, bone pains,  weight loss, arthritic symptoms,  weakness, numbness,  claudication and gait problem  No frequent infections    Not unusually sensitive to heat or cold  No swelling of the ankles  No swollen glands  Patient is anxious  Other symptoms are in HPI        /84 (BP Location: Right arm, Patient Position: Sitting, Cuff Size: Adult)   Pulse 75   Temp 97 8 °F (36 6 °C)   Resp 18   Ht 6' 4 5" (1 943 m)   Wt 105 kg (231 lb)   SpO2 95%   BMI 27 75 kg/m²     Physical Exam:  Alert, oriented, not in distress, no icterus, no oral thrush, no palpable neck mass, decreased breath sounds at lung bases,  regular heart rate, abdomen  soft and non tender, no palpable abdominal mass, no ascites, no edema of ankles, no calf tenderness, no focal neurological deficit, has nonspecific macular skin rash on arms and legs,  no palpable lymphadenopathy, good arterial pulses, no clubbing  Patient is anxious  Performance status 1  IMAGING:  IMPRESSION:     Innumerable new 6 mm and smaller lung nodules, some cavitary, with an upper lung predominance    Differential diagnosis includes metastatic disease versus disseminated fungal infection due to immunosuppression      No change in right lower lobe mass and left upper lobe nodule      The study was marked in EPIC for immediate notification               Workstation performed: [id number]      Imaging     CT chest wo contrast (Order: [id number]) - 12/16/2019     HPI:   Lia Gaming is a 63 y o  male with         Current Outpatient Medications:     albuterol (VENTOLIN HFA) 90 mcg/act inhaler, INHALE TWO PUFFS BY MOUTH EVERY 4 HOURS AS NEEDED FOR SHORTNESS OF BREATH   no more than 4 times daily, Disp: 18 g, Rfl: 1    ascorbic acid (VITAMIN C) 250 MG CHEW, Chew 250 mg daily, Disp: , Rfl:     Regorafenib (STIVARGA) 40 MG TABS, Take 3 tablets (120 mg) PO daily for 3 weeks on, then 1 week off , Disp: 63 tablet, Rfl: 11    ondansetron (ZOFRAN) 4 mg tablet, Take 1 tablet (4 mg total) by mouth every 6 (six) hours as needed for nausea or vomiting (Patient not taking: Reported on 12/23/2019), Disp: 50 tablet, Rfl: 2    Allergies Allergen Reactions    Aspirin Other (See Comments)     Nose bleed    Sulfa Antibiotics Other (See Comments)     Dizzy           Colon cancer metastasized to lung [facility])    2008 Initial Diagnosis     Colon cancer metastasized to lung [facility])      2008 - 2009 Chemotherapy     FOLFOX for two 6 month courses (3 month break in-between courses, according to the patient)  - Dr Jesus Salamanca      2/18/2008 Surgery     Partial colectomy/Right hemicolectomy:  - stage IV (pT3,pN2,pM1a, G2)    - Dr Eugene Ponce        4/21/2010 Surgery     Right lower lobe lung nodule:  - metastatic colonic adenocarcinoma      8/19/2010 Surgery     Left lower lobe wedge resection:  - moderately differentiated adenocarcinoma, consistent with metastasis from known colonic primary, 1 3 cm      2011 Surgery     Transverse colectomy      4/2011 - 10/2011 Chemotherapy     Avastin and FOLFIRI       11/9/2011 Surgery     RAZ lung wedge resection:  - metastatic adenocarcinoma of the colon  - invasive carcinoma is 0 4 cm from parenchymal resection      2012 Surgery     Wedge resection for right lung nodule      11/29/2012 Surgery     I  [id number] (11/29/12)   Omental nodule, excision:   - Metastatic adenocarcinoma morphologically consistent with colonic primary  1/15/2014 Surgery     A  Lung, right lower lobe, wedge resection:   - 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, moderately to poorly differentiated, consistent with colorectal primary, see note  - Resection margin, no tumor seen  - Background lung with few non-necrotizing granulomas and emphysematous changes           3/4/2014 - 3/14/2014 Radiation     Plan ID Energy Fractions Dose per Fraction (cGy) Total Dose Delivered (cGy) Elapsed Days   SBRT Lt Lung 6X 5 / 5 1,000 5,000 8     - Dr Cassandra Lomeli      4/14/2015 Surgery     Left lower lobe lobectomy, cervical mediastinoscopy, Left posterolateral thoracotomy:  A   Lymph node Level 2R:    - 3/3 lymph nodes, positive for metastatic colonic adenocarcinoma  BMatthews Hobbs node Level 7:   - 3/8 lymph nodes, positive for metastatic colonic adenocarcinoma       C   Left lower lobe:   - Metastatic colonic adenocarcinoma    - The vascular and bronchial margins of resection are negative for tumor    - 0/6 lymph nodes, negative for metastatic colonic adenocarcinoma     - The tumor is 0 5 cm away from the hilar staple line        6/2015 - 2/2017 Chemotherapy     Xeloda and Avastin      9/2018 - 5/2019 Chemotherapy     Lonsurf      8/13/2019 - 9/9/2019 Chemotherapy     bevacizumab (AVASTIN) 500 mg in sodium chloride 0 9 % 100 mL IVPB, 505 mg, Intravenous, Once, 1 of 11 cycles  Administration: 500 mg (8/27/2019)  irinotecan (CAMPTOSAR) 340 mg in dextrose 5 % 500 mL chemo infusion, 330 mg (83 3 % of original dose 180 mg/m2), Intravenous, Once, 2 of 12 cycles  Dose modification: 150 mg/m2 (original dose 180 mg/m2, Cycle 1, Reason: Dose Not Tolerated)  Administration: 340 mg (8/13/2019), 340 mg (8/27/2019)  oxaliplatin (ELOXATIN) 200 mg in dextrose 5 % 250 mL chemo infusion, 198 05 mg, Intravenous, Once, 2 of 12 cycles  Administration: 200 mg (8/13/2019), 200 mg (8/27/2019)        Malignant neoplasm of ascending colon (HCC)    2/5/2018 Initial Diagnosis     Malignant neoplasm of ascending colon ([facility] [address] )      8/13/2019 - 9/9/2019 Chemotherapy     bevacizumab (AVASTIN) 500 mg in sodium chloride 0 9 % 100 mL IVPB, 505 mg, Intravenous, Once, 1 of 11 cycles  Administration: 500 mg (8/27/2019)  irinotecan (CAMPTOSAR) 340 mg in dextrose 5 % 500 mL chemo infusion, 330 mg (83 3 % of original dose 180 mg/m2), Intravenous, Once, 2 of 12 cycles  Dose modification: 150 mg/m2 (original dose 180 mg/m2, Cycle 1, Reason: Dose Not Tolerated)  Administration: 340 mg (8/13/2019), 340 mg (8/27/2019)  oxaliplatin (ELOXATIN) 200 mg in dextrose 5 % 250 mL chemo infusion, 198 05 mg, Intravenous, Once, 2 of 12 cycles  Administration: 200 mg (8/13/2019), 200 mg (8/27/2019) 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)    2/5/2018 Initial Diagnosis     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[address] )      7/24/2019 - 8/6/2019 Radiation     Treatment:  Course: C2    Plan ID Energy Fractions Dose per Fraction (cGy) Dose Correction (cGy) Total Dose Delivered (cGy) Elapsed Days   R LL_Hilum 10X 10 / 10 300 0 3,000 13            8/13/2019 - 9/9/2019 Chemotherapy     bevacizumab (AVASTIN) 500 mg in sodium chloride 0 9 % 100 mL IVPB, 505 mg, Intravenous, Once, 1 of 11 cycles  Administration: 500 mg (8/27/2019)  irinotecan (CAMPTOSAR) 340 mg in dextrose 5 % 500 mL chemo infusion, 330 mg (83 3 % of original dose 180 mg/m2), Intravenous, Once, 2 of 12 cycles  Dose modification: 150 mg/m2 (original dose 180 mg/m2, Cycle 1, Reason: Dose Not Tolerated)  Administration: 340 mg (8/13/2019), 340 mg (8/27/2019)  oxaliplatin (ELOXATIN) 200 mg in dextrose 5 % 250 mL chemo infusion, 198 05 mg, Intravenous, Once, 2 of 12 cycles  Administration: 200 mg (8/13/2019), 200 mg (8/27/2019)         ROS:  12/23/19 Reviewed 13 systems:  Presently no headaches, seizures, dizziness, diplopia, dysphagia, hoarseness, chest pain, palpitations, shortness of breath, cough, hemoptysis, abdominal pain, nausea, vomiting, change in bowel habits, melena, hematuria, fever, chills, bleeding, bone pains, skin rash, weight loss, arthritic symptoms, tiredness ,  weakness, numbness,  claudication and gait problem  No frequent infections  Not unusually sensitive to heat or cold  No swelling of the ankles  No swollen glands  Patient is anxious   Other symptoms are in HPI        /84 (BP Location: Right arm, Patient Position: Sitting, Cuff Size: Adult)   Pulse 75   Temp 97 8 °F (36 6 °C)   Resp 18   Ht 6' 4 5" (1 943 m)   Wt 105 kg (231 lb)   SpO2 95%   BMI 27 75 kg/m²     Physical Exam:  Alert, oriented, not in distress, no icterus, no oral thrush, no palpable neck mass, clear lung fields, regular heart rate, abdomen  soft and non tender, no palpable abdominal mass, no ascites, no edema of ankles, no calf tenderness, no focal neurological deficit, no skin rash, no palpable lymphadenopathy, good arterial pulses, no clubbing  Patient is anxious  Performance status 1      IMAGING:  No orders to display       LABS:  Results for orders placed or performed in visit on 10/25/19   CBC and differential   Result Value Ref Range    WBC 4 22 (L) 4 31 - 10 16 Thousand/uL    RBC 4 74 3 88 - 5 62 Million/uL    Hemoglobin 16 2 12 0 - 17 0 g/dL    Hematocrit 47 8 36 5 - 49 3 %     (H) 82 - 98 fL    MCH 34 2 26 8 - 34 3 pg    MCHC 33 9 31 4 - 37 4 g/dL    RDW 12 0 11 6 - 15 1 %    MPV 8 9 8 9 - 12 7 fL    Platelets 96 (L) 014 - 390 Thousands/uL    nRBC 0 /100 WBCs    Neutrophils Relative 63 43 - 75 %    Immat GRANS % 0 0 - 2 %    Lymphocytes Relative 25 14 - 44 %    Monocytes Relative 7 4 - 12 %    Eosinophils Relative 4 0 - 6 %    Basophils Relative 1 0 - 1 %    Neutrophils Absolute 2 68 1 85 - 7 62 Thousands/µL    Immature Grans Absolute 0 01 0 00 - 0 20 Thousand/uL    Lymphocytes Absolute 1 04 0 60 - 4 47 Thousands/µL    Monocytes Absolute 0 29 0 17 - 1 22 Thousand/µL    Eosinophils Absolute 0 18 0 00 - 0 61 Thousand/µL    Basophils Absolute 0 02 0 00 - 0 10 Thousands/µL   CEA   Result Value Ref Range    CEA 15 9 (H) 0 0 - 3 0 ng/mL   Comprehensive metabolic panel   Result Value Ref Range    Sodium 139 136 - 145 mmol/L    Potassium 4 4 3 5 - 5 3 mmol/L    Chloride 103 100 - 108 mmol/L    CO2 31 21 - 32 mmol/L    ANION GAP 5 4 - 13 mmol/L    BUN 7 5 - 25 mg/dL    Creatinine 0 98 0 60 - 1 30 mg/dL    Glucose, Fasting 73 65 - 99 mg/dL    Calcium 9 3 8 3 - 10 1 mg/dL    AST 34 5 - 45 U/L    ALT 26 12 - 78 U/L    Alkaline Phosphatase 93 46 - 116 U/L    Total Protein 7 9 6 4 - 8 2 g/dL    Albumin 3 8 3 5 - 5 0 g/dL    Total Bilirubin 0 55 0 20 - 1 00 mg/dL    eGFR 85 ml/min/1 73sq m     Labs, Imaging, & Other studies:   All pertinent labs and imaging studies were personally reviewed    Lab Results   Component Value Date     10/19/2015    K 4 4 11/06/2019     11/06/2019    CO2 31 11/06/2019    ANIONGAP 8 10/19/2015    BUN 7 11/06/2019    CREATININE 0 98 11/06/2019    GLUCOSE 92 10/19/2015    GLUF 73 11/06/2019    CALCIUM 9 3 11/06/2019    AST 34 11/06/2019    ALT 26 11/06/2019    ALKPHOS 93 11/06/2019    PROT 7 1 10/19/2015    BILITOT 1 24 (H) 10/19/2015    EGFR 85 11/06/2019     Lab Results   Component Value Date    WBC 4 22 (L) 11/06/2019    HGB 16 2 11/06/2019    HCT 47 8 11/06/2019     (H) 11/06/2019    PLT 96 (L) 11/06/2019   No results found for: [address] [place] [address] and discussed with patient  Assessment and plan:   Patient has been on Stivarga since October 2019 for stage IV colon 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 intrathoracic lymph nodes  Nalini Salamanca He received 80 mg dose for the   1st 2 cycles and now he is on 120 mg type workup  He has some tiredness and rash on his arms and legs  He applies lotion  Rash is not getting worse  Minimal exertional dyspnea and dry cough as before  No new or worsening lung symptoms  Prior to [facility] he had a cycle  irinotecan and oxaliplatin and was hospitalized after that   Sissy Mikaela to that he had palliative radiation to the lung mass causing bronchial obstruction and dysphagia      He had been through several lines of systemic therapy for metastatic colon cancer that was 1st diagnosed in 2008  Srini Gallardo had not have Cyramza plus chemo   Patient saw    In 2008 he was diagnosed to have  stage IV adenocarcinoma of the colon     He had resection of the ascending colon   Pathology showed involvement of lymph nodes and lung   Postsurgery he received FOLFOX plus Avastin for 6 months    Then, in 2011 patient had additional resection this time of the transverse colon   He received FOLFIRI plus Avastin for 6 months after surgery  [facility] has residual grade 1 peripheral 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    In 2012 he had wedge resection for right lung nodule   No chemotherapy at that time   In January 2014 he had wedge resection of the right lower    No radiation following this     History of AFib/atrial flutter and had ablation   Jorgito Goodman 2015-February 2017 patient was on Xeloda plus Avastin   In February 2017 patient was noted to have a new lesion of the lung  Anna Lemus was unable to do RFA to this lesion   Patient was seen by thoracic surgery   Due to location, wedge resection was not able to be done  He was referred to rad onc for SBRT       May 2017 lung nodule was noted to be stable compared to February 2017 August 2017 right lower lobe pulmonary nodule had enlarged compared to May 2017      Due to enlarging nodule in May 2017, a PET/CT was performed in August 2017   This showed a 2 8 x 1 8 cm right lower medial lung mass with hypermetabolism      Aug 2017 he was referred to [place] for Cyberknife of the lung      Patient then did not return for follow up with our office until Feb 2018  At this time patient then had repeat scans to be performed  CT C/A/P in March 2018 showed enlarging right lower lobe nodular masses -- consistent with metastatic disease      He was then seen in follow-up on 03/15/2018 after his CT scan result   Patient was presented with multiple options at included surgery, RFA, radiation, CyberKnife, systemic therapy   Patient opted for consultation for RFA in thoracic surgery and then decide      Patient then did not come back to the office until July 2018  Naveen Marroquinjuan needed up to date imaging studies and had repeat PET/CT in Aug 2018 which showed enlarging hypermetabolic right lower medial lung in right hilar lesions   New hypermetabolic left lower lung nodule   Multiple left pleural base lesions, however noted to likely be inflammatory related to prior talc pleurodesis    No new lesions in the neck, abdomen, pelvis    Due to increase in metastatic disease, patient was recommended to pursue systemic therapy   Patient began Loigu 42 September 2018    Follow-up CT scan showed disease progression       Physical examination and test results are as recorded and discussed especially rising CEA and new 6 mm lung nodules and question of opportunistic infection  Patient does not have any new lung symptom  He is being referred to Pulmonary service  For now he is being continued on present dose of stivarga that was recently increased from 80 mg to 120 mg   No metastatic lesion to the brain   Patient had some response to palliative radiation seen on CT scan  Tiki Jensen discussed in detail   Questions answered     Also discussed the importance of eating healthy low-fat foods ,,  staying active as tolerated and health screening tests   Patient is capable of self-care     Patient to monitor blood pressure at home   1  Malignant neoplasm of ascending colon ([facility] )      2  [facility]      3  Drug rash      4  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[facility] )      5  Port-A-Cath in place      6  Pneumonitis    - Ambulatory referral to Pulmonology; Future          Patient voiced understanding and agreement in the discussion  Counseling / Coordination of Care   Greater than 50% of total time was spent with the patient and / or family counseling and / or coordination of care       LABS:  Results for orders placed or performed in visit on 10/25/19   CBC and differential   Result Value Ref Range    WBC 4 22 (L) 4 31 - 10 16 Thousand/uL    RBC 4 74 3 88 - 5 62 Million/uL    Hemoglobin 16 2 12 0 - 17 0 g/dL    Hematocrit 47 8 36 5 - 49 3 %     (H) 82 - 98 fL    MCH 34 2 26 8 - 34 3 pg    MCHC 33 9 31 4 - 37 4 g/dL    RDW 12 0 11 6 - 15 1 %    MPV 8 9 8 9 - 12 7 fL    Platelets 96 (L) 703 - 390 Thousands/uL    nRBC 0 /100 WBCs    Neutrophils Relative 63 43 - 75 %    Immat GRANS % 0 0 - 2 %    Lymphocytes Relative 25 14 - 44 %    Monocytes Relative 7 4 - 12 %    Eosinophils Relative 4 0 - 6 %    Basophils Relative 1 0 - 1 %    Neutrophils Absolute 2 68 1 85 - 7 62 Thousands/µL    Immature Grans Absolute 0 01 0 00 - 0 20 Thousand/uL    Lymphocytes Absolute 1 04 0 60 - 4 47 Thousands/µL    Monocytes Absolute 0 29 0 17 - 1 22 Thousand/µL    Eosinophils Absolute 0 18 0 00 - 0 61 Thousand/µL    Basophils Absolute 0 02 0 00 - 0 10 Thousands/µL   CEA   Result Value Ref Range    CEA 15 9 (H) 0 0 - 3 0 ng/mL   Comprehensive metabolic panel   Result Value Ref Range    Sodium 139 136 - 145 mmol/L    Potassium 4 4 3 5 - 5 3 mmol/L    Chloride 103 100 - 108 mmol/L    CO2 31 21 - 32 mmol/L    ANION GAP 5 4 - 13 mmol/L    BUN 7 5 - 25 mg/dL    Creatinine 0 98 0 60 - 1 30 mg/dL    Glucose, Fasting 73 65 - 99 mg/dL    Calcium 9 3 8 3 - 10 1 mg/dL    AST 34 5 - 45 U/L    ALT 26 12 - 78 U/L    Alkaline Phosphatase 93 46 - 116 U/L    Total Protein 7 9 6 4 - 8 2 g/dL    Albumin 3 8 3 5 - 5 0 g/dL    Total Bilirubin 0 55 0 20 - 1 00 mg/dL    eGFR 85 ml/min/1 73sq m     Labs, Imaging, & Other studies:   All pertinent labs and imaging studies were personally reviewed    Lab Results   Component Value Date     10/19/2015    K 4 4 11/06/2019     11/06/2019    CO2 31 11/06/2019    ANIONGAP 8 10/19/2015    BUN 7 11/06/2019    CREATININE 0 98 11/06/2019    GLUCOSE 92 10/19/2015    GLUF 73 11/06/2019    CALCIUM 9 3 11/06/2019    AST 34 11/06/2019    ALT 26 11/06/2019    ALKPHOS 93 11/06/2019    PROT 7 1 10/19/2015    BILITOT 1 24 (H) 10/19/2015    EGFR 85 11/06/2019     Lab Results   Component Value Date    WBC 4 22 (L) 11/06/2019    HGB 16 2 11/06/2019    HCT 47 8 11/06/2019     (H) 11/06/2019    PLT 96 (L) 11/06/2019   No results found for: SEDRATE    Reviewed and discussed with patient  Assessment and plan:  Genet Maddox is a 63 y o  male with           Patient voiced understanding and agreement in the discussion         Counseling / Coordination of Care   Greater than 50% of total time was spent with the patient and / or family counseling and / or coordination of care

## 2019-12-23 NOTE — PATIENT INSTRUCTIONS
No change in therapy  Requested addendum to CT scan report    Referring to pulmonary for possibility of opportunistic pneumonitis

## 2020-01-03 ENCOUNTER — TELEPHONE (OUTPATIENT)
Dept: PULMONOLOGY | Facility: CLINIC | Age: 60
End: 2020-01-03

## 2020-01-03 ENCOUNTER — OFFICE VISIT (OUTPATIENT)
Dept: PULMONOLOGY | Facility: CLINIC | Age: 60
End: 2020-01-03
Payer: MEDICARE

## 2020-01-03 ENCOUNTER — PREP FOR PROCEDURE (OUTPATIENT)
Dept: PULMONOLOGY | Facility: CLINIC | Age: 60
End: 2020-01-03

## 2020-01-03 VITALS
SYSTOLIC BLOOD PRESSURE: 110 MMHG | TEMPERATURE: 96.1 F | HEART RATE: 77 BPM | HEIGHT: 77 IN | OXYGEN SATURATION: 97 % | DIASTOLIC BLOOD PRESSURE: 82 MMHG | BODY MASS INDEX: 27.39 KG/M2 | WEIGHT: 232 LBS

## 2020-01-03 DIAGNOSIS — R91.8 PULMONARY NODULES/LESIONS, MULTIPLE: Primary | ICD-10-CM

## 2020-01-03 PROCEDURE — 99215 OFFICE O/P EST HI 40 MIN: CPT | Performed by: INTERNAL MEDICINE

## 2020-01-03 NOTE — PROGRESS NOTES
Pulmonary outpatient note   Tyler Correa 63 y o  male MRN: [id number]  1/3/2020      Assessment and plan:  Tyler Correa has metastatic colon cancer since 2008 status post multiple surgeries, wedge resections, chemotherapy and radiation  Currently he was referred due to increasing shortness of breath  On chest CT he has innumerable new 6 mm and smaller nodules with upper lung predominance, some of them are cavitary  Also stable right lower lobe mass 8 5 cm and upper 2 4 cm left upper lobe nodule  Differential diagnosis include infectious etiology in the patient with immunosuppression due to chemotherapy/biologic treatments, metastatic cancer or medication effect, especially biologics  Of note, the medication the patient is taking, Stivarga, is not known to cause this clinical picture  We will continue with a bronchoscopy with BAL and treat per results  If bronchoscopy is negative for cultures or cytology might continue with open lung biopsy  The patient understood and agreed  We will schedule a bronchoscopy as soon as possible and follow up according to the results  Merle Toro MD/PhD,  [facility]      No follow-ups on file  History of Present Illness  This is 63y o  year old male with previous medical history of metastatic colon cancer that was initially diagnosed stage IV in 2008  He had initially resection of the ascending colon with involvement of the lymph nodes and the lungs  After the surgery he received FOLFOX and Avastin for 6 months  In 2011 he had resection of the transverse colon and additional chemotherapy and Avastin  In 2012 he had wedge resection of a right lung nodule  In 2014 he had wedge resection of a right lower lobe nodule  He also had left lower lobe lobectomy probably initially  He was referred today due to new chest CT findings with multiple small nodules in the right lung more upper lobes than lower lobes    The patient is complaining of shortness of breath that is getting worse in the last 7-8 months   He also complains of cough and phlegm  Social history:   Smoked cigars for 10 years  Does not drink alcohol  Occupational history:   Worked as a  auto repair  Retired in 2017  Review of Systems   Constitutional: Positive for fatigue  HENT: Negative  Eyes: Negative  Respiratory: Positive for cough and shortness of breath  Cardiovascular: Negative  Gastrointestinal: Negative  Endocrine: Negative  Genitourinary: Negative  Musculoskeletal: Negative  Skin: Negative  Allergic/Immunologic: Negative  Neurological: Negative  Hematological: Negative  Psychiatric/Behavioral: Negative          Historical Information   Past Medical History:   Diagnosis Date    [facility]     [facility] )     [facility] )     Metastatic [facility] lung [facility])      Past Surgical History:   Procedure Laterality Date    ANTERIOR CRUCIATE LIGAMENT REPAIR      ANTERIOR CRUCIATE LIGAMENT REPAIR      2  on R knee    APPENDECTOMY      ATRIAL ABLATION SURGERY      BACK SURGERY      COLON SURGERY      Ascending and transverse colon    COLONOSCOPY      LUNG REMOVAL, PARTIAL      RIGHT COLECTOMY      and Transverse colon resection     Family History   Problem Relation Age of Onset    Diabetes Mother     Heart disease Mother     Heart disease Father     Skin cancer Sister        Meds/Allergies     Current Outpatient Medications:     albuterol (VENTOLIN HFA) 90 mcg/act inhaler, INHALE TWO PUFFS BY MOUTH EVERY 4 HOURS AS NEEDED FOR SHORTNESS OF BREATH   no more than 4 times daily, Disp: 18 g, Rfl: 1    ascorbic acid (VITAMIN C) 250 MG CHEW, Chew 250 mg daily, Disp: , Rfl:     ondansetron (ZOFRAN) 4 mg tablet, Take 1 tablet (4 mg total) by mouth every 6 (six) hours as needed for nausea or vomiting, Disp: 50 tablet, Rfl: 2    Regorafenib (STIVARGA) 40 MG TABS, Take 3 tablets (120 mg) PO daily for 3 weeks on, then 1 week off , Disp: 63 tablet, Rfl: 11  Allergies   Allergen Reactions    Aspirin Other (See Comments)     Nose bleed    Sulfa Antibiotics Other (See Comments)     Dizzy        Vitals: Blood pressure 110/82, pulse 77, temperature (!) 96 1 °F (35 6 °C), temperature source Tympanic, height 6' 4 5" (1 943 m), weight 105 kg (232 lb), SpO2 97 %  Body mass index is 27 87 kg/m²  Oxygen Therapy  SpO2: 97 %  Oxygen Therapy: None (Room air)    Physical Exam  Physical Exam   Constitutional: He is oriented to person, place, and time  He appears well-developed and well-nourished  No distress  HENT:   Head: Normocephalic and atraumatic  Eyes: Pupils are equal, round, and reactive to light  EOM are normal    Neck: Normal range of motion  Neck supple  No JVD present  Cardiovascular: Normal rate, regular rhythm, normal heart sounds and intact distal pulses  Exam reveals no friction rub  No murmur heard  Pulmonary/Chest: Effort normal and breath sounds normal  No respiratory distress  He has no wheezes  He has no rales  Abdominal: Soft  Bowel sounds are normal    Musculoskeletal: Normal range of motion  He exhibits no edema or deformity  Neurological: He is alert and oriented to person, place, and time  Skin: Skin is warm  Psychiatric: He has a normal mood and affect  Labs: I have personally reviewed pertinent lab results    Lab Results   Component Value Date    WBC 4 22 (L) 11/06/2019    HGB 16 2 11/06/2019    HCT 47 8 11/06/2019     (H) 11/06/2019    PLT 96 (L) 11/06/2019     Lab Results   Component Value Date    GLUCOSE 92 10/19/2015    CALCIUM 9 3 11/06/2019     10/19/2015    K 4 4 11/06/2019    CO2 31 11/06/2019     11/06/2019    BUN 7 11/06/2019    CREATININE 0 98 11/06/2019     No results found for: IGE  Lab Results   Component Value Date    ALT 26 11/06/2019    AST 34 11/06/2019    ALKPHOS 93 11/06/2019    BILITOT 1 24 (H) 10/19/2015 Imaging and other studies:   I have personally reviewed pertinent imaging studies in PACS  Chest CT December 2019  LUNGS:  Innumerable new 6 mm and smaller nodules within upper lung predominance  Some are cavitary (30, 31, 66)  Minimal decrease in the right lower lobe mass from 8 5 x 4 3 cm to 7 0 x 4 1 cm (3/94)  It was remeasured on the previous exam at the same level  No change in 2 4 x 1 9 cm left upper lobe nodule (3/38)  It was remeasured in a similar fashion on the previous study  Left lower lobectomy  Right upper lobe wedge resection  PLEURA:  No change in left pleural thickening with hyperdensity in the left pleural space related to talc pleurodesis  HEART/GREAT VESSELS:  Unremarkable for patient's age  MEDIASTINUM AND ZABRINA:  Right port at cavoatrial junction  CHEST WALL AND LOWER NECK:   Unremarkable  VISUALIZED STRUCTURES IN THE UPPER ABDOMEN:  Unremarkable  OSSEOUS STRUCTURES:  No acute fracture or destructive osseous lesion  IMPRESSION:  Innumerable new 6 mm and smaller lung nodules, some cavitary, with an upper lung predominance  Differential diagnosis includes metastatic disease versus disseminated fungal infection due to immunosuppression    No change in right lower lobe mass and left upper lobe nodule

## 2020-01-03 NOTE — H&P (VIEW-ONLY)
Pulmonary outpatient note   Evelyn Nguyen 63 y o  male MRN: [id number]  1/3/2020      Assessment and plan:  Evelyn Nguyen has metastatic colon cancer since 2008 status post multiple surgeries, wedge resections, chemotherapy and radiation  Currently he was referred due to increasing shortness of breath  On chest CT he has innumerable new 6 mm and smaller nodules with upper lung predominance, some of them are cavitary  Also stable right lower lobe mass 8 5 cm and upper 2 4 cm left upper lobe nodule  Differential diagnosis include infectious etiology in the patient with immunosuppression due to chemotherapy/biologic treatments, metastatic cancer or medication effect, especially biologics  Of note, the medication the patient is taking, Stivarga, is not known to cause this clinical picture  We will continue with a bronchoscopy with BAL and treat per results  If bronchoscopy is negative for cultures or cytology might continue with open lung biopsy  The patient understood and agreed  We will schedule a bronchoscopy as soon as possible and follow up according to the results  Mitra Storm MD/PhD,  [facility]      No follow-ups on file  History of Present Illness  This is 63y o  year old male with previous medical history of metastatic colon cancer that was initially diagnosed stage IV in 2008  He had initially resection of the ascending colon with involvement of the lymph nodes and the lungs  After the surgery he received FOLFOX and Avastin for 6 months  In 2011 he had resection of the transverse colon and additional chemotherapy and Avastin  In 2012 he had wedge resection of a right lung nodule  In 2014 he had wedge resection of a right lower lobe nodule  He also had left lower lobe lobectomy probably initially  He was referred today due to new chest CT findings with multiple small nodules in the right lung more upper lobes than lower lobes    The patient is complaining of shortness of breath that is getting worse in the last 7-8 months   He also complains of cough and phlegm  Social history:   Smoked cigars for 10 years  Does not drink alcohol  Occupational history:   Worked as a  auto repair  Retired in 2017  Review of Systems   Constitutional: Positive for fatigue  HENT: Negative  Eyes: Negative  Respiratory: Positive for cough and shortness of breath  Cardiovascular: Negative  Gastrointestinal: Negative  Endocrine: Negative  Genitourinary: Negative  Musculoskeletal: Negative  Skin: Negative  Allergic/Immunologic: Negative  Neurological: Negative  Hematological: Negative  Psychiatric/Behavioral: Negative          Historical Information   Past Medical History:   Diagnosis Date    [facility]     [facility] )     [facility] )     [facility]ast[facility] lung [facility])      Past Surgical History:   Procedure Laterality Date    ANTERIOR CRUCIATE LIGAMENT REPAIR      ANTERIOR CRUCIATE LIGAMENT REPAIR      2  on R knee    APPENDECTOMY      ATRIAL ABLATION SURGERY      BACK SURGERY      COLON SURGERY      Ascending and transverse colon    COLONOSCOPY      LUNG REMOVAL, PARTIAL      RIGHT COLECTOMY      and Transverse colon resection     Family History   Problem Relation Age of Onset    Diabetes Mother     Heart disease Mother     Heart disease Father     Skin cancer Sister        Meds/Allergies     Current Outpatient Medications:     albuterol (VENTOLIN HFA) 90 mcg/act inhaler, INHALE TWO PUFFS BY MOUTH EVERY 4 HOURS AS NEEDED FOR SHORTNESS OF BREATH   no more than 4 times daily, Disp: 18 g, Rfl: 1    ascorbic acid (VITAMIN C) 250 MG CHEW, Chew 250 mg daily, Disp: , Rfl:     ondansetron (ZOFRAN) 4 mg tablet, Take 1 tablet (4 mg total) by mouth every 6 (six) hours as needed for nausea or vomiting, Disp: 50 tablet, Rfl: 2    Regorafenib (STIVARGA) 40 MG TABS, Take 3 tablets (120 mg) PO daily for 3 weeks on, then 1 week off , Disp: 63 tablet, Rfl: 11  Allergies   Allergen Reactions    Aspirin Other (See Comments)     Nose bleed    Sulfa Antibiotics Other (See Comments)     Dizzy        Vitals: Blood pressure 110/82, pulse 77, temperature (!) 96 1 °F (35 6 °C), temperature source Tympanic, height 6' 4 5" (1 943 m), weight 105 kg (232 lb), SpO2 97 %  Body mass index is 27 87 kg/m²  Oxygen Therapy  SpO2: 97 %  Oxygen Therapy: None (Room air)    Physical Exam  Physical Exam   Constitutional: He is oriented to person, place, and time  He appears well-developed and well-nourished  No distress  HENT:   Head: Normocephalic and atraumatic  Eyes: Pupils are equal, round, and reactive to light  EOM are normal    Neck: Normal range of motion  Neck supple  No JVD present  Cardiovascular: Normal rate, regular rhythm, normal heart sounds and intact distal pulses  Exam reveals no friction rub  No murmur heard  Pulmonary/Chest: Effort normal and breath sounds normal  No respiratory distress  He has no wheezes  He has no rales  Abdominal: Soft  Bowel sounds are normal    Musculoskeletal: Normal range of motion  He exhibits no edema or deformity  Neurological: He is alert and oriented to person, place, and time  Skin: Skin is warm  Psychiatric: He has a normal mood and affect  Labs: I have personally reviewed pertinent lab results    Lab Results   Component Value Date    WBC 4 22 (L) 11/06/2019    HGB 16 2 11/06/2019    HCT 47 8 11/06/2019     (H) 11/06/2019    PLT 96 (L) 11/06/2019     Lab Results   Component Value Date    GLUCOSE 92 10/19/2015    CALCIUM 9 3 11/06/2019     10/19/2015    K 4 4 11/06/2019    CO2 31 11/06/2019     11/06/2019    BUN 7 11/06/2019    CREATININE 0 98 11/06/2019     No results found for: IGE  Lab Results   Component Value Date    ALT 26 11/06/2019    AST 34 11/06/2019    ALKPHOS 93 11/06/2019    BILITOT 1 24 (H) 10/19/2015 Imaging and other studies:   I have personally reviewed pertinent imaging studies in PACS  Chest CT December 2019  LUNGS:  Innumerable new 6 mm and smaller nodules within upper lung predominance  Some are cavitary (30, 31, 66)  Minimal decrease in the right lower lobe mass from 8 5 x 4 3 cm to 7 0 x 4 1 cm (3/94)  It was remeasured on the previous exam at the same level  No change in 2 4 x 1 9 cm left upper lobe nodule (3/38)  It was remeasured in a similar fashion on the previous study  Left lower lobectomy  Right upper lobe wedge resection  PLEURA:  No change in left pleural thickening with hyperdensity in the left pleural space related to talc pleurodesis  HEART/GREAT VESSELS:  Unremarkable for patient's age  MEDIASTINUM AND ZABRINA:  Right port at cavoatrial junction  CHEST WALL AND LOWER NECK:   Unremarkable  VISUALIZED STRUCTURES IN THE UPPER ABDOMEN:  Unremarkable  OSSEOUS STRUCTURES:  No acute fracture or destructive osseous lesion  IMPRESSION:  Innumerable new 6 mm and smaller lung nodules, some cavitary, with an upper lung predominance  Differential diagnosis includes metastatic disease versus disseminated fungal infection due to immunosuppression    No change in right lower lobe mass and left upper lobe nodule

## 2020-01-06 ENCOUNTER — ANESTHESIA EVENT (OUTPATIENT)
Dept: GASTROENTEROLOGY | Facility: HOSPITAL | Age: 60
End: 2020-01-06

## 2020-01-07 ENCOUNTER — ANESTHESIA (OUTPATIENT)
Dept: GASTROENTEROLOGY | Facility: HOSPITAL | Age: 60
End: 2020-01-07

## 2020-01-07 ENCOUNTER — HOSPITAL ENCOUNTER (OUTPATIENT)
Dept: GASTROENTEROLOGY | Facility: HOSPITAL | Age: 60
Setting detail: OUTPATIENT SURGERY
Discharge: HOME | End: 2020-01-07
Attending: INTERNAL MEDICINE | Admitting: INTERNAL MEDICINE
Payer: MEDICARE

## 2020-01-07 VITALS
SYSTOLIC BLOOD PRESSURE: 109 MMHG | TEMPERATURE: 97 F | RESPIRATION RATE: 18 BRPM | WEIGHT: 220 LBS | HEART RATE: 68 BPM | OXYGEN SATURATION: 96 % | DIASTOLIC BLOOD PRESSURE: 64 MMHG | HEIGHT: 77 IN | BODY MASS INDEX: 25.98 KG/M2

## 2020-01-07 DIAGNOSIS — R91.8 PULMONARY NODULES/LESIONS, MULTIPLE: ICD-10-CM

## 2020-01-07 PROCEDURE — 87070 CULTURE OTHR SPECIMN AEROBIC: CPT | Performed by: INTERNAL MEDICINE

## 2020-01-07 PROCEDURE — 87102 FUNGUS ISOLATION CULTURE: CPT | Performed by: INTERNAL MEDICINE

## 2020-01-07 PROCEDURE — 87281 PNEUMOCYSTIS CARINII AG IF: CPT | Performed by: INTERNAL MEDICINE

## 2020-01-07 PROCEDURE — 87116 MYCOBACTERIA CULTURE: CPT | Performed by: INTERNAL MEDICINE

## 2020-01-07 PROCEDURE — 88112 CYTOPATH CELL ENHANCE TECH: CPT | Performed by: PATHOLOGY

## 2020-01-07 PROCEDURE — 87206 SMEAR FLUORESCENT/ACID STAI: CPT | Performed by: INTERNAL MEDICINE

## 2020-01-07 PROCEDURE — 87015 SPECIMEN INFECT AGNT CONCNTJ: CPT | Performed by: INTERNAL MEDICINE

## 2020-01-07 PROCEDURE — 31624 DX BRONCHOSCOPE/LAVAGE: CPT | Performed by: INTERNAL MEDICINE

## 2020-01-07 PROCEDURE — 89051 BODY FLUID CELL COUNT: CPT | Performed by: PATHOLOGY

## 2020-01-07 RX ORDER — FENTANYL CITRATE 50 UG/ML
INJECTION, SOLUTION INTRAMUSCULAR; INTRAVENOUS AS NEEDED
Status: DISCONTINUED | OUTPATIENT
Start: 2020-01-07 | End: 2020-01-07 | Stop reason: SURG

## 2020-01-07 RX ORDER — PROPOFOL 10 MG/ML
INJECTION, EMULSION INTRAVENOUS AS NEEDED
Status: DISCONTINUED | OUTPATIENT
Start: 2020-01-07 | End: 2020-01-07 | Stop reason: SURG

## 2020-01-07 RX ORDER — SODIUM CHLORIDE 9 MG/ML
125 INJECTION, SOLUTION INTRAVENOUS CONTINUOUS
Status: DISCONTINUED | OUTPATIENT
Start: 2020-01-07 | End: 2020-01-11 | Stop reason: HOSPADM

## 2020-01-07 RX ORDER — KETAMINE HYDROCHLORIDE 50 MG/ML
INJECTION, SOLUTION, CONCENTRATE INTRAMUSCULAR; INTRAVENOUS AS NEEDED
Status: DISCONTINUED | OUTPATIENT
Start: 2020-01-07 | End: 2020-01-07 | Stop reason: SURG

## 2020-01-07 RX ADMIN — SODIUM CHLORIDE 125 ML/HR: 0.9 INJECTION, SOLUTION INTRAVENOUS at 10:22

## 2020-01-07 RX ADMIN — KETAMINE HYDROCHLORIDE 10 MG: 50 INJECTION, SOLUTION INTRAMUSCULAR; INTRAVENOUS at 11:25

## 2020-01-07 RX ADMIN — FENTANYL CITRATE 25 MCG: 50 INJECTION, SOLUTION INTRAMUSCULAR; INTRAVENOUS at 11:16

## 2020-01-07 RX ADMIN — PROPOFOL 50 MG: 10 INJECTION, EMULSION INTRAVENOUS at 11:19

## 2020-01-07 RX ADMIN — PROPOFOL 30 MG: 10 INJECTION, EMULSION INTRAVENOUS at 11:24

## 2020-01-07 RX ADMIN — KETAMINE HYDROCHLORIDE 10 MG: 50 INJECTION, SOLUTION INTRAMUSCULAR; INTRAVENOUS at 11:16

## 2020-01-07 RX ADMIN — PROPOFOL 50 MG: 10 INJECTION, EMULSION INTRAVENOUS at 11:16

## 2020-01-07 RX ADMIN — PROPOFOL 20 MG: 10 INJECTION, EMULSION INTRAVENOUS at 11:29

## 2020-01-07 RX ADMIN — KETAMINE HYDROCHLORIDE 20 MG: 50 INJECTION, SOLUTION INTRAMUSCULAR; INTRAVENOUS at 11:19

## 2020-01-07 NOTE — ANESTHESIA POSTPROCEDURE EVALUATION
Post-Op Assessment Note    CV Status:  Stable  Pain Score: 0    Pain management: adequate     Mental Status:  Alert and awake   Hydration Status:  Euvolemic   PONV Controlled:  Controlled   Airway Patency:  Patent   Post Op Vitals Reviewed:  Yes              /63 (01/07/20 1139)    Temp     Pulse 67 (01/07/20 1139)   Resp 18 (01/07/20 1139)    SpO2 99 % (01/07/20 1139)

## 2020-01-07 NOTE — ANESTHESIA PREPROCEDURE EVALUATION
Review of Systems/Medical History  Patient summary reviewed  Chart reviewed  No history of anesthetic complications     Cardiovascular  EKG reviewed, Exercise tolerance (METS): <4,     Pulmonary  Pneumonia (Sepsis secondary to PNA 8/2019), Shortness of breath,   Comment: chest CT he has innumerable new 6 mm and smaller nodules with upper lung predominance, some of them are cavitary  Also stable right lower lobe mass 8 5 cm and upper 2 4 cm left upper lobe nodule  Differential diagnosis include infectious etiology in the patient with immunosuppression due to chemotherapy/biologic treatments, metastatic cancer or medication effect, especially biologics     GI/Hepatic      Comment: metastatic colon cancer since 2008 status post multiple surgeries, wedge resections, chemotherapy and radiation     Negative  ROS        Endo/Other  Negative endo/other ROS      GYN       Hematology    Immunocompromised state (Immunosupressants) ,    Musculoskeletal  Negative musculoskeletal ROS        Neurology  Negative neurology ROS      Psychology           Physical Exam    Airway  Comment: Pain on right side  Mallampati score: I  TM Distance: >3 FB  Neck ROM: full     Dental   No notable dental hx     Cardiovascular  Rhythm: regular, Rate: normal,     Pulmonary  Breath sounds clear to auscultation,     Other Findings        Anesthesia Plan  ASA Score- 3     Anesthesia Type- IV sedation with anesthesia with ASA Monitors  Additional Monitors:   Airway Plan:         Plan Factors-    Induction- intravenous  Postoperative Plan- Plan for postoperative opioid use  Informed Consent- Anesthetic plan and risks discussed with patient  I personally reviewed this patient with the CRNA  Discussed and agreed on the Anesthesia Plan with the CRNA  Aby Ang

## 2020-01-07 NOTE — INTERVAL H&P NOTE
H&P reviewed  After examining the patient I find no changes in the patients condition since the H&P had been written      Vitals:    01/07/20 1016   BP: 124/80   Pulse: 69   Resp: 18   Temp: (!) 97 °F (36 1 °C)   SpO2: 95%

## 2020-01-08 LAB — P JIROVECII AG SPEC QL IF: NORMAL

## 2020-01-09 LAB
BACTERIA BRONCH AEROBE CULT: NORMAL
GRAM STN SPEC: NORMAL

## 2020-01-10 LAB
BACTERIA BRONCH AEROBE CULT: NORMAL
GRAM STN SPEC: NORMAL

## 2020-01-21 ENCOUNTER — TELEPHONE (OUTPATIENT)
Dept: ICU | Facility: HOSPITAL | Age: 60
End: 2020-01-21

## 2020-01-21 DIAGNOSIS — J84.9 ILD (INTERSTITIAL LUNG DISEASE) (HCC): Primary | ICD-10-CM

## 2020-01-21 NOTE — TELEPHONE ENCOUNTER
The patient was presented in the ILD and ED  It was recommended to check histoplasmosis, vasculitis, fungal, Nocardia and MAC  A bronchoscopy was done and ruled out fungal, Nocardia and MAC  I ordered blood tests for vasculitis including MAREYLLEN and ANCA and histoplasmosis in the blood and in the urine  I will call the patient with results to discuss the next options that might include open lung biopsy  He understood and agreed

## 2020-01-22 ENCOUNTER — APPOINTMENT (OUTPATIENT)
Dept: LAB | Facility: HOSPITAL | Age: 60
End: 2020-01-22
Attending: INTERNAL MEDICINE
Payer: MEDICARE

## 2020-01-22 PROCEDURE — 87385 HISTOPLASMA CAPSUL AG IA: CPT | Performed by: INTERNAL MEDICINE

## 2020-01-23 ENCOUNTER — TELEPHONE (OUTPATIENT)
Dept: GASTROENTEROLOGY | Facility: MEDICAL CENTER | Age: 60
End: 2020-01-23

## 2020-01-24 LAB
H CAPSUL AG UR IA-ACNC: <0.5
STONE ANALYSIS-IMP: NORMAL

## 2020-01-29 ENCOUNTER — HOSPITAL ENCOUNTER (OUTPATIENT)
Dept: INFUSION CENTER | Facility: CLINIC | Age: 60
Discharge: HOME | End: 2020-01-29
Payer: MEDICARE

## 2020-01-29 ENCOUNTER — OFFICE VISIT (OUTPATIENT)
Dept: HEMATOLOGY ONCOLOGY | Facility: CLINIC | Age: 60
End: 2020-01-29
Payer: MEDICARE

## 2020-01-29 VITALS
TEMPERATURE: 98.7 F | RESPIRATION RATE: 18 BRPM | SYSTOLIC BLOOD PRESSURE: 122 MMHG | HEIGHT: 77 IN | HEART RATE: 68 BPM | DIASTOLIC BLOOD PRESSURE: 90 MMHG | BODY MASS INDEX: 26.92 KG/M2 | OXYGEN SATURATION: 97 % | WEIGHT: 228 LBS

## 2020-01-29 DIAGNOSIS — C18.2 MALIGNANT NEOPLASM OF ASCENDING COLON (HCC): Primary | ICD-10-CM

## 2020-01-29 DIAGNOSIS — C78.00 COLON CANCER METASTASIZED TO LUNG (HCC): Chronic | ICD-10-CM

## 2020-01-29 DIAGNOSIS — R06.00 DYSPNEA ON EXERTION: ICD-10-CM

## 2020-01-29 DIAGNOSIS — M54.50 CHRONIC LOW BACK PAIN WITHOUT SCIATICA, UNSPECIFIED BACK PAIN LATERALITY: Chronic | ICD-10-CM

## 2020-01-29 DIAGNOSIS — G89.29 CHRONIC LOW BACK PAIN WITHOUT SCIATICA, UNSPECIFIED BACK PAIN LATERALITY: Chronic | ICD-10-CM

## 2020-01-29 DIAGNOSIS — Z95.828 PORT-A-CATH IN PLACE: Primary | ICD-10-CM

## 2020-01-29 DIAGNOSIS — C77.1 METASTASIS TO INTRATHORACIC LYMPH NODES (HCC): ICD-10-CM

## 2020-01-29 DIAGNOSIS — Z95.828 PORT-A-CATH IN PLACE: ICD-10-CM

## 2020-01-29 DIAGNOSIS — C18.9 COLON CANCER METASTASIZED TO LUNG (HCC): Chronic | ICD-10-CM

## 2020-01-29 PROCEDURE — 96523 IRRIG DRUG DELIVERY DEVICE: CPT

## 2020-01-29 PROCEDURE — 99214 OFFICE O/P EST MOD 30 MIN: CPT | Performed by: INTERNAL MEDICINE

## 2020-01-29 NOTE — PROGRESS NOTES
HPI:  Follow-up visit for stage IV colon 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 intrathoracic lymph nodes and patient has been on Stivarga since October 2019  Initial dose was 80 mg and that was increased to 120 mg  Patient does not have problem tolerating stivarga but there could be disease progression because tumor marker CEA has increased to 48, higher than before  Patient has exertional dyspnea and dry cough   No chest pain and no hemoptysis   Has tiredness  Has chronic low back discomfort  Prior to stivarga he had a cycle  irinotecan and oxaliplatin and was hospitalized after that   Silvestre Fret to that he had palliative radiation to the lung mass causing bronchial obstruction and dysphagia      He had been through several lines of systemic therapy for metastatic colon cancer that was 1st diagnosed in 2008  Yen Herberth had not have Cyramza plus chemo   Patient saw    In 2008 he was diagnosed to have  stage IV adenocarcinoma of the colon     He had resection of the ascending colon   Pathology showed involvement of lymph nodes and lung   Postsurgery he received FOLFOX plus Avastin for 6 months  Then, in 2011 patient had additional resection this time of the transverse colon   He received FOLFIRI plus Avastin for 6 months after surgery  [facility] has residual grade 1 peripheral 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    In 2012 he had wedge resection for right lung nodule   No chemotherapy at that time   In January 2014 he had wedge resection of the right lower    No radiation following this     History of AFib/atrial flutter and had ablation   Elizabeth Lopez 2015-February 2017 patient was on Xeloda plus Avastin   In February 2017 patient was noted to have a new lesion of the lung  Topher Cabral was unable to do RFA to this lesion   Patient was seen by thoracic surgery   Due to location, wedge resection was not able to be done   He was referred to rad onc for SBRT       May 2017 lung nodule was noted to be stable compared to February 2017 August 2017 right lower lobe pulmonary nodule had enlarged compared to May 2017      Due to enlarging nodule in May 2017, a PET/CT was performed in August 2017   This showed a 2 8 x 1 8 cm right lower medial lung mass with hypermetabolism      Aug 2017 he was referred to [place] for Cyberknife of the lung      Patient then did not return for follow up with our office until Feb 2018  At this time patient then had repeat scans to be performed  CT C/A/P in March 2018 showed enlarging right lower lobe nodular masses -- consistent with metastatic disease      He was then seen in follow-up on 03/15/2018 after his CT scan result   Patient was presented with multiple options at included surgery, RFA, radiation, CyberKnife, systemic therapy   Patient opted for consultation for RFA in thoracic surgery and then decide      Patient then did not come back to the office until July 2018  Obie Mapedro needed up to date imaging studies and had repeat PET/CT in Aug 2018 which showed enlarging hypermetabolic right lower medial lung in right hilar lesions   New hypermetabolic left lower lung nodule   Multiple left pleural base lesions, however noted to likely be inflammatory related to prior talc pleurodesis    No new lesions in the neck, abdomen, pelvis    Due to increase in metastatic disease, patient was recommended to pursue systemic therapy   Patient began Loigu 42 September 2018    Follow-up CT scan showed disease progression         Current Outpatient Medications:     albuterol (VENTOLIN HFA) 90 mcg/act inhaler, INHALE TWO PUFFS BY MOUTH EVERY 4 HOURS AS NEEDED FOR SHORTNESS OF BREATH   no more than 4 times daily, Disp: 18 g, Rfl: 1    ascorbic acid (VITAMIN C) 250 MG CHEW, Chew 250 mg daily, Disp: , Rfl:     ondansetron (ZOFRAN) 4 mg tablet, Take 1 tablet (4 mg total) by mouth every 6 (six) hours as needed for nausea or vomiting, Disp: 50 tablet, Rfl: 2    Regorafenib (STIVARGA) 40 MG TABS, Take 3 tablets (120 mg) PO daily for 3 weeks on, then 1 week off , Disp: 63 tablet, Rfl: 11    Allergies   Allergen Reactions    Aspirin Other (See Comments)     Nose bleed    Sulfa Antibiotics Other (See Comments)     Dizzy           Colon cancer metastasized to lung [facility])    2008 Initial Diagnosis     Colon cancer metastasized to lung [facility])      2008 - 2009 Chemotherapy     FOLFOX for two 6 month courses (3 month break in-between courses, according to the patient)  - Dr Brit Weiss      2/18/2008 Surgery     Partial colectomy/Right hemicolectomy:  - stage IV (pT3,pN2,pM1a, G2)    - Dr Tyree Romero        4/21/2010 Surgery     Right lower lobe lung nodule:  - metastatic colonic adenocarcinoma      8/19/2010 Surgery     Left lower lobe wedge resection:  - moderately differentiated adenocarcinoma, consistent with metastasis from known colonic primary, 1 3 cm      2011 Surgery     Transverse colectomy      4/2011 - 10/2011 Chemotherapy     Avastin and FOLFIRI       11/9/2011 Surgery     RAZ lung wedge resection:  - metastatic adenocarcinoma of the colon  - invasive carcinoma is 0 4 cm from parenchymal resection      2012 Surgery     Wedge resection for right lung nodule      11/29/2012 Surgery     I  [id number] (11/29/12)   Omental nodule, excision:   - Metastatic adenocarcinoma morphologically consistent with colonic primary  1/15/2014 Surgery     A  Lung, right lower lobe, wedge resection:   - 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, moderately to poorly differentiated, consistent with colorectal primary, see note  - Resection margin, no tumor seen  - Background lung with few non-necrotizing granulomas and emphysematous changes           3/4/2014 - 3/14/2014 Radiation     Plan ID Energy Fractions Dose per Fraction (cGy) Total Dose Delivered (cGy) Elapsed Days   SBRT Lt Lung 6X 5 / 5 1,000 5,000 8     - Dr Delfino Anand      4/14/2015 Surgery     Left lower lobe lobectomy, cervical mediastinoscopy, Left posterolateral thoracotomy:  A   Lymph node Level 2R:    - 3/3 lymph nodes, positive for metastatic colonic adenocarcinoma  [place] node Level 7:   - 3/8 lymph nodes, positive for metastatic colonic adenocarcinoma       C   Left lower lobe:   - Metastatic colonic adenocarcinoma    - The vascular and bronchial margins of resection are negative for tumor    - 0/6 lymph nodes, negative for metastatic colonic adenocarcinoma     - The tumor is 0 5 cm away from the hilar staple line        6/2015 - 2/2017 Chemotherapy     Xeloda and Avastin      9/2018 - 5/2019 Chemotherapy     Lonsurf      8/13/2019 - 9/9/2019 Chemotherapy     bevacizumab (AVASTIN) 500 mg in sodium chloride 0 9 % 100 mL IVPB, 505 mg, Intravenous, Once, 1 of 11 cycles  Administration: 500 mg (8/27/2019)  irinotecan (CAMPTOSAR) 340 mg in dextrose 5 % 500 mL chemo infusion, 330 mg (83 3 % of original dose 180 mg/m2), Intravenous, Once, 2 of 12 cycles  Dose modification: 150 mg/m2 (original dose 180 mg/m2, Cycle 1, Reason: Dose Not Tolerated)  Administration: 340 mg (8/13/2019), 340 mg (8/27/2019)  oxaliplatin (ELOXATIN) 200 mg in dextrose 5 % 250 mL chemo infusion, 198 05 mg, Intravenous, Once, 2 of 12 cycles  Administration: 200 mg (8/13/2019), 200 mg (8/27/2019)        Malignant neoplasm of ascending colon (HCC)    2/5/2018 Initial Diagnosis     Malignant neoplasm of ascending colon ([facility] [address] )      8/13/2019 - 9/9/2019 Chemotherapy     bevacizumab (AVASTIN) 500 mg in sodium chloride 0 9 % 100 mL IVPB, 505 mg, Intravenous, Once, 1 of 11 cycles  Administration: 500 mg (8/27/2019)  irinotecan (CAMPTOSAR) 340 mg in dextrose 5 % 500 mL chemo infusion, 330 mg (83 3 % of original dose 180 mg/m2), Intravenous, Once, 2 of 12 cycles  Dose modification: 150 mg/m2 (original dose 180 mg/m2, Cycle 1, Reason: Dose Not Tolerated)  Administration: 340 mg (8/13/2019), 340 mg (8/27/2019)  oxaliplatin (ELOXATIN) 200 mg in dextrose 5 % 250 mL chemo infusion, 198 05 mg, Intravenous, Once, 2 of 12 cycles  Administration: 200 mg (8/13/2019), 200 mg (8/27/2019)        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 [address] )      7/24/2019 - 8/6/2019 Radiation     Treatment:  Course: C2    Plan ID Energy Fractions Dose per Fraction (cGy) Dose Correction (cGy) Total Dose Delivered (cGy) Elapsed Days   R LL_Hilum 10X 10 / 10 300 0 3,000 13            8/13/2019 - 9/9/2019 Chemotherapy     bevacizumab (AVASTIN) 500 mg in sodium chloride 0 9 % 100 mL IVPB, 505 mg, Intravenous, Once, 1 of 11 cycles  Administration: 500 mg (8/27/2019)  irinotecan (CAMPTOSAR) 340 mg in dextrose 5 % 500 mL chemo infusion, 330 mg (83 3 % of original dose 180 mg/m2), Intravenous, Once, 2 of 12 cycles  Dose modification: 150 mg/m2 (original dose 180 mg/m2, Cycle 1, Reason: Dose Not Tolerated)  Administration: 340 mg (8/13/2019), 340 mg (8/27/2019)  oxaliplatin (ELOXATIN) 200 mg in dextrose 5 % 250 mL chemo infusion, 198 05 mg, Intravenous, Once, 2 of 12 cycles  Administration: 200 mg (8/13/2019), 200 mg (8/27/2019)         ROS:  01/29/20 Reviewed 13 systems:  Presently no headaches, seizures, dizziness, diplopia, dysphagia, hoarseness, chest pain, palpitations,  hemoptysis, abdominal pain, nausea, vomiting, change in bowel habits, melena, hematuria, fever, chills, bleeding, bone pains,  weight loss,   weakness, numbness,  claudication and gait problem  No frequent infections  Not unusually sensitive to heat or cold  No swelling of the ankles  No swollen glands  Patient is anxious   Other symptoms are in HPI        /90 (BP Location: Left arm, Patient Position: Sitting, Cuff Size: Adult)   Pulse 68   Temp 98 7 °F (37 1 °C)   Resp 18   Ht 6' 4 5" (1 943 m)   Wt 103 kg (228 lb)   SpO2 97%   BMI 27 39 kg/m²     Physical Exam:  Alert, oriented, not in distress, no icterus, no oral thrush, no palpable neck mass, decreased breath sounds lower lung fields, regular heart rate, abdomen  soft and non tender, no palpable abdominal mass, no ascites, no edema of ankles, no calf tenderness, no focal neurological deficit, has nonspecific macular skin rash on his arms and legs, no palpable lymphadenopathy, good arterial pulses, no clubbing  Patient is anxious  Performance status 1  IMAGING:  IMPRESSION:     Innumerable new 6 mm and smaller lung nodules, some cavitary, with an upper lung predominance    Differential diagnosis includes metastatic disease versus disseminated fungal infection due to immunosuppression      No change in right lower lobe mass and left upper lobe nodule      The study was marked in EPIC for immediate notification               Workstation performed: [id number]         Imaging     CT chest wo contrast (Order: [id number]) - 12/16/2019       LABS:  Results for orders placed or performed in visit on 01/21/20   Histoplasma Galactomannan Antigen   Result Value Ref Range    Histoplasma Galactomannan Ag <0 5 <0 5 ng/mL    Disclaimer Comment      Labs, Imaging, & Other studies:   All pertinent labs and imaging studies were personally reviewed    Lab Results   Component Value Date     10/19/2015    K 4 4 01/22/2020    CL 94 (L) 01/22/2020    CO2 30 01/22/2020    ANIONGAP 8 10/19/2015    BUN 11 01/22/2020    CREATININE 1 12 01/22/2020    GLUCOSE 92 10/19/2015    GLUF 83 01/22/2020    CALCIUM 9 5 01/22/2020    AST 72 (H) 01/22/2020    ALT 37 01/22/2020    ALKPHOS 75 01/22/2020    PROT 7 1 10/19/2015    BILITOT 1 24 (H) 10/19/2015    EGFR 72 01/22/2020     Lab Results   Component Value Date    WBC 5 19 01/22/2020    HGB 16 9 01/22/2020    HCT 47 3 01/22/2020    MCV 94 01/22/2020     01/22/2020   Results   Pulmonary cytology (Order [id number])   Result Information     Status Priority Source   Final result (1/9/2020 11:27 AM) Routine Lung, Right Middle Lobe Bronchial Lavage   Other Results from 1/7/2020      Fungal culture  Preliminary result 1/7/2020    Bronchial culture and Gram stain  Final result 1/7/2020    Pneumocystis smear by DFA  Final result 1/7/2020    AFB Culture with Stain  Preliminary result 1/7/2020    Bronchial culture and Gram stain  Final result 1/7/2020   Pulmonary cytology: [id number]   Order: [id number]   Collected:  1/7/2020 11:43 AM Status:  Final result   Visible to patient:  Yes (MyChart) Dx:  Pulmonary nodules/lesions, multiple   Component    Case Report   Non-gynecologic Cytology                          Case: [id number]                                   Authorizing Provider: Aneesh Jeffries MD            Collected:           01/07/2020 1143               Ordering Location:     [address]      Received:            01/07/2020 [facility]                                      [facility]                                                            Pathologist:           Brittnee Roy MD                                                         Specimen:    Lung, Right Middle Lobe Bronchial Lavage, with cell count                                  Final Diagnosis   A  Lung, Right Middle Lobe Bronchial Lavage, with cell count:  Negative for malignancy  Benign bronchial cells  Benign squamous cells  Pulmonary macrophages  Rare white blood cells      Satisfactory for evaluation      Cell Count     Macrophages 89%  Neutrophil       6%  Lymphocyte    5%  Eosinophil       0%  Basophil          0%      Electronically signed by Abraham Banks MD on 1/9/2020 at 11:27 AM   Gross Description            Patient had bronchoscopy on 01/07/2020  Cytology was negative for malignancy  Histoplasma test was negative  Test negative for Pneumocystis, fungal infection and bacteria    Reviewed and discussed with patient  CEA 48  Assessment and plan: Follow-up visit for stage IV colon 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 intrathoracic lymph nodes and patient has been on Stivarga since October 2019  Initial dose was 80 mg and that was increased to 120 mg    Patient does not have problem tolerating stivarga but there could be disease progression because tumor marker CEA has increased to 48, higher than before  Patient has exertional dyspnea and dry cough   No chest pain and no hemoptysis   Has tiredness  Has chronic low back discomfort  Prior to stivarga he had a cycle  irinotecan and oxaliplatin and was hospitalized after that   Lauren Adames to that he had palliative radiation to the lung mass causing bronchial obstruction and dysphagia      He had been through several lines of systemic therapy for metastatic colon cancer that was 1st diagnosed in 2008  Jamal Expose had not have Cyramza plus chemo   Patient saw    In 2008 he was diagnosed to have  stage IV adenocarcinoma of the colon     He had resection of the ascending colon   Pathology showed involvement of lymph nodes and lung   Postsurgery he received FOLFOX plus Avastin for 6 months  Then, in 2011 patient had additional resection this time of the transverse colon   He received FOLFIRI plus Avastin for 6 months after surgery  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    In 2012 he had wedge resection for right lung nodule   No chemotherapy at that time   In January 2014 he had wedge resection of the right lower    No radiation following this     History of AFib/atrial flutter and had ablation   Mary Ellen Monaco 2015-February 2017 patient was on Xeloda plus Avastin   In February 2017 patient was noted to have a new lesion of the lung  Trev Spann was unable to do RFA to this lesion   Patient was seen by thoracic surgery   Due to location, wedge resection was not able to be done   He was referred to rad onc for SBRT       May 2017 lung nodule was noted to be stable compared to February 2017 August 2017 right lower lobe pulmonary nodule had enlarged compared to May 2017      Due to enlarging nodule in May 2017, a PET/CT was performed in August 2017   This showed a 2 8 x 1 8 cm right lower medial lung mass with hypermetabolism      Aug 2017 [facility] was [facility] for Cyberknife [facility] the lung      Patient then did not return for follow up with our office until Feb 2018  At this time patient then had repeat scans to be performed  CT C/A/P in March 2018 showed enlarging right lower lobe nodular masses -- consistent with metastatic disease      He was then seen in follow-up on 03/15/2018 after his CT scan result   Patient was presented with multiple options at included surgery, RFA, radiation, CyberKnife, systemic therapy   Patient opted for consultation for RFA in thoracic surgery and then decide      Patient then did not come back to the office until July 2018  Penny Brothjose needed up to date imaging studies and had repeat PET/CT in Aug 2018 which showed enlarging hypermetabolic right lower medial lung in right hilar lesions   New hypermetabolic left lower lung nodule   Multiple left pleural base lesions, however noted to likely be inflammatory related to prior talc pleurodesis  No new lesions in the neck, abdomen, pelvis    Due to increase in metastatic disease, patient was recommended to pursue systemic therapy   Patient began Loigu 42 September 2018    Follow-up CT scan showed disease progression       Physical examination and test results are as recorded and discussed especially rising CEA and new 6 mm lung nodules and question of opportunistic infection  Patient does not have any new lung symptom  He was referred to Pulmonary service and he had bronchoscopy  Negative bronchial lavage edge for malignancy  Patient will have CT scans and treatment will be changed to FOLFIRI plus Cyramza  Patient had KRAS mutation  Negative HER- 2  MSI was stable    No metastatic lesion to the brain   Patient had some response to palliative radiation seen on CT scan  Rosmery Hickman Marchi discussed in detail   Questions answered      He will take information home on FOLFIRI plus Cyramza  Patient had FOLFIRI previously    Also discussed the importance of eating healthy low-fat foods ,,  staying active as tolerated and health screening tests   Patient is capable of self-care     Patient to monitor blood pressure at home  1  Malignant neoplasm of ascending colon (HCC)    - CT chest abdomen pelvis w contrast; Future    2  Colon cancer metastasized to lung [facility])    - CT chest abdomen pelvis w contrast; Future    3  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

## 2020-02-04 ENCOUNTER — HOSPITAL ENCOUNTER (OUTPATIENT)
Dept: CT IMAGING | Facility: HOSPITAL | Age: 60
Discharge: HOME | End: 2020-02-04
Attending: INTERNAL MEDICINE
Payer: MEDICARE

## 2020-02-04 DIAGNOSIS — C77.1 METASTASIS TO INTRATHORACIC LYMPH NODES (HCC): ICD-10-CM

## 2020-02-04 DIAGNOSIS — C18.2 MALIGNANT NEOPLASM OF ASCENDING COLON (HCC): ICD-10-CM

## 2020-02-04 DIAGNOSIS — C78.00 COLON CANCER METASTASIZED TO LUNG (HCC): ICD-10-CM

## 2020-02-04 DIAGNOSIS — C18.9 COLON CANCER METASTASIZED TO LUNG (HCC): ICD-10-CM

## 2020-02-04 PROCEDURE — 71260 CT THORAX DX C+: CPT

## 2020-02-04 PROCEDURE — 74177 CT ABD & PELVIS W/CONTRAST: CPT

## 2020-02-04 RX ADMIN — IOHEXOL 100 ML: 350 INJECTION, SOLUTION INTRAVENOUS at 18:13

## 2020-02-05 ENCOUNTER — TELEPHONE (OUTPATIENT)
Dept: HEMATOLOGY ONCOLOGY | Facility: CLINIC | Age: 60
End: 2020-02-05

## 2020-02-05 NOTE — TELEPHONE ENCOUNTER
I spoke with patient and he said he was feeling all right  There is no official printed report in the chart on CT scan  Patient finished this cycle of stivarga and he will not be starting a new cycle    He has follow-up appointment in the office

## 2020-02-05 NOTE — TELEPHONE ENCOUNTER
----- Message from Marcela Monet MD sent at 2/5/2020  8:29 AM EST -----  Huan George,  Can you please call him and ask him to do the blood/urine tests I referred him to ASAP? And then I will need to see him again  Her had CT yesterday and it seems that the lung findings are getting worse  Thanks,  Rimma Smith

## 2020-02-06 ENCOUNTER — APPOINTMENT (OUTPATIENT)
Dept: LAB | Facility: HOSPITAL | Age: 60
End: 2020-02-06
Attending: INTERNAL MEDICINE
Payer: MEDICARE

## 2020-02-06 DIAGNOSIS — J84.9 ILD (INTERSTITIAL LUNG DISEASE) (HCC): ICD-10-CM

## 2020-02-06 PROCEDURE — 86255 FLUORESCENT ANTIBODY SCREEN: CPT

## 2020-02-06 PROCEDURE — 36415 COLL VENOUS BLD VENIPUNCTURE: CPT

## 2020-02-06 PROCEDURE — 86235 NUCLEAR ANTIGEN ANTIBODY: CPT

## 2020-02-06 PROCEDURE — 86225 DNA ANTIBODY NATIVE: CPT

## 2020-02-06 PROCEDURE — 86698 HISTOPLASMA ANTIBODY: CPT

## 2020-02-07 LAB — MISCELLANEOUS LAB TEST RESULT: NORMAL

## 2020-02-08 LAB — H CAPSUL AB TITR SER ID: NEGATIVE {TITER}

## 2020-02-10 LAB
C-ANCA TITR SER IF: NORMAL TITER
FUNGUS SPEC CULT: NORMAL
MYELOPEROXIDASE AB SER IA-ACNC: <9 U/ML (ref 0–9)
P-ANCA ATYPICAL TITR SER IF: NORMAL TITER
P-ANCA TITR SER IF: NORMAL TITER
PROTEINASE3 AB SER IA-ACNC: <3.5 U/ML (ref 0–3.5)

## 2020-02-11 ENCOUNTER — TELEPHONE (OUTPATIENT)
Dept: PULMONOLOGY | Facility: CLINIC | Age: 60
End: 2020-02-11

## 2020-02-11 NOTE — TELEPHONE ENCOUNTER
I called the patient to discuss the results of the tests we did including bronchoscopy with lavage, blood tests and urine tests  All is negative for infection or vasculitis  Chest CT showed progression of the nodules which is suspicious to be secondary to metastatic colon cancer  If we need a tissue diagnosis he will need a tissue biopsy by thoracic surgery  However, if Oncology agrees that this is metastasis of his colon cancer he probably does not need a lung biopsy  He is scheduled to see Dr Orlena Litten tomorrow  I will send him this note and they can discuss what's the next step  I will be happy to refer the patient to thoracic surgery if needed

## 2020-02-12 ENCOUNTER — OFFICE VISIT (OUTPATIENT)
Dept: HEMATOLOGY ONCOLOGY | Facility: CLINIC | Age: 60
End: 2020-02-12
Payer: MEDICARE

## 2020-02-12 VITALS
TEMPERATURE: 97.5 F | SYSTOLIC BLOOD PRESSURE: 120 MMHG | HEIGHT: 76 IN | WEIGHT: 230 LBS | OXYGEN SATURATION: 96 % | BODY MASS INDEX: 28.01 KG/M2 | RESPIRATION RATE: 18 BRPM | HEART RATE: 62 BPM | DIASTOLIC BLOOD PRESSURE: 72 MMHG

## 2020-02-12 DIAGNOSIS — C78.00 COLON CANCER METASTASIZED TO LUNG (HCC): Chronic | ICD-10-CM

## 2020-02-12 DIAGNOSIS — C18.9 COLON CANCER METASTASIZED TO LUNG (HCC): Chronic | ICD-10-CM

## 2020-02-12 DIAGNOSIS — Z95.828 PORT-A-CATH IN PLACE: ICD-10-CM

## 2020-02-12 DIAGNOSIS — C18.2 MALIGNANT NEOPLASM OF ASCENDING COLON (HCC): Primary | ICD-10-CM

## 2020-02-12 DIAGNOSIS — C77.1 METASTASIS TO INTRATHORACIC LYMPH NODES (HCC): ICD-10-CM

## 2020-02-12 PROCEDURE — 99214 OFFICE O/P EST MOD 30 MIN: CPT | Performed by: INTERNAL MEDICINE

## 2020-02-12 NOTE — PROGRESS NOTES
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  He has cough and shortness of breath  He has generalized weakness and tiredness  Has chronic low back discomfort  Patient's most recent treatment was with stivarga  Prior to stivarga he had a cycle  irinotecan and oxaliplatin and was hospitalized after that   Tahmina Schuster to that he had palliative radiation to the lung mass causing bronchial obstruction and dysphagia      He had been through several lines of systemic therapy for metastatic colon cancer that was 1st diagnosed in 2008  Janiya Mccullough had not have Cyramza plus chemo     In 2008 he was diagnosed to have  stage IV adenocarcinoma of the colon     He had resection of the ascending colon   Pathology showed involvement of lymph nodes and lung   Postsurgery he received FOLFOX plus Avastin for 6 months  Then, in 2011 patient had additional resection this time of the transverse colon   He received FOLFIRI plus Avastin for 6 months after surgery  [facility] peripheral 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    In 2012 he had wedge resection for right lung nodule   No chemotherapy at that time   In January 2014 he had wedge resection of the right lower    No radiation following this     History of AFib/atrial flutter and had ablation   Lucilla Barrier 2015-February 2017 patient was on Xeloda plus Avastin   In February 2017 patient was noted to have a new lesion of the lung  Francisco Matthews was unable to do RFA to this lesion   Patient was seen by thoracic surgery   Due to location, wedge resection was not able to be done   He was referred to rad onc for SBRT       May 2017 lung nodule was noted to be stable compared to February 2017 August 2017 right lower lobe pulmonary nodule had enlarged compared to May 2017      Due to enlarging nodule in May 2017, a PET/CT was performed in August 2017   This showed a 2 8 x 1 8 cm right lower medial lung mass with hypermetabolism      Aug 2017 he was referred to [place] for Cyberknife of the lung      Patient then did not return for follow up with our office until Feb 2018  At this time patient then had repeat scans to be performed  CT C/A/P in March 2018 showed enlarging right lower lobe nodular masses -- consistent with metastatic disease      He was then seen in follow-up on 03/15/2018 after his CT scan result   Patient was presented with multiple options at included surgery, RFA, radiation, CyberKnife, systemic therapy   Patient opted for consultation for RFA in thoracic surgery and then decide      Patient then did not come back to the office until July 2018  Braeden Situ needed up to date imaging studies and had repeat PET/CT in Aug 2018 which showed enlarging hypermetabolic right lower medial lung in right hilar lesions   New hypermetabolic left lower lung nodule   Multiple left pleural base lesions, however noted to likely be inflammatory related to prior talc pleurodesis  No new lesions in the neck, abdomen, pelvis    Due to increase in metastatic disease, patient was recommended to pursue systemic therapy   Patient began [address] September 2018    Follow-up CT scan showed disease progression    Elier Mo was started in October 2019 and was stopped in January 2020 because of disease progression          Current Outpatient Medications:     albuterol (VENTOLIN HFA) 90 mcg/act inhaler, INHALE TWO PUFFS BY MOUTH EVERY 4 HOURS AS NEEDED FOR SHORTNESS OF BREATH   no more than 4 times daily, Disp: 18 g, Rfl: 1    ascorbic acid (VITAMIN C) 250 MG CHEW, Chew 250 mg daily, Disp: , Rfl:     ondansetron (ZOFRAN) 4 mg tablet, Take 1 tablet (4 mg total) by mouth every 6 (six) hours as needed for nausea or vomiting, Disp: 50 tablet, Rfl: 2    Regorafenib (STIVARGA) 40 MG TABS, Take 3 tablets (120 mg) PO daily for 3 weeks on, then 1 week off   (Patient not taking: Reported on 2/12/2020), Disp: 63 tablet, Rfl: 11    Allergies   Allergen Reactions    Aspirin Other (See Comments)     Nose bleed    Sulfa Antibiotics Other (See Comments)     Dizzy           Colon cancer metastasized to lung [facility])    2008 Initial Diagnosis     Colon cancer metastasized to lung [facility])      2008 - 2009 Chemotherapy     FOLFOX for two 6 month courses (3 month break in-between courses, according to the patient)  - Dr Stefan Abraham      2/18/2008 Surgery     Partial colectomy/Right hemicolectomy:  - stage IV (pT3,pN2,pM1a, G2)    - Dr Sapphire Maya        4/21/2010 Surgery     Right lower lobe lung nodule:  - metastatic colonic adenocarcinoma      8/19/2010 Surgery     Left lower lobe wedge resection:  - moderately differentiated adenocarcinoma, consistent with metastasis from known colonic primary, 1 3 cm      2011 Surgery     Transverse colectomy      4/2011 - 10/2011 Chemotherapy     Avastin and FOLFIRI       11/9/2011 Surgery     RAZ lung wedge resection:  - metastatic adenocarcinoma of the colon  - invasive carcinoma is 0 4 cm from parenchymal resection      2012 Surgery     Wedge resection for right lung nodule      11/29/2012 Surgery     I  [id number] (11/29/12)   Omental nodule, excision:   - Metastatic adenocarcinoma morphologically consistent with colonic primary  1/15/2014 Surgery     A  Lung, right lower lobe, wedge resection:   - 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, moderately to poorly differentiated, consistent with colorectal primary, see note  - Resection margin, no tumor seen  - Background lung with few non-necrotizing granulomas and emphysematous changes           3/4/2014 - 3/14/2014 Radiation     Plan ID Energy Fractions Dose per Fraction (cGy) Total Dose Delivered (cGy) Elapsed Days   SBRT Lt Lung 6X 5 / 5 1,000 5,000 8     - Dr Mallory Nazario      4/14/2015 Surgery     Left lower lobe lobectomy, cervical mediastinoscopy, Left posterolateral thoracotomy:  A   Lymph node Level 2R:    - 3/3 lymph nodes, positive for metastatic colonic adenocarcinoma  BMeredith Ramming node Level 7:   - 3/8 lymph nodes, positive for metastatic colonic adenocarcinoma       C   Left lower lobe:   - Metastatic colonic adenocarcinoma    - The vascular and bronchial margins of resection are negative for tumor    - 0/6 lymph nodes, negative for metastatic colonic adenocarcinoma     - The tumor is 0 5 cm away from the hilar staple line        6/2015 - 2/2017 Chemotherapy     Xeloda and Avastin      9/2018 - 5/2019 Chemotherapy     Lonsurf      8/13/2019 - 9/9/2019 Chemotherapy     bevacizumab (AVASTIN) 500 mg in sodium chloride 0 9 % 100 mL IVPB, 505 mg, Intravenous, Once, 1 of 11 cycles  Administration: 500 mg (8/27/2019)  irinotecan (CAMPTOSAR) 340 mg in dextrose 5 % 500 mL chemo infusion, 330 mg (83 3 % of original dose 180 mg/m2), Intravenous, Once, 2 of 12 cycles  Dose modification: 150 mg/m2 (original dose 180 mg/m2, Cycle 1, Reason: Dose Not Tolerated)  Administration: 340 mg (8/13/2019), 340 mg (8/27/2019)  oxaliplatin (ELOXATIN) 200 mg in dextrose 5 % 250 mL chemo infusion, 198 05 mg, Intravenous, Once, 2 of 12 cycles  Administration: 200 mg (8/13/2019), 200 mg (8/27/2019)        Malignant neoplasm of ascending colon (HCC)    2/5/2018 Initial Diagnosis     Malignant neoplasm of ascending colon ([facility] [address] )      8/13/2019 - 9/9/2019 Chemotherapy     bevacizumab (AVASTIN) 500 mg in sodium chloride 0 9 % 100 mL IVPB, 505 mg, Intravenous, Once, 1 of 11 cycles  Administration: 500 mg (8/27/2019)  irinotecan (CAMPTOSAR) 340 mg in dextrose 5 % 500 mL chemo infusion, 330 mg (83 3 % of original dose 180 mg/m2), Intravenous, Once, 2 of 12 cycles  Dose modification: 150 mg/m2 (original dose 180 mg/m2, Cycle 1, Reason: Dose Not Tolerated)  Administration: 340 mg (8/13/2019), 340 mg (8/27/2019)  oxaliplatin (ELOXATIN) 200 mg in dextrose 5 % 250 mL chemo infusion, 198 05 mg, Intravenous, Once, 2 of 12 cycles  Administration: 200 mg (8/13/2019), 200 mg (8/27/2019)        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)    2/5/2018 Initial Diagnosis     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[address] )      7/24/2019 - 8/6/2019 Radiation     Treatment:  Course: C2    Plan ID Energy Fractions Dose per Fraction (cGy) Dose Correction (cGy) Total Dose Delivered (cGy) Elapsed Days   R LL_Hilum 10X 10 / 10 300 0 3,000 13            8/13/2019 - 9/9/2019 Chemotherapy     bevacizumab (AVASTIN) 500 mg in sodium chloride 0 9 % 100 mL IVPB, 505 mg, Intravenous, Once, 1 of 11 cycles  Administration: 500 mg (8/27/2019)  irinotecan (CAMPTOSAR) 340 mg in dextrose 5 % 500 mL chemo infusion, 330 mg (83 3 % of original dose 180 mg/m2), Intravenous, Once, 2 of 12 cycles  Dose modification: 150 mg/m2 (original dose 180 mg/m2, Cycle 1, Reason: Dose Not Tolerated)  Administration: 340 mg (8/13/2019), 340 mg (8/27/2019)  oxaliplatin (ELOXATIN) 200 mg in dextrose 5 % 250 mL chemo infusion, 198 05 mg, Intravenous, Once, 2 of 12 cycles  Administration: 200 mg (8/13/2019), 200 mg (8/27/2019)         ROS:  02/12/20 Reviewed 13 systems:  Presently no other neurological, cardiac, pulmonary, GI and  symptoms other than mentioned above  No other symptoms like  fever, chills, bleeding, bone pains, skin rash, weight loss,  numbness,  claudication and gait problem  No frequent infections  Not unusually sensitive to heat or cold  No swelling of the ankles  No swollen glands  Patient is anxious   Other symptoms are in HPI        /72 (BP Location: Left arm, Patient Position: Sitting, Cuff Size: Adult)   Pulse 62   Temp 97 5 °F (36 4 °C)   Resp 18   Ht 6' 4" (1 93 m)   Wt 104 kg (230 lb)   SpO2 96%   BMI 28 00 kg/m²     Physical Exam:  Alert, oriented, not in distress, no icterus, no oral thrush, no palpable neck mass, decreased breath sounds at lung bases,  regular heart rate, abdomen  soft and non tender, no palpable abdominal mass, no ascites, no edema of ankles, no calf tenderness, no focal neurological deficit, no skin rash, no palpable lymphadenopathy, good arterial pulses, no clubbing  Patient is anxious  Performance status 2  IMAGING:  IMPRESSION:     Redemonstration of innumerable lung nodules in perilymphatic distribution which have increased in number and size since 12/16/2019  New areas of peribronchovascular consolidations in the right middle lobe  Left upper lung and right lower lobe masses   have also increased in size    These findings are concerning for progression of disease in the chest      No evidence of metastatic disease in the abdomen and pelvis      Workstation performed: [id number]      Imaging     CT chest abdomen pelvis w contrast (Order: [id number]) - 2/4/2020       LABS:  Results for orders placed or performed in visit on 02/06/20   MARYELLEN Comprehensive Panel   Result Value Ref Range    Miscellaneous Lab Test Result see written report    Histoplasma antibodies   Result Value Ref Range    Histoplasma Ab, Immunodiffusion Negative Neg:<1:1   Anti-neutrophilic cytoplasmic antibody   Result Value Ref Range    C-ANCA <1:20 Neg:<1:20 titer    Atypical pANCA <1:20 Neg:<1:20 titer    MPO AB <9 0 0 0 - 9 0 U/mL    ND-3 AB <3 5 0 0 - 3 5 U/mL    P-ANCA <1:20 Neg:<1:20 titer     Labs, Imaging, & Other studies:   All pertinent labs and imaging studies were personally reviewed    Lab Results   Component Value Date     10/19/2015    K 4 4 01/22/2020    CL 94 (L) 01/22/2020    CO2 30 01/22/2020    ANIONGAP 8 10/19/2015    BUN 11 01/22/2020    CREATININE 1 12 01/22/2020    GLUCOSE 92 10/19/2015    GLUF 83 01/22/2020    CALCIUM 9 5 01/22/2020    AST 72 (H) 01/22/2020    ALT 37 01/22/2020    ALKPHOS 75 01/22/2020    PROT 7 1 10/19/2015    BILITOT 1 24 (H) 10/19/2015    EGFR 72 01/22/2020     Lab Results   Component Value Date    WBC 5 19 01/22/2020    HGB 16 9 01/22/2020    HCT 47 3 01/22/2020    MCV 94 01/22/2020     01/22/2020     Component      Latest Ref Rng & Units 5/28/2015 6/13/2015 7/24/2015 10/19/2015   CARCINOEMBRYONIC ANTIGEN      0 0 - 3 0 ng/mL 1 2 1 1 1 3 1 7     Component      Latest Ref Rng & Units 1/11/2016 2/22/2016 4/5/2016 5/2/2016   CARCINOEMBRYONIC ANTIGEN      0 0 - 3 0 ng/mL 1 4 1 1 <0 5 1 1     Component      Latest Ref Rng & Units 5/23/2016 6/13/2016 7/5/2016 8/15/2016   CARCINOEMBRYONIC ANTIGEN      0 0 - 3 0 ng/mL 1 2 1 5 1 3 1 8     Component      Latest Ref Rng & Units 11/14/2016 12/27/2016 1/16/2017 2/6/2017   CARCINOEMBRYONIC ANTIGEN      0 0 - 3 0 ng/mL 1 9 1 7 1 6 2 0     Component      Latest Ref Rng & Units 2/27/2017 8/9/2017 8/2/2018 11/21/2018   CARCINOEMBRYONIC ANTIGEN      0 0 - 3 0 ng/mL 1 6 3 0 30 1 (H) 19 6 (H)     Component      Latest Ref Rng & Units 5/6/2019 7/11/2019 7/29/2019 8/9/2019   CARCINOEMBRYONIC ANTIGEN      0 0 - 3 0 ng/mL 24 3 (H) 42 0 (H) 36 5 (H) 32 6 (H)     Component      Latest Ref Rng & Units 11/6/2019 1/22/2020   CARCINOEMBRYONIC ANTIGEN      0 0 - 3 0 ng/mL 15 9 (H) 48 0 (H)       Reviewed and discussed with patient  Assessment and plan:    HPI:            Current Outpatient Medications:     albuterol (VENTOLIN HFA) 90 mcg/act inhaler, INHALE TWO PUFFS BY MOUTH EVERY 4 HOURS AS NEEDED FOR SHORTNESS OF BREATH   no more than 4 times daily, Disp: 18 g, Rfl: 1    ascorbic acid (VITAMIN C) 250 MG CHEW, Chew 250 mg daily, Disp: , Rfl:     ondansetron (ZOFRAN) 4 mg tablet, Take 1 tablet (4 mg total) by mouth every 6 (six) hours as needed for nausea or vomiting, Disp: 50 tablet, Rfl: 2    Regorafenib (STIVARGA) 40 MG TABS, Take 3 tablets (120 mg) PO daily for 3 weeks on, then 1 week off   (Patient not taking: Reported on 2/12/2020), Disp: 63 tablet, Rfl: 11    Allergies   Allergen Reactions    Aspirin Other (See Comments)     Nose bleed    Sulfa Antibiotics Other (See Comments)     Dizzy           Colon cancer metastasized to lung [facility])    2008 Initial Diagnosis     Colon cancer metastasized to lung [facility])      2008 - 2009 Chemotherapy     FOLFOX for two 6 month courses (3 month break in-between courses, according to the patient)  - Dr Romi Ceballos      2/18/2008 Surgery     Partial colectomy/Right hemicolectomy:  - stage IV (pT3,pN2,pM1a, G2)    - Dr Stephanie Garg        4/21/2010 Surgery     Right lower lobe lung nodule:  - metastatic colonic adenocarcinoma      8/19/2010 Surgery     Left lower lobe wedge resection:  - moderately differentiated adenocarcinoma, consistent with metastasis from known colonic primary, 1 3 cm      2011 Surgery     Transverse colectomy      4/2011 - 10/2011 Chemotherapy     Avastin and FOLFIRI       11/9/2011 Surgery     RAZ lung wedge resection:  - metastatic adenocarcinoma of the colon  - invasive carcinoma is 0 4 cm from parenchymal resection      2012 Surgery     Wedge resection for right lung nodule      11/29/2012 Surgery     I  [id number] (11/29/12)   Omental nodule, excision:   - Metastatic adenocarcinoma morphologically consistent with colonic primary  1/15/2014 Surgery     A  Lung, right lower lobe, wedge resection:   - 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, moderately to poorly differentiated, consistent with colorectal primary, see note  - Resection margin, no tumor seen  - Background lung with few non-necrotizing granulomas and emphysematous changes  3/4/2014 - 3/14/2014 Radiation     Plan ID Energy Fractions Dose per Fraction (cGy) Total Dose Delivered (cGy) Elapsed Days   SBRT Lt Lung 6X 5 / 5 1,000 5,000 8     - Dr Yesica Smith      4/14/2015 Surgery     Left lower lobe lobectomy, cervical mediastinoscopy, Left posterolateral thoracotomy:  A   Lymph node Level 2R:    - 3/3 lymph nodes, positive for metastatic colonic adenocarcinoma       BMarja Channing node Level 7:   - 3/8 lymph nodes, positive for metastatic colonic adenocarcinoma       C   Left lower lobe:   - Metastatic colonic adenocarcinoma    - The vascular and bronchial margins of resection are negative for tumor    - 0/6 lymph nodes, negative for metastatic colonic adenocarcinoma     - The tumor is 0 5 cm away from the hilar staple line        6/2015 - 2/2017 Chemotherapy     Xeloda and Avastin      9/2018 - 5/2019 Chemotherapy     Lonsurf      8/13/2019 - 9/9/2019 Chemotherapy     bevacizumab (AVASTIN) 500 mg in sodium chloride 0 9 % 100 mL IVPB, 505 mg, Intravenous, Once, 1 of 11 cycles  Administration: 500 mg (8/27/2019)  irinotecan (CAMPTOSAR) 340 mg in dextrose 5 % 500 mL chemo infusion, 330 mg (83 3 % of original dose 180 mg/m2), Intravenous, Once, 2 of 12 cycles  Dose modification: 150 mg/m2 (original dose 180 mg/m2, Cycle 1, Reason: Dose Not Tolerated)  Administration: 340 mg (8/13/2019), 340 mg (8/27/2019)  oxaliplatin (ELOXATIN) 200 mg in dextrose 5 % 250 mL chemo infusion, 198 05 mg, Intravenous, Once, 2 of 12 cycles  Administration: 200 mg (8/13/2019), 200 mg (8/27/2019)        Malignant neoplasm of ascending colon (HCC)    2/5/2018 Initial Diagnosis     Malignant neoplasm of ascending colon ([facility] [address] )      8/13/2019 - 9/9/2019 Chemotherapy     bevacizumab (AVASTIN) 500 mg in sodium chloride 0 9 % 100 mL IVPB, 505 mg, Intravenous, Once, 1 of 11 cycles  Administration: 500 mg (8/27/2019)  irinotecan (CAMPTOSAR) 340 mg in dextrose 5 % 500 mL chemo infusion, 330 mg (83 3 % of original dose 180 mg/m2), Intravenous, Once, 2 of 12 cycles  Dose modification: 150 mg/m2 (original dose 180 mg/m2, Cycle 1, Reason: Dose Not Tolerated)  Administration: 340 mg (8/13/2019), 340 mg (8/27/2019)  oxaliplatin (ELOXATIN) 200 mg in dextrose 5 % 250 mL chemo infusion, 198 05 mg, Intravenous, Once, 2 of 12 cycles  Administration: 200 mg (8/13/2019), 200 mg (8/27/2019)        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)    2/5/2018 Initial Diagnosis     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[facility] [address] )      7/24/2019 - 8/6/2019 Radiation     Treatment:  Course: C2    Plan ID Energy Fractions Dose per Fraction (cGy) Dose Correction (cGy) Total Dose Delivered (cGy) Elapsed Days   R LL_Hilum 10X 10 / 10 300 0 3,000 13            8/13/2019 - 9/9/2019 Chemotherapy     bevacizumab (AVASTIN) 500 mg in sodium chloride 0 9 % 100 mL IVPB, 505 mg, Intravenous, Once, 1 of 11 cycles  Administration: 500 mg (8/27/2019)  irinotecan (CAMPTOSAR) 340 mg in dextrose 5 % 500 mL chemo infusion, 330 mg (83 3 % of original dose 180 mg/m2), Intravenous, Once, 2 of 12 cycles  Dose modification: 150 mg/m2 (original dose 180 mg/m2, Cycle 1, Reason: Dose Not Tolerated)  Administration: 340 mg (8/13/2019), 340 mg (8/27/2019)  oxaliplatin (ELOXATIN) 200 mg in dextrose 5 % 250 mL chemo infusion, 198 05 mg, Intravenous, Once, 2 of 12 cycles  Administration: 200 mg (8/13/2019), 200 mg (8/27/2019)         ROS:  02/12/20 Reviewed 13 systems:  Presently no headaches, seizures, dizziness, diplopia, dysphagia, hoarseness, chest pain, palpitations, shortness of breath, cough, hemoptysis, abdominal pain, nausea, vomiting, change in bowel habits, melena, hematuria, fever, chills, bleeding, bone pains, skin rash, weight loss, arthritic symptoms, tiredness ,  weakness, numbness,  claudication and gait problem  No frequent infections  Not unusually sensitive to heat or cold  No swelling of the ankles  No swollen glands  Patient is anxious  Other symptoms are in HPI        /72 (BP Location: Left arm, Patient Position: Sitting, Cuff Size: Adult)   Pulse 62   Temp 97 5 °F (36 4 °C)   Resp 18   Ht 6' 4" (1 93 m)   Wt 104 kg (230 lb)   SpO2 96%   BMI 28 00 kg/m²     Physical Exam:  Alert, oriented, not in distress, no icterus, no oral thrush, no palpable neck mass, clear lung fields, regular heart rate, abdomen  soft and non tender, no palpable abdominal mass, no ascites, no edema of ankles, no calf tenderness, no focal neurological deficit, no skin rash, no palpable lymphadenopathy, good arterial pulses, no clubbing  Patient is anxious    Performance status 1  IMAGING:  No orders to display       LABS:  Results for orders placed or performed in visit on 02/06/20   MARYELLEN Comprehensive Panel   Result Value Ref Range    Miscellaneous Lab Test Result see written report    Histoplasma antibodies   Result Value Ref Range    Histoplasma Ab, Immunodiffusion Negative Neg:<1:1   Anti-neutrophilic cytoplasmic antibody   Result Value Ref Range    C-ANCA <1:20 Neg:<1:20 titer    Atypical pANCA <1:20 Neg:<1:20 titer    MPO AB <9 0 0 0 - 9 0 U/mL    WV-3 AB <3 5 0 0 - 3 5 U/mL    P-ANCA <1:20 Neg:<1:20 titer     Labs, Imaging, & Other studies:   All pertinent labs and imaging studies were personally reviewed    Lab Results   Component Value Date     10/19/2015    K 4 4 01/22/2020    CL 94 (L) 01/22/2020    CO2 30 01/22/2020    ANIONGAP 8 10/19/2015    BUN 11 01/22/2020    CREATININE 1 12 01/22/2020    GLUCOSE 92 10/19/2015    GLUF 83 01/22/2020    CALCIUM 9 5 01/22/2020    AST 72 (H) 01/22/2020    ALT 37 01/22/2020    ALKPHOS 75 01/22/2020    PROT 7 1 10/19/2015    BILITOT 1 24 (H) 10/19/2015    EGFR 72 01/22/2020     Lab Results   Component Value Date    WBC 5 19 01/22/2020    HGB 16 9 01/22/2020    HCT 47 3 01/22/2020    MCV 94 01/22/2020     01/22/2020       Reviewed and discussed with patient  Assessment and plan:  Patient is here with his wife  He has progressive malignancy in his lungs  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  He has cough and shortness of breath  He has generalized weakness and tiredness  Has chronic low back discomfort  Patient's most recent treatment was with stivarga    Prior to stivarga he had a cycle  irinotecan and oxaliplatin and was hospitalized after that   Rogelio Mendosa to that he had palliative radiation to the lung mass causing bronchial obstruction and dysphagia      He had been through several lines of systemic therapy for metastatic colon cancer that was 1st diagnosed in 2008  Catarino Basket had not have Cyramza plus chemo     In 2008 he was diagnosed to have  stage IV adenocarcinoma of the colon     He had resection of the ascending colon   Pathology showed involvement of lymph nodes and lung   Postsurgery he received FOLFOX plus Avastin for 6 months  Then, in 2011 patient had additional resection this time of the transverse colon   He received FOLFIRI plus Avastin for 6 months after surgery  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    In 2012 he had wedge resection for right lung nodule   No chemotherapy at that time   In January 2014 he had wedge resection of the right lower    No radiation following this     History of AFib/atrial flutter and had ablation   Evetta Crew 2015-February 2017 patient was on Xeloda plus Avastin   In February 2017 patient was noted to have a new lesion of the lung  Lars Drafts was unable to do RFA to this lesion   Patient was seen by thoracic surgery   Due to location, wedge resection was not able to be done  He was referred to rad onc for SBRT       May 2017 lung nodule was noted to be stable compared to February 2017 August 2017 right lower lobe pulmonary nodule had enlarged compared to May 2017      Due to enlarging nodule in May 2017, a PET/CT was performed in August 2017   This showed a 2 8 x 1 8 cm right lower medial lung mass with hypermetabolism      Aug 2017 he was referred to [place] for Cyberknife of the lung      Patient then did not return for follow up with our office until Feb 2018  At this time patient then had repeat scans to be performed   CT C/A/P in March 2018 showed enlarging right lower lobe nodular masses -- consistent with metastatic disease      He was then seen in follow-up on 03/15/2018 after his CT scan result   Patient was presented with multiple options at included surgery, RFA, radiation, CyberKnife, systemic therapy   Patient opted for consultation for RFA in thoracic surgery and then decide      Patient then did not come back to the office until July 2018  Kim Moore needed up to date imaging studies and had repeat PET/CT in Aug 2018 which showed enlarging hypermetabolic right lower medial lung in right hilar lesions   New hypermetabolic left lower lung nodule   Multiple left pleural base lesions, however noted to likely be inflammatory related to prior talc pleurodesis  No new lesions in the neck, abdomen, pelvis    Due to increase in metastatic disease, patient was recommended to pursue systemic therapy   Patient began [address] September 2018    Follow-up CT scan showed disease progression    Cyndra Lapidus was started in October 2019 and was stopped in January 2020 because of disease progression  [employer] Physical examination and test results are as recorded and discussed  Suspected disease progression in the lungs secondary to metastatic disease from colon  Question came up of the possibility of some other cancer  Probably not but to be sure patient will have thorascopic lung biopsy  Patient is being referred to thoracic surgery  Condition discussed and explained  Questions answered  Discussed the importance of eating healthy foods, activities as tolerated and health screening tests  We discussed FOLFIRI plus Cyramza     Treatment options are very much limited  Patient is capable of self-care at this time     Patient will continue to follow with his primary physician and other consultants  1  Malignant neoplasm of ascending colon ([facility] [address] )      2  Colon cancer metastasized to lung [facility])    - Ambulatory referral to Thoracic Surgery; Future    3  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

## 2020-02-18 ENCOUNTER — OFFICE VISIT (OUTPATIENT)
Dept: CARDIAC SURGERY | Facility: CLINIC | Age: 60
End: 2020-02-18
Payer: MEDICARE

## 2020-02-18 VITALS
HEART RATE: 74 BPM | DIASTOLIC BLOOD PRESSURE: 68 MMHG | WEIGHT: 232.6 LBS | TEMPERATURE: 96.4 F | SYSTOLIC BLOOD PRESSURE: 103 MMHG | HEIGHT: 76 IN | BODY MASS INDEX: 28.33 KG/M2 | OXYGEN SATURATION: 94 %

## 2020-02-18 DIAGNOSIS — C18.9 COLON CANCER METASTASIZED TO LUNG (HCC): Primary | Chronic | ICD-10-CM

## 2020-02-18 DIAGNOSIS — C78.00 COLON CANCER METASTASIZED TO LUNG (HCC): Primary | Chronic | ICD-10-CM

## 2020-02-18 PROCEDURE — 99214 OFFICE O/P EST MOD 30 MIN: CPT | Performed by: PHYSICIAN ASSISTANT

## 2020-02-18 RX ORDER — HEPARIN SODIUM 5000 [USP'U]/ML
5000 INJECTION, SOLUTION INTRAVENOUS; SUBCUTANEOUS
Status: CANCELLED | OUTPATIENT
Start: 2020-02-18 | End: 2020-02-19

## 2020-02-18 RX ORDER — GABAPENTIN 300 MG/1
600 CAPSULE ORAL ONCE
Status: CANCELLED | OUTPATIENT
Start: 2020-02-18 | End: 2020-02-18

## 2020-02-18 RX ORDER — ACETAMINOPHEN 325 MG/1
975 TABLET ORAL ONCE
Status: CANCELLED | OUTPATIENT
Start: 2020-02-18 | End: 2020-02-18

## 2020-02-18 NOTE — H&P (VIEW-ONLY)
Thoracic Follow-Up  Assessment/Plan:    Colon cancer metastasized to lung [facility])  We had a discussion with Mr  Eduard Leggett, regarding his innumerable, progressive bilateral pulmonary nodules  They are most likely metastatic colon cancer, but we can perform a right thoracoscopic wedge biopsy to obtain tissue for definitive diagnosis  The procedure, possible risks, and post operative course were explained and all questions were answered  We will schedule this in the next couple of weeks  He will undergo some blood work prior to the procedure  He will be on ERAS protocol  Diagnoses and all orders for this visit:    Colon cancer metastasized to lung [facility])  -     Type and screen; Future  -     APTT; Future  -     Protime-INR; Future  -     [facility] WEDGE LUNG; Standing  -     Case request operating room: [address] (VATS), RESECTION WEDGE LUNG    Other orders  -     Diet NPO; Sips with meds; Standing  -     Nursing communication Please give pre-op Carbohydrate drink to patient 2-4 hours prior to surgery; Standing  -     Void on call to OR; Standing  -     Insert peripheral IV; Standing  -     Place sequential compression device; Standing  -     ceFAZolin (ANCEF) 2,000 mg in sodium chloride 0 9 % 50 mL IVPB  -     heparin (porcine) subcutaneous injection 5,000 Units  -     acetaminophen (TYLENOL) tablet 975 mg  -     gabapentin (NEURONTIN) capsule 600 mg          Thoracic History       Diagnosis: 1  Left recurrent pneumothorax  2  Chest wall hernia  3  Colon cancer lung metastasis      Procedures: 1  Flexible bronchoscopy, right thoracoscopic lower lobe wedge resection from April 21, 2010 2  Left Port-A-Cath placed on may 7, 2010 3  Flexible bronchoscopy, left thorascopic lower lobar dissection intercostal nerve block from August 19, 2010 4   Flexible bronchoscopy, left thoracoscopic upper lobe wedge resection with technetium and gamma probe localization, repair chest wall incisional hernia and intercostal nerve block performed on November 9, 2011 5  Left vats, talc pleurodesis, replacement Esperance-Tian patch for chest wall incisional hernia and intercostal block performed on December 11, 20116  Right thoracoscopic lower lobe wedge resection on January 15, 2014  7  Flexible bronchoscopy, video mediastinoscopy, left posterior lateral thoracotomy, lower lobectomy and pulmonary artery arterioplasty April 14, 20158  Flexible bronchoscopy April 22, 20159  Port-A-Cath Insertion - Left IJ June 8, 2015  Patient ID: Anisa Naylor is a 63 y o  male  HPI     Mr Tari Rosales is a 64 yo gentleman with a history of stage IV colon cancer, well known to our practice for multiple lung resections  He was last seen in April 2018, at which time a right lower lobectomy was discussed for metastatic disease  His PFT's were not adequate for further resection  He returns today with a CT scan from 2/4/20, which reveals innumerable bilateral pulmonary nodules, which have increased in size and number since his scan in 12/19  He was on chemo, which was stopped about 2 weeks ago, secondary to progression of disease  Dr Franck Lopez had referred him to our office for consideration of a wedge biopsy for tissue diagnosis  He does have some clear sputum production and did have some bright red hemoptysis last weekend  He is having shortness of breath with exertion, but not really at rest  He no longer works         The following portions of the patient's history were reviewed and updated as appropriate: allergies, current medications, past family history, past medical history, past social history, past surgical history and problem list     Past Medical History:   Diagnosis Date    Atrial fibrillation ([facility] [address] [facility]     Colon cancer ([facility] [address] [facility]     Colon cancer ([facility] [address] [facility]     [facility]     Shortness of breath      Past Surgical History:   Procedure Laterality Date    ANTERIOR CRUCIATE LIGAMENT REPAIR      ANTERIOR CRUCIATE LIGAMENT REPAIR      2  on R knee    APPENDECTOMY      ATRIAL ABLATION SURGERY      BACK SURGERY      COLON SURGERY      Ascending and transverse colon    COLONOSCOPY      LUNG REMOVAL, PARTIAL      RIGHT COLECTOMY      and Transverse colon resection    THORACOTOMY       Family History   Problem Relation Age of Onset    Diabetes Mother     Heart disease Mother     Heart disease Father     Skin cancer Sister      Social History     Socioeconomic History    Marital status: /Civil Union     Spouse name: Not on file    Number of children: Not on file    Years of education: Not on file    Highest education level: Not on file   Occupational History    Not on file   Social Needs    Financial resource strain: Not on file    Food insecurity:     Worry: Not on file     Inability: Not on file    Transportation needs:     Medical: Not on file     Non-medical: Not on file   Tobacco Use    Smoking status: Former Smoker     Years: 10 00     Types: Cigars     Start date: 2000     Last attempt to quit: 2010     Years since quitting: 10 1    Smokeless tobacco: Never Used    Tobacco comment: Cigars    Substance and Sexual Activity    Alcohol use:  Yes     Alcohol/week: 12 0 standard drinks     Types: 12 Cans of beer per week     Frequency: Never     Drinks per session: 1 or 2     Binge frequency: Never     Comment: Socially     Drug use: No    Sexual activity: Not on file   Lifestyle    Physical activity:     Days per week: Not on file     Minutes per session: Not on file    Stress: Not on file   Relationships    Social connections:     Talks on phone: Not on file     Gets together: Not on file     Attends L[employer] service: Not on file     Active member of club or organization: Not on file     Attends meetings of clubs or organizations: Not on file     Relationship status: Not on file    Intimate partner violence:     Fear of current or ex partner: Not on file     Emotionally abused: Not on file     Physically abused: Not on file     Forced sexual activity: Not on file   Other Topics Concern    Not on file   Social History Narrative    Patient not questioned about family hx      Allergies   Allergen Reactions    Aspirin Other (See Comments)     Nose bleed    Sulfa Antibiotics Other (See Comments)     Dizzy      Current Outpatient Medications on File Prior to Visit   Medication Sig Dispense Refill    albuterol (VENTOLIN HFA) 90 mcg/act inhaler INHALE TWO PUFFS BY MOUTH EVERY 4 HOURS AS NEEDED FOR SHORTNESS OF BREATH   no more than 4 times daily 18 g 1    ascorbic acid (VITAMIN C) 250 MG CHEW Chew 250 mg daily      ondansetron (ZOFRAN) 4 mg tablet Take 1 tablet (4 mg total) by mouth every 6 (six) hours as needed for nausea or vomiting 50 tablet 2    Regorafenib (STIVARGA) 40 MG TABS Take 3 tablets (120 mg) PO daily for 3 weeks on, then 1 week off  (Patient not taking: Reported on 2/18/2020) 63 tablet 11     No current facility-administered medications on file prior to visit  Review of Systems   Constitutional: Positive for fatigue  Negative for chills, fever and unexpected weight change  HENT: Negative for trouble swallowing and voice change  Sputum production, hemoptysis    Respiratory: Positive for cough and shortness of breath  Negative for chest tightness and wheezing  Cardiovascular: Negative for chest pain and palpitations  Gastrointestinal: Negative for abdominal pain, diarrhea, nausea and vomiting  Musculoskeletal: Negative for back pain and gait problem  Psychiatric/Behavioral: Negative for agitation, behavioral problems and confusion  All other systems reviewed and are negative  Objective:   Physical Exam   Constitutional: He is oriented to person, place, and time  He appears well-developed and well-nourished  Looks fatigued    HENT:   Head: Normocephalic and atraumatic     Eyes: Pupils are equal, round, and reactive to light  EOM are normal  No scleral icterus  Neck: Neck supple  Cardiovascular: Normal rate and regular rhythm  No murmur heard  Pulmonary/Chest: Effort normal and breath sounds normal  No respiratory distress  He has no wheezes  Musculoskeletal: Normal range of motion  Neurological: He is alert and oriented to person, place, and time  Skin: Skin is warm and dry  Psychiatric: He has a normal mood and affect  His behavior is normal    Nursing note and vitals reviewed  /68 (BP Location: Left arm, Patient Position: Sitting, Cuff Size: Large)   Pulse 74   Temp (!) 96 4 °F (35 8 °C)   Ht 6' 4" (1 93 m)   Wt 106 kg (232 lb 9 6 oz)   SpO2 94%   BMI 28 31 kg/m²        Ct Chest Abdomen Pelvis W Contrast    Result Date: 2/10/2020  Narrative CT CHEST, ABDOMEN AND PELVIS WITH IV CONTRAST INDICATION:   C18 2: Malignant neoplasm of ascending colon C18 9: Malignant neoplasm of colon, unspecified C78 00: Secondary malignant neoplasm of unspecified lung 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  COMPARISON:  CT chest dated 12/16/2019 and CT chest abdomen pelvis dated 7/14/2019  TECHNIQUE: CT examination of the chest, abdomen and pelvis was performed  Axial, sagittal, and coronal 2D reformatted images were created from the source data and submitted for interpretation  Radiation dose length product (DLP) for this visit:  865 mGy-cm   This examination, like all CT scans performed in the [facility], was performed utilizing techniques to minimize radiation dose exposure, including the use of iterative reconstruction and automated exposure control  IV Contrast:  100 mL of iohexol (OMNIPAQUE) Enteric Contrast: Enteric contrast was administered  FINDINGS: CHEST LUNGS:  Redemonstration of innumerable lung nodules throughout with significant increase in number and size of the nodules    These nodules are in perilymphatic distribution  There are a couple of new areas of peribronchial vascular consolidations in the right middle lobe  Redemonstration of a right lower lobe mass containing scattered foci of calcifications measuring 7 2 x 5 7 cm (previously 5 6 x 4 7 cm)  Again seen is a pleural-based mass in the left upper lung measuring 2 6 x 1 6 cm (previously 1 9 x 1 3  cm)  Mild-to-moderate paraseptal emphysema with bullous changes in the right middle lobe  There is no tracheal or endobronchial lesion  PLEURA:  Unremarkable  HEART/GREAT VESSELS:  Unremarkable for patient's age  MEDIASTINUM AND ZABRINA:  Unremarkable  CHEST WALL AND LOWER NECK:   Right chest [id number] is present  ABDOMEN LIVER/BILIARY TREE:  Unremarkable  GALLBLADDER:  No calcified gallstones  No pericholecystic inflammatory change  SPLEEN:  Unremarkable  PANCREAS:  Unremarkable  ADRENAL GLANDS:  Unremarkable  KIDNEYS/URETERS:  Unremarkable  No hydronephrosis  STOMACH AND BOWEL:  No bowel obstruction  Status post right hemicolectomy  APPENDIX:  Surgically absent  ABDOMINOPELVIC CAVITY:  No ascites or free intraperitoneal air  No lymphadenopathy  VESSELS:  Unremarkable for patient's age  PELVIS REPRODUCTIVE ORGANS:  Unremarkable for patient's age  URINARY BLADDER:  Unremarkable  ABDOMINAL WALL/INGUINAL REGIONS:  Unremarkable  OSSEOUS STRUCTURES:  No acute fracture or destructive osseous lesion  Impression Redemonstration of innumerable lung nodules in perilymphatic distribution which have increased in number and size since 12/16/2019  New areas of peribronchovascular consolidations in the right middle lobe  Left upper lung and right lower lobe masses have also increased in size  These findings are concerning for progression of disease in the chest  No evidence of metastatic disease in the abdomen and pelvis   Workstation performed: [id number]

## 2020-02-18 NOTE — ASSESSMENT & PLAN NOTE
We had a discussion with Mr  Lynsey Judd, regarding his innumerable, progressive bilateral pulmonary nodules  They are most likely metastatic colon cancer, but we can perform a right thoracoscopic wedge biopsy to obtain tissue for definitive diagnosis  The procedure, possible risks, and post operative course were explained and all questions were answered  We will schedule this in the next couple of weeks  He will undergo some blood work prior to the procedure  He will be on ERAS protocol

## 2020-02-18 NOTE — PROGRESS NOTES
Thoracic Follow-Up  Assessment/Plan:    Colon cancer metastasized to lung [facility])  We had a discussion with Mr  Randolph Ang, regarding his innumerable, progressive bilateral pulmonary nodules  They are most likely metastatic colon cancer, but we can perform a right thoracoscopic wedge biopsy to obtain tissue for definitive diagnosis  The procedure, possible risks, and post operative course were explained and all questions were answered  We will schedule this in the next couple of weeks  He will undergo some blood work prior to the procedure  He will be on ERAS protocol  Diagnoses and all orders for this visit:    Colon cancer metastasized to lung [facility])  -     Type and screen; Future  -     APTT; Future  -     Protime-INR; Future  -     [facility] RESECTION WEDGE LUNG; Standing  -     [facility] RESECTION WEDGE LUNG    Other orders  -     Diet NPO; Sips with meds; Standing  -     Nursing communication Please give pre-op Carbohydrate drink to patient 2-4 hours prior to surgery; Standing  -     Void on call to OR; Standing  -     Insert peripheral IV; Standing  -     Place sequential compression device; Standing  -     ceFAZolin (ANCEF) 2,000 mg in sodium chloride 0 9 % 50 mL IVPB  -     heparin (porcine) subcutaneous injection 5,000 Units  -     acetaminophen (TYLENOL) tablet 975 mg  -     gabapentin (NEURONTIN) capsule 600 mg          Thoracic History       Diagnosis: 1  Left recurrent pneumothorax  2  Chest wall hernia  3  Colon cancer lung metastasis      Procedures: 1  Flexible bronchoscopy, right thoracoscopic lower lobe wedge resection from April 21, 2010 2  Left Port-A-Cath placed on may 7, 2010 3  Flexible bronchoscopy, left thorascopic lower lobar dissection intercostal nerve block from August 19, 2010 4   Flexible bronchoscopy, left thoracoscopic upper lobe wedge resection with technetium and gamma probe localization, repair chest wall incisional hernia and intercostal nerve block performed on November 9, 2011 5  Left vats, talc pleurodesis, replacement [place]-Tian patch for chest wall incisional hernia and intercostal block performed on December 11, 20116  Right thoracoscopic lower lobe wedge resection on January 15, 2014  7  Flexible bronchoscopy, video mediastinoscopy, left posterior lateral thoracotomy, lower lobectomy and pulmonary artery arterioplasty April 14, 20158  Flexible bronchoscopy April 22, 20159  Port-A-Cath Insertion - Left IJ June 8, 2015  Patient ID: Crystal Heredia is a 63 y o  male  HPI     Mr Margaret Waters is a 64 yo gentleman with a history of stage IV colon cancer, well known to our practice for multiple lung resections  He was last seen in April 2018, at which time a right lower lobectomy was discussed for metastatic disease  His PFT's were not adequate for further resection  He returns today with a CT scan from 2/4/20, which reveals innumerable bilateral pulmonary nodules, which have increased in size and number since his scan in 12/19  He was on chemo, which was stopped about 2 weeks ago, secondary to progression of disease  Dr Romana Herter had referred him to our office for consideration of a wedge biopsy for tissue diagnosis  He does have some clear sputum production and did have some bright red hemoptysis last weekend  He is having shortness of breath with exertion, but not really at rest  He no longer works         The following portions of the patient's history were reviewed and updated as appropriate: allergies, current medications, past family history, past medical history, past social history, past surgical history and problem list     Past Medical History:   Diagnosis Date    [facility]     [facility]     [facility]     [facility]     Shortness of breath      Past Surgical History:   Procedure Laterality Date    ANTERIOR CRUCIATE LIGAMENT REPAIR      ANTERIOR CRUCIATE LIGAMENT REPAIR      2  on R knee    APPENDECTOMY      ATRIAL ABLATION SURGERY      BACK SURGERY      COLON SURGERY      Ascending and transverse colon    COLONOSCOPY      LUNG REMOVAL, PARTIAL      RIGHT COLECTOMY      and Transverse colon resection    THORACOTOMY       Family History   Problem Relation Age of Onset    Diabetes Mother     Heart disease Mother     Heart disease Father     Skin cancer Sister      Social History     Socioeconomic History    Marital status: /Civil Union     Spouse name: Not on file    Number of children: Not on file    Years of education: Not on file    Highest education level: Not on file   Occupational History    Not on file   Social Needs    Financial resource strain: Not on file    Food insecurity:     Worry: Not on file     Inability: Not on file    Transportation needs:     Medical: Not on file     Non-medical: Not on file   Tobacco Use    Smoking status: Former Smoker     Years: 10 00     Types: Cigars     Start date: 2000     Last attempt to quit: 2010     Years since quitting: 10 1    Smokeless tobacco: Never Used    Tobacco comment: Cigars    Substance and Sexual Activity    Alcohol use:  Yes     Alcohol/week: 12 0 standard drinks     Types: 12 Cans of beer per week     Frequency: Never     Drinks per session: 1 or 2     Binge frequency: Never     Comment: Socially     Drug use: No    Sexual activity: Not on file   Lifestyle    Physical activity:     Days per week: Not on file     Minutes per session: Not on file    Stress: Not on file   Relationships    Social connections:     Talks on phone: Not on file     Gets together: Not on file     Attends Sikh service: Not on file     Active member of club or organization: Not on file     Attends meetings of clubs or organizations: Not on file     Relationship status: Not on file    Intimate partner violence:     Fear of current or ex partner: Not on file     Emotionally abused: Not on file     Physically abused: Not on file     Forced sexual activity: Not on file   Other Topics Concern    Not on file   Social History Narrative    Patient not questioned about family hx      Allergies   Allergen Reactions    Aspirin Other (See Comments)     Nose bleed    Sulfa Antibiotics Other (See Comments)     Dizzy      Current Outpatient Medications on File Prior to Visit   Medication Sig Dispense Refill    albuterol (VENTOLIN HFA) 90 mcg/act inhaler INHALE TWO PUFFS BY MOUTH EVERY 4 HOURS AS NEEDED FOR SHORTNESS OF BREATH   no more than 4 times daily 18 g 1    ascorbic acid (VITAMIN C) 250 MG CHEW Chew 250 mg daily      ondansetron (ZOFRAN) 4 mg tablet Take 1 tablet (4 mg total) by mouth every 6 (six) hours as needed for nausea or vomiting 50 tablet 2    Regorafenib (STIVARGA) 40 MG TABS Take 3 tablets (120 mg) PO daily for 3 weeks on, then 1 week off  (Patient not taking: Reported on 2/18/2020) 63 tablet 11     No current facility-administered medications on file prior to visit  Review of Systems   Constitutional: Positive for fatigue  Negative for chills, fever and unexpected weight change  HENT: Negative for trouble swallowing and voice change  Sputum production, hemoptysis    Respiratory: Positive for cough and shortness of breath  Negative for chest tightness and wheezing  Cardiovascular: Negative for chest pain and palpitations  Gastrointestinal: Negative for abdominal pain, diarrhea, nausea and vomiting  Musculoskeletal: Negative for back pain and gait problem  Psychiatric/Behavioral: Negative for agitation, behavioral problems and confusion  All other systems reviewed and are negative  Objective:   Physical Exam   Constitutional: He is oriented to person, place, and time  He appears well-developed and well-nourished  Looks fatigued    HENT:   Head: Normocephalic and atraumatic     Eyes: Pupils are equal, round, and reactive to light  EOM are normal  No scleral icterus  Neck: Neck supple  Cardiovascular: Normal rate and regular rhythm  No murmur heard  Pulmonary/Chest: Effort normal and breath sounds normal  No respiratory distress  He has no wheezes  Musculoskeletal: Normal range of motion  Neurological: He is alert and oriented to person, place, and time  Skin: Skin is warm and dry  Psychiatric: He has a normal mood and affect  His behavior is normal    Nursing note and vitals reviewed  /68 (BP Location: Left arm, Patient Position: Sitting, Cuff Size: Large)   Pulse 74   Temp (!) 96 4 °F (35 8 °C)   Ht 6' 4" (1 93 m)   Wt 106 kg (232 lb 9 6 oz)   SpO2 94%   BMI 28 31 kg/m²        Ct Chest Abdomen Pelvis W Contrast    Result Date: 2/10/2020  Narrative CT CHEST, ABDOMEN AND PELVIS WITH IV CONTRAST INDICATION:   C18 2: Malignant neoplasm of ascending colon C18 9: Malignant neoplasm of colon, unspecified C78 00: Secondary malignant neoplasm of unspecified lung 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  COMPARISON:  CT chest dated 12/16/2019 and CT chest abdomen pelvis dated 7/14/2019  TECHNIQUE: CT examination of the chest, abdomen and pelvis was performed  Axial, sagittal, and coronal 2D reformatted images were created from the source data and submitted for interpretation  Radiation dose length product (DLP) for this visit:  865 mGy-cm   This examination, like all CT scans performed in the [facility], was performed utilizing techniques to minimize radiation dose exposure, including the use of iterative reconstruction and automated exposure control  IV Contrast:  100 mL of iohexol (OMNIPAQUE) Enteric Contrast: Enteric contrast was administered  FINDINGS: CHEST LUNGS:  Redemonstration of innumerable lung nodules throughout with significant increase in number and size of the nodules    These nodules are in perilymphatic distribution  There are a couple of new areas of peribronchial vascular consolidations in the right middle lobe  Redemonstration of a right lower lobe mass containing scattered foci of calcifications measuring 7 2 x 5 7 cm (previously 5 6 x 4 7 cm)  Again seen is a pleural-based mass in the left upper lung measuring 2 6 x 1 6 cm (previously 1 9 x 1 3  cm)  Mild-to-moderate paraseptal emphysema with bullous changes in the right middle lobe  There is no tracheal or endobronchial lesion  PLEURA:  Unremarkable  HEART/GREAT VESSELS:  Unremarkable for patient's age  MEDIASTINUM AND ZABRINA:  Unremarkable  CHEST WALL AND LOWER NECK:   Right chest [id number] is present  ABDOMEN LIVER/BILIARY TREE:  Unremarkable  GALLBLADDER:  No calcified gallstones  No pericholecystic inflammatory change  SPLEEN:  Unremarkable  PANCREAS:  Unremarkable  ADRENAL GLANDS:  Unremarkable  KIDNEYS/URETERS:  Unremarkable  No hydronephrosis  STOMACH AND BOWEL:  No bowel obstruction  Status post right hemicolectomy  APPENDIX:  Surgically absent  ABDOMINOPELVIC CAVITY:  No ascites or free intraperitoneal air  No lymphadenopathy  VESSELS:  Unremarkable for patient's age  PELVIS REPRODUCTIVE ORGANS:  Unremarkable for patient's age  URINARY BLADDER:  Unremarkable  ABDOMINAL WALL/INGUINAL REGIONS:  Unremarkable  OSSEOUS STRUCTURES:  No acute fracture or destructive osseous lesion  Impression Redemonstration of innumerable lung nodules in perilymphatic distribution which have increased in number and size since 12/16/2019  New areas of peribronchovascular consolidations in the right middle lobe  Left upper lung and right lower lobe masses have also increased in size  These findings are concerning for progression of disease in the chest  No evidence of metastatic disease in the abdomen and pelvis   Workstation performed: [id number]

## 2020-02-20 ENCOUNTER — APPOINTMENT (OUTPATIENT)
Dept: LAB | Facility: HOSPITAL | Age: 60
End: 2020-02-20
Payer: MEDICARE

## 2020-02-20 ENCOUNTER — TRANSCRIBE ORDERS (OUTPATIENT)
Dept: ADMINISTRATIVE | Facility: HOSPITAL | Age: 60
End: 2020-02-20

## 2020-02-20 DIAGNOSIS — C18.9 COLON CANCER METASTASIZED TO LUNG (HCC): Chronic | ICD-10-CM

## 2020-02-20 DIAGNOSIS — C78.00 COLON CANCER METASTASIZED TO LUNG (HCC): Chronic | ICD-10-CM

## 2020-02-20 DIAGNOSIS — C18.9 MALIGNANT NEOPLASM OF COLON, UNSPECIFIED PART OF COLON (HCC): ICD-10-CM

## 2020-02-20 DIAGNOSIS — C18.9 MALIGNANT NEOPLASM OF COLON, UNSPECIFIED PART OF COLON (HCC): Primary | ICD-10-CM

## 2020-02-20 DIAGNOSIS — C78.00 MALIGNANT NEOPLASM METASTATIC TO LUNG, UNSPECIFIED LATERALITY (HCC): ICD-10-CM

## 2020-02-20 LAB
ABO GROUP BLD: NORMAL
APTT PPP: 34 SECONDS (ref 23–37)
BLD GP AB SCN SERPL QL: NEGATIVE
INR PPP: 0.97 (ref 0.84–1.19)
PROTHROMBIN TIME: 13 SECONDS (ref 11.6–14.5)
RH BLD: POSITIVE
SPECIMEN EXPIRATION DATE: NORMAL

## 2020-02-20 PROCEDURE — 85610 PROTHROMBIN TIME: CPT

## 2020-02-20 PROCEDURE — 86900 BLOOD TYPING SEROLOGIC ABO: CPT

## 2020-02-20 PROCEDURE — 85730 THROMBOPLASTIN TIME PARTIAL: CPT

## 2020-02-20 PROCEDURE — 86901 BLOOD TYPING SEROLOGIC RH(D): CPT

## 2020-02-20 PROCEDURE — 86850 RBC ANTIBODY SCREEN: CPT

## 2020-02-20 PROCEDURE — 36415 COLL VENOUS BLD VENIPUNCTURE: CPT

## 2020-02-24 ENCOUNTER — ANESTHESIA EVENT (OUTPATIENT)
Dept: PERIOP | Facility: HOSPITAL | Age: 60
DRG: 164 | End: 2020-02-24
Payer: MEDICARE

## 2020-02-24 DIAGNOSIS — Z91.89 RISK FACTORS FOR OBSTRUCTIVE SLEEP APNEA: Primary | ICD-10-CM

## 2020-02-24 NOTE — PRE-PROCEDURE INSTRUCTIONS
Pre-Surgery Instructions:   Medication Instructions    albuterol (VENTOLIN HFA) 90 mcg/act inhaler Instructed patient per Anesthesia Guidelines   ascorbic acid (VITAMIN C) 250 MG CHEW Instructed patient per Anesthesia Guidelines   ondansetron (ZOFRAN) 4 mg tablet Instructed patient per Anesthesia Guidelines

## 2020-02-24 NOTE — PRE-PROCEDURE INSTRUCTIONS
Pre-Surgery Instructions:   Medication Instructions    albuterol (VENTOLIN HFA) 90 mcg/act inhaler Instructed patient per Anesthesia Guidelines   ascorbic acid (VITAMIN C) 250 MG CHEW Instructed patient per Anesthesia Guidelines   ondansetron (ZOFRAN) 4 mg tablet Instructed patient per Anesthesia Guidelines  Pt instructed to stop nsaids and supplements prior to surgery  Pt verbalized understanding of shower instructions

## 2020-02-25 LAB
MYCOBACTERIUM SPEC CULT: NORMAL
RHODAMINE-AURAMINE STN SPEC: NORMAL

## 2020-02-26 ENCOUNTER — ANESTHESIA (OUTPATIENT)
Dept: PERIOP | Facility: HOSPITAL | Age: 60
DRG: 164 | End: 2020-02-26
Payer: MEDICARE

## 2020-02-26 ENCOUNTER — APPOINTMENT (INPATIENT)
Dept: RADIOLOGY | Facility: HOSPITAL | Age: 60
DRG: 164 | End: 2020-02-26
Payer: MEDICARE

## 2020-02-26 ENCOUNTER — HOSPITAL ENCOUNTER (INPATIENT)
Facility: HOSPITAL | Age: 60
LOS: 2 days | Discharge: HOME | DRG: 164 | End: 2020-02-28
Attending: THORACIC SURGERY (CARDIOTHORACIC VASCULAR SURGERY) | Admitting: THORACIC SURGERY (CARDIOTHORACIC VASCULAR SURGERY)
Payer: MEDICARE

## 2020-02-26 DIAGNOSIS — C18.9 COLON CANCER METASTASIZED TO LUNG (HCC): ICD-10-CM

## 2020-02-26 DIAGNOSIS — C78.00 COLON CANCER METASTASIZED TO LUNG (HCC): ICD-10-CM

## 2020-02-26 DIAGNOSIS — R06.00 DYSPNEA ON EXERTION: Primary | ICD-10-CM

## 2020-02-26 DIAGNOSIS — J18.9 PNEUMONITIS: ICD-10-CM

## 2020-02-26 DIAGNOSIS — J96.01 ACUTE RESPIRATORY FAILURE WITH HYPOXIA (HCC): ICD-10-CM

## 2020-02-26 PROCEDURE — 88307 TISSUE EXAM BY PATHOLOGIST: CPT | Performed by: PATHOLOGY

## 2020-02-26 PROCEDURE — 32608 THORACOSCOPY W/BX NODULE: CPT | Performed by: THORACIC SURGERY (CARDIOTHORACIC VASCULAR SURGERY)

## 2020-02-26 PROCEDURE — 88363 XM ARCHIVE TISSUE MOLEC ANAL: CPT | Performed by: PATHOLOGY

## 2020-02-26 PROCEDURE — 88341 IMHCHEM/IMCYTCHM EA ADD ANTB: CPT | Performed by: PATHOLOGY

## 2020-02-26 PROCEDURE — 71045 X-RAY EXAM CHEST 1 VIEW: CPT

## 2020-02-26 PROCEDURE — 94760 N-INVAS EAR/PLS OXIMETRY 1: CPT

## 2020-02-26 PROCEDURE — C9290 INJ, BUPIVACAINE LIPOSOME: HCPCS | Performed by: THORACIC SURGERY (CARDIOTHORACIC VASCULAR SURGERY)

## 2020-02-26 PROCEDURE — 0BTF4ZZ RESECTION OF RIGHT LOWER LUNG LOBE, PERCUTANEOUS ENDOSCOPIC APPROACH: ICD-10-PCS | Performed by: THORACIC SURGERY (CARDIOTHORACIC VASCULAR SURGERY)

## 2020-02-26 PROCEDURE — 88342 IMHCHEM/IMCYTCHM 1ST ANTB: CPT | Performed by: PATHOLOGY

## 2020-02-26 RX ORDER — ONDANSETRON 2 MG/ML
4 INJECTION INTRAMUSCULAR; INTRAVENOUS EVERY 6 HOURS PRN
Status: DISCONTINUED | OUTPATIENT
Start: 2020-02-26 | End: 2020-02-28 | Stop reason: HOSPADM

## 2020-02-26 RX ORDER — FENTANYL CITRATE 50 UG/ML
INJECTION, SOLUTION INTRAMUSCULAR; INTRAVENOUS AS NEEDED
Status: DISCONTINUED | OUTPATIENT
Start: 2020-02-26 | End: 2020-02-26 | Stop reason: SURG

## 2020-02-26 RX ORDER — SODIUM CHLORIDE, SODIUM LACTATE, POTASSIUM CHLORIDE, CALCIUM CHLORIDE 600; 310; 30; 20 MG/100ML; MG/100ML; MG/100ML; MG/100ML
75 INJECTION, SOLUTION INTRAVENOUS CONTINUOUS
Status: DISCONTINUED | OUTPATIENT
Start: 2020-02-26 | End: 2020-02-26

## 2020-02-26 RX ORDER — ACETAMINOPHEN 325 MG/1
975 TABLET ORAL EVERY 6 HOURS
Status: DISCONTINUED | OUTPATIENT
Start: 2020-02-26 | End: 2020-02-28 | Stop reason: HOSPADM

## 2020-02-26 RX ORDER — MIDAZOLAM HYDROCHLORIDE 2 MG/2ML
INJECTION, SOLUTION INTRAMUSCULAR; INTRAVENOUS AS NEEDED
Status: DISCONTINUED | OUTPATIENT
Start: 2020-02-26 | End: 2020-02-26 | Stop reason: SURG

## 2020-02-26 RX ORDER — ONDANSETRON 2 MG/ML
INJECTION INTRAMUSCULAR; INTRAVENOUS AS NEEDED
Status: DISCONTINUED | OUTPATIENT
Start: 2020-02-26 | End: 2020-02-26 | Stop reason: SURG

## 2020-02-26 RX ORDER — GABAPENTIN 300 MG/1
300 CAPSULE ORAL 3 TIMES DAILY
Status: DISCONTINUED | OUTPATIENT
Start: 2020-02-26 | End: 2020-02-28 | Stop reason: HOSPADM

## 2020-02-26 RX ORDER — OXYCODONE HYDROCHLORIDE 5 MG/1
5 TABLET ORAL EVERY 4 HOURS PRN
Status: DISCONTINUED | OUTPATIENT
Start: 2020-02-26 | End: 2020-02-28 | Stop reason: HOSPADM

## 2020-02-26 RX ORDER — PROPOFOL 10 MG/ML
INJECTION, EMULSION INTRAVENOUS CONTINUOUS PRN
Status: DISCONTINUED | OUTPATIENT
Start: 2020-02-26 | End: 2020-02-26 | Stop reason: SURG

## 2020-02-26 RX ORDER — LIDOCAINE HYDROCHLORIDE 10 MG/ML
0.5 INJECTION, SOLUTION EPIDURAL; INFILTRATION; INTRACAUDAL; PERINEURAL ONCE AS NEEDED
Status: DISCONTINUED | OUTPATIENT
Start: 2020-02-26 | End: 2020-02-26 | Stop reason: HOSPADM

## 2020-02-26 RX ORDER — ONDANSETRON 2 MG/ML
4 INJECTION INTRAMUSCULAR; INTRAVENOUS ONCE AS NEEDED
Status: DISCONTINUED | OUTPATIENT
Start: 2020-02-26 | End: 2020-02-26 | Stop reason: HOSPADM

## 2020-02-26 RX ORDER — SODIUM CHLORIDE, SODIUM LACTATE, POTASSIUM CHLORIDE, CALCIUM CHLORIDE 600; 310; 30; 20 MG/100ML; MG/100ML; MG/100ML; MG/100ML
INJECTION, SOLUTION INTRAVENOUS CONTINUOUS PRN
Status: DISCONTINUED | OUTPATIENT
Start: 2020-02-26 | End: 2020-02-26 | Stop reason: SURG

## 2020-02-26 RX ORDER — ALBUTEROL SULFATE 90 UG/1
2 AEROSOL, METERED RESPIRATORY (INHALATION) EVERY 4 HOURS PRN
Status: DISCONTINUED | OUTPATIENT
Start: 2020-02-26 | End: 2020-02-28 | Stop reason: HOSPADM

## 2020-02-26 RX ORDER — SODIUM CHLORIDE, SODIUM LACTATE, POTASSIUM CHLORIDE, CALCIUM CHLORIDE 600; 310; 30; 20 MG/100ML; MG/100ML; MG/100ML; MG/100ML
100 INJECTION, SOLUTION INTRAVENOUS CONTINUOUS
Status: DISCONTINUED | OUTPATIENT
Start: 2020-02-26 | End: 2020-02-27

## 2020-02-26 RX ORDER — GLYCOPYRROLATE 0.2 MG/ML
INJECTION INTRAMUSCULAR; INTRAVENOUS AS NEEDED
Status: DISCONTINUED | OUTPATIENT
Start: 2020-02-26 | End: 2020-02-26 | Stop reason: SURG

## 2020-02-26 RX ORDER — SODIUM CHLORIDE 9 MG/ML
INJECTION, SOLUTION INTRAVENOUS CONTINUOUS PRN
Status: DISCONTINUED | OUTPATIENT
Start: 2020-02-26 | End: 2020-02-26 | Stop reason: SURG

## 2020-02-26 RX ORDER — SENNOSIDES 8.6 MG
1 TABLET ORAL DAILY
Status: DISCONTINUED | OUTPATIENT
Start: 2020-02-27 | End: 2020-02-28 | Stop reason: HOSPADM

## 2020-02-26 RX ORDER — DOCUSATE SODIUM 100 MG/1
100 CAPSULE, LIQUID FILLED ORAL 2 TIMES DAILY
Status: DISCONTINUED | OUTPATIENT
Start: 2020-02-26 | End: 2020-02-28 | Stop reason: HOSPADM

## 2020-02-26 RX ORDER — SODIUM CHLORIDE, SODIUM LACTATE, POTASSIUM CHLORIDE, CALCIUM CHLORIDE 600; 310; 30; 20 MG/100ML; MG/100ML; MG/100ML; MG/100ML
100 INJECTION, SOLUTION INTRAVENOUS CONTINUOUS
Status: DISCONTINUED | OUTPATIENT
Start: 2020-02-26 | End: 2020-02-26

## 2020-02-26 RX ORDER — HEPARIN SODIUM 5000 [USP'U]/ML
5000 INJECTION, SOLUTION INTRAVENOUS; SUBCUTANEOUS
Status: DISCONTINUED | OUTPATIENT
Start: 2020-02-27 | End: 2020-02-26

## 2020-02-26 RX ORDER — PROPOFOL 10 MG/ML
INJECTION, EMULSION INTRAVENOUS AS NEEDED
Status: DISCONTINUED | OUTPATIENT
Start: 2020-02-26 | End: 2020-02-26 | Stop reason: SURG

## 2020-02-26 RX ORDER — ROCURONIUM BROMIDE 10 MG/ML
INJECTION, SOLUTION INTRAVENOUS AS NEEDED
Status: DISCONTINUED | OUTPATIENT
Start: 2020-02-26 | End: 2020-02-26 | Stop reason: SURG

## 2020-02-26 RX ORDER — GABAPENTIN 300 MG/1
600 CAPSULE ORAL ONCE
Status: COMPLETED | OUTPATIENT
Start: 2020-02-26 | End: 2020-02-26

## 2020-02-26 RX ORDER — DEXAMETHASONE SODIUM PHOSPHATE 10 MG/ML
INJECTION, SOLUTION INTRAMUSCULAR; INTRAVENOUS AS NEEDED
Status: DISCONTINUED | OUTPATIENT
Start: 2020-02-26 | End: 2020-02-26 | Stop reason: SURG

## 2020-02-26 RX ORDER — HYDROMORPHONE HCL/PF 1 MG/ML
0.5 SYRINGE (ML) INJECTION EVERY 2 HOUR PRN
Status: DISCONTINUED | OUTPATIENT
Start: 2020-02-26 | End: 2020-02-28 | Stop reason: HOSPADM

## 2020-02-26 RX ORDER — ALBUMIN, HUMAN INJ 5% 5 %
SOLUTION INTRAVENOUS CONTINUOUS PRN
Status: DISCONTINUED | OUTPATIENT
Start: 2020-02-26 | End: 2020-02-26 | Stop reason: SURG

## 2020-02-26 RX ORDER — HEPARIN SODIUM 5000 [USP'U]/ML
5000 INJECTION, SOLUTION INTRAVENOUS; SUBCUTANEOUS
Status: COMPLETED | OUTPATIENT
Start: 2020-02-26 | End: 2020-02-26

## 2020-02-26 RX ORDER — CEFAZOLIN SODIUM 1 G/3ML
INJECTION, POWDER, FOR SOLUTION INTRAMUSCULAR; INTRAVENOUS AS NEEDED
Status: DISCONTINUED | OUTPATIENT
Start: 2020-02-26 | End: 2020-02-26 | Stop reason: SURG

## 2020-02-26 RX ORDER — PANTOPRAZOLE SODIUM 40 MG/1
40 TABLET, DELAYED RELEASE ORAL
Status: DISCONTINUED | OUTPATIENT
Start: 2020-02-27 | End: 2020-02-28 | Stop reason: HOSPADM

## 2020-02-26 RX ORDER — EPHEDRINE SULFATE 50 MG/ML
INJECTION INTRAVENOUS AS NEEDED
Status: DISCONTINUED | OUTPATIENT
Start: 2020-02-26 | End: 2020-02-26 | Stop reason: SURG

## 2020-02-26 RX ORDER — LIDOCAINE HYDROCHLORIDE 10 MG/ML
INJECTION, SOLUTION EPIDURAL; INFILTRATION; INTRACAUDAL; PERINEURAL AS NEEDED
Status: DISCONTINUED | OUTPATIENT
Start: 2020-02-26 | End: 2020-02-26 | Stop reason: SURG

## 2020-02-26 RX ORDER — BUPIVACAINE HYDROCHLORIDE 2.5 MG/ML
INJECTION, SOLUTION EPIDURAL; INFILTRATION; INTRACAUDAL AS NEEDED
Status: DISCONTINUED | OUTPATIENT
Start: 2020-02-26 | End: 2020-02-26 | Stop reason: HOSPADM

## 2020-02-26 RX ORDER — ACETAMINOPHEN 325 MG/1
975 TABLET ORAL ONCE
Status: COMPLETED | OUTPATIENT
Start: 2020-02-26 | End: 2020-02-26

## 2020-02-26 RX ORDER — FENTANYL CITRATE/PF 50 MCG/ML
50 SYRINGE (ML) INJECTION
Status: DISCONTINUED | OUTPATIENT
Start: 2020-02-26 | End: 2020-02-26 | Stop reason: HOSPADM

## 2020-02-26 RX ORDER — POLYETHYLENE GLYCOL 3350 17 G/17G
17 POWDER, FOR SOLUTION ORAL DAILY
Status: DISCONTINUED | OUTPATIENT
Start: 2020-02-27 | End: 2020-02-28 | Stop reason: HOSPADM

## 2020-02-26 RX ORDER — HYDROMORPHONE HCL/PF 1 MG/ML
0.2 SYRINGE (ML) INJECTION
Status: DISCONTINUED | OUTPATIENT
Start: 2020-02-26 | End: 2020-02-26 | Stop reason: HOSPADM

## 2020-02-26 RX ADMIN — ONDANSETRON 40 MG: 2 INJECTION INTRAMUSCULAR; INTRAVENOUS at 16:38

## 2020-02-26 RX ADMIN — PROPOFOL 200 MG: 10 INJECTION, EMULSION INTRAVENOUS at 15:55

## 2020-02-26 RX ADMIN — SODIUM CHLORIDE, SODIUM LACTATE, POTASSIUM CHLORIDE, AND CALCIUM CHLORIDE 500 ML: .6; .31; .03; .02 INJECTION, SOLUTION INTRAVENOUS at 18:00

## 2020-02-26 RX ADMIN — ROCURONIUM BROMIDE 70 MG: 50 INJECTION, SOLUTION INTRAVENOUS at 16:00

## 2020-02-26 RX ADMIN — PHENYLEPHRINE HYDROCHLORIDE 200 MCG: 10 INJECTION INTRAVENOUS at 16:33

## 2020-02-26 RX ADMIN — ALBUMIN (HUMAN): 12.5 SOLUTION INTRAVENOUS at 16:11

## 2020-02-26 RX ADMIN — DEXAMETHASONE SODIUM PHOSPHATE 10 MG: 10 INJECTION, SOLUTION INTRAMUSCULAR; INTRAVENOUS at 16:16

## 2020-02-26 RX ADMIN — SODIUM CHLORIDE, SODIUM LACTATE, POTASSIUM CHLORIDE, AND CALCIUM CHLORIDE 75 ML/HR: .6; .31; .03; .02 INJECTION, SOLUTION INTRAVENOUS at 12:15

## 2020-02-26 RX ADMIN — SODIUM CHLORIDE, SODIUM LACTATE, POTASSIUM CHLORIDE, AND CALCIUM CHLORIDE 100 ML/HR: .6; .31; .03; .02 INJECTION, SOLUTION INTRAVENOUS at 17:38

## 2020-02-26 RX ADMIN — GABAPENTIN 600 MG: 300 CAPSULE ORAL at 12:25

## 2020-02-26 RX ADMIN — HEPARIN SODIUM 5000 UNITS: 5000 INJECTION INTRAVENOUS; SUBCUTANEOUS at 13:35

## 2020-02-26 RX ADMIN — EPHEDRINE SULFATE 10 MG: 50 INJECTION, SOLUTION INTRAVENOUS at 16:53

## 2020-02-26 RX ADMIN — MIDAZOLAM 2 MG: 1 INJECTION INTRAMUSCULAR; INTRAVENOUS at 15:48

## 2020-02-26 RX ADMIN — PHENYLEPHRINE HYDROCHLORIDE 200 MCG: 10 INJECTION INTRAVENOUS at 16:04

## 2020-02-26 RX ADMIN — PROPOFOL 50 MG: 10 INJECTION, EMULSION INTRAVENOUS at 16:00

## 2020-02-26 RX ADMIN — PHENYLEPHRINE HYDROCHLORIDE 200 MCG: 10 INJECTION INTRAVENOUS at 16:07

## 2020-02-26 RX ADMIN — PROPOFOL 100 MCG/KG/MIN: 10 INJECTION, EMULSION INTRAVENOUS at 16:06

## 2020-02-26 RX ADMIN — GABAPENTIN 300 MG: 300 CAPSULE ORAL at 21:38

## 2020-02-26 RX ADMIN — LIDOCAINE HYDROCHLORIDE 100 MG: 10 INJECTION, SOLUTION EPIDURAL; INFILTRATION; INTRACAUDAL; PERINEURAL at 15:55

## 2020-02-26 RX ADMIN — SUGAMMADEX 400 MG: 100 INJECTION, SOLUTION INTRAVENOUS at 16:41

## 2020-02-26 RX ADMIN — GLYCOPYRROLATE 0.4 MG: 0.2 INJECTION, SOLUTION INTRAMUSCULAR; INTRAVENOUS at 16:56

## 2020-02-26 RX ADMIN — EPHEDRINE SULFATE 10 MG: 50 INJECTION, SOLUTION INTRAVENOUS at 16:09

## 2020-02-26 RX ADMIN — PHENYLEPHRINE HYDROCHLORIDE 200 MCG: 10 INJECTION INTRAVENOUS at 16:10

## 2020-02-26 RX ADMIN — SODIUM CHLORIDE, SODIUM LACTATE, POTASSIUM CHLORIDE, AND CALCIUM CHLORIDE 500 ML: .6; .31; .03; .02 INJECTION, SOLUTION INTRAVENOUS at 18:34

## 2020-02-26 RX ADMIN — PHENYLEPHRINE HYDROCHLORIDE 200 MCG: 10 INJECTION INTRAVENOUS at 16:12

## 2020-02-26 RX ADMIN — ACETAMINOPHEN 975 MG: 325 TABLET ORAL at 12:25

## 2020-02-26 RX ADMIN — PHENYLEPHRINE HYDROCHLORIDE 200 MCG: 10 INJECTION INTRAVENOUS at 16:48

## 2020-02-26 RX ADMIN — SODIUM CHLORIDE: 0.9 INJECTION, SOLUTION INTRAVENOUS at 16:05

## 2020-02-26 RX ADMIN — FENTANYL CITRATE 100 MCG: 50 INJECTION, SOLUTION INTRAMUSCULAR; INTRAVENOUS at 15:55

## 2020-02-26 RX ADMIN — EPHEDRINE SULFATE 10 MG: 50 INJECTION, SOLUTION INTRAVENOUS at 16:10

## 2020-02-26 RX ADMIN — PHENYLEPHRINE HYDROCHLORIDE 30 MCG/MIN: 10 INJECTION INTRAVENOUS at 16:05

## 2020-02-26 RX ADMIN — SODIUM CHLORIDE, SODIUM LACTATE, POTASSIUM CHLORIDE, AND CALCIUM CHLORIDE 100 ML/HR: .6; .31; .03; .02 INJECTION, SOLUTION INTRAVENOUS at 19:43

## 2020-02-26 RX ADMIN — ACETAMINOPHEN 975 MG: 325 TABLET ORAL at 22:59

## 2020-02-26 RX ADMIN — CEFAZOLIN 2000 MG: 1 INJECTION, POWDER, FOR SOLUTION INTRAVENOUS at 16:06

## 2020-02-26 RX ADMIN — SODIUM CHLORIDE, SODIUM LACTATE, POTASSIUM CHLORIDE, AND CALCIUM CHLORIDE: .6; .31; .03; .02 INJECTION, SOLUTION INTRAVENOUS at 15:49

## 2020-02-26 NOTE — PERIOPERATIVE NURSING NOTE
Pt still borderline hypotensive with systolics in 81'N, but MAPs above 65  Dr Keerthi Cordova contacted  Ordered to give additional 500 mL of LR and if SBP is above 90 and MAP above 65, ok to send to floor

## 2020-02-26 NOTE — INTERVAL H&P NOTE
H&P reviewed  After examining the patient I find no changes in the patients condition since the H&P had been written      Vitals:    02/26/20 1218   BP: 115/82   Pulse: 69   Resp: 18   Temp: 98 4 °F (36 9 °C)   SpO2: 95%

## 2020-02-26 NOTE — ANESTHESIA PROCEDURE NOTES
Arterial Line Insertion  Performed by: Jtain Shine CRNA  Authorized by: Jose Mauro MD   Consent: Verbal consent obtained  Written consent obtained  Risks and benefits: risks, benefits and alternatives were discussed  Consent given by: patient  Patient understanding: patient states understanding of the procedure being performed  Patient consent: the patient's understanding of the procedure matches consent given  Procedure consent: procedure consent matches procedure scheduled  Relevant documents: relevant documents present and verified  Test results: test results available and properly labeled  Required items: required blood products, implants, devices, and special equipment available  Patient identity confirmed: arm band and hospital-assigned identification number  Time out: Immediately prior to procedure a "time out" was called to verify the correct patient, procedure, equipment, support staff and site/side marked as required  Preparation: Patient was prepped and draped in the usual sterile fashion  Indications: hemodynamic monitoring  Orientation:  Right  Location: radial artery  Sedation:  Patient sedated: under GA      Procedure Details:  Needle gauge: 20  Seldinger technique: Seldinger technique used  Number of attempts: 1    Post-procedure:  Post-procedure: dressing applied  Waveform: good waveform  Patient tolerance: Patient tolerated the procedure well with no immediate complications

## 2020-02-26 NOTE — ANESTHESIA POSTPROCEDURE EVALUATION
Post-Op Assessment Note    CV Status:  Stable    Pain management: adequate     Mental Status:  Alert and awake   Hydration Status:  Euvolemic   PONV Controlled:  Controlled   Airway Patency:  Patent   Post Op Vitals Reviewed: Yes      Staff: Anesthesiologist, CRNA           BP   111/71   Temp (!) (P) 97 4 °F (36 3 °C) (02/26/20 1709)    Pulse (P) 69 (02/26/20 1709)   Resp (P) 14 (02/26/20 1709)    SpO2 (P) 97 % (02/26/20 1709)

## 2020-02-26 NOTE — OP NOTE
OPERATIVE REPORT  PATIENT NAME: Natalie Wells    :  1960  MRN: [id number]  Pt Location: BE OR ROOM 08    SURGERY DATE: 2020    Surgeon(s) and Role:     * Tracey Charles MD - Primary     * Ifrah Hazel PA-C - Assisting     * Gisselle Wolf MD - Assisting    Preop Diagnosis:  Colon cancer metastasized to lung [facility]) [C18 9, C78 00]    Post-Op Diagnosis Codes:     * Colon cancer metastasized to lung ([facility] ) [C18 9, C78 00]    Procedure(s) (LRB):  THORACOSCOPY VIDEO ASSISTED SURGERY (VATS) (Right)  RESECTION WEDGE LUNG (Right)    Specimen(s):  ID Type Source Tests Collected by Time Destination   1 : RIGHT UPPER LOBE WEDGE RESECTION Tissue Lung TISSUE Sakshi Espinal MD 2020 [id number]        Estimated Blood Loss:   Minimal    Drains:  Chest Tube 1 Right Pleural 24 Fr  (Active)   Number of days: 0       Anesthesia Type:   General    Operative Indications:  Colon cancer metastasized to lung ([facility] ) [C18 9, C78 00]  Mr Dexter Mares is a 52-year-old with known stage IV colon cancer multiple pulmonary metastasectomy is  He recently presented with a miliary pattern worrisome for more metastatic colon cancer and he is brought to the operating room for wedge resection  Operative Findings: In numerable pulmonary nodules    Complications:   None    Procedure and Technique:  The patient was brought to the operating room placed in the supine position  After institution of adequate general anesthesia with a double-lumen endotracheal tube was placed in a left lateral decubitus position  Standard port incisions with a 5 mm port site in the 8th intercostal space in the posterior axillary line as well as a 3 cm incision more anteriorly  There were innumerable pulmonary nodules in all 3 lobes  A wedge resection was performed using multiple fires of the linear stapler of the posterior segment of the right upper lobe  The specimen was then placed inside of a protective bag and removed from the body    There are at least fiber 6 palpable pulmonary nodules within the specimen  The staple line is hemostatic  Paravertebral blocks with a mixture of 20 mL of Exparel and 20 mL of 0 25% Marcaine were performed over a levels  A 24 Japanese straight chest tube was placed through the camera port to lie posteriorly and up towards the apex  The anterior access incision was closed in layers with running absorbable suture to the pectoral fascia, the subcutaneous subcuticular layers  The chest tube was connected to the chest wall and then to a Lily  Surgical glue was applied over the anterior incision  The patient was then turned supine, extubated, and brought to the recovery unit in stable condition having tolerated the procedure well  Sponge and instrument counts were correct     I was present for the entire procedure    Patient Disposition:  PACU     SIGNATURE: Estee Montaño MD  DATE: February 26, 2020  TIME: 4:49 PM

## 2020-02-26 NOTE — ANESTHESIA PREPROCEDURE EVALUATION
Review of Systems/Medical History  Patient summary reviewed  Chart reviewed  No history of anesthetic complications     Cardiovascular  Exercise tolerance (METS): <4,  Dysrhythmias ,   Comment: Atrial Fibrillation s/p cardioversion, now in sinus rhythm    MEDHAT 2015:  LEFT VENTRICLE: Size was normal  Systolic function was at the lower limits of   normal by visual assessment  Ejection fraction was estimated to be 50 %  There   were no regional wall motion abnormalities  Wall thickness was normal    RIGHT VENTRICLE: The ventricle was mildly dilated  Systolic function was   normal    LEFT ATRIUM: The atrium was mildly dilated  APPENDAGE: The appendage was    mildly   dilated  No thrombus was identified  DOPPLER: The function was mildly reduced   (mildly reduced emptying velocity)  ATRIAL SEPTUM: No defect or patent foramen ovale was identified  RIGHT ATRIUM: Size was normal    MITRAL VALVE: Valve structure was normal  There was normal leaflet separation  There was no echocardiographic evidence of vegetation  DOPPLER: There was    trace   regurgitation  AORTIC VALVE: The valve was trileaflet  Leaflets exhibited normal thickness    and   normal cuspal separation  There was no echocardiographic evidence of   vegetation  DOPPLER: There was no regurgitation  TRICUSPID VALVE: The valve structure was normal  There was normal leaflet   separation  There was no echocardiographic evidence of vegetation  DOPPLER:   There was trace regurgitation  PULMONIC VALVE: Leaflets exhibited normal thickness, no calcification, and   normal cuspal separation  There was no echocardiographic evidence of    vegetation  PERICARDIUM: A trace pericardial effusion was identified anterior to the    heart  ,  Pulmonary  Shortness of breath, No recent URI , Not oxygen dependent ,   Comment: CT Chest:  Redemonstration of innumerable lung nodules in perilymphatic distribution which have increased in number and size since 12/16/2019    New areas of peribronchovascular consolidations in the right middle lobe  Left upper lung and right lower lobe masses   have also increased in size  These findings are concerning for progression of disease in the chest      GI/Hepatic    GI malignancy,   Comment: Stage IV colon cancer s/p multiple rounds of chemo/radiation still with lung mets     Negative  ROS No chronic kidney disease ,        Endo/Other  Negative endo/other ROS      GYN       Hematology  No anemia ,  No coagulation disorder , Immunocompromised state ,    Musculoskeletal  Negative musculoskeletal ROS        Neurology  Negative neurology ROS      Psychology     Chronic pain,            Physical Exam    Airway    Mallampati score: II  TM Distance: >3 FB  Neck ROM: full     Dental       Cardiovascular      Pulmonary      Other Findings        Anesthesia Plan  ASA Score- 3     Anesthesia Type- general with ASA Monitors  Additional Monitors: arterial line  Airway Plan: ETT      Comment: Recent labs personally reviewed:  Lab Results       Component                Value               Date                       WBC                      5 19                01/22/2020                 HGB                      16 9                01/22/2020                 PLT                      158                 01/22/2020            Lab Results       Component                Value               Date                       NA                       140                 10/19/2015                 K                        4 4                 01/22/2020                 BUN                      11                  01/22/2020                 CREATININE               1 12                01/22/2020                 GLUCOSE                  92                  10/19/2015            Lab Results       Component                Value               Date                       PTT                      34                  02/20/2020             Lab Results       Component Value               Date                       INR                      0 97                02/20/2020              Blood type A    Given history of increasing dyspnea on exertion and lung pathology, did discuss post op intubation and ICU course  Carlos Plascencia MD, have personally seen and evaluated the patient prior to anesthetic care  I have reviewed the pre-anesthetic record, medical history, allergies, medications and any other medical records if appropriate to the anesthetic care  If a CRNA is involved in the case, I have reviewed the CRNA assessment, if present, and agree  I discussed the anesthetic plan and risks/benefits/alternatives with the patient including possible PONV, sore throat, and possibility of rare anesthetic and surgical emergencies        Plan Factors-  Patient did not smoke on day of surgery  Induction- intravenous  Postoperative Plan- Plan for postoperative opioid use  Planned trial extubation    Informed Consent- Anesthetic plan and risks discussed with patient  I personally reviewed this patient with the CRNA  Discussed and agreed on the Anesthesia Plan with the CRNA  Tresa Meek

## 2020-02-27 ENCOUNTER — APPOINTMENT (INPATIENT)
Dept: RADIOLOGY | Facility: HOSPITAL | Age: 60
DRG: 164 | End: 2020-02-27
Payer: MEDICARE

## 2020-02-27 LAB
ANION GAP SERPL CALCULATED.3IONS-SCNC: 6 MMOL/L (ref 4–13)
BUN SERPL-MCNC: 10 MG/DL (ref 5–25)
CALCIUM SERPL-MCNC: 8.5 MG/DL (ref 8.3–10.1)
CHLORIDE SERPL-SCNC: 103 MMOL/L (ref 100–108)
CO2 SERPL-SCNC: 26 MMOL/L (ref 21–32)
CREAT SERPL-MCNC: 1.01 MG/DL (ref 0.6–1.3)
ERYTHROCYTE [DISTWIDTH] IN BLOOD BY AUTOMATED COUNT: 13.3 % (ref 11.6–15.1)
GFR SERPL CREATININE-BSD FRML MDRD: 82 ML/MIN/1.73SQ M
GLUCOSE SERPL-MCNC: 170 MG/DL (ref 65–140)
HCT VFR BLD AUTO: 39.2 % (ref 36.5–49.3)
HGB BLD-MCNC: 13.2 G/DL (ref 12–17)
MAGNESIUM SERPL-MCNC: 2.2 MG/DL (ref 1.6–2.6)
MCH RBC QN AUTO: 33.2 PG (ref 26.8–34.3)
MCHC RBC AUTO-ENTMCNC: 33.7 G/DL (ref 31.4–37.4)
MCV RBC AUTO: 99 FL (ref 82–98)
PLATELET # BLD AUTO: 108 THOUSANDS/UL (ref 149–390)
PMV BLD AUTO: 9 FL (ref 8.9–12.7)
POTASSIUM SERPL-SCNC: 4.4 MMOL/L (ref 3.5–5.3)
RBC # BLD AUTO: 3.98 MILLION/UL (ref 3.88–5.62)
SODIUM SERPL-SCNC: 135 MMOL/L (ref 136–145)
WBC # BLD AUTO: 4.69 THOUSAND/UL (ref 4.31–10.16)

## 2020-02-27 PROCEDURE — 71046 X-RAY EXAM CHEST 2 VIEWS: CPT

## 2020-02-27 PROCEDURE — 99231 SBSQ HOSP IP/OBS SF/LOW 25: CPT | Performed by: THORACIC SURGERY (CARDIOTHORACIC VASCULAR SURGERY)

## 2020-02-27 PROCEDURE — 83735 ASSAY OF MAGNESIUM: CPT | Performed by: THORACIC SURGERY (CARDIOTHORACIC VASCULAR SURGERY)

## 2020-02-27 PROCEDURE — NC001 PR NO CHARGE: Performed by: PHYSICIAN ASSISTANT

## 2020-02-27 PROCEDURE — 94760 N-INVAS EAR/PLS OXIMETRY 1: CPT

## 2020-02-27 PROCEDURE — 85027 COMPLETE CBC AUTOMATED: CPT | Performed by: PHYSICIAN ASSISTANT

## 2020-02-27 PROCEDURE — 80048 BASIC METABOLIC PNL TOTAL CA: CPT | Performed by: THORACIC SURGERY (CARDIOTHORACIC VASCULAR SURGERY)

## 2020-02-27 RX ADMIN — POLYETHYLENE GLYCOL 3350 17 G: 17 POWDER, FOR SOLUTION ORAL at 08:52

## 2020-02-27 RX ADMIN — GABAPENTIN 300 MG: 300 CAPSULE ORAL at 08:52

## 2020-02-27 RX ADMIN — PANTOPRAZOLE SODIUM 40 MG: 40 TABLET, DELAYED RELEASE ORAL at 05:02

## 2020-02-27 RX ADMIN — ACETAMINOPHEN 975 MG: 325 TABLET ORAL at 05:02

## 2020-02-27 RX ADMIN — ENOXAPARIN SODIUM 40 MG: 40 INJECTION SUBCUTANEOUS at 08:52

## 2020-02-27 RX ADMIN — DOCUSATE SODIUM 100 MG: 100 CAPSULE, LIQUID FILLED ORAL at 17:30

## 2020-02-27 RX ADMIN — GABAPENTIN 300 MG: 300 CAPSULE ORAL at 21:31

## 2020-02-27 RX ADMIN — ACETAMINOPHEN 975 MG: 325 TABLET ORAL at 17:30

## 2020-02-27 RX ADMIN — SODIUM CHLORIDE, SODIUM LACTATE, POTASSIUM CHLORIDE, AND CALCIUM CHLORIDE 100 ML/HR: .6; .31; .03; .02 INJECTION, SOLUTION INTRAVENOUS at 05:01

## 2020-02-27 RX ADMIN — SENNOSIDES 8.6 MG: 8.6 TABLET, FILM COATED ORAL at 08:52

## 2020-02-27 RX ADMIN — ACETAMINOPHEN 975 MG: 325 TABLET ORAL at 11:51

## 2020-02-27 RX ADMIN — GABAPENTIN 300 MG: 300 CAPSULE ORAL at 16:50

## 2020-02-27 RX ADMIN — ACETAMINOPHEN 975 MG: 325 TABLET ORAL at 23:26

## 2020-02-27 RX ADMIN — DOCUSATE SODIUM 100 MG: 100 CAPSULE, LIQUID FILLED ORAL at 08:52

## 2020-02-27 NOTE — PLAN OF CARE
Problem: Potential for Falls  Goal: Patient will remain free of falls  Description  INTERVENTIONS:  - Assess patient frequently for physical needs  -  Identify cognitive and physical deficits and behaviors that affect risk of falls    -  [place] fall precautions as indicated by assessment   - Educate patient/family on patient safety including physical limitations  - Instruct patient to call for assistance with activity based on assessment  - Modify environment to reduce risk of injury  - Consider OT/PT consult to assist with strengthening/mobility  Outcome: Progressing     Problem: PAIN - ADULT  Goal: Verbalizes/displays adequate comfort level or baseline comfort level  Description  Interventions:  - Encourage patient to monitor pain and request assistance  - Assess pain using appropriate pain scale  - Administer analgesics based on type and severity of pain and evaluate response  - Implement non-pharmacological measures as appropriate and evaluate response  - Consider cultural and social influences on pain and pain management  - Notify physician/advanced practitioner if interventions unsuccessful or patient reports new pain  Outcome: Progressing     Problem: INFECTION - ADULT  Goal: Absence or prevention of progression during hospitalization  Description  INTERVENTIONS:  - Assess and monitor for signs and symptoms of infection  - Monitor lab/diagnostic results  - Monitor all insertion sites, i e  indwelling lines, tubes, and drains  - Monitor endotracheal if appropriate and nasal secretions for changes in amount and color  - [place] appropriate cooling/warming therapies per order  - Administer medications as ordered  - Instruct and encourage patient and family to use good hand hygiene technique  - Identify and instruct in appropriate isolation precautions for identified infection/condition  Outcome: Progressing  Goal: Absence of fever/infection during neutropenic period  Description  INTERVENTIONS:  - Monitor WBC    Outcome: Progressing     Problem: SAFETY ADULT  Goal: Maintain or return to baseline ADL function  Description  INTERVENTIONS:  -  Assess patient's ability to carry out ADLs; assess patient's baseline for ADL function and identify physical deficits which impact ability to perform ADLs (bathing, care of mouth/teeth, toileting, grooming, dressing, etc )  - Assess/evaluate cause of self-care deficits   - Assess range of motion  - Assess patient's mobility; develop plan if impaired  - Assess patient's need for assistive devices and provide as appropriate  - Encourage maximum independence but intervene and supervise when necessary  - Involve family in performance of ADLs  - Assess for home care needs following discharge   - Consider OT consult to assist with ADL evaluation and planning for discharge  - Provide patient education as appropriate  Outcome: Progressing  Goal: Maintain or return mobility status to optimal level  Description  INTERVENTIONS:  - Assess patient's baseline mobility status (ambulation, transfers, stairs, etc )    - Identify cognitive and physical deficits and behaviors that affect mobility  - Identify mobility aids required to assist with transfers and/or ambulation (gait belt, sit-to-stand, lift, walker, cane, etc )  - [place] fall precautions as indicated by assessment  - Record patient progress and toleration of activity level on Mobility SBAR; progress patient to next Phase/Stage  - Instruct patient to call for assistance with activity based on assessment  - Consider rehabilitation consult to assist with strengthening/weightbearing, etc   Outcome: Progressing     Problem: DISCHARGE PLANNING  Goal: Discharge to home or other facility with appropriate resources  Description  INTERVENTIONS:  - Identify barriers to discharge w/patient and caregiver  - Arrange for needed discharge resources and transportation as appropriate  - Identify discharge learning needs (meds, wound care, etc )  - Arrange for interpretive services to assist at discharge as needed  - Refer to Case Management Department for coordinating discharge planning if the patient needs post-hospital services based on physician/advanced practitioner order or complex needs related to functional status, cognitive ability, or social support system  Outcome: Progressing     Problem: Knowledge Deficit  Goal: Patient/family/caregiver demonstrates understanding of disease process, treatment plan, medications, and discharge instructions  Description  Complete learning assessment and assess knowledge base    Interventions:  - Provide teaching at level of understanding  - Provide teaching via preferred learning methods  Outcome: Progressing

## 2020-02-27 NOTE — UTILIZATION REVIEW
Initial Clinical Review    Elective surgical procedure  Age/Sex: 63 y o  male  Surgery Date: 02/26/2020  Procedure: THORACOSCOPY VIDEO ASSISTED SURGERY (VATS) (Right)  RESECTION WEDGE LUNG (Right)  Anesthesia: General  Operative Findings: Colon cancer metastasized to lung ([address] ) [C18 9, C78 00]  Mr Adama Lozano is a 60-year-old with known stage IV colon cancer multiple pulmonary metastasectomy is  He recently presented with a miliary pattern worrisome for more metastatic colon cancer and he is brought to the operating room for wedge resection  POD#1 Progress Note: s/p R VATS, wedge resection on 2/26  R CT x1 to water seal,150 cc of serosanguinous output   - AL  Plan:  f/u AM CXR, poss d/c R CT today  Pain control  Regular diet  IS   Pulmonary toilet  DVT ppx  Admission Orders: Date/Time/Statement: Admission Orders (From admission, onward)     Ordered        02/26/20 1659  Inpatient Admission  Once                   Orders Placed This Encounter   Procedures    Inpatient Admission     Standing Status:   Standing     Number of Occurrences:   1     Order Specific Question:   Admitting Physician     Answer:   Margaret Sepulveda [[id number]]     Order Specific Question:   Level of Care     Answer:   Med Surg [[id number]]     Order Specific Question:   Estimated length of stay     Answer:   Inpatient Only Surgery     Vital Signs: /55 (BP Location: Right arm)   Pulse (!) 46   Temp 97 6 °F (36 4 °C) (Oral)   Resp 20   Ht 6' 5" (1 956 m)   Wt 102 kg (225 lb)   SpO2 (!) 74%   BMI 26 68 kg/m²   Diet: Regular  Mobility: A,bulate TID  TELM  Chest Tube to water seal  Elevate HOB  O2 @ 2L, maintain P O   At least 92%  DVT Prophylaxis: [place]in SCDs  Medications/Pain Control:   Scheduled Medications:  Medications:  acetaminophen 975 mg Oral Q6H   docusate sodium 100 mg Oral BID   enoxaparin 40 mg Subcutaneous Daily   gabapentin 300 mg Oral TID   influenza vaccine 0 5 mL Intramuscular Once   pantoprazole 40 mg Oral Early Morning polyethylene glycol 17 g Oral Daily   senna 1 tablet Oral Daily   Continuous IV Infusions:  lactated ringers 100 mL/hr Intravenous Continuous   PRN Meds:  albuterol 2 puff Inhalation Q4H PRN   bisacodyl 10 mg Oral Daily PRN   HYDROmorphone 0 5 mg Intravenous Q2H PRN   ondansetron 4 mg Intravenous Q6H PRN   oxyCODONE 5 mg Oral Q4H PRN         Network Utilization Review Department  [email or web address]  ATTENTION: Please call with any questions or concerns to [phone] and carefully listen to the prompts so that you are directed to the right person  All voicemails are confidential   Margaret Scruggs all requests for admission clinical reviews, approved or denied determinations and any other requests to dedicated fax number below belonging to the campus where the patient is receiving treatment   List of dedicated fax numbers for the Facilities:  [address] DENIALS (Administrative/Medical Necessity) [phone]   [address] (Maternity/NICU/Pediatrics) [phone]   Va Burt [phone]   [facility] [phone]   Sonia Arteaga [phone]   Shirley Osorio [phone]   1202 [facility] 1525 [facility] [phone]   Eliza Martin [phone]   2205 [facility]  2401 [facility] 1000 [address] [facility] [phone]

## 2020-02-27 NOTE — RESPIRATORY THERAPY NOTE
RT Protocol Note  Swetha Toure 63 y o  male MRN: [id number]  Unit/Bed#: [facility] 421-01 Encounter: [id number]    Assessment    Principal Problem:    Colon cancer metastasized to lung [facility])      Home Pulmonary Medications:  none       Past Medical History:   Diagnosis Date    Atrial fibrillation ([facility] )     [facility] )     Colon cancer ([facility] )     Metastatic carcinoma to lung (HCC)     Shortness of breath      Social History     Socioeconomic History    Marital status: /Civil Union     Spouse name: None    Number of children: None    Years of education: None    Highest education level: None   Occupational History    None   Social Needs    Financial resource strain: None    Food insecurity:     Worry: None     Inability: None    Transportation needs:     Medical: None     Non-medical: None   Tobacco Use    Smoking status: Former Smoker     Years: 10 00     Types: Cigars     Start date: 2000     Last attempt to quit: 2010     Years since quitting: 10 1    Smokeless tobacco: Never Used    Tobacco comment: Cigars    Substance and Sexual Activity    Alcohol use:  Yes     Alcohol/week: 8 0 standard drinks     Types: 8 Cans of beer per week     Frequency: 2-3 times a week     Drinks per session: 1 or 2     Binge frequency: Never    Drug use: No    Sexual activity: None   Lifestyle    Physical activity:     Days per week: None     Minutes per session: None    Stress: None   Relationships    Social connections:     Talks on phone: None     Gets together: None     Attends Holiness service: None     Active member of club or organization: None     Attends meetings of clubs or organizations: None     Relationship status: None    Intimate partner violence:     Fear of current or ex partner: None     Emotionally abused: None     Physically abused: None     Forced sexual activity: None   Other Topics Concern    None   Social History Narrative    Patient not questioned about family hx Subjective         Objective    Physical Exam:   Assessment Type: Pre-treatment  General Appearance: Awake, Alert  Respiratory Pattern: Normal  Chest Assessment: Chest expansion symmetrical  Bilateral Breath Sounds: Diminished    Vitals:  Blood pressure 103/55, pulse 58, temperature 97 8 °F (36 6 °C), temperature source Oral, resp  rate 16, height 6' 5" (1 956 m), weight 102 kg (225 lb), SpO2 97 %  Imaging and other studies: I have personally reviewed pertinent reports  Plan       Airway Clearance Plan: Incentive Spirometer     Resp Comments: (P) 63 yr old male S/P Right vats found resting on 2 liters in no distress, bryanna bs diminshed, npc, x-ray report na, Pt has no hx of copd or asthma but will be started on IS Q1 WA per ACP

## 2020-02-27 NOTE — PROGRESS NOTES
Progress Note - Thoracic Surgery   Paulie Zimmerman 63 y o  male MRN: [id number]  Unit/Bed#: [facility] 421-01 Encounter: [id number]    Assessment:  64 y/o M s/p R VATS, wedge resection on 2/26  --R CT x1 to water seal,150 cc of serosanguinous output, -AL    Plan:  --f/u AM CXR, poss d/c R CT today   --Pain control  --Regular diet  --IS, pulmonary toilet  --DVT ppx    Subjective/Objective     Subjective:     No acute events overnight  Pt doing well this AM, denies any complaints  Objective:     Blood pressure 97/58, pulse (!) 53, temperature 97 5 °F (36 4 °C), temperature source Oral, resp  rate 18, height 6' 5" (1 956 m), weight 102 kg (225 lb), SpO2 96 %  ,Body mass index is 26 68 kg/m²  Intake/Output Summary (Last 24 hours) at 2/27/2020 0450  Last data filed at 2/27/2020 0250  Gross per 24 hour   Intake 3516 67 ml   Output 1950 ml   Net 1566 67 ml       Invasive Devices     Central Venous Catheter Line            Port A Cath Right Subclavian -- days          Peripheral Intravenous Line            Peripheral IV 02/26/20 Left Forearm less than 1 day    Peripheral IV 02/26/20 Left Hand less than 1 day          Drain            Chest Tube 1 Right Pleural 24 Fr  less than 1 day                Physical Exam:    GEN: NAD  HEENT: MMM  Chest: R CT to water seal with serosanguinous output, -AL  CV: RRR  Lung: normal effort  Ab: Soft, NT/ND  Extrem: No CCE  Neuro:  A+Ox3, motor and sensation grossly intact    Lab, Imaging and other studies:Coagulation: No results found for: PT, INR, APTT, Urinalysis: No results found for: COLORU, CLARITYU, SPECGRAV, PHUR, LEUKOCYTESUR, NITRITE, PROTEINUA, GLUCOSEU, KETONESU, BILIRUBINUR, BLOODU, Amylase: No results found for: AMYLASE, Lipase: No results found for: LIPASE  VTE Pharmacologic Prophylaxis: Enoxaparin (Lovenox)  VTE Mechanical Prophylaxis: sequential compression device

## 2020-02-27 NOTE — PROGRESS NOTES
02/27/20    Procedure: Chest tube removal     right  chest tube removed in routine fashion without incident  The patient tolerated the procedure well  A dry, sterile dressing was placed  Will check a pa/lat chest x-ray       Rita Green PA-C

## 2020-02-27 NOTE — SOCIAL WORK
CM obtained all the following info about the pt  HOME: Pt resides in a [employer] w/ no steps inside and 1 step to enter at front door  LIVES W/: Wife only  : Pt's wife Shelby Osman / c: [phone]  INDEPENDENCE: Pt reported he is normally fully independent at baseline w/ ambulating and performing his ADLS  DME: None reported  HHC: No hx reported  I/P REHAB: No hx reported  MENTAL HEALTH ISSUES: No hx reported  D&A ISSUES: No hx reported  PCP: Dr Dallin Paulson through [facility]  PHARMACY: [facility] located in [place] on [address] [address]  INCOME SOURCE: Social Security Disability (SSDI) benefits  INSURANCE: Medicare for primary coverage, [employer] supplemental plan for secondary coverage  MEDICAL POA: No one is pre-designated / Pt's wife is POA by legal default if needed  TRANSPORTATION AT D/C: Pt's wife will provide transport  CM reviewed d/c planning process including the following: identifying help at home, patient preference for d/c planning needs, Discharge Lounge, [facility] Meds to Bed program, availability of treatment team to discuss questions or concerns patient and/or family may have regarding understanding medications and recognizing signs and symptoms once discharged  CM also encouraged patient to follow up with all recommended appointments after discharge  Patient advised of importance for patient and family to participate in managing patients medical well being  Patient/caregiver received discharge checklist  Content reviewed  Patient/caregiver encouraged to participate in discharge plan of care prior to discharge home

## 2020-02-27 NOTE — RESTORATIVE TECHNICIAN NOTE
Restorative Specialist Mobility Note       Activity: Ambulate in waggoner, Chair     Assistive Device: None

## 2020-02-27 NOTE — PROGRESS NOTES
PGY2 Post-Op Check Note     S:    Pt denies any complaints post-op  On 2L NC      O:     Vitals:    02/26/20 2000   BP:    Pulse:    Resp:    Temp:    SpO2: 97%        I/O       02/25 0701 - 02/26 0700 02/26 0701 - 02/27 0700    I V  (mL/kg)  2350 (23)    IV Piggyback  250    Total Intake(mL/kg)  2600 (25 5)    Chest Tube  115    Total Output  115    Net  +2485                PE:  GEN: NAD  HEENT: MMM  Chest: R CT to water seal with serosanguinous output, -AL  CV: RRR  Lung: normal effort  Ab: Soft, NT/ND  Extrem: No CCE  Neuro:  A+Ox3, motor and sensation grossly intact       Lab Results   Component Value Date    WBC 5 19 01/22/2020    HGB 16 9 01/22/2020    HCT 47 3 01/22/2020    MCV 94 01/22/2020     01/22/2020     Lab Results   Component Value Date    GLUCOSE 92 10/19/2015    CALCIUM 9 5 01/22/2020     10/19/2015    K 4 4 01/22/2020    CO2 30 01/22/2020    CL 94 (L) 01/22/2020    BUN 11 01/22/2020    CREATININE 1 12 01/22/2020           A/P:   64 y/o M s/p R VATS, wedge resection  --R CT x1 to water seal, -AL  --Pain control  --Regular diet  --IS, pulmonary toilet  --DVT ppx

## 2020-02-27 NOTE — PLAN OF CARE
Problem: Potential for Falls  Goal: Patient will remain free of falls  Description  INTERVENTIONS:  - Assess patient frequently for physical needs  -  Identify cognitive and physical deficits and behaviors that affect risk of falls    -  [place] fall precautions as indicated by assessment   - Educate patient/family on patient safety including physical limitations  - Instruct patient to call for assistance with activity based on assessment  - Modify environment to reduce risk of injury  - Consider OT/PT consult to assist with strengthening/mobility  2/26/2020 2303 by Maribell Leroy RN  Outcome: Progressing  2/26/2020 2005 by Maribell Leroy RN  Outcome: Progressing     Problem: PAIN - ADULT  Goal: Verbalizes/displays adequate comfort level or baseline comfort level  Description  Interventions:  - Encourage patient to monitor pain and request assistance  - Assess pain using appropriate pain scale  - Administer analgesics based on type and severity of pain and evaluate response  - Implement non-pharmacological measures as appropriate and evaluate response  - Consider cultural and social influences on pain and pain management  - Notify physician/advanced practitioner if interventions unsuccessful or patient reports new pain  2/26/2020 2303 by Maribell Leroy RN  Outcome: Progressing  2/26/2020 2005 by Maribell Leroy RN  Outcome: Progressing     Problem: INFECTION - ADULT  Goal: Absence or prevention of progression during hospitalization  Description  INTERVENTIONS:  - Assess and monitor for signs and symptoms of infection  - Monitor lab/diagnostic results  - Monitor all insertion sites, i e  indwelling lines, tubes, and drains  - Monitor endotracheal if appropriate and nasal secretions for changes in amount and color  - [place] appropriate cooling/warming therapies per order  - Administer medications as ordered  - Instruct and encourage patient and family to use good hand hygiene technique  - Identify and instruct in appropriate isolation precautions for identified infection/condition  2/26/2020 2303 by David Singh RN  Outcome: Progressing  2/26/2020 2005 by David Singh RN  Outcome: Progressing  Goal: Absence of fever/infection during neutropenic period  Description  INTERVENTIONS:  - Monitor WBC    2/26/2020 2303 by David Singh RN  Outcome: Progressing  2/26/2020 2005 by David Singh RN  Outcome: Progressing     Problem: SAFETY ADULT  Goal: Maintain or return to baseline ADL function  Description  INTERVENTIONS:  -  Assess patient's ability to carry out ADLs; assess patient's baseline for ADL function and identify physical deficits which impact ability to perform ADLs (bathing, care of mouth/teeth, toileting, grooming, dressing, etc )  - Assess/evaluate cause of self-care deficits   - Assess range of motion  - Assess patient's mobility; develop plan if impaired  - Assess patient's need for assistive devices and provide as appropriate  - Encourage maximum independence but intervene and supervise when necessary  - Involve family in performance of ADLs  - Assess for home care needs following discharge   - Consider OT consult to assist with ADL evaluation and planning for discharge  - Provide patient education as appropriate  2/26/2020 2303 by David Singh RN  Outcome: Progressing  2/26/2020 2005 by David Singh RN  Outcome: Progressing  Goal: Maintain or return mobility status to optimal level  Description  INTERVENTIONS:  - Assess patient's baseline mobility status (ambulation, transfers, stairs, etc )    - Identify cognitive and physical deficits and behaviors that affect mobility  - Identify mobility aids required to assist with transfers and/or ambulation (gait belt, sit-to-stand, lift, walker, cane, etc )  - [place] fall precautions as indicated by assessment  - Record patient progress and toleration of activity level on Mobility SBAR; progress patient to next Phase/Stage  - Instruct patient to call for assistance with activity based on assessment  - Consider rehabilitation consult to assist with strengthening/weightbearing, etc   2/26/2020 2303 by Maribell Leroy RN  Outcome: Progressing  2/26/2020 2005 by Maribell Leroy RN  Outcome: Progressing     Problem: DISCHARGE PLANNING  Goal: Discharge to home or other facility with appropriate resources  Description  INTERVENTIONS:  - Identify barriers to discharge w/patient and caregiver  - Arrange for needed discharge resources and transportation as appropriate  - Identify discharge learning needs (meds, wound care, etc )  - Arrange for interpretive services to assist at discharge as needed  - Refer to Case Management Department for coordinating discharge planning if the patient needs post-hospital services based on physician/advanced practitioner order or complex needs related to functional status, cognitive ability, or social support system  2/26/2020 2303 by Maribell Leroy RN  Outcome: Progressing  2/26/2020 2005 by Maribell Leroy RN  Outcome: Progressing     Problem: Knowledge Deficit  Goal: Patient/family/caregiver demonstrates understanding of disease process, treatment plan, medications, and discharge instructions  Description  Complete learning assessment and assess knowledge base  Interventions:  - Provide teaching at level of understanding  - Provide teaching via preferred learning methods  2/26/2020 2303 by Maribell Leroy RN  Outcome: Progressing  2/26/2020 2005 by Maribell Leroy RN  Outcome: Progressing     Problem: Nutrition/Hydration-ADULT  Goal: Nutrient/Hydration intake appropriate for improving, restoring or maintaining nutritional needs  Description  Monitor and assess patient's nutrition/hydration status for malnutrition  Collaborate with interdisciplinary team and initiate plan and interventions as ordered  Monitor patient's weight and dietary intake as ordered or per policy  Utilize nutrition screening tool and intervene as necessary  Determine patient's food preferences and provide high-protein, high-caloric foods as appropriate       INTERVENTIONS:  - Monitor oral intake, urinary output, labs, and treatment plans  - Assess nutrition and hydration status and recommend course of action  - Evaluate amount of meals eaten  - Assist patient with eating if necessary   - Allow adequate time for meals  - Recommend/ encourage appropriate diets, oral nutritional supplements, and vitamin/mineral supplements  - Order, calculate, and assess calorie counts as needed  - Recommend, monitor, and adjust tube feedings and TPN/PPN based on assessed needs  - Assess need for intravenous fluids  - Provide specific nutrition/hydration education as appropriate  - Include patient/family/caregiver in decisions related to nutrition  Outcome: Progressing

## 2020-02-27 NOTE — RESTORATIVE TECHNICIAN NOTE
Restorative Specialist Mobility Note       Activity: Ambulate in waggoner, Chair     Assistive Device: Other (Comment)(pushed IV pole)

## 2020-02-28 ENCOUNTER — APPOINTMENT (INPATIENT)
Dept: RADIOLOGY | Facility: HOSPITAL | Age: 60
DRG: 164 | End: 2020-02-28
Payer: MEDICARE

## 2020-02-28 VITALS
OXYGEN SATURATION: 98 % | SYSTOLIC BLOOD PRESSURE: 121 MMHG | WEIGHT: 225 LBS | RESPIRATION RATE: 18 BRPM | HEART RATE: 59 BPM | BODY MASS INDEX: 26.57 KG/M2 | HEIGHT: 77 IN | TEMPERATURE: 98.1 F | DIASTOLIC BLOOD PRESSURE: 73 MMHG

## 2020-02-28 PROCEDURE — 71045 X-RAY EXAM CHEST 1 VIEW: CPT

## 2020-02-28 PROCEDURE — NC001 PR NO CHARGE: Performed by: PHYSICIAN ASSISTANT

## 2020-02-28 PROCEDURE — 99238 HOSP IP/OBS DSCHRG MGMT 30/<: CPT | Performed by: THORACIC SURGERY (CARDIOTHORACIC VASCULAR SURGERY)

## 2020-02-28 PROCEDURE — 94618 PULMONARY STRESS TESTING: CPT

## 2020-02-28 RX ORDER — LIDOCAINE HYDROCHLORIDE 10 MG/ML
10 INJECTION, SOLUTION EPIDURAL; INFILTRATION; INTRACAUDAL; PERINEURAL ONCE
Status: COMPLETED | OUTPATIENT
Start: 2020-02-28 | End: 2020-02-28

## 2020-02-28 RX ORDER — ACETAMINOPHEN 325 MG/1
650 TABLET ORAL EVERY 6 HOURS
Qty: 56 TABLET | Refills: 0 | Status: SHIPPED | OUTPATIENT
Start: 2020-02-28 | End: 2020-03-06

## 2020-02-28 RX ORDER — LANOLIN ALCOHOL/MO/W.PET/CERES
9 CREAM (GRAM) TOPICAL ONCE
Status: COMPLETED | OUTPATIENT
Start: 2020-02-28 | End: 2020-02-28

## 2020-02-28 RX ORDER — DOCUSATE SODIUM 100 MG/1
100 CAPSULE, LIQUID FILLED ORAL 2 TIMES DAILY
Qty: 10 CAPSULE | Refills: 0 | Status: SHIPPED | OUTPATIENT
Start: 2020-02-28

## 2020-02-28 RX ORDER — OXYCODONE HYDROCHLORIDE 5 MG/1
5 TABLET ORAL EVERY 4 HOURS PRN
Qty: 30 TABLET | Refills: 0 | Status: SHIPPED | OUTPATIENT
Start: 2020-02-28 | End: 2020-03-09

## 2020-02-28 RX ORDER — GABAPENTIN 300 MG/1
300 CAPSULE ORAL 3 TIMES DAILY
Qty: 63 CAPSULE | Refills: 0 | Status: SHIPPED | OUTPATIENT
Start: 2020-02-28 | End: 2020-07-16 | Stop reason: ALTCHOICE

## 2020-02-28 RX ADMIN — ACETAMINOPHEN 975 MG: 325 TABLET ORAL at 05:18

## 2020-02-28 RX ADMIN — ACETAMINOPHEN 975 MG: 325 TABLET ORAL at 13:40

## 2020-02-28 RX ADMIN — OXYCODONE HYDROCHLORIDE 5 MG: 5 TABLET ORAL at 08:46

## 2020-02-28 RX ADMIN — ENOXAPARIN SODIUM 40 MG: 40 INJECTION SUBCUTANEOUS at 08:47

## 2020-02-28 RX ADMIN — LIDOCAINE HYDROCHLORIDE 10 ML: 10 INJECTION, SOLUTION EPIDURAL; INFILTRATION; INTRACAUDAL; PERINEURAL at 08:15

## 2020-02-28 RX ADMIN — POLYETHYLENE GLYCOL 3350 17 G: 17 POWDER, FOR SOLUTION ORAL at 08:47

## 2020-02-28 RX ADMIN — PANTOPRAZOLE SODIUM 40 MG: 40 TABLET, DELAYED RELEASE ORAL at 05:18

## 2020-02-28 RX ADMIN — MELATONIN 9 MG: 3 TAB ORAL at 00:32

## 2020-02-28 RX ADMIN — SENNOSIDES 8.6 MG: 8.6 TABLET, FILM COATED ORAL at 08:47

## 2020-02-28 RX ADMIN — GABAPENTIN 300 MG: 300 CAPSULE ORAL at 08:46

## 2020-02-28 RX ADMIN — DOCUSATE SODIUM 100 MG: 100 CAPSULE, LIQUID FILLED ORAL at 08:47

## 2020-02-28 RX ADMIN — ALBUTEROL SULFATE 2 PUFF: 90 AEROSOL, METERED RESPIRATORY (INHALATION) at 01:03

## 2020-02-28 NOTE — SOCIAL WORK
Pt is recommended to have Home O2 for his aftercare needs  CM met w/ pt to discuss this recommendation  Pt reported he understands importance of O2 needs and is agreeable to recommendation  CM provided pt w/ freedom of choice for DME suppliers  Pt requested referral to [facility]  CM made Ecin referral to [facility] for Home O[facility]  CM to follow

## 2020-02-28 NOTE — PROGRESS NOTES
Cultivated a relationship of care and support  Pt declined  support       02/28/20 1300   Clinical Encounter Type   Visited With Patient   Routine Visit Introduction

## 2020-02-28 NOTE — RESPIRATORY THERAPY NOTE
Home Oxygen Qualifying Test       Patient name: Jennifer Nelson        : 1960   Date of Test:  2020  Diagnosis:      Home Oxygen Test:    **Medicare Guidelines require item(s) 1-5 on all ambulatory patients or 1 and 2 on non-ambulatory patients  1   Baseline SPO2 on Room Air at rest 91%  2   SPO2 during exercise on Room Air 83%  During exercise monitor SpO2  If SPO2 increases >=89% with ambulation do not add supplemental             oxygen  If <= 88% on room air add O2 via NC and titrate patient  Patient must be ambulated with O2 and titrated to > 88% with exertion  3   SPO2 on Oxygen at rest 92% 3 lpm     4   SPO2 during exercise on Oxygen 90-91% a liter flow of 3 lpm     5   Exercise performed:          walking, duration 6 (min), distance 400 (feet)          [x]  Supplemental Home Oxygen is indicated  []  Client does not qualify for home oxygen  Respiratory Additional Notes- Pt needed 3lpm of oxygen to maintain saturations around 90-91% while walking  Pts sats went to 93% on RA while ambulating       Mirta Body, RT

## 2020-02-28 NOTE — PROGRESS NOTES
Made [facility] aware that patient has crepitus underneath armpit above guaze site  Patient asymptomatic  Per [facility] notify them if patient becomes short of breath, chest pain, or needs to be placed on oxygen  Per [facility] chest xray will be done in morning  Will continue to monitor

## 2020-02-28 NOTE — DISCHARGE INSTRUCTIONS
Gently wash your incisions daily with soap and water, do not soak in a tub  Do not apply any lotions, creams, or ointments to incisions  No lifting over 10 lbs or strenuous exercise  No driving until seen at your post operative visit  The blue stitch will be removed at your post operative visit  Please obtain a pa/lat chest xray at a [facility] within 3 days of your follow up visit  You will be prescribed 3 medications to take at home, that should be taken exactly as directed  These have been shown to greatly improve post-operative pain:     1  Gabapentin 300mg tablet  Take 1 tablet by mouth 3x a day for 3 weeks  2    Tylenol 325mg tablet  Take 2 tablets by mouth, every 6 hours, for 1 week  You will also be prescribed a medication that you may take on an as needed basis:  Oxycodone 5mg tablet  Take 1-2 tablets every 4 hours as needed for pain

## 2020-02-28 NOTE — PROGRESS NOTES
Right chest tube site prepped with iodine and 5mL 1% lidocaine without epi was injected under the skin  Simple interrupted stitch placed with 0-Prolene suture  Sterile dressing applied       Amanda Gomez PA-C

## 2020-02-28 NOTE — PROGRESS NOTES
Progress Note - Thoracic Surgery   Tyler Bah 63 y o  male MRN: [id number]  Unit/Bed#: [facility] 421-01 Encounter: [id number]    Assessment:  POD#2 R VATS diagnostic wedge resection in setting of metastatic colon cancer  COPD    Plan: It is medically necessary for the patient to have supplemental oxygen upon discharge given his COPD and inability to maintain adequate ambulatory oxygen saturations  Chief Complaint: shortness of breath on exertion, fatigue    Objective:     Vitals: Blood pressure 116/65, pulse 62, temperature 98 1 °F (36 7 °C), temperature source Oral, resp  rate 18, height 6' 5" (1 956 m), weight 102 kg (225 lb), SpO2 91 %  ,Body mass index is 26 68 kg/m²

## 2020-02-28 NOTE — DISCHARGE SUMMARY
Discharge Summary - Thoracic Surgery   Steven Cockayne 63 y o  male MRN: [id number]  Unit/Bed#: [facility] 421-01 Encounter: [id number]    Admission Date:   Admission Orders (From admission, onward)     Ordered        02/26/20 1659  Inpatient Admission  Once                      Discharge Date: 2/28/20    Admitting Diagnosis: Colon cancer metastasized to lung [facility]) [C18 9, C78 00]    Discharge Diagnosis: as above    Resolved Problems  Date Reviewed: 2/28/2020    None          Attending: Dr Funmilayo Lai Physician(s): nutrition     Procedures Performed: Right thoracoscopic diagnostic wedge resection 2/26/20    Pathology: final pending    Hospital Course: Mr Cindy Padron is a 63year old man who underwent the above procedure 2/26/20  He tolerated it well and was transferred to [facility] unit with a chest tube to water seal  On POD#1, his pain was well controlled, and his chest tube demonstrated no air leak and little output  It was removed, and post-pull CXR demonstrated no pneumothorax  On POD#2, he worked with RT and qualified for home O2  An extra suture was placed at his chest tube site due to some pleural fluid leaking  He was deemed stable for discharge home with supplemental oxygen  He will follow up with us in 2 weeks in the office to discuss pathology  Condition at Discharge: good     Discharge instructions/Information to patient and family:   See after visit summary for information provided to patient and family  Provisions for Follow-Up Care:  See after visit summary for information related to follow-up care and any pertinent home health orders  Disposition: Home          Planned Readmission: No    Discharge Statement   I spent 20 minutes discharging the patient  This time was spent on the day of discharge  I had direct contact with the patient on the day of discharge  Additional documentation is required if more than 30 minutes were spent on discharge       Discharge Medications:  See after visit summary for reconciled discharge medications provided to patient and family

## 2020-02-28 NOTE — PROGRESS NOTES
Progress Note - Thoracic Surgery   Prudy Chalino 63 y o  male MRN: [id number]  Unit/Bed#: [facility] 421-01 Encounter: [id number]    Assessment:  64 y/o M s/p R VATS, wedge resection on 2/26, CT removal 2/27    Plan:  Diet Regular; Regular House  OOBTC, AAT  Pulmonary Toilet, IS  PPx: SQH  Pain and Nausea control PRN  Will place additional sutures to stop leakage of fluid  Plan to discharge home later today      Subjective/Objective     Subjective:  Overnight the patient had drainage from his right chest tube site status post removal   He also had developed some subcutaneous emphysema around the chest tube site  Patient was put on supplemental oxygen overnight for desaturation  Chest x-ray was ordered and there is no pneumothorax  Patient was checked on by myself multiple times overnight and patient continued to deny any worsening chest pain or shortness of breath  I replaced his dressing overnight with an occlusive dressing and this morning it it filled with serosanguineous fluid for which have been drained and placed a new dressing  Objective:     Blood pressure 111/63, pulse 55, temperature 97 5 °F (36 4 °C), temperature source Oral, resp  rate 18, height 6' 5" (1 956 m), weight 102 kg (225 lb), SpO2 97 %  ,Body mass index is 26 68 kg/m²        Intake/Output Summary (Last 24 hours) at 2/28/2020 0604  Last data filed at 2/27/2020 1200  Gross per 24 hour   Intake 1750 ml   Output 450 ml   Net 1300 ml       Invasive Devices     Central Venous Catheter Line            Port A Cath Right Subclavian -- days          Peripheral Intravenous Line            Peripheral IV 02/26/20 Left Forearm 1 day          Drain            Chest Tube 1 Right Pleural 24 Fr  1 day                Physical Exam:  Gen: NAD, A&O, Comfortable   Chest: Normal work of breathing, right chest incision CDI, right prior chest tube site with serosanguineous fluid drainage, U stitch suture intact  Abd: S, ND, NT  Ext: No edema  Skin: warm, dry, intact    Lab, Imaging and other studies:Coagulation: No results found for: PT, INR, APTT, Urinalysis: No results found for: COLORU, CLARITYU, SPECGRAV, PHUR, LEUKOCYTESUR, NITRITE, PROTEINUA, GLUCOSEU, KETONESU, BILIRUBINUR, BLOODU, Amylase: No results found for: AMYLASE, Lipase: No results found for: LIPASE  VTE Pharmacologic Prophylaxis: Enoxaparin (Lovenox)  VTE Mechanical Prophylaxis: sequential compression device

## 2020-03-02 ENCOUNTER — TELEPHONE (OUTPATIENT)
Dept: CARDIAC SURGERY | Facility: CLINIC | Age: 60
End: 2020-03-02

## 2020-03-02 NOTE — TELEPHONE ENCOUNTER
1  Constipation: (Yes: Colace 100 mg BID, Senokot 2 tabs QHS or Senokot S 2 tabs qhs   No: Dulcolax/Miralax/suppository/enema)  Yes, patient has bought Mirolax to try  Advised to call if no relief  2    Pain Management: (Oxycodone 5-10 mg prn, Tylenol 650 mg q6 hrs and +/- Advil 600 mg TID for 7 days  Neurontin 300 mg TID for 21 days)  Patient taking tylenol, encouraged use of Gabapentin  3   Fever: (Chills? Call for temp >100 4 )  Denies fever or chills  4   Cough: (Dry, productive?)  Minimal      5  Shortness of breath: (At rest, with activity?)  Using oxygen with sleeping and exertion  6   Appetite:   Fair      7  Incisions: (Warmth, redness, drainage? May shower, no baths  No creams, lotions, or ointments to incisions)  Patient reports incisions clean and dry  Denies any redness  8   Ambulation/Incentive Spirometry: (Any activity? Use IS every 15 min) Using IS and walking  Reviewed post-op appointment date, time and need for CXR  Advised to call for temp >100 4, chills, increase SOB or cough

## 2020-03-03 ENCOUNTER — OFFICE VISIT (OUTPATIENT)
Dept: HEMATOLOGY ONCOLOGY | Facility: CLINIC | Age: 60
End: 2020-03-03
Payer: MEDICARE

## 2020-03-03 VITALS
DIASTOLIC BLOOD PRESSURE: 74 MMHG | HEART RATE: 81 BPM | SYSTOLIC BLOOD PRESSURE: 130 MMHG | WEIGHT: 235 LBS | HEIGHT: 76 IN | OXYGEN SATURATION: 92 % | BODY MASS INDEX: 28.62 KG/M2 | RESPIRATION RATE: 16 BRPM | TEMPERATURE: 97.8 F

## 2020-03-03 DIAGNOSIS — G44.89 OTHER HEADACHE SYNDROME: ICD-10-CM

## 2020-03-03 DIAGNOSIS — R11.2 CHEMOTHERAPY INDUCED NAUSEA AND VOMITING: Primary | ICD-10-CM

## 2020-03-03 DIAGNOSIS — T45.1X5A CHEMOTHERAPY INDUCED NAUSEA AND VOMITING: Primary | ICD-10-CM

## 2020-03-03 PROCEDURE — 99213 OFFICE O/P EST LOW 20 MIN: CPT | Performed by: INTERNAL MEDICINE

## 2020-03-03 NOTE — PROGRESS NOTES
[facility]  [address] [facility]  [place] [address] [address]  [address],1960, [id number]  03/03/20    Discussion:   In summary, this is a 41-year-old male history of advanced colon cancer on ongoing chemotherapy  He has headaches was fluctuate with his chemotherapy administration  He also has persistent nausea despite trying a numerous antiemetics  His symptoms are certainly amenable to potential improvement with medical marijuana  The patient qualifies for use of MMJ in the [facility] by having the following medical condition - nausea, headaches  From a palliative care stand point the patient is suffering with nausea, headaches, cancer  These symptoms and side effects might be alleviated with use of MMJ products  The patient registered online  The patient read and I reviewed the informed consent document with the patient  I answered all questions related to it before the patient signed it  The patient's medical certification was completed on this date  The patient was given a signed copy of the informed consent and medical certification  I issued a certification for MMJ use with palliative intent -  To help alleviate cancer related symptoms and cancer treatment related side effects  I do not endorse the belief that MMJ can treat cancer and strongly encouraged the patient to continue treatments and surveillance as recommend by cancer specialists  I discussed the above with the patient  The patient and his wife voiced understanding and agreement   ______________________________________________________________________    No chief complaint on file  HPI:     Colon cancer metastasized to lung [facility])    2008 Initial Diagnosis     Colon cancer metastasized to lung [facility])      2008 - 2009 Chemotherapy     FOLFOX for two 6 month courses (3 month break in-between courses, according to the patient)       - Dr Belle Hackett 2/18/2008 Surgery     Partial colectomy/Right hemicolectomy:  - stage IV (pT3,pN2,pM1a, G2)    - Dr Joseph Ram        4/21/2010 Surgery     Right lower lobe lung nodule:  - metastatic colonic adenocarcinoma      8/19/2010 Surgery     Left lower lobe wedge resection:  - moderately differentiated adenocarcinoma, consistent with metastasis from known colonic primary, 1 3 cm      2011 Surgery     Transverse colectomy      4/2011 - 10/2011 Chemotherapy     Avastin and FOLFIRI       11/9/2011 Surgery     RAZ lung wedge resection:  - metastatic adenocarcinoma of the colon  - invasive carcinoma is 0 4 cm from parenchymal resection      2012 Surgery     Wedge resection for right lung nodule      11/29/2012 Surgery     I  [id number] (11/29/12)   Omental nodule, excision:   - Metastatic adenocarcinoma morphologically consistent with colonic primary  1/15/2014 Surgery     A  Lung, right lower lobe, wedge resection:   - 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, moderately to poorly differentiated, consistent with colorectal primary, see note  - Resection margin, no tumor seen  - Background lung with few non-necrotizing granulomas and emphysematous changes  3/4/2014 - 3/14/2014 Radiation     Plan ID Energy Fractions Dose per Fraction (cGy) Total Dose Delivered (cGy) Elapsed Days   SBRT Lt Lung 6X 5 / 5 1,000 5,000 8     - Dr Claudy Gregg      4/14/2015 Surgery     Left lower lobe lobectomy, cervical mediastinoscopy, Left posterolateral thoracotomy:  A   Lymph node Level 2R:    - 3/3 lymph nodes, positive for metastatic colonic adenocarcinoma       BShelba Geoffrey node Level 7:   - 3/8 lymph nodes, positive for metastatic colonic adenocarcinoma       C   Left lower lobe:   - Metastatic colonic adenocarcinoma    - The vascular and bronchial margins of resection are negative for tumor    - 0/6 lymph nodes, negative for metastatic colonic adenocarcinoma     - The tumor is 0 5 cm away from the hilar staple line       6/2015 - 2/2017 Chemotherapy     Xeloda and Avastin      9/2018 - 5/2019 Chemotherapy     Lonsurf      8/13/2019 - 9/9/2019 Chemotherapy     bevacizumab (AVASTIN) 500 mg in sodium chloride 0 9 % 100 mL IVPB, 505 mg, Intravenous, Once, 1 of 11 cycles  Administration: 500 mg (8/27/2019)  irinotecan (CAMPTOSAR) 340 mg in dextrose 5 % 500 mL chemo infusion, 330 mg (83 3 % of original dose 180 mg/m2), Intravenous, Once, 2 of 12 cycles  Dose modification: 150 mg/m2 (original dose 180 mg/m2, Cycle 1, Reason: Dose Not Tolerated)  Administration: 340 mg (8/13/2019), 340 mg (8/27/2019)  oxaliplatin (ELOXATIN) 200 mg in dextrose 5 % 250 mL chemo infusion, 198 05 mg, Intravenous, Once, 2 of 12 cycles  Administration: 200 mg (8/13/2019), 200 mg (8/27/2019)        Malignant neoplasm of ascending colon ([facility] [address] )    2/5/2018 Initial Diagnosis     Malignant neoplasm of ascending colon ([facility] [address] )      8/13/2019 - 9/9/2019 Chemotherapy     bevacizumab (AVASTIN) 500 mg in sodium chloride 0 9 % 100 mL IVPB, 505 mg, Intravenous, Once, 1 of 11 cycles  Administration: 500 mg (8/27/2019)  irinotecan (CAMPTOSAR) 340 mg in dextrose 5 % 500 mL chemo infusion, 330 mg (83 3 % of original dose 180 mg/m2), Intravenous, Once, 2 of 12 cycles  Dose modification: 150 mg/m2 (original dose 180 mg/m2, Cycle 1, Reason: Dose Not Tolerated)  Administration: 340 mg (8/13/2019), 340 mg (8/27/2019)  oxaliplatin (ELOXATIN) 200 mg in dextrose 5 % 250 mL chemo infusion, 198 05 mg, Intravenous, Once, 2 of 12 cycles  Administration: 200 mg (8/13/2019), 200 mg (8/27/2019)        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)    2/5/2018 Initial Diagnosis     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)      7/24/2019 - 8/6/2019 Radiation     Treatment:  Course: C2    Plan ID Energy Fractions Dose per Fraction (cGy) Dose Correction (cGy) Total Dose Delivered (cGy) Elapsed Days   R LL_Hilum 10X 10 / 10 300 0 3,000 13            8/13/2019 - 9/9/2019 Chemotherapy bevacizumab (AVASTIN) 500 mg in sodium chloride 0 9 % 100 mL IVPB, 505 mg, Intravenous, Once, 1 of 11 cycles  Administration: 500 mg (8/27/2019)  irinotecan (CAMPTOSAR) 340 mg in dextrose 5 % 500 mL chemo infusion, 330 mg (83 3 % of original dose 180 mg/m2), Intravenous, Once, 2 of 12 cycles  Dose modification: 150 mg/m2 (original dose 180 mg/m2, Cycle 1, Reason: Dose Not Tolerated)  Administration: 340 mg (8/13/2019), 340 mg (8/27/2019)  oxaliplatin (ELOXATIN) 200 mg in dextrose 5 % 250 mL chemo infusion, 198 05 mg, Intravenous, Once, 2 of 12 cycles  Administration: 200 mg (8/13/2019), 200 mg (8/27/2019)         Interval History:  Clinically stable  Headaches, nausea  1 - Symptomatic but completely ambulatory    Review of Systems   Constitutional: Negative for chills and fever  HENT: Negative for nosebleeds  Eyes: Negative for discharge  Respiratory: Negative for cough and shortness of breath  Cardiovascular: Negative for chest pain  Gastrointestinal: Negative for abdominal pain, constipation and diarrhea  Endocrine: Negative for polydipsia  Genitourinary: Negative for hematuria  Musculoskeletal: Negative for arthralgias  Skin: Negative for color change  Allergic/Immunologic: Negative for immunocompromised state  Neurological: Negative for dizziness and headaches  Hematological: Negative for adenopathy  Psychiatric/Behavioral: Negative for agitation         Past Medical History:   Diagnosis Date    [facility]     [facility] cancer [facility] [address] )     Colon cancer ([facility] [address] )     Metastatic carcinoma to lung (HCC)     Shortness of breath      Patient Active Problem List   Diagnosis    Flu-like symptoms    Fever    Colon cancer metastasized to lung ([facility] [address] )    Chronic back pain    Diarrhea    Malignant neoplasm of ascending colon ([facility] [address] )    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)    Dyspnea on exertion    Port-A-Cath in place    Esophageal dysphagia    Pneumonia due to infectious organism    Acute respiratory failure with hypoxia (HCC)    Sepsis ([facility] )    Neutropenic fever (HCC)    Thrombocytopenia (HCC)    Acute kidney injury ([facility] [address] )    Drug rash    Pneumonitis       Current Outpatient Medications:     acetaminophen (TYLENOL) 325 mg tablet, Take 2 tablets (650 mg total) by mouth every 6 (six) hours for 7 days, Disp: 56 tablet, Rfl: 0    albuterol (VENTOLIN HFA) 90 mcg/act inhaler, INHALE TWO PUFFS BY MOUTH EVERY 4 HOURS AS NEEDED FOR SHORTNESS OF BREATH   no more than 4 times daily, Disp: 18 g, Rfl: 1    ascorbic acid (VITAMIN C) 250 MG CHEW, Chew 250 mg daily, Disp: , Rfl:     docusate sodium (COLACE) 100 mg capsule, Take 1 capsule (100 mg total) by mouth 2 (two) times a day, Disp: 10 capsule, Rfl: 0    gabapentin (NEURONTIN) 300 mg capsule, Take 1 capsule (300 mg total) by mouth 3 (three) times a day, Disp: 63 capsule, Rfl: 0    ondansetron (ZOFRAN) 4 mg tablet, Take 1 tablet (4 mg total) by mouth every 6 (six) hours as needed for nausea or vomiting, Disp: 50 tablet, Rfl: 2    oxyCODONE (ROXICODONE) 5 mg immediate release tablet, Take 1 tablet (5 mg total) by mouth every 4 (four) hours as needed for severe pain for up to 10 daysMax Daily Amount: 30 mg, Disp: 30 tablet, Rfl: 0    Regorafenib (STIVARGA) 40 MG TABS, Take 3 tablets (120 mg) PO daily for 3 weeks on, then 1 week off   (Patient not taking: Reported on 2/18/2020), Disp: 63 tablet, Rfl: 11  Allergies   Allergen Reactions    Aspirin Other (See Comments)     Nose bleed    Sulfa Antibiotics Other (See Comments)     Dizzy      Past Surgical History:   Procedure Laterality Date    ANTERIOR CRUCIATE LIGAMENT REPAIR      ANTERIOR CRUCIATE LIGAMENT REPAIR      2  on R knee    APPENDECTOMY      ATRIAL ABLATION SURGERY      BACK SURGERY      COLON SURGERY      Ascending and transverse colon    COLONOSCOPY      LUNG REMOVAL, PARTIAL      LUNG SEGMENTECTOMY Right 2/26/2020    Procedure: RESECTION WEDGE LUNG; Surgeon: Mei Tate MD;  [facility];  Service: Thoracic    ND THORACOSCOPY Adali Drop WEDGE RESEXN INITIAL UNILAT Right 2/26/2020    Procedure: THORACOSCOPY VIDEO ASSISTED SURGERY (VATS); Surgeon: Mei Tate MD;  Location: [facility];  Service: Thoracic    RIGHT COLECTOMY      and Transverse colon resection    THORACOTOMY       Social History     Objective: There were no vitals filed for this visit  Physical Exam   Constitutional: He is oriented to person, place, and time  He appears well-developed  HENT:   Head: Normocephalic  Eyes: Pupils are equal, round, and reactive to light  Neck: Neck supple  Cardiovascular: Normal rate and regular rhythm  No murmur heard  Pulmonary/Chest: Breath sounds normal  He has no wheezes  He has no rales  Abdominal: Soft  There is no tenderness  Musculoskeletal: Normal range of motion  He exhibits no edema or tenderness  Lymphadenopathy:     He has no cervical adenopathy  Neurological: He is alert and oriented to person, place, and time  He has normal reflexes  No cranial nerve deficit  Skin: No rash noted  No erythema  Psychiatric: He has a normal mood and affect  His behavior is normal          Labs: I personally reviewed the labs and imaging pertinent to this patient care

## 2020-03-04 ENCOUNTER — TELEPHONE (OUTPATIENT)
Dept: HEMATOLOGY ONCOLOGY | Facility: CLINIC | Age: 60
End: 2020-03-04

## 2020-03-04 ENCOUNTER — TELEPHONE (OUTPATIENT)
Dept: HEMATOLOGY ONCOLOGY | Facility: MEDICAL CENTER | Age: 60
End: 2020-03-04

## 2020-03-04 NOTE — TELEPHONE ENCOUNTER
Patient registered for Medical Marijuana Card per instructions from [employer]   Please call [phone] with any questions

## 2020-03-04 NOTE — TELEPHONE ENCOUNTER
FAXED [facility] PAPERWORK FOR [address]  to [phone] WITH ATTACHED LAST OFFICE NOTE FROM THE PATIENT VISIT WITH DR Indio Kirby

## 2020-03-04 NOTE — TELEPHONE ENCOUNTER
Patient called in stated that he would like to be schedule with Dr Krystle Haider I schedule patient for 03/31/2020 I verified date, time and location  Also wanted me to send a message to Dr Blanco De La Torre advising that he has filled out the referral for the medical marijuanas card

## 2020-03-09 ENCOUNTER — TELEPHONE (OUTPATIENT)
Dept: HEMATOLOGY ONCOLOGY | Facility: CLINIC | Age: 60
End: 2020-03-09

## 2020-03-13 ENCOUNTER — HOSPITAL ENCOUNTER (OUTPATIENT)
Dept: RADIOLOGY | Facility: HOSPITAL | Age: 60
Discharge: HOME | End: 2020-03-13
Attending: INTERNAL MEDICINE
Payer: MEDICARE

## 2020-03-13 ENCOUNTER — APPOINTMENT (OUTPATIENT)
Dept: LAB | Facility: HOSPITAL | Age: 60
End: 2020-03-13
Attending: INTERNAL MEDICINE
Payer: MEDICARE

## 2020-03-13 ENCOUNTER — TELEPHONE (OUTPATIENT)
Dept: HEMATOLOGY ONCOLOGY | Facility: MEDICAL CENTER | Age: 60
End: 2020-03-13

## 2020-03-13 DIAGNOSIS — C78.00 COLON CANCER METASTASIZED TO LUNG (HCC): ICD-10-CM

## 2020-03-13 DIAGNOSIS — C18.9 COLON CANCER METASTASIZED TO LUNG (HCC): ICD-10-CM

## 2020-03-13 PROCEDURE — 71046 X-RAY EXAM CHEST 2 VIEWS: CPT

## 2020-03-13 NOTE — TELEPHONE ENCOUNTER
Spoke to C[employer] John Cerda stating that the [facility] paperwork for Sarabia Silva was sent over by Trish Mcmanus MA on 3/4/20  Cite John Cerda voiced understanding and was appreciative of the call back

## 2020-03-17 ENCOUNTER — OFFICE VISIT (OUTPATIENT)
Dept: CARDIAC SURGERY | Facility: CLINIC | Age: 60
End: 2020-03-17
Payer: MEDICARE

## 2020-03-17 VITALS
WEIGHT: 236.2 LBS | HEIGHT: 76 IN | SYSTOLIC BLOOD PRESSURE: 131 MMHG | BODY MASS INDEX: 28.76 KG/M2 | HEART RATE: 66 BPM | DIASTOLIC BLOOD PRESSURE: 86 MMHG | TEMPERATURE: 97.2 F | OXYGEN SATURATION: 95 %

## 2020-03-17 DIAGNOSIS — C18.9 COLON CANCER METASTASIZED TO LUNG (HCC): Primary | Chronic | ICD-10-CM

## 2020-03-17 DIAGNOSIS — C78.00 COLON CANCER METASTASIZED TO LUNG (HCC): Primary | Chronic | ICD-10-CM

## 2020-03-17 PROCEDURE — 99213 OFFICE O/P EST LOW 20 MIN: CPT | Performed by: PHYSICIAN ASSISTANT

## 2020-03-17 RX ORDER — ACETAMINOPHEN 500 MG
500 TABLET ORAL EVERY 6 HOURS PRN
COMMUNITY

## 2020-03-17 RX ORDER — IBUPROFEN 200 MG
TABLET ORAL EVERY 6 HOURS PRN
COMMUNITY

## 2020-03-17 NOTE — LETTER
March 17, 2020     Den Segovia DO  [address]    Patient: Nayan Painting   YOB: 1960   Date of Visit: 3/17/2020       Dear Dr Joce Mitchell:    Thank you for referring Marco Rose to me for evaluation  Below are my notes for this consultation  If you have questions, please do not hesitate to call me  I look forward to following your patient along with you  Sincerely,        Ansley Orellana MD        CC: MD Mahamed Morelos PA-C  3/17/2020  2:43 PM  Sign at close encounter  Thoracic Follow-Up  Assessment/Plan:    Colon cancer metastasized to lung [facility])  Mr  Patrick Cordero is progressing well from a thoracic surgery standpoint  His pathology is consistent with metastatic colon cancer  His incisions are healing well  Since there is some consolidation at the right base on chest xray, he should finish out his antibiotic, which would be this coming Friday  If he continues to feel sick, he should undergo a full workup  He will only need to return to the office on as needed basis  Diagnoses and all orders for this visit:    Colon cancer metastasized to lung [facility])    Other orders  -     acetaminophen (TYLENOL) 500 mg tablet; Take 500 mg by mouth every 6 (six) hours as needed for mild pain  -     ibuprofen (MOTRIN) 200 mg tablet; Take by mouth every 6 (six) hours as needed for mild pain             Thoracic History       Diagnosis: 1  Left recurrent pneumothorax  2  Chest wall hernia  3  Colon cancer lung metastasis      Procedures: 1  Flexible bronchoscopy, right thoracoscopic lower lobe wedge resection from April 21, 2010 2  Left Port-A-Cath placed on may 7, 2010 3  Flexible bronchoscopy, left thorascopic lower lobar dissection intercostal nerve block from August 19, 2010 4   Flexible bronchoscopy, left thoracoscopic upper lobe wedge resection with technetium and gamma probe localization, repair chest wall incisional hernia and intercostal nerve block performed on November 9, 2011 5  Left vats, talc pleurodesis, replacement [place]-Tian patch for chest wall incisional hernia and intercostal block performed on December 11, 20116  Right thoracoscopic lower lobe wedge resection on January 15, 2014  7  Flexible bronchoscopy, video mediastinoscopy, left posterior lateral thoracotomy, lower lobectomy and pulmonary artery arterioplasty April 14, 20158  Flexible bronchoscopy April 22, 20159  Port-A-Cath Insertion - Left IJ June 8, 2015 8  Right thoracoscopic diagnostic wedge resection performed on 2/26/20    Pathology: Right upper lobe wedge resection revealed metastatic colonic adenocarcinoma     Patient ID: Evelyn Nguyen is a 63 y o  male  HPI    Mr Irl Goltz is a 62 yo male who has a history metastatic colon cancer to his lungs, s/p multiple resections who was recently found to have innumerable bilateral pulmonary nodules  He underwent a right thoracoscopic diagnostic wedge biopsy on 2/26/20 for tissue diagnosis  He was discharged on 2/28/20 with home oxygen, after having an extra suture placed for pleural fluid leaking around his previous chest tube site  He returns today with a chest xray from 3/13/20  This demonstrated innumerable bilateral nodules, without evidence of pneumothorax or effusion  He is on 2L at rest and 3 L with activity  He is having increased shortness of breath at rest and dyspnea on exertion, along with some tannish sputum production  He started Augmentin Friday, leftover from a previous pneumonia  Since starting the abx, his nasal discharge has changed from green to clear and his sputum went from bright orange to yellowish orange  He denies fever, chills, hemoptysis, or green sputum production  His CEA is 253 from 3/13/20 and WBC was 7 23       The following portions of the patient's history were reviewed and updated as appropriate: allergies, current medications, past family history, past medical history, past social history, past surgical history and problem list     Review of Systems      Objective:   Physical Exam   Constitutional: He is oriented to person, place, and time  He appears well-developed and well-nourished  Looks fatigued  On 2L of oxygen via nc   HENT:   Head: Normocephalic and atraumatic  Eyes: Pupils are equal, round, and reactive to light  EOM are normal  No scleral icterus  Cardiovascular: Normal rate and regular rhythm  No murmur heard  Pulmonary/Chest: Effort normal and breath sounds normal  No respiratory distress  He has no wheezes  Musculoskeletal: Normal range of motion  Neurological: He is alert and oriented to person, place, and time  Skin: Skin is warm and dry  Right thoracoscopic incisions healing well  2 prolene sutures removed without incident  Psychiatric: He has a normal mood and affect  His behavior is normal    Nursing note and vitals reviewed  /86 (BP Location: Left arm, Patient Position: Sitting, Cuff Size: Large)   Pulse 66   Temp (!) 97 2 °F (36 2 °C)   Ht 6' 4" (1 93 m)   Wt 107 kg (236 lb 3 2 oz)   SpO2 95%   BMI 28 75 kg/m²      Xr Chest Pa & Lateral    Result Date: 3/17/2020  Impression Innumerable bilateral nodules in keeping with metastatic disease  Stable masses in the left upper lobe and right lower lobe  Postsurgical changes in the left base   Workstation performed: [id number]

## 2020-03-17 NOTE — PROGRESS NOTES
Thoracic Follow-Up  Assessment/Plan:    Colon cancer metastasized to lung [facility])  Mr Tari Rosales is progressing well from a thoracic surgery standpoint  His pathology is consistent with metastatic colon cancer  His incisions are healing well  Since there is some consolidation at the right base on chest xray, he should finish out his antibiotic, which would be this coming Friday  If he continues to feel sick, he should undergo a full workup  He will only need to return to the office on as needed basis  Diagnoses and all orders for this visit:    Colon cancer metastasized to lung [facility])    Other orders  -     acetaminophen (TYLENOL) 500 mg tablet; Take 500 mg by mouth every 6 (six) hours as needed for mild pain  -     ibuprofen (MOTRIN) 200 mg tablet; Take by mouth every 6 (six) hours as needed for mild pain             Thoracic History       Diagnosis: 1  Left recurrent pneumothorax  2  Chest wall hernia  3  Colon cancer lung metastasis      Procedures: 1  Flexible bronchoscopy, right thoracoscopic lower lobe wedge resection from April 21, 2010 2  Left Port-A-Cath placed on may 7, 2010 3  Flexible bronchoscopy, left thorascopic lower lobar dissection intercostal nerve block from August 19, 2010 4  Flexible bronchoscopy, left thoracoscopic upper lobe wedge resection with technetium and gamma probe localization, repair chest wall incisional hernia and intercostal nerve block performed on November 9, 2011 5  Left vats, talc pleurodesis, replacement [place]-Tian patch for chest wall incisional hernia and intercostal block performed on December 11, 20116  Right thoracoscopic lower lobe wedge resection on January 15, 2014  7  Flexible bronchoscopy, video mediastinoscopy, left posterior lateral thoracotomy, lower lobectomy and pulmonary artery arterioplasty April 14, 20158  Flexible bronchoscopy April 22, 20159  Port-A-Cath Insertion - Left IJ June 8, 2015 8   Right thoracoscopic diagnostic wedge resection performed on 2/26/20    Pathology: Right upper lobe wedge resection revealed metastatic colonic adenocarcinoma     Patient ID: Natalie Wells is a 63 y o  male  HPI    Mr Dexter Mares is a 62 yo male who has a history metastatic colon cancer to his lungs, s/p multiple resections who was recently found to have innumerable bilateral pulmonary nodules  He underwent a right thoracoscopic diagnostic wedge biopsy on 2/26/20 for tissue diagnosis  He was discharged on 2/28/20 with home oxygen, after having an extra suture placed for pleural fluid leaking around his previous chest tube site  He returns today with a chest xray from 3/13/20  This demonstrated innumerable bilateral nodules, without evidence of pneumothorax or effusion  He is on 2L at rest and 3 L with activity  He is having increased shortness of breath at rest and dyspnea on exertion, along with some tannish sputum production  He started Augmentin Friday, leftover from a previous pneumonia  Since starting the abx, his nasal discharge has changed from green to clear and his sputum went from bright orange to yellowish orange  He denies fever, chills, hemoptysis, or green sputum production  His CEA is 253 from 3/13/20 and WBC was 7 23  The following portions of the patient's history were reviewed and updated as appropriate: allergies, current medications, past family history, past medical history, past social history, past surgical history and problem list     Review of Systems      Objective:   Physical Exam   Constitutional: He is oriented to person, place, and time  He appears well-developed and well-nourished  Looks fatigued  On 2L of oxygen via nc   HENT:   Head: Normocephalic and atraumatic  Eyes: Pupils are equal, round, and reactive to light  EOM are normal  No scleral icterus  Cardiovascular: Normal rate and regular rhythm  No murmur heard  Pulmonary/Chest: Effort normal and breath sounds normal  No respiratory distress  He has no wheezes  Musculoskeletal: Normal range of motion  Neurological: He is alert and oriented to person, place, and time  Skin: Skin is warm and dry  Right thoracoscopic incisions healing well  2 prolene sutures removed without incident  Psychiatric: He has a normal mood and affect  His behavior is normal    Nursing note and vitals reviewed  /86 (BP Location: Left arm, Patient Position: Sitting, Cuff Size: Large)   Pulse 66   Temp (!) 97 2 °F (36 2 °C)   Ht 6' 4" (1 93 m)   Wt 107 kg (236 lb 3 2 oz)   SpO2 95%   BMI 28 75 kg/m²     Xr Chest Pa & Lateral    Result Date: 3/17/2020  Impression Innumerable bilateral nodules in keeping with metastatic disease  Stable masses in the left upper lobe and right lower lobe  Postsurgical changes in the left base   Workstation performed: [id number]

## 2020-03-17 NOTE — ASSESSMENT & PLAN NOTE
Mr Eduard Leggett is progressing well from a thoracic surgery standpoint  His pathology is consistent with metastatic colon cancer  His incisions are healing well  Since there is some consolidation at the right base on chest xray, he should finish out his antibiotic, which would be this coming Friday  If he continues to feel sick, he should undergo a full workup  He will only need to return to the office on as needed basis

## 2020-03-20 ENCOUNTER — DOCUMENTATION (OUTPATIENT)
Dept: HEMATOLOGY ONCOLOGY | Facility: CLINIC | Age: 60
End: 2020-03-20

## 2020-03-20 ENCOUNTER — TELEPHONE (OUTPATIENT)
Dept: HEMATOLOGY ONCOLOGY | Facility: MEDICAL CENTER | Age: 60
End: 2020-03-20

## 2020-03-20 ENCOUNTER — OFFICE VISIT (OUTPATIENT)
Dept: HEMATOLOGY ONCOLOGY | Facility: CLINIC | Age: 60
End: 2020-03-20
Payer: MEDICARE

## 2020-03-20 ENCOUNTER — TELEPHONE (OUTPATIENT)
Dept: PULMONOLOGY | Facility: CLINIC | Age: 60
End: 2020-03-20

## 2020-03-20 VITALS
DIASTOLIC BLOOD PRESSURE: 76 MMHG | RESPIRATION RATE: 16 BRPM | SYSTOLIC BLOOD PRESSURE: 126 MMHG | HEIGHT: 76 IN | TEMPERATURE: 98.2 F | WEIGHT: 239 LBS | BODY MASS INDEX: 29.1 KG/M2 | HEART RATE: 70 BPM | OXYGEN SATURATION: 97 %

## 2020-03-20 DIAGNOSIS — C77.1 METASTASIS TO INTRATHORACIC LYMPH NODES (HCC): ICD-10-CM

## 2020-03-20 DIAGNOSIS — C78.00 COLON CANCER METASTASIZED TO LUNG (HCC): ICD-10-CM

## 2020-03-20 DIAGNOSIS — C18.9 COLON CANCER METASTASIZED TO LUNG (HCC): Chronic | ICD-10-CM

## 2020-03-20 DIAGNOSIS — C18.2 MALIGNANT NEOPLASM OF ASCENDING COLON (HCC): Primary | ICD-10-CM

## 2020-03-20 DIAGNOSIS — D70.1 CHEMOTHERAPY INDUCED NEUTROPENIA (HCC): Primary | ICD-10-CM

## 2020-03-20 DIAGNOSIS — D70.1 CHEMOTHERAPY INDUCED NEUTROPENIA (HCC): ICD-10-CM

## 2020-03-20 DIAGNOSIS — T45.1X5A CINV (CHEMOTHERAPY-INDUCED NAUSEA AND VOMITING): Primary | ICD-10-CM

## 2020-03-20 DIAGNOSIS — C78.00 COLON CANCER METASTASIZED TO LUNG (HCC): Chronic | ICD-10-CM

## 2020-03-20 DIAGNOSIS — C18.9 COLON CANCER METASTASIZED TO LUNG (HCC): ICD-10-CM

## 2020-03-20 DIAGNOSIS — T45.1X5A CHEMOTHERAPY INDUCED NEUTROPENIA (HCC): ICD-10-CM

## 2020-03-20 DIAGNOSIS — C18.2 MALIGNANT NEOPLASM OF ASCENDING COLON (HCC): ICD-10-CM

## 2020-03-20 DIAGNOSIS — T45.1X5A CHEMOTHERAPY INDUCED NEUTROPENIA (HCC): Primary | ICD-10-CM

## 2020-03-20 DIAGNOSIS — R11.2 CINV (CHEMOTHERAPY-INDUCED NAUSEA AND VOMITING): Primary | ICD-10-CM

## 2020-03-20 DIAGNOSIS — Z95.828 PORT-A-CATH IN PLACE: ICD-10-CM

## 2020-03-20 PROCEDURE — 99215 OFFICE O/P EST HI 40 MIN: CPT | Performed by: INTERNAL MEDICINE

## 2020-03-20 RX ORDER — SODIUM CHLORIDE 9 MG/ML
20 INJECTION, SOLUTION INTRAVENOUS ONCE
Status: CANCELLED | OUTPATIENT
Start: 2020-03-25

## 2020-03-20 RX ORDER — ATROPINE SULFATE 1 MG/ML
0.25 INJECTION, SOLUTION INTRAMUSCULAR; INTRAVENOUS; SUBCUTANEOUS ONCE
Status: CANCELLED | OUTPATIENT
Start: 2020-03-25

## 2020-03-20 RX ORDER — ATROPINE SULFATE 1 MG/ML
0.25 INJECTION, SOLUTION INTRAMUSCULAR; INTRAVENOUS; SUBCUTANEOUS ONCE AS NEEDED
Status: CANCELLED | OUTPATIENT
Start: 2020-03-25

## 2020-03-20 RX ORDER — PROCHLORPERAZINE MALEATE 10 MG
10 TABLET ORAL EVERY 6 HOURS PRN
Qty: 30 TABLET | Refills: 3 | Status: SHIPPED | OUTPATIENT
Start: 2020-03-20 | End: 2020-08-22 | Stop reason: HOSPADM

## 2020-03-20 NOTE — TELEPHONE ENCOUNTER
Spoke to Pulmonary  They will reach out to the patient directly for consult  They are aware that Dr Zabrina Black would like the patient to be seen right away

## 2020-03-20 NOTE — PROGRESS NOTES
HPI:   Patient is here with his wife  This is a follow-up visit for stage IV colon cancer with disease progression primarily in the lungs and lymph nodes  Patient is on oxygen now 24/7 between 2 and 3 liters/minute  He has nonproductive cough  Shortness of breath  Weakness and tiredness  Chronic low back discomfort     He has grade 1 peripheral neuropathy He had been through several lines of systemic therapy, surgeries and radiation treatments and options are getting limited  He had not have Cyramza plus FOLFIRI  Colon cancer  was 1st diagnosed in 2008  Metastatic disease was diagnosed in 2012  Patient had right upper lobe wedge resection recently on 02/26/2020 and that again came back metastatic colonic adenocarcinoma and not lung primary  History of AFib/atrial flutter and had ablation       In 2008 he was diagnosed to have  stage IV adenocarcinoma of the colon     He had resection of the ascending colon   Pathology showed involvement of lymph nodes and lung   Postsurgery he received FOLFOX plus Avastin for 6 months  Then, in 2011 patient had additional resection this time of the transverse colon   He received FOLFIRI plus Avastin for 6 months after surgery     In 2012 he had wedge resection for right lung nodule   No chemotherapy at that time   In January 2014 he had wedge resection of the right lower    No radiation following this   Olegario Goes 2015-February 2017 patient was on Xeloda plus Avastin   In February 2017 patient was noted to have a new lesion of the lung  Sophie Castrejon was unable to do RFA to this lesion   Patient was seen by thoracic surgery   Due to location, wedge resection was not able to be done   He was referred to rad onc for SBRT       May 2017 lung nodule was noted to be stable compared to February 2017 August 2017 right lower lobe pulmonary nodule had enlarged compared to May 2017      Due to enlarging nodule in May 2017, a PET/CT was performed in August 2017   This showed a 2 8 x 1 8 cm right lower medial lung mass with hypermetabolism      Aug 2017 he was referred to [place] for Cyberknife of the lung      Patient then did not return for follow up with our office until Feb 2018  At this time patient then had repeat scans to be performed  CT C/A/P in March 2018 showed enlarging right lower lobe nodular masses -- consistent with metastatic disease      He was then seen in follow-up on 03/15/2018 after his CT scan result   Patient was presented with multiple options at included surgery, RFA, radiation, CyberKnife, systemic therapy   Patient opted for consultation for RFA and thoracic surgery and then decide      Patient then did not come back to the office until July 2018  [facility] needed up to date imaging studies and had repeat PET/CT in Aug 2018 which showed enlarging hypermetabolic right lower medial lung in right hilar lesions   New hypermetabolic left lower lung nodule   Multiple left pleural base lesions, however noted to likely be inflammatory related to prior talc pleurodesis  No new lesions in the neck, abdomen, pelvis    Due to increase in metastatic disease, patient was recommended to pursue systemic therapy   Patient began Loigu 42 September 2018    Follow-up CT scan showed disease progression     Patient had palliative radiation to lung mass causing bronchial obstruction and dysphagia  After that he had 1 cycle of   IROX, combination of irinotecan and oxaliplatin and was hospitalized   Rocío Melany was started in October 2019 and was stopped in January 2020 because of disease progression  Patient had right upper lobe wedge resection recently on 02/26/2020  Latha AlvarezNicolette Physical examination and test results are as recorded and discussed  Suspected disease progression in the lungs secondary to metastatic disease from colon  Question came up of the possibility of some other cancer  Probably not but to be sure patient will have thorascopic lung biopsy    Patient is being referred to thoracic surgery  Condition discussed and explained  Questions answered  Discussed the importance of eating healthy foods, activities as tolerated and health screening tests  We discussed FOLFIRI plus Cyramza     Treatment options are very much limited  Patient is capable of self-care at this time     Patient will continue to follow with his primary physician and other consultants               Current Outpatient Medications:     acetaminophen (TYLENOL) 500 mg tablet, Take 500 mg by mouth every 6 (six) hours as needed for mild pain, Disp: , Rfl:     albuterol (VENTOLIN HFA) 90 mcg/act inhaler, INHALE TWO PUFFS BY MOUTH EVERY 4 HOURS AS NEEDED FOR SHORTNESS OF BREATH   no more than 4 times daily, Disp: 18 g, Rfl: 1    ascorbic acid (VITAMIN C) 250 MG CHEW, Chew 250 mg daily, Disp: , Rfl:     gabapentin (NEURONTIN) 300 mg capsule, Take 1 capsule (300 mg total) by mouth 3 (three) times a day, Disp: 63 capsule, Rfl: 0    ibuprofen (MOTRIN) 200 mg tablet, Take by mouth every 6 (six) hours as needed for mild pain, Disp: , Rfl:     docusate sodium (COLACE) 100 mg capsule, Take 1 capsule (100 mg total) by mouth 2 (two) times a day (Patient not taking: Reported on 3/17/2020), Disp: 10 capsule, Rfl: 0    ondansetron (ZOFRAN) 4 mg tablet, Take 1 tablet (4 mg total) by mouth every 6 (six) hours as needed for nausea or vomiting (Patient not taking: Reported on 3/17/2020), Disp: 50 tablet, Rfl: 2    Allergies   Allergen Reactions    Aspirin Other (See Comments)     Nose bleed    Sulfa Antibiotics Other (See Comments)     Dizzy           Colon cancer metastasized to lung [facility])    2008 Initial Diagnosis     Colon cancer metastasized to lung [facility])      2008 - 2009 Chemotherapy     FOLFOX for two 6 month courses (3 month break in-between courses, according to the patient)       - Dr Therese Garvin      2/18/2008 Surgery     Partial colectomy/Right hemicolectomy:  - stage IV (pT3,pN2,pM1a, G2)    - Dr Lunsford Line        4/21/2010 Surgery     Right lower lobe lung nodule:  - metastatic colonic adenocarcinoma      8/19/2010 Surgery     Left lower lobe wedge resection:  - moderately differentiated adenocarcinoma, consistent with metastasis from known colonic primary, 1 3 cm      2011 Surgery     Transverse colectomy      4/2011 - 10/2011 Chemotherapy     Avastin and FOLFIRI       11/9/2011 Surgery     RAZ lung wedge resection:  - metastatic adenocarcinoma of the colon  - invasive carcinoma is 0 4 cm from parenchymal resection      2012 Surgery     Wedge resection for right lung nodule      11/29/2012 Surgery     I  [id number] (11/29/12)   Omental nodule, excision:   - Metastatic adenocarcinoma morphologically consistent with colonic primary  1/15/2014 Surgery     A  Lung, right lower lobe, wedge resection:   - 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, moderately to poorly differentiated, consistent with colorectal primary, see note  - Resection margin, no tumor seen  - Background lung with few non-necrotizing granulomas and emphysematous changes  3/4/2014 - 3/14/2014 Radiation     Plan ID Energy Fractions Dose per Fraction (cGy) Total Dose Delivered (cGy) Elapsed Days   SBRT Lt Lung 6X 5 / 5 1,000 5,000 8     - Dr Cee Ear      4/14/2015 Surgery     Left lower lobe lobectomy, cervical mediastinoscopy, Left posterolateral thoracotomy:  A   Lymph node Level 2R:    - 3/3 lymph nodes, positive for metastatic colonic adenocarcinoma  BLeverette  node Level 7:   - 3/8 lymph nodes, positive for metastatic colonic adenocarcinoma       C   Left lower lobe:   - Metastatic colonic adenocarcinoma    - The vascular and bronchial margins of resection are negative for tumor    - 0/6 lymph nodes, negative for metastatic colonic adenocarcinoma     - The tumor is 0 5 cm away from the hilar staple line        6/2015 - 2/2017 Chemotherapy     Xeloda and Avastin      9/2018 - 5/2019 Chemotherapy     Lonsurf      8/13/2019 - 9/9/2019 Chemotherapy     bevacizumab (AVASTIN) 500 mg in sodium chloride 0 9 % 100 mL IVPB, 505 mg, Intravenous, Once, 1 of 11 cycles  Administration: 500 mg (8/27/2019)  irinotecan (CAMPTOSAR) 340 mg in dextrose 5 % 500 mL chemo infusion, 330 mg (83 3 % of original dose 180 mg/m2), Intravenous, Once, 2 of 12 cycles  Dose modification: 150 mg/m2 (original dose 180 mg/m2, Cycle 1, Reason: Dose Not Tolerated)  Administration: 340 mg (8/13/2019), 340 mg (8/27/2019)  oxaliplatin (ELOXATIN) 200 mg in dextrose 5 % 250 mL chemo infusion, 198 05 mg, Intravenous, Once, 2 of 12 cycles  Administration: 200 mg (8/13/2019), 200 mg (8/27/2019)        Malignant neoplasm of ascending colon (HCC)    2/5/2018 Initial Diagnosis     Malignant neoplasm of ascending colon ([facility] [address] )      8/13/2019 - 9/9/2019 Chemotherapy     bevacizumab (AVASTIN) 500 mg in sodium chloride 0 9 % 100 mL IVPB, 505 mg, Intravenous, Once, 1 of 11 cycles  Administration: 500 mg (8/27/2019)  irinotecan (CAMPTOSAR) 340 mg in dextrose 5 % 500 mL chemo infusion, 330 mg (83 3 % of original dose 180 mg/m2), Intravenous, Once, 2 of 12 cycles  Dose modification: 150 mg/m2 (original dose 180 mg/m2, Cycle 1, Reason: Dose Not Tolerated)  Administration: 340 mg (8/13/2019), 340 mg (8/27/2019)  oxaliplatin (ELOXATIN) 200 mg in dextrose 5 % 250 mL chemo infusion, 198 05 mg, Intravenous, Once, 2 of 12 cycles  Administration: 200 mg (8/13/2019), 200 mg (8/27/2019)        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)    2/5/2018 Initial Diagnosis     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)      7/24/2019 - 8/6/2019 Radiation     Treatment:  Course: C2    Plan ID Energy Fractions Dose per Fraction (cGy) Dose Correction (cGy) Total Dose Delivered (cGy) Elapsed Days   R LL_Hilum 10X 10 / 10 300 0 3,000 13            8/13/2019 - 9/9/2019 Chemotherapy     bevacizumab (AVASTIN) 500 mg in sodium chloride 0 9 % 100 mL IVPB, 505 mg, Intravenous, Once, 1 of 11 cycles  Administration: 500 mg (8/27/2019)  irinotecan (CAMPTOSAR) 340 mg in dextrose 5 % 500 mL chemo infusion, 330 mg (83 3 % of original dose 180 mg/m2), Intravenous, Once, 2 of 12 cycles  Dose modification: 150 mg/m2 (original dose 180 mg/m2, Cycle 1, Reason: Dose Not Tolerated)  Administration: 340 mg (8/13/2019), 340 mg (8/27/2019)  oxaliplatin (ELOXATIN) 200 mg in dextrose 5 % 250 mL chemo infusion, 198 05 mg, Intravenous, Once, 2 of 12 cycles  Administration: 200 mg (8/13/2019), 200 mg (8/27/2019)         ROS:  03/20/20 Reviewed 13 systems:  Presently no other neurological, cardiac, pulmonary, GI and  symptoms other than mentioned above  No other symptoms like  fever, chills, bleeding, bone pains, skin rash, weight loss,  claudication and gait problem  No frequent infections  Not unusually sensitive to heat or cold  No swelling of the ankles  No swollen glands  Patient is anxious  Other symptoms are in HPI        /76 (BP Location: Right arm, Patient Position: Sitting, Cuff Size: Adult)   Pulse 70   Temp 98 2 °F (36 8 °C)   Resp 16   Ht 6' 4" (1 93 m)   Wt 108 kg (239 lb)   SpO2 97%   BMI 29 09 kg/m²     Physical Exam:  Alert, oriented, not in distress on nasal oxygen, no icterus, no oral thrush, no palpable neck mass, decreased breath sounds but no dullness, regular heart rate, abdomen  soft and non tender, no palpable abdominal mass, no ascites, no edema of ankles, no calf tenderness, no focal neurological deficit, no skin rash, no palpable lymphadenopathy, good arterial pulses, no clubbing  Patient is anxious  Performance status 3      IMAGING:      LABS:  Results for orders placed or performed in visit on 03/13/20   UA (URINE) with reflex to Microscopic   Result Value Ref Range    Color, UA Yellow     Clarity, UA Clear     Specific Gravity, UA >=1 030 1 003 - 1 030    pH, UA 5 5 4 5, 5 0, 5 5, 6 0, 6 5, 7 0, 7 5, 8 0    Leukocytes, UA Negative Negative    Nitrite, UA Negative Negative    Protein, UA Negative Negative mg/dl    Glucose, UA Negative Negative mg/dl    Ketones, UA Trace (A) Negative mg/dl    Urobilinogen, UA 0 2 0 2, 1 0 E U /dl E U /dl    Bilirubin, UA Negative Negative    Blood, UA Negative Negative     Labs, Imaging, & Other studies:   All pertinent labs and imaging studies were personally reviewed    Lab Results   Component Value Date     10/19/2015    K 4 5 03/13/2020     03/13/2020    CO2 34 (H) 03/13/2020    ANIONGAP 8 10/19/2015    BUN 15 03/13/2020    CREATININE 1 29 03/13/2020    GLUCOSE 92 10/19/2015    GLUF 83 01/22/2020    CALCIUM 9 3 03/13/2020    AST 34 03/13/2020    ALT 25 03/13/2020    ALKPHOS 71 03/13/2020    PROT 7 1 10/19/2015    BILITOT 1 24 (H) 10/19/2015    EGFR 61 03/13/2020     Lab Results   Component Value Date    WBC 7 23 03/13/2020    HGB 13 4 03/13/2020    HCT 41 2 03/13/2020     (H) 03/13/2020     (L) 03/13/2020       Reviewed and discussed with patient  Assessment and plan:  Patient is here with his wife  This is a follow-up visit for stage IV colon cancer with disease progression primarily in the lungs and lymph nodes  Patient is on oxygen now 24/7 between 2 and 3 liters/minute  He has nonproductive cough  Shortness of breath  Weakness and tiredness  Chronic low back discomfort     He has grade 1 peripheral neuropathy He had been through several lines of systemic therapy, surgeries and radiation treatments and options are getting limited  He had not have Cyramza plus FOLFIRI  Colon cancer  was 1st diagnosed in 2008  Metastatic disease was diagnosed in 2012     Patient had right upper lobe wedge resection recently on 02/26/2020 and that again came back metastatic colonic adenocarcinoma and not lung primary  History of AFib/atrial flutter and had ablation       In 2008 he was diagnosed to have  stage IV adenocarcinoma of the colon     He had resection of the ascending colon   Pathology showed involvement of lymph nodes and lung   Postsurgery he received FOLFOX plus Avastin for 6 months  Then, in 2011 patient had additional resection this time of the transverse colon   He received FOLFIRI plus Avastin for 6 months after surgery     In 2012 he had wedge resection for right lung nodule   No chemotherapy at that time   In January 2014 he had wedge resection of the right lower    No radiation following this   Glenny [place] 2015-February 2017 patient was on Xeloda plus Avastin   In February 2017 patient was noted to have a new lesion of the lung  Lucas Price was unable to do RFA to this lesion   Patient was seen by thoracic surgery   Due to location, wedge resection was not able to be done  He was referred to rad onc for SBRT       May 2017 lung nodule was noted to be stable compared to February 2017 August 2017 right lower lobe pulmonary nodule had enlarged compared to May 2017      Due to enlarging nodule in May 2017, a PET/CT was performed in August 2017   This showed a 2 8 x 1 8 cm right lower medial lung mass with hypermetabolism      Aug 2017 he was referred to [place] for Cyberknife of the lung      Patient then did not return for follow up with our office until Feb 2018  At this time patient then had repeat scans to be performed   CT C/A/P in March 2018 showed enlarging right lower lobe nodular masses -- consistent with metastatic disease      He was then seen in follow-up on 03/15/2018 after his CT scan result   Patient was presented with multiple options at included surgery, RFA, radiation, CyberKnife, systemic therapy   Patient opted for consultation for RFA and thoracic surgery and then decide      Patient then did not come back to the office until July 2018  Sharath Chandler needed up to date imaging studies and had repeat PET/CT in Aug 2018 which showed enlarging hypermetabolic right lower medial lung in right hilar lesions   New hypermetabolic left lower lung nodule   Multiple left pleural base lesions, however noted to likely be inflammatory related to prior talc pleurodesis  No new lesions in the neck, abdomen, pelvis    Due to increase in metastatic disease, patient was recommended to pursue systemic therapy   Patient began Loigu 42 September 2018    Follow-up CT scan showed disease progression     Patient had palliative radiation to lung mass causing bronchial obstruction and dysphagia  After that he had 1 cycle of   IROX, combination of irinotecan and oxaliplatin and was hospitalized   John Tiffani was started in October 2019 and was stopped in January 2020 because of disease progression  Patient had right upper lobe wedge resection recently on 02/26/2020  Brandon Javier Physical examination and test results are as recorded and discussed  Discussed most recent lung surgery pathology  Discussed disease progression  Discussed limited treatment options and not so good prognosis  Discussed Cyramza plus FOLFIRI  Patient had been heavily treated and we could remove bolus 5 FU and had growth factor Neulasta and see how he would tolerate therapy  There are increased risk of side effects because of several lines of previous treatments  Discussed all this in very much detail  Discussed Cyramza plus FOLFIRI verbally and in printed forms especially side effects  Patient signed the informed consent for Cyramza plus FOLFIRI and for transfusions of blood products  He wants to get started on treatments  Arrangements are being made  Discussed the importance of eating healthy foods, activities as tolerated and health screening tests  Patient needs assistance in his care     Patient will continue to follow with his primary physician and other consultants  1  Malignant neoplasm of ascending colon (HCC)    - Infusion Calculated Appointment Request; Future  - CBC and differential; Future  - Comprehensive metabolic panel;  Future  - Infusion Calculated Appointment Request; Future  - Infusion Calculated Appointment Request; Future  - CBC and differential; Future  - Comprehensive metabolic panel; Future  - Infusion Calculated Appointment Request; Future  - Infusion Calculated Appointment Request; Future  - CBC and differential; Future  - Comprehensive metabolic panel; Future  - Infusion Calculated Appointment Request; Future  - Infusion Calculated Appointment Request; Future  - CBC and differential; Future  - Comprehensive metabolic panel; Future  - Infusion Calculated Appointment Request; Future  - Infusion Calculated Appointment Request; Future  - CBC and differential; Future  - Comprehensive metabolic panel; Future  - Infusion Calculated Appointment Request; Future  - Infusion Calculated Appointment Request; Future  - CBC and differential; Future  - Comprehensive metabolic panel; Future  - Infusion Calculated Appointment Request; Future  - CBC and differential; Standing  - Comprehensive metabolic panel; Standing  - CEA; Standing  - UA (URINE) with reflex to Scope; Standing  - CBC and differential  - Comprehensive metabolic panel  - CEA  - UA (URINE) with reflex to Scope    2  Colon cancer metastasized to lung [facility])    - Infusion Calculated Appointment Request; Future  - CBC and differential; Future  - Comprehensive metabolic panel; Future  - Infusion Calculated Appointment Request; Future  - Infusion Calculated Appointment Request; Future  - CBC and differential; Future  - Comprehensive metabolic panel; Future  - Infusion Calculated Appointment Request; Future  - Infusion Calculated Appointment Request; Future  - CBC and differential; Future  - Comprehensive metabolic panel; Future  - Infusion Calculated Appointment Request; Future  - Infusion Calculated Appointment Request; Future  - CBC and differential; Future  - Comprehensive metabolic panel; Future  - Infusion Calculated Appointment Request; Future  - Infusion Calculated Appointment Request; Future  - CBC and differential; Future  - Comprehensive metabolic panel;  Future  - Infusion Calculated Appointment Request; Future  - Infusion Calculated Appointment Request; Future  - CBC and differential; Future  - Comprehensive metabolic panel; Future  - Infusion Calculated Appointment Request; Future    3  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)    - Infusion Calculated Appointment Request; Future  - CBC and differential; Future  - Comprehensive metabolic panel; Future  - Infusion Calculated Appointment Request; Future  - Infusion Calculated Appointment Request; Future  - CBC and differential; Future  - Comprehensive metabolic panel; Future  - Infusion Calculated Appointment Request; Future  - Infusion Calculated Appointment Request; Future  - CBC and differential; Future  - Comprehensive metabolic panel; Future  - Infusion Calculated Appointment Request; Future  - Infusion Calculated Appointment Request; Future  - CBC and differential; Future  - Comprehensive metabolic panel; Future  - Infusion Calculated Appointment Request; Future  - Infusion Calculated Appointment Request; Future  - CBC and differential; Future  - Comprehensive metabolic panel; Future  - Infusion Calculated Appointment Request; Future  - Infusion Calculated Appointment Request; Future  - CBC and differential; Future  - Comprehensive metabolic panel; Future  - Infusion Calculated Appointment Request; Future    4  Port-A-Cath in place      5  Chemotherapy induced neutropenia (HCC)    - CBC and differential; Future  - Comprehensive metabolic panel; Future  - Infusion Calculated Appointment Request; Future  - Infusion Calculated Appointment Request; Future  - CBC and differential; Future  - Comprehensive metabolic panel; Future  - Infusion Calculated Appointment Request; Future  - Infusion Calculated Appointment Request; Future  - CBC and differential; Future  - Comprehensive metabolic panel; Future  - Infusion Calculated Appointment Request; Future  - Infusion Calculated Appointment Request; Future  - CBC and differential; Future  - Comprehensive metabolic panel;  Future  - Infusion Calculated Appointment Request; Future  - Infusion Calculated Appointment Request; Future  - CBC and differential; Future  - Comprehensive metabolic panel; Future  - Infusion Calculated Appointment Request; Future  - Infusion Calculated Appointment Request; Future  - CBC and differential; Future  - Comprehensive metabolic panel; Future  - Infusion Calculated Appointment Request; Future          Patient voiced understanding and agreement in the discussion  Counseling / Coordination of Care   Greater than 50% of total time was spent with the patient and / or family counseling and / or coordination of care

## 2020-03-20 NOTE — TELEPHONE ENCOUNTER
Sole order for pulmonary referral placed  Patient would prefer to be seen in [facility] location  Please call to schedule

## 2020-03-20 NOTE — PROGRESS NOTES
Completed teach with patient and significant other on FOLFIRI + CYRAMZA Q 14 days with Neulasta On Pro  Reviewed each drugs mechanism of action, possible side effects and symptom management, when to call the doctor versus when to go to the ER, etc  All questions answered, handouts provided, and consents signed/scanned into chart  Patient stated Zofran gives him severe headaches from his experience in the past, and would like something different  Sent RX for Compazine to patient's pharmacy to try  Patient also has a medical marijuana card now  Patient is to start next week per Dr Janet West  Sent email to [facility] infusion group for fast start CADD pump referral  Patient is to start 3/25 at [facility] ( scheduled by [facility] [place], operated by [facility])

## 2020-03-20 NOTE — TELEPHONE ENCOUNTER
Patient called back  He is requesting a Pulmonologist in [facility] if possible    Please have Dr Libia Dumont place referral

## 2020-03-24 ENCOUNTER — APPOINTMENT (OUTPATIENT)
Dept: LAB | Facility: HOSPITAL | Age: 60
End: 2020-03-24
Attending: INTERNAL MEDICINE
Payer: MEDICARE

## 2020-03-25 ENCOUNTER — HOSPITAL ENCOUNTER (OUTPATIENT)
Dept: INFUSION CENTER | Facility: CLINIC | Age: 60
Discharge: HOME | End: 2020-03-25
Payer: MEDICARE

## 2020-03-25 VITALS
RESPIRATION RATE: 16 BRPM | SYSTOLIC BLOOD PRESSURE: 126 MMHG | BODY MASS INDEX: 29.13 KG/M2 | HEART RATE: 82 BPM | TEMPERATURE: 96.6 F | WEIGHT: 239.2 LBS | HEIGHT: 76 IN | DIASTOLIC BLOOD PRESSURE: 83 MMHG

## 2020-03-25 DIAGNOSIS — T45.1X5A CHEMOTHERAPY INDUCED NEUTROPENIA (HCC): ICD-10-CM

## 2020-03-25 DIAGNOSIS — C78.00 COLON CANCER METASTASIZED TO LUNG (HCC): Chronic | ICD-10-CM

## 2020-03-25 DIAGNOSIS — C77.1 METASTASIS TO INTRATHORACIC LYMPH NODES (HCC): ICD-10-CM

## 2020-03-25 DIAGNOSIS — C18.9 COLON CANCER METASTASIZED TO LUNG (HCC): Chronic | ICD-10-CM

## 2020-03-25 DIAGNOSIS — D70.1 CHEMOTHERAPY INDUCED NEUTROPENIA (HCC): ICD-10-CM

## 2020-03-25 DIAGNOSIS — C18.2 MALIGNANT NEOPLASM OF ASCENDING COLON (HCC): Primary | ICD-10-CM

## 2020-03-25 PROCEDURE — 96367 TX/PROPH/DG ADDL SEQ IV INF: CPT

## 2020-03-25 PROCEDURE — 96417 CHEMO IV INFUS EACH ADDL SEQ: CPT

## 2020-03-25 PROCEDURE — 96375 TX/PRO/DX INJ NEW DRUG ADDON: CPT

## 2020-03-25 PROCEDURE — G0498 CHEMO EXTEND IV INFUS W/PUMP: HCPCS

## 2020-03-25 PROCEDURE — 96413 CHEMO IV INFUSION 1 HR: CPT

## 2020-03-25 PROCEDURE — 96368 THER/DIAG CONCURRENT INF: CPT

## 2020-03-25 RX ORDER — ATROPINE SULFATE 1 MG/ML
0.25 INJECTION, SOLUTION INTRAMUSCULAR; INTRAVENOUS; SUBCUTANEOUS ONCE AS NEEDED
Status: DISCONTINUED | OUTPATIENT
Start: 2020-03-25 | End: 2020-03-28 | Stop reason: HOSPADM

## 2020-03-25 RX ORDER — ATROPINE SULFATE 1 MG/ML
0.25 INJECTION, SOLUTION INTRAMUSCULAR; INTRAVENOUS; SUBCUTANEOUS ONCE
Status: COMPLETED | OUTPATIENT
Start: 2020-03-25 | End: 2020-03-25

## 2020-03-25 RX ORDER — SODIUM CHLORIDE 9 MG/ML
20 INJECTION, SOLUTION INTRAVENOUS ONCE
Status: COMPLETED | OUTPATIENT
Start: 2020-03-25 | End: 2020-03-25

## 2020-03-25 RX ADMIN — SODIUM CHLORIDE 20 ML/HR: 9 INJECTION, SOLUTION INTRAVENOUS at 09:40

## 2020-03-25 RX ADMIN — IRINOTECAN HYDROCHLORIDE 440 MG: 20 INJECTION, SOLUTION INTRAVENOUS at 12:23

## 2020-03-25 RX ADMIN — DEXAMETHASONE SODIUM PHOSPHATE 10 MG: 10 INJECTION, SOLUTION INTRAMUSCULAR; INTRAVENOUS at 09:54

## 2020-03-25 RX ADMIN — SODIUM CHLORIDE 150 MG: 0.9 INJECTION, SOLUTION INTRAVENOUS at 10:30

## 2020-03-25 RX ADMIN — LEUCOVORIN CALCIUM 1000 MG: 200 INJECTION, POWDER, LYOPHILIZED, FOR SOLUTION INTRAMUSCULAR; INTRAVENOUS at 12:22

## 2020-03-25 RX ADMIN — ATROPINE SULFATE 0.25 MG: 1 INJECTION, SOLUTION INTRAMUSCULAR; INTRAVENOUS; SUBCUTANEOUS at 12:19

## 2020-03-25 RX ADMIN — SODIUM CHLORIDE 900 MG: 0.9 INJECTION, SOLUTION INTRAVENOUS at 11:10

## 2020-03-27 ENCOUNTER — OFFICE VISIT (OUTPATIENT)
Dept: PULMONOLOGY | Facility: CLINIC | Age: 60
End: 2020-03-27
Payer: MEDICARE

## 2020-03-27 ENCOUNTER — TELEMEDICINE (OUTPATIENT)
Dept: PULMONOLOGY | Facility: CLINIC | Age: 60
End: 2020-03-27

## 2020-03-27 ENCOUNTER — HOSPITAL ENCOUNTER (OUTPATIENT)
Dept: INFUSION CENTER | Facility: CLINIC | Age: 60
Discharge: HOME | End: 2020-03-27
Payer: MEDICARE

## 2020-03-27 VITALS
WEIGHT: 239.8 LBS | DIASTOLIC BLOOD PRESSURE: 66 MMHG | RESPIRATION RATE: 16 BRPM | HEIGHT: 76 IN | OXYGEN SATURATION: 97 % | BODY MASS INDEX: 29.2 KG/M2 | HEART RATE: 69 BPM | SYSTOLIC BLOOD PRESSURE: 110 MMHG | TEMPERATURE: 97.6 F

## 2020-03-27 VITALS
HEART RATE: 62 BPM | DIASTOLIC BLOOD PRESSURE: 91 MMHG | RESPIRATION RATE: 18 BRPM | SYSTOLIC BLOOD PRESSURE: 150 MMHG | TEMPERATURE: 98 F

## 2020-03-27 DIAGNOSIS — C78.00 COLON CANCER METASTASIZED TO LUNG (HCC): Primary | ICD-10-CM

## 2020-03-27 DIAGNOSIS — R91.8 PULMONARY NODULES/LESIONS, MULTIPLE: Primary | ICD-10-CM

## 2020-03-27 DIAGNOSIS — C78.00 COLON CANCER METASTASIZED TO LUNG (HCC): Chronic | ICD-10-CM

## 2020-03-27 DIAGNOSIS — C18.9 COLON CANCER METASTASIZED TO LUNG (HCC): Primary | ICD-10-CM

## 2020-03-27 DIAGNOSIS — C77.1 METASTASIS TO INTRATHORACIC LYMPH NODES (HCC): ICD-10-CM

## 2020-03-27 DIAGNOSIS — C18.9 COLON CANCER METASTASIZED TO LUNG (HCC): Chronic | ICD-10-CM

## 2020-03-27 DIAGNOSIS — C18.2 MALIGNANT NEOPLASM OF ASCENDING COLON (HCC): Primary | ICD-10-CM

## 2020-03-27 DIAGNOSIS — D70.1 CHEMOTHERAPY INDUCED NEUTROPENIA (HCC): ICD-10-CM

## 2020-03-27 DIAGNOSIS — T45.1X5A CHEMOTHERAPY INDUCED NEUTROPENIA (HCC): ICD-10-CM

## 2020-03-27 PROCEDURE — 96372 THER/PROPH/DIAG INJ SC/IM: CPT

## 2020-03-27 PROCEDURE — 99215 OFFICE O/P EST HI 40 MIN: CPT | Performed by: INTERNAL MEDICINE

## 2020-03-27 PROCEDURE — 94618 PULMONARY STRESS TESTING: CPT | Performed by: INTERNAL MEDICINE

## 2020-03-27 RX ADMIN — PEGFILGRASTIM 6 MG: KIT SUBCUTANEOUS at 12:26

## 2020-03-27 NOTE — LETTER
March 27, 2020     Anamaria Manuel, DO  [address] [facility]  [address]    Patient: Connie Fontenot   YOB: 1960   Date of Visit: 3/27/2020       Dear Dr Jim Piña:    Thank you for referring Shira Yanez to me for evaluation  Below are my notes for this consultation  If you have questions, please do not hesitate to call me  I look forward to following your patient along with you  Sincerely,        Marcela Monet MD        CC: MD Marcela Gunn MD  3/27/2020 11:34 AM  Sign at close encounter  Pulmonary outpatient note   Connie Fontenot 63 y o  male MRN: [id number]  3/27/2020      Assessment and plan:  Connie Service has metastatic colon cancer to the chest   He is complaining of shortness of breath and she is hypoxic on 6 minutes walk test down to 80% requiring 2 liters/minute of oxygen  This is secondary to the lung metastasis which occupies significant portion of the patient of the   He is being treated with chemotherapy by Oncology  I gave the patient inhaler sample, Breo a taking once daily try to decrease inflammation/   This is making a difference with prescribed  If not, it would start using I asked him to home in 2 weeks with clinical impression  I am prescribing oxygen use during exercise  Will follow as needed  Marcela Monet MD/PhD,  [facility]      No follow-ups on file  History of Present Illness  This is 63y o  year old male with previous medical history of metastatic colon cancer since 2008  I saw him on January 20 week for shortness of breath  Did a bronchoscopy that was negative and the patient and lung biopsy that showed metastatic colon cancer in his lungs  He is currently laying in his cancer and is complaining of shortness of breath  She is using oxygen and ED walk test today that showed ejection need oxygen    Regarding his breathing, besides the colon cancer the patient does not have known lung disease  Social history:   Smoked cigars for 10 years  Does not drink alcohol  Occupational history:     Worked as a   Retired in 2017  Review of Systems   Constitutional: Positive for fatigue and unexpected weight change  HENT: Negative  Eyes: Negative  Respiratory: Positive for shortness of breath  Cardiovascular: Negative  Gastrointestinal: Negative  Endocrine: Negative  Genitourinary: Negative  Musculoskeletal: Negative  Skin: Negative  Allergic/Immunologic: Negative  Neurological: Negative  Hematological: Negative  Psychiatric/Behavioral: Negative  Historical Information   Past Medical History:   Diagnosis Date    Atrial fibrillation (HCC)     Colon cancer (HCC)     Dyspnea on exertion     Malignant neoplasm of ascending colon ([address] )     Metastatic carcinoma to lung (HCC)     Pneumonitis     Pulmonary nodules     Shortness of breath      Past Surgical History:   Procedure Laterality Date    ANTERIOR CRUCIATE LIGAMENT REPAIR      ANTERIOR CRUCIATE LIGAMENT REPAIR      2  on R knee    APPENDECTOMY      ATRIAL ABLATION SURGERY      BACK SURGERY      COLON SURGERY      Ascending and transverse colon    COLONOSCOPY      LUNG REMOVAL, PARTIAL      LUNG SEGMENTECTOMY Right 2/26/2020    Procedure: RESECTION WEDGE LUNG;  Surgeon: Mansoor Rand MD;  [facility];  Service: Thoracic    MD THORACOSCOPY W/THERA WEDGE RESEXN INITIAL UNILAT Right 2/26/2020    Procedure: THORACOSCOPY VIDEO ASSISTED SURGERY (VATS);   Surgeon: Mansoor Rand MD;  [facility];  Service: Thoracic    RIGHT COLECTOMY      and Transverse colon resection    THORACOTOMY       Family History   Problem Relation Age of Onset    Diabetes Mother     Heart disease Mother     Heart disease Father     Skin cancer Sister        Meds/Allergies     Current Outpatient Medications:     acetaminophen (TYLENOL) 500 mg tablet, Take 500 mg by mouth every 6 (six) hours as needed for mild pain, Disp: , Rfl:     albuterol (VENTOLIN HFA) 90 mcg/act inhaler, INHALE TWO PUFFS BY MOUTH EVERY 4 HOURS AS NEEDED FOR SHORTNESS OF BREATH   no more than 4 times daily, Disp: 18 g, Rfl: 1    ascorbic acid (VITAMIN C) 250 MG CHEW, Chew 250 mg daily, Disp: , Rfl:     docusate sodium (COLACE) 100 mg capsule, Take 1 capsule (100 mg total) by mouth 2 (two) times a day (Patient not taking: Reported on 3/17/2020), Disp: 10 capsule, Rfl: 0    gabapentin (NEURONTIN) 300 mg capsule, Take 1 capsule (300 mg total) by mouth 3 (three) times a day (Patient not taking: Reported on 3/27/2020), Disp: 63 capsule, Rfl: 0    ibuprofen (MOTRIN) 200 mg tablet, Take by mouth every 6 (six) hours as needed for mild pain, Disp: , Rfl:     ondansetron (ZOFRAN) 4 mg tablet, Take 1 tablet (4 mg total) by mouth every 6 (six) hours as needed for nausea or vomiting (Patient not taking: Reported on 3/17/2020), Disp: 50 tablet, Rfl: 2    prochlorperazine (COMPAZINE) 10 mg tablet, Take 1 tablet (10 mg total) by mouth every 6 (six) hours as needed for nausea or vomiting, Disp: 30 tablet, Rfl: 3  No current facility-administered medications for this visit  Facility-Administered Medications Ordered in Other Visits:     atropine injection 0 25 mg, 0 25 mg, Intravenous, Once PRN, Oneil Hollis MD  Allergies   Allergen Reactions    Aspirin Other (See Comments)     Nose bleed    Sulfa Antibiotics Other (See Comments)     Dizzy        Vitals: There were no vitals taken for this visit  There is no height or weight on file to calculate BMI  Physical Exam  Physical Exam   Constitutional: He is oriented to person, place, and time  He appears well-developed and well-nourished  No distress  HENT:   Head: Normocephalic and atraumatic  Eyes: Pupils are equal, round, and reactive to light  EOM are normal    Neck: Normal range of motion  Neck supple  No JVD present     Cardiovascular: Normal rate, regular rhythm, normal heart sounds and intact distal pulses  Exam reveals no friction rub  No murmur heard  Pulmonary/Chest: Effort normal  No respiratory distress  He has no wheezes  He has rales  Decreased breath sounds bilaterally   Abdominal: Soft  Bowel sounds are normal    Musculoskeletal: Normal range of motion  He exhibits no edema or deformity  Neurological: He is alert and oriented to person, place, and time  Skin: Skin is warm  Psychiatric: He has a normal mood and affect  Labs: I have personally reviewed pertinent lab results  Lab Results   Component Value Date    WBC 5 43 03/24/2020    HGB 13 8 03/24/2020    HCT 40 3 03/24/2020    MCV 97 03/24/2020     (L) 03/24/2020     Lab Results   Component Value Date    GLUCOSE 92 10/19/2015    CALCIUM 9 1 03/24/2020     10/19/2015    K 4 5 03/24/2020    CO2 33 (H) 03/24/2020    CL 95 (L) 03/24/2020    BUN 12 03/24/2020    CREATININE 1 25 03/24/2020     No results found for: IGE  Lab Results   Component Value Date    ALT 30 03/24/2020    AST 55 (H) 03/24/2020    ALKPHOS 70 03/24/2020    BILITOT 1 24 (H) 10/19/2015       Imaging and other studies:   I have personally reviewed pertinent imaging studies in PACS  The patient had chest CT on February 2020 that showed multiple lung nodules in  Hepatic distribution increasing in size compared to December 2019 which represent metastatic colon cancer  The patient had a 6 minutes walk test today  Room air saturation 97%  Patient desaturated to 88% after 2 minutes and 84 m  He was placed on oxygen 2 liters/minute with saturation increasing to 95%    He completed total of 1 in 2 m during the test

## 2020-03-27 NOTE — PROGRESS NOTES
Pulmonary outpatient note   Treasa Phoenix 63 y o  male MRN: [id number]  3/27/2020      Assessment and plan:  Treasa Phoenix has metastatic colon cancer to the chest   He is complaining of shortness of breath and she is hypoxic on 6 minutes walk test down to 80% requiring 2 liters/minute of oxygen  This is secondary to the lung metastasis which occupies significant portion of the patient of the   He is being treated with chemotherapy by Oncology  I gave the patient inhaler sample, Breo a taking once daily try to decrease inflammation/   This is making a difference with prescribed  If not, it would start using I asked him to home in 2 weeks with clinical impression  I am prescribing oxygen use during exercise  Will follow as needed  Keya Anderson MD/PhD,  [facility]      No follow-ups on file  History of Present Illness  This is 63y o  year old male with previous medical history of metastatic colon cancer since 2008  I saw him on January 20 week for shortness of breath  Did a bronchoscopy that was negative and the patient and lung biopsy that showed metastatic colon cancer in his lungs  He is currently laying in his cancer and is complaining of shortness of breath  She is using oxygen and ED walk test today that showed ejection need oxygen  Regarding his breathing, besides the colon cancer the patient does not have known lung disease  Social history:   Smoked cigars for 10 years  Does not drink alcohol  Occupational history:     Worked as a   Retired in 2017  Review of Systems   Constitutional: Positive for fatigue and unexpected weight change  HENT: Negative  Eyes: Negative  Respiratory: Positive for shortness of breath  Cardiovascular: Negative  Gastrointestinal: Negative  Endocrine: Negative  Genitourinary: Negative  Musculoskeletal: Negative  Skin: Negative  Allergic/Immunologic: Negative      Neurological: Negative  Hematological: Negative  Psychiatric/Behavioral: Negative  Historical Information   Past Medical History:   Diagnosis Date    Atrial fibrillation (HCC)     Colon cancer (HCC)     Dyspnea on exertion     Malignant neoplasm of ascending colon ([address] )     Metastatic carcinoma to lung (HCC)     Pneumonitis     Pulmonary nodules     Shortness of breath      Past Surgical History:   Procedure Laterality Date    ANTERIOR CRUCIATE LIGAMENT REPAIR      ANTERIOR CRUCIATE LIGAMENT REPAIR      2  on R knee    APPENDECTOMY      ATRIAL ABLATION SURGERY      BACK SURGERY      COLON SURGERY      Ascending and transverse colon    COLONOSCOPY      LUNG REMOVAL, PARTIAL      LUNG SEGMENTECTOMY Right 2/26/2020    Procedure: RESECTION WEDGE LUNG;  Surgeon: Medina Bird MD;  [facility];  Service: Thoracic    FL THORACOSCOPY W/THERA WEDGE RESEXN INITIAL UNILAT Right 2/26/2020    Procedure: THORACOSCOPY VIDEO ASSISTED SURGERY (VATS);   Surgeon: Medina Bird MD;  [facility];  Service: Thoracic    RIGHT COLECTOMY      and Transverse colon resection    THORACOTOMY       Family History   Problem Relation Age of Onset    Diabetes Mother     Heart disease Mother     Heart disease Father     Skin cancer Sister        Meds/Allergies     Current Outpatient Medications:     acetaminophen (TYLENOL) 500 mg tablet, Take 500 mg by mouth every 6 (six) hours as needed for mild pain, Disp: , Rfl:     albuterol (VENTOLIN HFA) 90 mcg/act inhaler, INHALE TWO PUFFS BY MOUTH EVERY 4 HOURS AS NEEDED FOR SHORTNESS OF BREATH   no more than 4 times daily, Disp: 18 g, Rfl: 1    ascorbic acid (VITAMIN C) 250 MG CHEW, Chew 250 mg daily, Disp: , Rfl:     docusate sodium (COLACE) 100 mg capsule, Take 1 capsule (100 mg total) by mouth 2 (two) times a day (Patient not taking: Reported on 3/17/2020), Disp: 10 capsule, Rfl: 0    gabapentin (NEURONTIN) 300 mg capsule, Take 1 capsule (300 mg total) by mouth 3 (three) times a day (Patient not taking: Reported on 3/27/2020), Disp: 63 capsule, Rfl: 0    ibuprofen (MOTRIN) 200 mg tablet, Take by mouth every 6 (six) hours as needed for mild pain, Disp: , Rfl:     ondansetron (ZOFRAN) 4 mg tablet, Take 1 tablet (4 mg total) by mouth every 6 (six) hours as needed for nausea or vomiting (Patient not taking: Reported on 3/17/2020), Disp: 50 tablet, Rfl: 2    prochlorperazine (COMPAZINE) 10 mg tablet, Take 1 tablet (10 mg total) by mouth every 6 (six) hours as needed for nausea or vomiting, Disp: 30 tablet, Rfl: 3  No current facility-administered medications for this visit  Facility-Administered Medications Ordered in Other Visits:     atropine injection 0 25 mg, 0 25 mg, Intravenous, Once PRN, Zunilda Benoit MD  Allergies   Allergen Reactions    Aspirin Other (See Comments)     Nose bleed    Sulfa Antibiotics Other (See Comments)     Dizzy        Vitals: There were no vitals taken for this visit  There is no height or weight on file to calculate BMI  Physical Exam  Physical Exam   Constitutional: He is oriented to person, place, and time  He appears well-developed and well-nourished  No distress  HENT:   Head: Normocephalic and atraumatic  Eyes: Pupils are equal, round, and reactive to light  EOM are normal    Neck: Normal range of motion  Neck supple  No JVD present  Cardiovascular: Normal rate, regular rhythm, normal heart sounds and intact distal pulses  Exam reveals no friction rub  No murmur heard  Pulmonary/Chest: Effort normal  No respiratory distress  He has no wheezes  He has rales  Decreased breath sounds bilaterally   Abdominal: Soft  Bowel sounds are normal    Musculoskeletal: Normal range of motion  He exhibits no edema or deformity  Neurological: He is alert and oriented to person, place, and time  Skin: Skin is warm  Psychiatric: He has a normal mood and affect  Labs:    I have personally reviewed pertinent lab results  Lab Results   Component Value Date    WBC 5 43 03/24/2020    HGB 13 8 03/24/2020    HCT 40 3 03/24/2020    MCV 97 03/24/2020     (L) 03/24/2020     Lab Results   Component Value Date    GLUCOSE 92 10/19/2015    CALCIUM 9 1 03/24/2020     10/19/2015    K 4 5 03/24/2020    CO2 33 (H) 03/24/2020    CL 95 (L) 03/24/2020    BUN 12 03/24/2020    CREATININE 1 25 03/24/2020     No results found for: IGE  Lab Results   Component Value Date    ALT 30 03/24/2020    AST 55 (H) 03/24/2020    ALKPHOS 70 03/24/2020    BILITOT 1 24 (H) 10/19/2015       Imaging and other studies:   I have personally reviewed pertinent imaging studies in PACS  The patient had chest CT on February 2020 that showed multiple lung nodules in  Hepatic distribution increasing in size compared to December 2019 which represent metastatic colon cancer  The patient had a 6 minutes walk test today  Room air saturation 97%  Patient desaturated to 88% after 2 minutes and 84 m  He was placed on oxygen 2 liters/minute with saturation increasing to 95%    He completed total of 1 in 2 m during the test

## 2020-03-30 ENCOUNTER — TELEPHONE (OUTPATIENT)
Dept: PULMONOLOGY | Facility: CLINIC | Age: 60
End: 2020-03-30

## 2020-03-30 DIAGNOSIS — D70.1 CHEMOTHERAPY INDUCED NEUTROPENIA (HCC): Primary | ICD-10-CM

## 2020-03-30 DIAGNOSIS — C18.9 COLON CANCER METASTASIZED TO LUNG (HCC): ICD-10-CM

## 2020-03-30 DIAGNOSIS — C78.00 COLON CANCER METASTASIZED TO LUNG (HCC): ICD-10-CM

## 2020-03-30 DIAGNOSIS — C18.2 MALIGNANT NEOPLASM OF ASCENDING COLON (HCC): ICD-10-CM

## 2020-03-30 DIAGNOSIS — T45.1X5A CHEMOTHERAPY INDUCED NEUTROPENIA (HCC): Primary | ICD-10-CM

## 2020-03-30 DIAGNOSIS — C77.1 METASTASIS TO INTRATHORACIC LYMPH NODES (HCC): ICD-10-CM

## 2020-03-30 RX ORDER — ATROPINE SULFATE 1 MG/ML
0.25 INJECTION, SOLUTION INTRAMUSCULAR; INTRAVENOUS; SUBCUTANEOUS ONCE AS NEEDED
Status: CANCELLED | OUTPATIENT
Start: 2020-04-08

## 2020-03-30 RX ORDER — ATROPINE SULFATE 1 MG/ML
0.25 INJECTION, SOLUTION INTRAMUSCULAR; INTRAVENOUS; SUBCUTANEOUS ONCE
Status: CANCELLED | OUTPATIENT
Start: 2020-04-08

## 2020-03-30 RX ORDER — SODIUM CHLORIDE 9 MG/ML
20 INJECTION, SOLUTION INTRAVENOUS ONCE
Status: CANCELLED | OUTPATIENT
Start: 2020-04-08

## 2020-03-30 NOTE — TELEPHONE ENCOUNTER
Faxed oxygen and poc order along with testing and office notes to [facility] [phone] on 3/30/20

## 2020-04-06 ENCOUNTER — APPOINTMENT (OUTPATIENT)
Dept: LAB | Facility: HOSPITAL | Age: 60
End: 2020-04-06
Attending: INTERNAL MEDICINE
Payer: MEDICARE

## 2020-04-06 DIAGNOSIS — C18.2 MALIGNANT NEOPLASM OF ASCENDING COLON (HCC): ICD-10-CM

## 2020-04-06 LAB
BILIRUB UR QL STRIP: NEGATIVE
CEA SERPL-MCNC: 219 NG/ML (ref 0–3)
CLARITY UR: CLEAR
COLOR UR: YELLOW
GLUCOSE UR STRIP-MCNC: NEGATIVE MG/DL
HGB UR QL STRIP.AUTO: NEGATIVE
KETONES UR STRIP-MCNC: NEGATIVE MG/DL
LEUKOCYTE ESTERASE UR QL STRIP: NEGATIVE
NITRITE UR QL STRIP: NEGATIVE
PH UR STRIP.AUTO: 6 [PH]
PROT UR STRIP-MCNC: NEGATIVE MG/DL
SP GR UR STRIP.AUTO: 1.02 (ref 1–1.03)
UROBILINOGEN UR QL STRIP.AUTO: 0.2 E.U./DL

## 2020-04-06 PROCEDURE — 81003 URINALYSIS AUTO W/O SCOPE: CPT | Performed by: INTERNAL MEDICINE

## 2020-04-06 PROCEDURE — 82378 CARCINOEMBRYONIC ANTIGEN: CPT

## 2020-04-08 ENCOUNTER — HOSPITAL ENCOUNTER (OUTPATIENT)
Dept: INFUSION CENTER | Facility: CLINIC | Age: 60
Discharge: HOME | End: 2020-04-08
Payer: MEDICARE

## 2020-04-08 VITALS
WEIGHT: 240.52 LBS | HEIGHT: 76 IN | RESPIRATION RATE: 18 BRPM | SYSTOLIC BLOOD PRESSURE: 133 MMHG | HEART RATE: 80 BPM | BODY MASS INDEX: 29.29 KG/M2 | TEMPERATURE: 97.6 F | DIASTOLIC BLOOD PRESSURE: 88 MMHG

## 2020-04-08 DIAGNOSIS — C78.00 COLON CANCER METASTASIZED TO LUNG (HCC): Primary | Chronic | ICD-10-CM

## 2020-04-08 DIAGNOSIS — T45.1X5A CHEMOTHERAPY INDUCED NEUTROPENIA (HCC): ICD-10-CM

## 2020-04-08 DIAGNOSIS — C18.9 COLON CANCER METASTASIZED TO LUNG (HCC): Primary | Chronic | ICD-10-CM

## 2020-04-08 DIAGNOSIS — C18.2 MALIGNANT NEOPLASM OF ASCENDING COLON (HCC): Primary | ICD-10-CM

## 2020-04-08 DIAGNOSIS — C77.1 METASTASIS TO INTRATHORACIC LYMPH NODES (HCC): ICD-10-CM

## 2020-04-08 DIAGNOSIS — C78.00 COLON CANCER METASTASIZED TO LUNG (HCC): Chronic | ICD-10-CM

## 2020-04-08 DIAGNOSIS — D70.1 CHEMOTHERAPY INDUCED NEUTROPENIA (HCC): ICD-10-CM

## 2020-04-08 DIAGNOSIS — C18.9 COLON CANCER METASTASIZED TO LUNG (HCC): Chronic | ICD-10-CM

## 2020-04-08 PROCEDURE — 96367 TX/PROPH/DG ADDL SEQ IV INF: CPT

## 2020-04-08 PROCEDURE — 96417 CHEMO IV INFUS EACH ADDL SEQ: CPT

## 2020-04-08 PROCEDURE — 96375 TX/PRO/DX INJ NEW DRUG ADDON: CPT

## 2020-04-08 PROCEDURE — 96368 THER/DIAG CONCURRENT INF: CPT

## 2020-04-08 PROCEDURE — 96413 CHEMO IV INFUSION 1 HR: CPT

## 2020-04-08 PROCEDURE — G0498 CHEMO EXTEND IV INFUS W/PUMP: HCPCS

## 2020-04-08 RX ORDER — SODIUM CHLORIDE 9 MG/ML
20 INJECTION, SOLUTION INTRAVENOUS ONCE
Status: COMPLETED | OUTPATIENT
Start: 2020-04-08 | End: 2020-04-08

## 2020-04-08 RX ORDER — ATROPINE SULFATE 1 MG/ML
0.25 INJECTION, SOLUTION INTRAMUSCULAR; INTRAVENOUS; SUBCUTANEOUS ONCE
Status: COMPLETED | OUTPATIENT
Start: 2020-04-08 | End: 2020-04-08

## 2020-04-08 RX ORDER — ATROPINE SULFATE 1 MG/ML
0.25 INJECTION, SOLUTION INTRAMUSCULAR; INTRAVENOUS; SUBCUTANEOUS ONCE AS NEEDED
Status: DISCONTINUED | OUTPATIENT
Start: 2020-04-08 | End: 2020-04-11 | Stop reason: HOSPADM

## 2020-04-08 RX ADMIN — LEUCOVORIN CALCIUM 1000 MG: 200 INJECTION, POWDER, LYOPHILIZED, FOR SUSPENSION INTRAMUSCULAR; INTRAVENOUS at 11:55

## 2020-04-08 RX ADMIN — IRINOTECAN HYDROCHLORIDE 340 MG: 20 INJECTION, SOLUTION INTRAVENOUS at 11:57

## 2020-04-08 RX ADMIN — SODIUM CHLORIDE 900 MG: 0.9 INJECTION, SOLUTION INTRAVENOUS at 10:49

## 2020-04-08 RX ADMIN — SODIUM CHLORIDE 20 ML/HR: 0.9 INJECTION, SOLUTION INTRAVENOUS at 09:38

## 2020-04-08 RX ADMIN — DEXAMETHASONE SODIUM PHOSPHATE 10 MG: 10 INJECTION, SOLUTION INTRAMUSCULAR; INTRAVENOUS at 09:38

## 2020-04-08 RX ADMIN — SODIUM CHLORIDE 150 MG: 0.9 INJECTION, SOLUTION INTRAVENOUS at 10:02

## 2020-04-08 RX ADMIN — ATROPINE SULFATE 0.25 MG: 1 INJECTION, SOLUTION INTRAMUSCULAR; INTRAVENOUS; SUBCUTANEOUS at 11:53

## 2020-04-10 ENCOUNTER — HOSPITAL ENCOUNTER (OUTPATIENT)
Dept: INFUSION CENTER | Facility: CLINIC | Age: 60
Discharge: HOME | End: 2020-04-10
Payer: MEDICARE

## 2020-04-10 VITALS
RESPIRATION RATE: 18 BRPM | SYSTOLIC BLOOD PRESSURE: 132 MMHG | TEMPERATURE: 98 F | HEART RATE: 75 BPM | DIASTOLIC BLOOD PRESSURE: 89 MMHG

## 2020-04-10 DIAGNOSIS — T45.1X5A CHEMOTHERAPY INDUCED NEUTROPENIA (HCC): ICD-10-CM

## 2020-04-10 DIAGNOSIS — C18.2 MALIGNANT NEOPLASM OF ASCENDING COLON (HCC): Primary | ICD-10-CM

## 2020-04-10 DIAGNOSIS — C77.1 METASTASIS TO INTRATHORACIC LYMPH NODES (HCC): ICD-10-CM

## 2020-04-10 DIAGNOSIS — C78.00 COLON CANCER METASTASIZED TO LUNG (HCC): Chronic | ICD-10-CM

## 2020-04-10 DIAGNOSIS — C18.9 COLON CANCER METASTASIZED TO LUNG (HCC): Chronic | ICD-10-CM

## 2020-04-10 DIAGNOSIS — D70.1 CHEMOTHERAPY INDUCED NEUTROPENIA (HCC): ICD-10-CM

## 2020-04-10 PROCEDURE — 96372 THER/PROPH/DIAG INJ SC/IM: CPT

## 2020-04-10 RX ADMIN — PEGFILGRASTIM 6 MG: KIT SUBCUTANEOUS at 08:34

## 2020-04-14 RX ORDER — ATROPINE SULFATE 1 MG/ML
0.25 INJECTION, SOLUTION INTRAMUSCULAR; INTRAVENOUS; SUBCUTANEOUS ONCE AS NEEDED
Status: CANCELLED | OUTPATIENT
Start: 2020-04-22

## 2020-04-14 RX ORDER — SODIUM CHLORIDE 9 MG/ML
20 INJECTION, SOLUTION INTRAVENOUS ONCE
Status: CANCELLED | OUTPATIENT
Start: 2020-04-22

## 2020-04-14 RX ORDER — ATROPINE SULFATE 1 MG/ML
0.25 INJECTION, SOLUTION INTRAMUSCULAR; INTRAVENOUS; SUBCUTANEOUS ONCE
Status: CANCELLED | OUTPATIENT
Start: 2020-04-22

## 2020-04-15 DIAGNOSIS — C77.1 METASTASIS TO INTRATHORACIC LYMPH NODES (HCC): ICD-10-CM

## 2020-04-15 DIAGNOSIS — C78.00 COLON CANCER METASTASIZED TO LUNG (HCC): ICD-10-CM

## 2020-04-15 DIAGNOSIS — T45.1X5A CHEMOTHERAPY INDUCED NEUTROPENIA (HCC): Primary | ICD-10-CM

## 2020-04-15 DIAGNOSIS — C18.2 MALIGNANT NEOPLASM OF ASCENDING COLON (HCC): ICD-10-CM

## 2020-04-15 DIAGNOSIS — D70.1 CHEMOTHERAPY INDUCED NEUTROPENIA (HCC): Primary | ICD-10-CM

## 2020-04-15 DIAGNOSIS — C18.9 COLON CANCER METASTASIZED TO LUNG (HCC): ICD-10-CM

## 2020-04-17 NOTE — TELEPHONE ENCOUNTER
Pt called to say that he spoke with a rep from [employer] and they told him that they had not received any order for a POC  So I told him that I would call them and see what happened since we faxed the paperwork on 3/30/2020  I called Linda Portillo)  and asked her about this order she told me that they did receive the order but right now due to Covid-19 they are not doing the restart eval  And the papers are still in the billing dept so it will depend on [place] to det when will the Pt get his POC  I will call and let the PT know what's really going om

## 2020-04-20 ENCOUNTER — APPOINTMENT (OUTPATIENT)
Dept: LAB | Facility: HOSPITAL | Age: 60
End: 2020-04-20
Attending: INTERNAL MEDICINE
Payer: MEDICARE

## 2020-04-20 DIAGNOSIS — C18.2 MALIGNANT NEOPLASM OF ASCENDING COLON (HCC): ICD-10-CM

## 2020-04-22 ENCOUNTER — HOSPITAL ENCOUNTER (OUTPATIENT)
Dept: INFUSION CENTER | Facility: CLINIC | Age: 60
Discharge: HOME | End: 2020-04-22
Payer: MEDICARE

## 2020-04-22 VITALS
RESPIRATION RATE: 18 BRPM | BODY MASS INDEX: 28.19 KG/M2 | WEIGHT: 231.48 LBS | OXYGEN SATURATION: 99 % | HEIGHT: 76 IN | DIASTOLIC BLOOD PRESSURE: 82 MMHG | SYSTOLIC BLOOD PRESSURE: 124 MMHG | TEMPERATURE: 98.3 F | HEART RATE: 85 BPM

## 2020-04-22 DIAGNOSIS — C18.2 MALIGNANT NEOPLASM OF ASCENDING COLON (HCC): Primary | ICD-10-CM

## 2020-04-22 DIAGNOSIS — D70.1 CHEMOTHERAPY INDUCED NEUTROPENIA (HCC): ICD-10-CM

## 2020-04-22 DIAGNOSIS — T45.1X5A CHEMOTHERAPY INDUCED NEUTROPENIA (HCC): ICD-10-CM

## 2020-04-22 DIAGNOSIS — C18.9 COLON CANCER METASTASIZED TO LUNG (HCC): Chronic | ICD-10-CM

## 2020-04-22 DIAGNOSIS — C77.1 METASTASIS TO INTRATHORACIC LYMPH NODES (HCC): ICD-10-CM

## 2020-04-22 DIAGNOSIS — C78.00 COLON CANCER METASTASIZED TO LUNG (HCC): Chronic | ICD-10-CM

## 2020-04-22 PROCEDURE — 96415 CHEMO IV INFUSION ADDL HR: CPT

## 2020-04-22 PROCEDURE — 96375 TX/PRO/DX INJ NEW DRUG ADDON: CPT

## 2020-04-22 PROCEDURE — G0498 CHEMO EXTEND IV INFUS W/PUMP: HCPCS

## 2020-04-22 PROCEDURE — 96413 CHEMO IV INFUSION 1 HR: CPT

## 2020-04-22 PROCEDURE — 96367 TX/PROPH/DG ADDL SEQ IV INF: CPT

## 2020-04-22 PROCEDURE — 96417 CHEMO IV INFUS EACH ADDL SEQ: CPT

## 2020-04-22 PROCEDURE — 96368 THER/DIAG CONCURRENT INF: CPT

## 2020-04-22 RX ORDER — ATROPINE SULFATE 1 MG/ML
0.25 INJECTION, SOLUTION INTRAMUSCULAR; INTRAVENOUS; SUBCUTANEOUS ONCE
Status: COMPLETED | OUTPATIENT
Start: 2020-04-22 | End: 2020-04-22

## 2020-04-22 RX ORDER — SODIUM CHLORIDE 9 MG/ML
20 INJECTION, SOLUTION INTRAVENOUS ONCE
Status: COMPLETED | OUTPATIENT
Start: 2020-04-22 | End: 2020-04-22

## 2020-04-22 RX ORDER — ATROPINE SULFATE 1 MG/ML
0.25 INJECTION, SOLUTION INTRAMUSCULAR; INTRAVENOUS; SUBCUTANEOUS ONCE AS NEEDED
Status: DISCONTINUED | OUTPATIENT
Start: 2020-04-22 | End: 2020-04-25 | Stop reason: HOSPADM

## 2020-04-22 RX ADMIN — DEXAMETHASONE SODIUM PHOSPHATE 10 MG: 10 INJECTION, SOLUTION INTRAMUSCULAR; INTRAVENOUS at 08:58

## 2020-04-22 RX ADMIN — SODIUM CHLORIDE 150 MG: 0.9 INJECTION, SOLUTION INTRAVENOUS at 09:19

## 2020-04-22 RX ADMIN — ATROPINE SULFATE 0.25 MG: 1 INJECTION, SOLUTION INTRAMUSCULAR; INTRAVENOUS; SUBCUTANEOUS at 11:03

## 2020-04-22 RX ADMIN — IRINOTECAN HYDROCHLORIDE 340 MG: 20 INJECTION, SOLUTION INTRAVENOUS at 11:19

## 2020-04-22 RX ADMIN — SODIUM CHLORIDE 20 ML/HR: 0.9 INJECTION, SOLUTION INTRAVENOUS at 08:58

## 2020-04-22 RX ADMIN — SODIUM CHLORIDE 700 MG: 0.9 INJECTION, SOLUTION INTRAVENOUS at 10:00

## 2020-04-22 RX ADMIN — LEUCOVORIN CALCIUM 800 MG: 200 INJECTION, POWDER, LYOPHILIZED, FOR SUSPENSION INTRAMUSCULAR; INTRAVENOUS at 11:16

## 2020-04-24 ENCOUNTER — HOSPITAL ENCOUNTER (OUTPATIENT)
Dept: INFUSION CENTER | Facility: CLINIC | Age: 60
Discharge: HOME | End: 2020-04-24
Payer: MEDICARE

## 2020-04-24 VITALS
HEART RATE: 81 BPM | TEMPERATURE: 98.1 F | RESPIRATION RATE: 18 BRPM | SYSTOLIC BLOOD PRESSURE: 114 MMHG | DIASTOLIC BLOOD PRESSURE: 79 MMHG

## 2020-04-24 DIAGNOSIS — T45.1X5A CHEMOTHERAPY INDUCED NEUTROPENIA (HCC): ICD-10-CM

## 2020-04-24 DIAGNOSIS — D70.1 CHEMOTHERAPY INDUCED NEUTROPENIA (HCC): ICD-10-CM

## 2020-04-24 DIAGNOSIS — C77.1 METASTASIS TO INTRATHORACIC LYMPH NODES (HCC): ICD-10-CM

## 2020-04-24 DIAGNOSIS — C78.00 COLON CANCER METASTASIZED TO LUNG (HCC): Chronic | ICD-10-CM

## 2020-04-24 DIAGNOSIS — C18.9 COLON CANCER METASTASIZED TO LUNG (HCC): Chronic | ICD-10-CM

## 2020-04-24 DIAGNOSIS — C18.2 MALIGNANT NEOPLASM OF ASCENDING COLON (HCC): Primary | ICD-10-CM

## 2020-04-24 PROCEDURE — 96372 THER/PROPH/DIAG INJ SC/IM: CPT

## 2020-04-24 RX ADMIN — PEGFILGRASTIM 6 MG: KIT SUBCUTANEOUS at 10:36

## 2020-04-28 RX ORDER — ATROPINE SULFATE 1 MG/ML
0.25 INJECTION, SOLUTION INTRAMUSCULAR; INTRAVENOUS; SUBCUTANEOUS ONCE AS NEEDED
Status: CANCELLED | OUTPATIENT
Start: 2020-05-05

## 2020-04-28 RX ORDER — ATROPINE SULFATE 1 MG/ML
0.25 INJECTION, SOLUTION INTRAMUSCULAR; INTRAVENOUS; SUBCUTANEOUS ONCE
Status: CANCELLED | OUTPATIENT
Start: 2020-05-05

## 2020-04-28 RX ORDER — SODIUM CHLORIDE 9 MG/ML
20 INJECTION, SOLUTION INTRAVENOUS ONCE
Status: CANCELLED | OUTPATIENT
Start: 2020-05-05

## 2020-04-29 DIAGNOSIS — C18.2 MALIGNANT NEOPLASM OF ASCENDING COLON (HCC): ICD-10-CM

## 2020-04-29 DIAGNOSIS — C18.9 COLON CANCER METASTASIZED TO LUNG (HCC): ICD-10-CM

## 2020-04-29 DIAGNOSIS — D70.1 CHEMOTHERAPY INDUCED NEUTROPENIA (HCC): Primary | ICD-10-CM

## 2020-04-29 DIAGNOSIS — C77.1 METASTASIS TO INTRATHORACIC LYMPH NODES (HCC): ICD-10-CM

## 2020-04-29 DIAGNOSIS — T45.1X5A CHEMOTHERAPY INDUCED NEUTROPENIA (HCC): Primary | ICD-10-CM

## 2020-04-29 DIAGNOSIS — C78.00 COLON CANCER METASTASIZED TO LUNG (HCC): ICD-10-CM

## 2020-05-03 ENCOUNTER — APPOINTMENT (OUTPATIENT)
Dept: LAB | Facility: HOSPITAL | Age: 60
End: 2020-05-03
Attending: INTERNAL MEDICINE
Payer: MEDICARE

## 2020-05-03 DIAGNOSIS — C18.2 MALIGNANT NEOPLASM OF ASCENDING COLON (HCC): ICD-10-CM

## 2020-05-03 LAB — CEA SERPL-MCNC: 86.7 NG/ML (ref 0–3)

## 2020-05-03 PROCEDURE — 36415 COLL VENOUS BLD VENIPUNCTURE: CPT

## 2020-05-03 PROCEDURE — 82378 CARCINOEMBRYONIC ANTIGEN: CPT

## 2020-05-05 ENCOUNTER — HOSPITAL ENCOUNTER (OUTPATIENT)
Dept: INFUSION CENTER | Facility: CLINIC | Age: 60
Discharge: HOME | End: 2020-05-05
Payer: MEDICARE

## 2020-05-05 ENCOUNTER — OFFICE VISIT (OUTPATIENT)
Dept: HEMATOLOGY ONCOLOGY | Facility: CLINIC | Age: 60
End: 2020-05-05
Payer: MEDICARE

## 2020-05-05 VITALS
WEIGHT: 230.2 LBS | BODY MASS INDEX: 28.62 KG/M2 | SYSTOLIC BLOOD PRESSURE: 118 MMHG | HEIGHT: 75 IN | DIASTOLIC BLOOD PRESSURE: 76 MMHG | TEMPERATURE: 97.8 F | HEART RATE: 76 BPM | RESPIRATION RATE: 18 BRPM | OXYGEN SATURATION: 97 %

## 2020-05-05 VITALS
HEIGHT: 75 IN | HEART RATE: 76 BPM | SYSTOLIC BLOOD PRESSURE: 118 MMHG | RESPIRATION RATE: 18 BRPM | BODY MASS INDEX: 28.51 KG/M2 | WEIGHT: 229.28 LBS | TEMPERATURE: 98.3 F | DIASTOLIC BLOOD PRESSURE: 76 MMHG

## 2020-05-05 DIAGNOSIS — D70.1 CHEMOTHERAPY INDUCED NEUTROPENIA (HCC): ICD-10-CM

## 2020-05-05 DIAGNOSIS — Z95.828 PORT-A-CATH IN PLACE: ICD-10-CM

## 2020-05-05 DIAGNOSIS — C18.2 MALIGNANT NEOPLASM OF ASCENDING COLON (HCC): Primary | ICD-10-CM

## 2020-05-05 DIAGNOSIS — C78.00 COLON CANCER METASTASIZED TO LUNG (HCC): Chronic | ICD-10-CM

## 2020-05-05 DIAGNOSIS — C77.1 METASTASIS TO INTRATHORACIC LYMPH NODES (HCC): ICD-10-CM

## 2020-05-05 DIAGNOSIS — D69.6 THROMBOCYTOPENIA (HCC): ICD-10-CM

## 2020-05-05 DIAGNOSIS — C18.9 COLON CANCER METASTASIZED TO LUNG (HCC): Chronic | ICD-10-CM

## 2020-05-05 DIAGNOSIS — T45.1X5A CHEMOTHERAPY INDUCED NEUTROPENIA (HCC): ICD-10-CM

## 2020-05-05 PROCEDURE — 99214 OFFICE O/P EST MOD 30 MIN: CPT | Performed by: INTERNAL MEDICINE

## 2020-05-05 PROCEDURE — 96417 CHEMO IV INFUS EACH ADDL SEQ: CPT

## 2020-05-05 PROCEDURE — 96367 TX/PROPH/DG ADDL SEQ IV INF: CPT

## 2020-05-05 PROCEDURE — G0498 CHEMO EXTEND IV INFUS W/PUMP: HCPCS

## 2020-05-05 PROCEDURE — 96375 TX/PRO/DX INJ NEW DRUG ADDON: CPT

## 2020-05-05 PROCEDURE — 96413 CHEMO IV INFUSION 1 HR: CPT

## 2020-05-05 PROCEDURE — 96368 THER/DIAG CONCURRENT INF: CPT

## 2020-05-05 RX ORDER — ATROPINE SULFATE 1 MG/ML
0.25 INJECTION, SOLUTION INTRAMUSCULAR; INTRAVENOUS; SUBCUTANEOUS ONCE
Status: COMPLETED | OUTPATIENT
Start: 2020-05-05 | End: 2020-05-05

## 2020-05-05 RX ORDER — ATROPINE SULFATE 1 MG/ML
0.25 INJECTION, SOLUTION INTRAMUSCULAR; INTRAVENOUS; SUBCUTANEOUS ONCE AS NEEDED
Status: DISCONTINUED | OUTPATIENT
Start: 2020-05-05 | End: 2020-05-08 | Stop reason: HOSPADM

## 2020-05-05 RX ORDER — METHOCARBAMOL 750 MG/1
TABLET, FILM COATED ORAL
COMMUNITY
Start: 2020-04-14 | End: 2020-07-16 | Stop reason: ALTCHOICE

## 2020-05-05 RX ORDER — SODIUM CHLORIDE 9 MG/ML
20 INJECTION, SOLUTION INTRAVENOUS ONCE
Status: COMPLETED | OUTPATIENT
Start: 2020-05-05 | End: 2020-05-05

## 2020-05-05 RX ADMIN — SODIUM CHLORIDE 20 ML/HR: 0.9 INJECTION, SOLUTION INTRAVENOUS at 11:33

## 2020-05-05 RX ADMIN — IRINOTECAN HYDROCHLORIDE 340 MG: 20 INJECTION, SOLUTION INTRAVENOUS at 14:04

## 2020-05-05 RX ADMIN — ATROPINE SULFATE 0.25 MG: 1 INJECTION, SOLUTION INTRAMUSCULAR; INTRAVENOUS; SUBCUTANEOUS at 12:02

## 2020-05-05 RX ADMIN — LEUCOVORIN CALCIUM 800 MG: 200 INJECTION, POWDER, LYOPHILIZED, FOR SOLUTION INTRAMUSCULAR; INTRAVENOUS at 14:09

## 2020-05-05 RX ADMIN — DEXAMETHASONE SODIUM PHOSPHATE 10 MG: 10 INJECTION, SOLUTION INTRAMUSCULAR; INTRAVENOUS at 12:01

## 2020-05-05 RX ADMIN — SODIUM CHLORIDE 700 MG: 0.9 INJECTION, SOLUTION INTRAVENOUS at 12:57

## 2020-05-05 RX ADMIN — SODIUM CHLORIDE 150 MG: 0.9 INJECTION, SOLUTION INTRAVENOUS at 12:24

## 2020-05-05 RX ADMIN — DIPHENHYDRAMINE HYDROCHLORIDE 25 MG: 50 INJECTION, SOLUTION INTRAMUSCULAR; INTRAVENOUS at 11:34

## 2020-05-07 ENCOUNTER — HOSPITAL ENCOUNTER (OUTPATIENT)
Dept: INFUSION CENTER | Facility: CLINIC | Age: 60
Discharge: HOME | End: 2020-05-07
Payer: MEDICARE

## 2020-05-07 VITALS
RESPIRATION RATE: 20 BRPM | SYSTOLIC BLOOD PRESSURE: 94 MMHG | HEART RATE: 117 BPM | DIASTOLIC BLOOD PRESSURE: 67 MMHG | TEMPERATURE: 97.1 F

## 2020-05-07 DIAGNOSIS — D70.1 CHEMOTHERAPY INDUCED NEUTROPENIA (HCC): ICD-10-CM

## 2020-05-07 DIAGNOSIS — C18.9 COLON CANCER METASTASIZED TO LUNG (HCC): Chronic | ICD-10-CM

## 2020-05-07 DIAGNOSIS — C78.00 COLON CANCER METASTASIZED TO LUNG (HCC): Chronic | ICD-10-CM

## 2020-05-07 DIAGNOSIS — C18.2 MALIGNANT NEOPLASM OF ASCENDING COLON (HCC): Primary | ICD-10-CM

## 2020-05-07 DIAGNOSIS — C77.1 METASTASIS TO INTRATHORACIC LYMPH NODES (HCC): ICD-10-CM

## 2020-05-07 DIAGNOSIS — T45.1X5A CHEMOTHERAPY INDUCED NEUTROPENIA (HCC): ICD-10-CM

## 2020-05-07 PROCEDURE — 96372 THER/PROPH/DIAG INJ SC/IM: CPT

## 2020-05-07 RX ADMIN — PEGFILGRASTIM 6 MG: KIT SUBCUTANEOUS at 14:02

## 2020-05-11 RX ORDER — SODIUM CHLORIDE 9 MG/ML
20 INJECTION, SOLUTION INTRAVENOUS ONCE
Status: CANCELLED | OUTPATIENT
Start: 2020-05-19

## 2020-05-11 RX ORDER — ATROPINE SULFATE 1 MG/ML
0.25 INJECTION, SOLUTION INTRAMUSCULAR; INTRAVENOUS; SUBCUTANEOUS ONCE
Status: CANCELLED | OUTPATIENT
Start: 2020-05-19

## 2020-05-11 RX ORDER — ATROPINE SULFATE 1 MG/ML
0.25 INJECTION, SOLUTION INTRAMUSCULAR; INTRAVENOUS; SUBCUTANEOUS ONCE AS NEEDED
Status: CANCELLED | OUTPATIENT
Start: 2020-05-19

## 2020-05-12 DIAGNOSIS — C18.9 COLON CANCER METASTASIZED TO LUNG (HCC): ICD-10-CM

## 2020-05-12 DIAGNOSIS — D70.1 CHEMOTHERAPY INDUCED NEUTROPENIA (HCC): Primary | ICD-10-CM

## 2020-05-12 DIAGNOSIS — T45.1X5A CHEMOTHERAPY INDUCED NEUTROPENIA (HCC): Primary | ICD-10-CM

## 2020-05-12 DIAGNOSIS — C18.2 MALIGNANT NEOPLASM OF ASCENDING COLON (HCC): ICD-10-CM

## 2020-05-12 DIAGNOSIS — C78.00 COLON CANCER METASTASIZED TO LUNG (HCC): ICD-10-CM

## 2020-05-12 DIAGNOSIS — C77.1 METASTASIS TO INTRATHORACIC LYMPH NODES (HCC): ICD-10-CM

## 2020-05-15 ENCOUNTER — TELEPHONE (OUTPATIENT)
Dept: HEMATOLOGY ONCOLOGY | Facility: CLINIC | Age: 60
End: 2020-05-15

## 2020-05-18 ENCOUNTER — APPOINTMENT (OUTPATIENT)
Dept: LAB | Facility: HOSPITAL | Age: 60
End: 2020-05-18
Attending: INTERNAL MEDICINE
Payer: MEDICARE

## 2020-05-18 DIAGNOSIS — C18.2 MALIGNANT NEOPLASM OF ASCENDING COLON (HCC): ICD-10-CM

## 2020-05-18 LAB — CEA SERPL-MCNC: 153.6 NG/ML (ref 0–3)

## 2020-05-18 PROCEDURE — 82378 CARCINOEMBRYONIC ANTIGEN: CPT

## 2020-05-19 ENCOUNTER — HOSPITAL ENCOUNTER (OUTPATIENT)
Dept: INFUSION CENTER | Facility: CLINIC | Age: 60
Discharge: HOME | End: 2020-05-19
Payer: MEDICARE

## 2020-05-19 VITALS
SYSTOLIC BLOOD PRESSURE: 99 MMHG | HEART RATE: 84 BPM | TEMPERATURE: 96.7 F | RESPIRATION RATE: 18 BRPM | HEIGHT: 75 IN | BODY MASS INDEX: 28.78 KG/M2 | OXYGEN SATURATION: 98 % | WEIGHT: 231.48 LBS | DIASTOLIC BLOOD PRESSURE: 69 MMHG

## 2020-05-19 DIAGNOSIS — C18.9 COLON CANCER METASTASIZED TO LUNG (HCC): Chronic | ICD-10-CM

## 2020-05-19 DIAGNOSIS — C18.9 COLON CANCER METASTASIZED TO LUNG (HCC): ICD-10-CM

## 2020-05-19 DIAGNOSIS — C77.1 METASTASIS TO INTRATHORACIC LYMPH NODES (HCC): ICD-10-CM

## 2020-05-19 DIAGNOSIS — C78.00 COLON CANCER METASTASIZED TO LUNG (HCC): ICD-10-CM

## 2020-05-19 DIAGNOSIS — C78.00 COLON CANCER METASTASIZED TO LUNG (HCC): Chronic | ICD-10-CM

## 2020-05-19 DIAGNOSIS — T45.1X5A CHEMOTHERAPY INDUCED NEUTROPENIA (HCC): ICD-10-CM

## 2020-05-19 DIAGNOSIS — C18.2 MALIGNANT NEOPLASM OF ASCENDING COLON (HCC): ICD-10-CM

## 2020-05-19 DIAGNOSIS — D70.1 CHEMOTHERAPY INDUCED NEUTROPENIA (HCC): ICD-10-CM

## 2020-05-19 DIAGNOSIS — C18.2 MALIGNANT NEOPLASM OF ASCENDING COLON (HCC): Primary | ICD-10-CM

## 2020-05-19 PROCEDURE — 96415 CHEMO IV INFUSION ADDL HR: CPT

## 2020-05-19 PROCEDURE — 96413 CHEMO IV INFUSION 1 HR: CPT

## 2020-05-19 PROCEDURE — 96375 TX/PRO/DX INJ NEW DRUG ADDON: CPT

## 2020-05-19 PROCEDURE — 96417 CHEMO IV INFUS EACH ADDL SEQ: CPT

## 2020-05-19 PROCEDURE — 96367 TX/PROPH/DG ADDL SEQ IV INF: CPT

## 2020-05-19 PROCEDURE — G0498 CHEMO EXTEND IV INFUS W/PUMP: HCPCS

## 2020-05-19 PROCEDURE — 96368 THER/DIAG CONCURRENT INF: CPT

## 2020-05-19 RX ORDER — ATROPINE SULFATE 1 MG/ML
0.25 INJECTION, SOLUTION INTRAMUSCULAR; INTRAVENOUS; SUBCUTANEOUS ONCE AS NEEDED
Status: DISCONTINUED | OUTPATIENT
Start: 2020-05-19 | End: 2020-05-22 | Stop reason: HOSPADM

## 2020-05-19 RX ORDER — ATROPINE SULFATE 1 MG/ML
0.25 INJECTION, SOLUTION INTRAMUSCULAR; INTRAVENOUS; SUBCUTANEOUS ONCE
Status: COMPLETED | OUTPATIENT
Start: 2020-05-19 | End: 2020-05-19

## 2020-05-19 RX ORDER — SODIUM CHLORIDE 9 MG/ML
20 INJECTION, SOLUTION INTRAVENOUS ONCE
Status: COMPLETED | OUTPATIENT
Start: 2020-05-19 | End: 2020-05-19

## 2020-05-19 RX ORDER — LOPERAMIDE HYDROCHLORIDE 2 MG/1
2 CAPSULE ORAL 4 TIMES DAILY PRN
COMMUNITY

## 2020-05-19 RX ADMIN — SODIUM CHLORIDE 20 ML/HR: 0.9 INJECTION, SOLUTION INTRAVENOUS at 08:56

## 2020-05-19 RX ADMIN — SODIUM CHLORIDE 150 MG: 0.9 INJECTION, SOLUTION INTRAVENOUS at 09:32

## 2020-05-19 RX ADMIN — ATROPINE SULFATE 0.25 MG: 1 INJECTION, SOLUTION INTRAMUSCULAR; INTRAVENOUS; SUBCUTANEOUS at 11:17

## 2020-05-19 RX ADMIN — IRINOTECAN HYDROCHLORIDE 340 MG: 20 INJECTION, SOLUTION INTRAVENOUS at 11:19

## 2020-05-19 RX ADMIN — DIPHENHYDRAMINE HYDROCHLORIDE 25 MG: 50 INJECTION, SOLUTION INTRAMUSCULAR; INTRAVENOUS at 09:11

## 2020-05-19 RX ADMIN — LEUCOVORIN CALCIUM 765 MG: 200 INJECTION, POWDER, LYOPHILIZED, FOR SOLUTION INTRAMUSCULAR; INTRAVENOUS at 11:19

## 2020-05-19 RX ADMIN — SODIUM CHLORIDE 700 MG: 0.9 INJECTION, SOLUTION INTRAVENOUS at 10:14

## 2020-05-19 RX ADMIN — DEXAMETHASONE SODIUM PHOSPHATE: 10 INJECTION, SOLUTION INTRAMUSCULAR; INTRAVENOUS at 08:56

## 2020-05-21 ENCOUNTER — HOSPITAL ENCOUNTER (OUTPATIENT)
Dept: INFUSION CENTER | Facility: CLINIC | Age: 60
Discharge: HOME | End: 2020-05-21

## 2020-05-21 VITALS — TEMPERATURE: 97.4 F

## 2020-05-21 DIAGNOSIS — T45.1X5A CHEMOTHERAPY INDUCED NEUTROPENIA (HCC): ICD-10-CM

## 2020-05-21 DIAGNOSIS — C18.2 MALIGNANT NEOPLASM OF ASCENDING COLON (HCC): Primary | ICD-10-CM

## 2020-05-21 DIAGNOSIS — C18.9 COLON CANCER METASTASIZED TO LUNG (HCC): Chronic | ICD-10-CM

## 2020-05-21 DIAGNOSIS — C78.00 COLON CANCER METASTASIZED TO LUNG (HCC): Chronic | ICD-10-CM

## 2020-05-21 DIAGNOSIS — C77.1 METASTASIS TO INTRATHORACIC LYMPH NODES (HCC): ICD-10-CM

## 2020-05-21 DIAGNOSIS — D70.1 CHEMOTHERAPY INDUCED NEUTROPENIA (HCC): ICD-10-CM

## 2020-05-28 DIAGNOSIS — D70.1 CHEMOTHERAPY INDUCED NEUTROPENIA (HCC): Primary | ICD-10-CM

## 2020-05-28 DIAGNOSIS — T45.1X5A CHEMOTHERAPY INDUCED NEUTROPENIA (HCC): Primary | ICD-10-CM

## 2020-05-28 DIAGNOSIS — C18.9 COLON CANCER METASTASIZED TO LUNG (HCC): ICD-10-CM

## 2020-05-28 DIAGNOSIS — C78.00 COLON CANCER METASTASIZED TO LUNG (HCC): ICD-10-CM

## 2020-05-28 DIAGNOSIS — C77.1 METASTASIS TO INTRATHORACIC LYMPH NODES (HCC): ICD-10-CM

## 2020-05-28 DIAGNOSIS — C18.2 MALIGNANT NEOPLASM OF ASCENDING COLON (HCC): ICD-10-CM

## 2020-05-28 RX ORDER — SODIUM CHLORIDE 9 MG/ML
20 INJECTION, SOLUTION INTRAVENOUS ONCE
Status: CANCELLED | OUTPATIENT
Start: 2020-06-02

## 2020-05-28 RX ORDER — ATROPINE SULFATE 1 MG/ML
0.25 INJECTION, SOLUTION INTRAMUSCULAR; INTRAVENOUS; SUBCUTANEOUS ONCE
Status: CANCELLED | OUTPATIENT
Start: 2020-06-02

## 2020-05-28 RX ORDER — ATROPINE SULFATE 1 MG/ML
0.25 INJECTION, SOLUTION INTRAMUSCULAR; INTRAVENOUS; SUBCUTANEOUS ONCE AS NEEDED
Status: CANCELLED | OUTPATIENT
Start: 2020-06-02

## 2020-06-01 ENCOUNTER — APPOINTMENT (OUTPATIENT)
Dept: LAB | Facility: HOSPITAL | Age: 60
End: 2020-06-01
Attending: INTERNAL MEDICINE
Payer: MEDICARE

## 2020-06-01 DIAGNOSIS — C18.2 MALIGNANT NEOPLASM OF ASCENDING COLON (HCC): ICD-10-CM

## 2020-06-01 LAB — CEA SERPL-MCNC: 81.6 NG/ML (ref 0–3)

## 2020-06-01 PROCEDURE — 82378 CARCINOEMBRYONIC ANTIGEN: CPT

## 2020-06-02 ENCOUNTER — HOSPITAL ENCOUNTER (OUTPATIENT)
Dept: INFUSION CENTER | Facility: CLINIC | Age: 60
Discharge: HOME | End: 2020-06-02
Payer: MEDICARE

## 2020-06-02 VITALS
DIASTOLIC BLOOD PRESSURE: 66 MMHG | HEART RATE: 83 BPM | BODY MASS INDEX: 29.11 KG/M2 | WEIGHT: 234.13 LBS | RESPIRATION RATE: 16 BRPM | HEIGHT: 75 IN | TEMPERATURE: 96.7 F | SYSTOLIC BLOOD PRESSURE: 102 MMHG

## 2020-06-02 DIAGNOSIS — C18.2 MALIGNANT NEOPLASM OF ASCENDING COLON (HCC): Primary | ICD-10-CM

## 2020-06-02 DIAGNOSIS — C78.00 COLON CANCER METASTASIZED TO LUNG (HCC): Chronic | ICD-10-CM

## 2020-06-02 DIAGNOSIS — T45.1X5A CHEMOTHERAPY INDUCED NEUTROPENIA (HCC): ICD-10-CM

## 2020-06-02 DIAGNOSIS — C77.1 METASTASIS TO INTRATHORACIC LYMPH NODES (HCC): ICD-10-CM

## 2020-06-02 DIAGNOSIS — D70.1 CHEMOTHERAPY INDUCED NEUTROPENIA (HCC): ICD-10-CM

## 2020-06-02 DIAGNOSIS — C18.9 COLON CANCER METASTASIZED TO LUNG (HCC): Chronic | ICD-10-CM

## 2020-06-02 PROCEDURE — 96368 THER/DIAG CONCURRENT INF: CPT

## 2020-06-02 PROCEDURE — 96367 TX/PROPH/DG ADDL SEQ IV INF: CPT

## 2020-06-02 PROCEDURE — 96417 CHEMO IV INFUS EACH ADDL SEQ: CPT

## 2020-06-02 PROCEDURE — 96413 CHEMO IV INFUSION 1 HR: CPT

## 2020-06-02 PROCEDURE — 96375 TX/PRO/DX INJ NEW DRUG ADDON: CPT

## 2020-06-02 PROCEDURE — G0498 CHEMO EXTEND IV INFUS W/PUMP: HCPCS

## 2020-06-02 RX ORDER — ATROPINE SULFATE 1 MG/ML
0.25 INJECTION, SOLUTION INTRAMUSCULAR; INTRAVENOUS; SUBCUTANEOUS ONCE AS NEEDED
Status: DISCONTINUED | OUTPATIENT
Start: 2020-06-02 | End: 2020-06-05 | Stop reason: HOSPADM

## 2020-06-02 RX ORDER — ATROPINE SULFATE 1 MG/ML
0.25 INJECTION, SOLUTION INTRAMUSCULAR; INTRAVENOUS; SUBCUTANEOUS ONCE
Status: COMPLETED | OUTPATIENT
Start: 2020-06-02 | End: 2020-06-02

## 2020-06-02 RX ORDER — SODIUM CHLORIDE 9 MG/ML
20 INJECTION, SOLUTION INTRAVENOUS ONCE
Status: COMPLETED | OUTPATIENT
Start: 2020-06-02 | End: 2020-06-02

## 2020-06-02 RX ADMIN — IRINOTECAN HYDROCHLORIDE 340 MG: 20 INJECTION, SOLUTION INTRAVENOUS at 11:33

## 2020-06-02 RX ADMIN — SODIUM CHLORIDE 700 MG: 0.9 INJECTION, SOLUTION INTRAVENOUS at 10:20

## 2020-06-02 RX ADMIN — SODIUM CHLORIDE 150 MG: 0.9 INJECTION, SOLUTION INTRAVENOUS at 09:44

## 2020-06-02 RX ADMIN — ATROPINE SULFATE 0.25 MG: 1 INJECTION, SOLUTION INTRAMUSCULAR; INTRAVENOUS; SUBCUTANEOUS at 11:27

## 2020-06-02 RX ADMIN — DIPHENHYDRAMINE HYDROCHLORIDE 25 MG: 50 INJECTION, SOLUTION INTRAMUSCULAR; INTRAVENOUS at 09:09

## 2020-06-02 RX ADMIN — LEUCOVORIN CALCIUM 765 MG: 200 INJECTION, POWDER, LYOPHILIZED, FOR SUSPENSION INTRAMUSCULAR; INTRAVENOUS at 11:29

## 2020-06-02 RX ADMIN — DEXAMETHASONE SODIUM PHOSPHATE: 10 INJECTION, SOLUTION INTRAMUSCULAR; INTRAVENOUS at 08:54

## 2020-06-02 RX ADMIN — SODIUM CHLORIDE 20 ML/HR: 9 INJECTION, SOLUTION INTRAVENOUS at 08:40

## 2020-06-03 ENCOUNTER — OFFICE VISIT (OUTPATIENT)
Dept: HEMATOLOGY ONCOLOGY | Facility: CLINIC | Age: 60
End: 2020-06-03
Payer: MEDICARE

## 2020-06-03 ENCOUNTER — TELEPHONE (OUTPATIENT)
Dept: HEMATOLOGY ONCOLOGY | Facility: CLINIC | Age: 60
End: 2020-06-03

## 2020-06-03 VITALS
HEART RATE: 88 BPM | BODY MASS INDEX: 29.27 KG/M2 | DIASTOLIC BLOOD PRESSURE: 78 MMHG | TEMPERATURE: 98.5 F | OXYGEN SATURATION: 92 % | WEIGHT: 235.4 LBS | SYSTOLIC BLOOD PRESSURE: 112 MMHG | HEIGHT: 75 IN | RESPIRATION RATE: 18 BRPM

## 2020-06-03 DIAGNOSIS — C78.00 COLON CANCER METASTASIZED TO LUNG (HCC): ICD-10-CM

## 2020-06-03 DIAGNOSIS — C18.2 MALIGNANT NEOPLASM OF ASCENDING COLON (HCC): Primary | ICD-10-CM

## 2020-06-03 DIAGNOSIS — C77.1 METASTASIS TO INTRATHORACIC LYMPH NODES (HCC): ICD-10-CM

## 2020-06-03 DIAGNOSIS — C18.9 COLON CANCER METASTASIZED TO LUNG (HCC): ICD-10-CM

## 2020-06-03 PROCEDURE — 99214 OFFICE O/P EST MOD 30 MIN: CPT | Performed by: PHYSICIAN ASSISTANT

## 2020-06-04 ENCOUNTER — HOSPITAL ENCOUNTER (OUTPATIENT)
Dept: INFUSION CENTER | Facility: CLINIC | Age: 60
Discharge: HOME | End: 2020-06-04
Payer: MEDICARE

## 2020-06-04 VITALS
RESPIRATION RATE: 18 BRPM | SYSTOLIC BLOOD PRESSURE: 90 MMHG | TEMPERATURE: 97.4 F | HEART RATE: 83 BPM | DIASTOLIC BLOOD PRESSURE: 62 MMHG

## 2020-06-04 DIAGNOSIS — C77.1 METASTASIS TO INTRATHORACIC LYMPH NODES (HCC): ICD-10-CM

## 2020-06-04 DIAGNOSIS — D70.1 CHEMOTHERAPY INDUCED NEUTROPENIA (HCC): ICD-10-CM

## 2020-06-04 DIAGNOSIS — C18.2 MALIGNANT NEOPLASM OF ASCENDING COLON (HCC): Primary | ICD-10-CM

## 2020-06-04 DIAGNOSIS — C78.00 COLON CANCER METASTASIZED TO LUNG (HCC): Chronic | ICD-10-CM

## 2020-06-04 DIAGNOSIS — C18.9 COLON CANCER METASTASIZED TO LUNG (HCC): Chronic | ICD-10-CM

## 2020-06-04 DIAGNOSIS — T45.1X5A CHEMOTHERAPY INDUCED NEUTROPENIA (HCC): ICD-10-CM

## 2020-06-04 PROCEDURE — 96372 THER/PROPH/DIAG INJ SC/IM: CPT

## 2020-06-04 RX ADMIN — PEGFILGRASTIM 6 MG: KIT SUBCUTANEOUS at 11:36

## 2020-06-08 RX ORDER — ATROPINE SULFATE 1 MG/ML
0.25 INJECTION, SOLUTION INTRAMUSCULAR; INTRAVENOUS; SUBCUTANEOUS ONCE
Status: CANCELLED | OUTPATIENT
Start: 2020-06-16

## 2020-06-08 RX ORDER — ATROPINE SULFATE 1 MG/ML
0.25 INJECTION, SOLUTION INTRAMUSCULAR; INTRAVENOUS; SUBCUTANEOUS ONCE AS NEEDED
Status: CANCELLED | OUTPATIENT
Start: 2020-06-16

## 2020-06-08 RX ORDER — SODIUM CHLORIDE 9 MG/ML
20 INJECTION, SOLUTION INTRAVENOUS ONCE
Status: CANCELLED | OUTPATIENT
Start: 2020-06-16

## 2020-06-09 ENCOUNTER — APPOINTMENT (OUTPATIENT)
Dept: LAB | Facility: HOSPITAL | Age: 60
End: 2020-06-09
Payer: MEDICARE

## 2020-06-09 DIAGNOSIS — C18.2 MALIGNANT NEOPLASM OF ASCENDING COLON (HCC): ICD-10-CM

## 2020-06-09 DIAGNOSIS — T45.1X5A CHEMOTHERAPY INDUCED NEUTROPENIA (HCC): Primary | ICD-10-CM

## 2020-06-09 DIAGNOSIS — D70.1 CHEMOTHERAPY INDUCED NEUTROPENIA (HCC): Primary | ICD-10-CM

## 2020-06-09 DIAGNOSIS — C78.00 COLON CANCER METASTASIZED TO LUNG (HCC): ICD-10-CM

## 2020-06-09 DIAGNOSIS — C77.1 METASTASIS TO INTRATHORACIC LYMPH NODES (HCC): ICD-10-CM

## 2020-06-09 DIAGNOSIS — C18.9 COLON CANCER METASTASIZED TO LUNG (HCC): ICD-10-CM

## 2020-06-09 LAB
ALBUMIN SERPL BCP-MCNC: 3.7 G/DL (ref 3.5–5)
ALP SERPL-CCNC: 146 U/L (ref 46–116)
ALT SERPL W P-5'-P-CCNC: 18 U/L (ref 12–78)
ANION GAP SERPL CALCULATED.3IONS-SCNC: 5 MMOL/L (ref 4–13)
AST SERPL W P-5'-P-CCNC: 22 U/L (ref 5–45)
BILIRUB SERPL-MCNC: 0.34 MG/DL (ref 0.2–1)
BUN SERPL-MCNC: 14 MG/DL (ref 5–25)
CALCIUM SERPL-MCNC: 9.5 MG/DL (ref 8.3–10.1)
CHLORIDE SERPL-SCNC: 98 MMOL/L (ref 100–108)
CO2 SERPL-SCNC: 32 MMOL/L (ref 21–32)
CREAT SERPL-MCNC: 1.36 MG/DL (ref 0.6–1.3)
GFR SERPL CREATININE-BSD FRML MDRD: 57 ML/MIN/1.73SQ M
GLUCOSE SERPL-MCNC: 88 MG/DL (ref 65–140)
POTASSIUM SERPL-SCNC: 4.4 MMOL/L (ref 3.5–5.3)
PROT SERPL-MCNC: 6.9 G/DL (ref 6.4–8.2)
SODIUM SERPL-SCNC: 135 MMOL/L (ref 136–145)

## 2020-06-09 PROCEDURE — 36415 COLL VENOUS BLD VENIPUNCTURE: CPT

## 2020-06-09 PROCEDURE — 80053 COMPREHEN METABOLIC PANEL: CPT

## 2020-06-10 ENCOUNTER — TELEPHONE (OUTPATIENT)
Dept: HEMATOLOGY ONCOLOGY | Facility: CLINIC | Age: 60
End: 2020-06-10

## 2020-06-15 ENCOUNTER — APPOINTMENT (OUTPATIENT)
Dept: LAB | Facility: HOSPITAL | Age: 60
End: 2020-06-15
Attending: INTERNAL MEDICINE
Payer: MEDICARE

## 2020-06-15 DIAGNOSIS — D70.1 CHEMOTHERAPY INDUCED NEUTROPENIA (HCC): ICD-10-CM

## 2020-06-15 DIAGNOSIS — T45.1X5A CHEMOTHERAPY INDUCED NEUTROPENIA (HCC): ICD-10-CM

## 2020-06-15 DIAGNOSIS — C18.9 COLON CANCER METASTASIZED TO LUNG (HCC): ICD-10-CM

## 2020-06-15 DIAGNOSIS — C18.2 MALIGNANT NEOPLASM OF ASCENDING COLON (HCC): ICD-10-CM

## 2020-06-15 DIAGNOSIS — C78.00 COLON CANCER METASTASIZED TO LUNG (HCC): ICD-10-CM

## 2020-06-15 DIAGNOSIS — C77.1 METASTASIS TO INTRATHORACIC LYMPH NODES (HCC): ICD-10-CM

## 2020-06-15 LAB
ALBUMIN SERPL BCP-MCNC: 3.9 G/DL (ref 3.5–5)
ALP SERPL-CCNC: 150 U/L (ref 46–116)
ALT SERPL W P-5'-P-CCNC: 14 U/L (ref 12–78)
ANION GAP SERPL CALCULATED.3IONS-SCNC: 6 MMOL/L (ref 4–13)
AST SERPL W P-5'-P-CCNC: 23 U/L (ref 5–45)
BASOPHILS # BLD AUTO: 0.08 THOUSANDS/ΜL (ref 0–0.1)
BASOPHILS NFR BLD AUTO: 1 % (ref 0–1)
BILIRUB SERPL-MCNC: 0.36 MG/DL (ref 0.2–1)
BUN SERPL-MCNC: 11 MG/DL (ref 5–25)
CALCIUM SERPL-MCNC: 9.2 MG/DL (ref 8.3–10.1)
CHLORIDE SERPL-SCNC: 99 MMOL/L (ref 100–108)
CO2 SERPL-SCNC: 31 MMOL/L (ref 21–32)
CREAT SERPL-MCNC: 1.33 MG/DL (ref 0.6–1.3)
EOSINOPHIL # BLD AUTO: 0.42 THOUSAND/ΜL (ref 0–0.61)
EOSINOPHIL NFR BLD AUTO: 3 % (ref 0–6)
ERYTHROCYTE [DISTWIDTH] IN BLOOD BY AUTOMATED COUNT: 16.6 % (ref 11.6–15.1)
GFR SERPL CREATININE-BSD FRML MDRD: 59 ML/MIN/1.73SQ M
GLUCOSE P FAST SERPL-MCNC: 83 MG/DL (ref 65–99)
HCT VFR BLD AUTO: 38 % (ref 36.5–49.3)
HGB BLD-MCNC: 12.4 G/DL (ref 12–17)
IMM GRANULOCYTES # BLD AUTO: 0.2 THOUSAND/UL (ref 0–0.2)
IMM GRANULOCYTES NFR BLD AUTO: 1 % (ref 0–2)
LYMPHOCYTES # BLD AUTO: 1.72 THOUSANDS/ΜL (ref 0.6–4.47)
LYMPHOCYTES NFR BLD AUTO: 10 % (ref 14–44)
MCH RBC QN AUTO: 35 PG (ref 26.8–34.3)
MCHC RBC AUTO-ENTMCNC: 32.6 G/DL (ref 31.4–37.4)
MCV RBC AUTO: 107 FL (ref 82–98)
MONOCYTES # BLD AUTO: 0.87 THOUSAND/ΜL (ref 0.17–1.22)
MONOCYTES NFR BLD AUTO: 5 % (ref 4–12)
NEUTROPHILS # BLD AUTO: 13.36 THOUSANDS/ΜL (ref 1.85–7.62)
NEUTS SEG NFR BLD AUTO: 80 % (ref 43–75)
NRBC BLD AUTO-RTO: 0 /100 WBCS
PLATELET # BLD AUTO: 80 THOUSANDS/UL (ref 149–390)
PMV BLD AUTO: 9.7 FL (ref 8.9–12.7)
POTASSIUM SERPL-SCNC: 4.6 MMOL/L (ref 3.5–5.3)
PROT SERPL-MCNC: 7.3 G/DL (ref 6.4–8.2)
RBC # BLD AUTO: 3.54 MILLION/UL (ref 3.88–5.62)
SODIUM SERPL-SCNC: 136 MMOL/L (ref 136–145)
WBC # BLD AUTO: 16.65 THOUSAND/UL (ref 4.31–10.16)

## 2020-06-15 PROCEDURE — 36415 COLL VENOUS BLD VENIPUNCTURE: CPT

## 2020-06-15 PROCEDURE — 85025 COMPLETE CBC W/AUTO DIFF WBC: CPT

## 2020-06-15 PROCEDURE — 80053 COMPREHEN METABOLIC PANEL: CPT

## 2020-06-16 ENCOUNTER — HOSPITAL ENCOUNTER (OUTPATIENT)
Dept: INFUSION CENTER | Facility: CLINIC | Age: 60
Discharge: HOME | End: 2020-06-16
Payer: MEDICARE

## 2020-06-16 VITALS
WEIGHT: 233.03 LBS | HEIGHT: 75 IN | TEMPERATURE: 96.4 F | BODY MASS INDEX: 28.97 KG/M2 | HEART RATE: 62 BPM | SYSTOLIC BLOOD PRESSURE: 121 MMHG | RESPIRATION RATE: 18 BRPM | DIASTOLIC BLOOD PRESSURE: 79 MMHG

## 2020-06-16 DIAGNOSIS — C18.9 COLON CANCER METASTASIZED TO LUNG (HCC): ICD-10-CM

## 2020-06-16 DIAGNOSIS — T45.1X5A CHEMOTHERAPY INDUCED NEUTROPENIA (HCC): ICD-10-CM

## 2020-06-16 DIAGNOSIS — C78.00 COLON CANCER METASTASIZED TO LUNG (HCC): ICD-10-CM

## 2020-06-16 DIAGNOSIS — C77.1 METASTASIS TO INTRATHORACIC LYMPH NODES (HCC): ICD-10-CM

## 2020-06-16 DIAGNOSIS — D70.1 CHEMOTHERAPY INDUCED NEUTROPENIA (HCC): ICD-10-CM

## 2020-06-16 DIAGNOSIS — C18.2 MALIGNANT NEOPLASM OF ASCENDING COLON (HCC): Primary | ICD-10-CM

## 2020-06-16 PROCEDURE — 96417 CHEMO IV INFUS EACH ADDL SEQ: CPT

## 2020-06-16 PROCEDURE — 96368 THER/DIAG CONCURRENT INF: CPT

## 2020-06-16 PROCEDURE — G0498 CHEMO EXTEND IV INFUS W/PUMP: HCPCS

## 2020-06-16 PROCEDURE — 96375 TX/PRO/DX INJ NEW DRUG ADDON: CPT

## 2020-06-16 PROCEDURE — 96413 CHEMO IV INFUSION 1 HR: CPT

## 2020-06-16 PROCEDURE — 96367 TX/PROPH/DG ADDL SEQ IV INF: CPT

## 2020-06-16 RX ORDER — ATROPINE SULFATE 1 MG/ML
0.25 INJECTION, SOLUTION INTRAMUSCULAR; INTRAVENOUS; SUBCUTANEOUS ONCE
Status: COMPLETED | OUTPATIENT
Start: 2020-06-16 | End: 2020-06-16

## 2020-06-16 RX ORDER — ATROPINE SULFATE 1 MG/ML
0.25 INJECTION, SOLUTION INTRAMUSCULAR; INTRAVENOUS; SUBCUTANEOUS ONCE AS NEEDED
Status: DISCONTINUED | OUTPATIENT
Start: 2020-06-16 | End: 2020-06-19 | Stop reason: HOSPADM

## 2020-06-16 RX ORDER — SODIUM CHLORIDE 9 MG/ML
20 INJECTION, SOLUTION INTRAVENOUS ONCE
Status: COMPLETED | OUTPATIENT
Start: 2020-06-16 | End: 2020-06-16

## 2020-06-16 RX ADMIN — DEXAMETHASONE SODIUM PHOSPHATE: 10 INJECTION, SOLUTION INTRAMUSCULAR; INTRAVENOUS at 09:16

## 2020-06-16 RX ADMIN — IRINOTECAN HYDROCHLORIDE 300 MG: 20 INJECTION, SOLUTION INTRAVENOUS at 11:36

## 2020-06-16 RX ADMIN — SODIUM CHLORIDE 150 MG: 0.9 INJECTION, SOLUTION INTRAVENOUS at 09:40

## 2020-06-16 RX ADMIN — ATROPINE SULFATE 0.25 MG: 1 INJECTION, SOLUTION INTRAMUSCULAR; INTRAVENOUS; SUBCUTANEOUS at 11:31

## 2020-06-16 RX ADMIN — LEUCOVORIN CALCIUM 800 MG: 200 INJECTION, POWDER, LYOPHILIZED, FOR SUSPENSION INTRAMUSCULAR; INTRAVENOUS at 11:33

## 2020-06-16 RX ADMIN — SODIUM CHLORIDE 700 MG: 0.9 INJECTION, SOLUTION INTRAVENOUS at 10:22

## 2020-06-16 RX ADMIN — SODIUM CHLORIDE 20 ML/HR: 0.9 INJECTION, SOLUTION INTRAVENOUS at 08:40

## 2020-06-16 RX ADMIN — DIPHENHYDRAMINE HYDROCHLORIDE 25 MG: 50 INJECTION, SOLUTION INTRAMUSCULAR; INTRAVENOUS at 08:50

## 2020-06-18 ENCOUNTER — HOSPITAL ENCOUNTER (OUTPATIENT)
Dept: INFUSION CENTER | Facility: CLINIC | Age: 60
Discharge: HOME | End: 2020-06-18
Payer: MEDICARE

## 2020-06-18 VITALS
DIASTOLIC BLOOD PRESSURE: 73 MMHG | RESPIRATION RATE: 18 BRPM | TEMPERATURE: 95.8 F | SYSTOLIC BLOOD PRESSURE: 104 MMHG | HEART RATE: 74 BPM

## 2020-06-18 DIAGNOSIS — C18.9 COLON CANCER METASTASIZED TO LUNG (HCC): ICD-10-CM

## 2020-06-18 DIAGNOSIS — C18.2 MALIGNANT NEOPLASM OF ASCENDING COLON (HCC): Primary | ICD-10-CM

## 2020-06-18 DIAGNOSIS — C77.1 METASTASIS TO INTRATHORACIC LYMPH NODES (HCC): ICD-10-CM

## 2020-06-18 DIAGNOSIS — C78.00 COLON CANCER METASTASIZED TO LUNG (HCC): ICD-10-CM

## 2020-06-18 DIAGNOSIS — T45.1X5A CHEMOTHERAPY INDUCED NEUTROPENIA (HCC): ICD-10-CM

## 2020-06-18 DIAGNOSIS — D70.1 CHEMOTHERAPY INDUCED NEUTROPENIA (HCC): ICD-10-CM

## 2020-06-18 PROCEDURE — 96372 THER/PROPH/DIAG INJ SC/IM: CPT

## 2020-06-18 RX ADMIN — PEGFILGRASTIM 6 MG: KIT SUBCUTANEOUS at 11:36

## 2020-06-22 ENCOUNTER — APPOINTMENT (OUTPATIENT)
Dept: LAB | Facility: HOSPITAL | Age: 60
End: 2020-06-22
Attending: INTERNAL MEDICINE
Payer: MEDICARE

## 2020-06-22 ENCOUNTER — TELEPHONE (OUTPATIENT)
Dept: HEMATOLOGY ONCOLOGY | Facility: CLINIC | Age: 60
End: 2020-06-22

## 2020-06-22 DIAGNOSIS — T45.1X5A CHEMOTHERAPY INDUCED NEUTROPENIA (HCC): ICD-10-CM

## 2020-06-22 DIAGNOSIS — C77.1 METASTASIS TO INTRATHORACIC LYMPH NODES (HCC): ICD-10-CM

## 2020-06-22 DIAGNOSIS — C18.2 MALIGNANT NEOPLASM OF ASCENDING COLON (HCC): ICD-10-CM

## 2020-06-22 DIAGNOSIS — C78.00 COLON CANCER METASTASIZED TO LUNG (HCC): ICD-10-CM

## 2020-06-22 DIAGNOSIS — C18.9 COLON CANCER METASTASIZED TO LUNG (HCC): ICD-10-CM

## 2020-06-22 DIAGNOSIS — D70.1 CHEMOTHERAPY INDUCED NEUTROPENIA (HCC): ICD-10-CM

## 2020-06-22 RX ORDER — SODIUM CHLORIDE 9 MG/ML
20 INJECTION, SOLUTION INTRAVENOUS ONCE
Status: CANCELLED | OUTPATIENT
Start: 2020-06-30

## 2020-06-22 RX ORDER — ATROPINE SULFATE 1 MG/ML
0.25 INJECTION, SOLUTION INTRAMUSCULAR; INTRAVENOUS; SUBCUTANEOUS ONCE AS NEEDED
Status: CANCELLED | OUTPATIENT
Start: 2020-06-30

## 2020-06-22 RX ORDER — ATROPINE SULFATE 1 MG/ML
0.25 INJECTION, SOLUTION INTRAMUSCULAR; INTRAVENOUS; SUBCUTANEOUS ONCE
Status: CANCELLED | OUTPATIENT
Start: 2020-06-30

## 2020-06-23 DIAGNOSIS — C77.1 METASTASIS TO INTRATHORACIC LYMPH NODES (HCC): ICD-10-CM

## 2020-06-23 DIAGNOSIS — C78.00 COLON CANCER METASTASIZED TO LUNG (HCC): ICD-10-CM

## 2020-06-23 DIAGNOSIS — D70.1 CHEMOTHERAPY INDUCED NEUTROPENIA (HCC): Primary | ICD-10-CM

## 2020-06-23 DIAGNOSIS — C18.2 MALIGNANT NEOPLASM OF ASCENDING COLON (HCC): ICD-10-CM

## 2020-06-23 DIAGNOSIS — C18.9 COLON CANCER METASTASIZED TO LUNG (HCC): ICD-10-CM

## 2020-06-23 DIAGNOSIS — T45.1X5A CHEMOTHERAPY INDUCED NEUTROPENIA (HCC): Primary | ICD-10-CM

## 2020-06-24 ENCOUNTER — HOSPITAL ENCOUNTER (OUTPATIENT)
Dept: CT IMAGING | Facility: HOSPITAL | Age: 60
Discharge: HOME | End: 2020-06-24
Payer: MEDICARE

## 2020-06-24 DIAGNOSIS — C77.1 METASTASIS TO INTRATHORACIC LYMPH NODES (HCC): ICD-10-CM

## 2020-06-24 DIAGNOSIS — C78.00 COLON CANCER METASTASIZED TO LUNG (HCC): ICD-10-CM

## 2020-06-24 DIAGNOSIS — C18.2 MALIGNANT NEOPLASM OF ASCENDING COLON (HCC): ICD-10-CM

## 2020-06-24 DIAGNOSIS — C18.9 COLON CANCER METASTASIZED TO LUNG (HCC): ICD-10-CM

## 2020-06-24 PROCEDURE — 74177 CT ABD & PELVIS W/CONTRAST: CPT

## 2020-06-24 PROCEDURE — 71260 CT THORAX DX C+: CPT

## 2020-06-24 RX ADMIN — IOHEXOL 100 ML: 350 INJECTION, SOLUTION INTRAVENOUS at 13:45

## 2020-06-29 ENCOUNTER — TELEPHONE (OUTPATIENT)
Dept: HEMATOLOGY ONCOLOGY | Facility: CLINIC | Age: 60
End: 2020-06-29

## 2020-06-29 ENCOUNTER — APPOINTMENT (OUTPATIENT)
Dept: LAB | Facility: HOSPITAL | Age: 60
End: 2020-06-29
Attending: INTERNAL MEDICINE
Payer: MEDICARE

## 2020-06-29 DIAGNOSIS — C18.2 MALIGNANT NEOPLASM OF ASCENDING COLON (HCC): ICD-10-CM

## 2020-06-30 ENCOUNTER — HOSPITAL ENCOUNTER (OUTPATIENT)
Dept: INFUSION CENTER | Facility: CLINIC | Age: 60
Discharge: HOME | End: 2020-06-30

## 2020-07-01 ENCOUNTER — OFFICE VISIT (OUTPATIENT)
Dept: HEMATOLOGY ONCOLOGY | Facility: CLINIC | Age: 60
End: 2020-07-01
Payer: MEDICARE

## 2020-07-01 VITALS
BODY MASS INDEX: 29.09 KG/M2 | HEART RATE: 76 BPM | RESPIRATION RATE: 16 BRPM | WEIGHT: 234 LBS | HEIGHT: 75 IN | DIASTOLIC BLOOD PRESSURE: 64 MMHG | SYSTOLIC BLOOD PRESSURE: 92 MMHG | OXYGEN SATURATION: 90 % | TEMPERATURE: 95.1 F

## 2020-07-01 DIAGNOSIS — C77.1 METASTASIS TO INTRATHORACIC LYMPH NODES (HCC): ICD-10-CM

## 2020-07-01 DIAGNOSIS — C18.9 COLON CANCER METASTASIZED TO LUNG (HCC): Primary | ICD-10-CM

## 2020-07-01 DIAGNOSIS — C78.00 COLON CANCER METASTASIZED TO LUNG (HCC): ICD-10-CM

## 2020-07-01 DIAGNOSIS — C79.9 METASTASIS FROM COLON CANCER (HCC): ICD-10-CM

## 2020-07-01 DIAGNOSIS — T45.1X5A CHEMOTHERAPY INDUCED NEUTROPENIA (HCC): ICD-10-CM

## 2020-07-01 DIAGNOSIS — C18.9 COLON CANCER METASTASIZED TO LUNG (HCC): ICD-10-CM

## 2020-07-01 DIAGNOSIS — C18.2 MALIGNANT NEOPLASM OF ASCENDING COLON (HCC): ICD-10-CM

## 2020-07-01 DIAGNOSIS — C78.00 COLON CANCER METASTASIZED TO LUNG (HCC): Primary | ICD-10-CM

## 2020-07-01 DIAGNOSIS — D70.1 CHEMOTHERAPY INDUCED NEUTROPENIA (HCC): ICD-10-CM

## 2020-07-01 DIAGNOSIS — C18.9 METASTASIS FROM COLON CANCER (HCC): ICD-10-CM

## 2020-07-01 DIAGNOSIS — C18.2 MALIGNANT NEOPLASM OF ASCENDING COLON (HCC): Primary | ICD-10-CM

## 2020-07-01 PROCEDURE — 99214 OFFICE O/P EST MOD 30 MIN: CPT | Performed by: PHYSICIAN ASSISTANT

## 2020-07-01 NOTE — PROGRESS NOTES
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[facility] [address] ), 3  Colon cancer metastasized to lung [facility])  61-year-old male presents for follow-up regarding metastatic adenocarcinoma of the colon  He just had a CT scan which showed disease progression  Therefore treatment to be discontinued at this time  Patient's options remain limited  Molecular testing with Mehdi Zarate was completed  There are no actionable mutations  Patient's performance status is 3  He is not eligible for clinical trials  Patient recently was on Xeloda and 2015 to 2017 with Avastin with benefit  Dr Lisbet Simmons recommend patient to trial this again  Plan is for Xeloda 1000 mg/m2 BID 2 weeks on, one week off  Avastin 7 5 mg/kg every 21 days     Dr Lisbet Simmons reviewed the above with patient  He obtained informed consent  Patient declined written patient information regarding Xeloda and Avastin as he has already received this previously  Specifically reviewed PPE secondary to does Xeloda as well as diarrhea  Use Imodium as needed  He has have brain MRI prior to Avastin  He will have CBC, CMP, UA, CEA every 3 weeks  Dr Lisbet Simmons also recommended radiation oncology referral due to the tumor that is invading towards the pulmonary artery  Reviewed with patient and wife that options are limited  We are going back to treatments that he previously has tried  Patient seems to understand this  Wife still remains very hopeful  Continue to add salt to food daily  Continue to drink electrolytes, ie gatorade  Follow up 3-4 weeks       - MRI brain w wo contrast; Future    HPI:       Colon cancer metastasized to lung [facility])    2008 Initial Diagnosis     Colon cancer metastasized to lung [facility])      2008 - 2009 Chemotherapy     FOLFOX for two 6 month courses (3 month break in-between courses, according to the patient)  - Dr Jeri Escalante      2/18/2008 Surgery     Partial colectomy/Right hemicolectomy:  - stage IV (pT3,pN2,pM1a, G2)    - Dr Nikunj Villarreal        4/21/2010 Surgery     Right lower lobe lung nodule:  - metastatic colonic adenocarcinoma      8/19/2010 Surgery     Left lower lobe wedge resection:  - moderately differentiated adenocarcinoma, consistent with metastasis from known colonic primary, 1 3 cm      2011 Surgery     Transverse colectomy      4/2011 - 10/2011 Chemotherapy     Avastin and FOLFIRI       11/9/2011 Surgery     RAZ lung wedge resection:  - metastatic adenocarcinoma of the colon  - invasive carcinoma is 0 4 cm from parenchymal resection      2012 Surgery     Wedge resection for right lung nodule      11/29/2012 Surgery     I  [id number] (11/29/12)   Omental nodule, excision:   - Metastatic adenocarcinoma morphologically consistent with colonic primary  1/15/2014 Surgery     A  Lung, right lower lobe, wedge resection:   - 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, moderately to poorly differentiated, consistent with colorectal primary, see note  - Resection margin, no tumor seen  - Background lung with few non-necrotizing granulomas and emphysematous changes  3/4/2014 - 3/14/2014 Radiation     Plan ID Energy Fractions Dose per Fraction (cGy) Total Dose Delivered (cGy) Elapsed Days   SBRT Lt Lung 6X 5 / 5 1,000 5,000 8     - Dr Mary Antunez      4/14/2015 Surgery     Left lower lobe lobectomy, cervical mediastinoscopy, Left posterolateral thoracotomy:  A   Lymph node Level 2R:    - 3/3 lymph nodes, positive for metastatic colonic adenocarcinoma       [facility] [place] node Level 7:   - 3/8 lymph nodes, positive for metastatic colonic adenocarcinoma       C   Left lower lobe:   - Metastatic colonic adenocarcinoma    - The vascular and bronchial margins of resection are negative for tumor    - 0/6 lymph nodes, negative for metastatic colonic adenocarcinoma     - The tumor is 0 5 cm away from the hilar staple line        6/2015 - 2/2017 Chemotherapy     Xeloda and Avastin      9/2018 - 5/2019 Chemotherapy     Lonsurf      8/13/2019 - 9/9/2019 Chemotherapy     bevacizumab (AVASTIN) 500 mg in sodium chloride 0 9 % 100 mL IVPB, 505 mg, Intravenous, Once, 1 of 11 cycles  Administration: 500 mg (8/27/2019)  irinotecan (CAMPTOSAR) 340 mg in dextrose 5 % 500 mL chemo infusion, 330 mg (83 3 % of original dose 180 mg/m2), Intravenous, Once, 2 of 12 cycles  Dose modification: 150 mg/m2 (original dose 180 mg/m2, Cycle 1, Reason: Dose Not Tolerated)  Administration: 340 mg (8/13/2019), 340 mg (8/27/2019)  oxaliplatin (ELOXATIN) 200 mg in dextrose 5 % 250 mL chemo infusion, 198 05 mg, Intravenous, Once, 2 of 12 cycles  Administration: 200 mg (8/13/2019), 200 mg (8/27/2019)      3/25/2020 - 6/29/2020 Chemotherapy     pegfilgrastim (NEULASTA ONPRO) subcutaneous injection kit 6 mg, 6 mg, Subcutaneous, Once, 6 of 11 cycles  Administration: 6 mg (3/27/2020), 6 mg (4/10/2020), 6 mg (4/24/2020), 6 mg (5/7/2020), 6 mg (6/4/2020), 6 mg (6/18/2020)  fosaprepitant (EMEND) 150 mg in sodium chloride 0 9 % 250 mL IVPB, 150 mg, Intravenous, Once, 7 of 12 cycles  Administration: 150 mg (3/25/2020), 150 mg (4/8/2020), 150 mg (4/22/2020), 150 mg (5/5/2020), 150 mg (5/19/2020), 150 mg (6/2/2020), 150 mg (6/16/2020)  ramucirumab (CYRAMZA) 900 mg in sodium chloride 0 9 % 250 mL IVPB, 864 mg (100 % of original dose 8 mg/kg), Intravenous, Once, 7 of 12 cycles  Dose modification: 8 mg/kg (original dose 8 mg/kg, Cycle 1), 6 4 mg/kg (80 % of original dose 8 mg/kg, Cycle 3, Reason: Other (see comments), Comment: per dr hamilton reduce dose to 80% based on platelets of 19,135), 6 4 mg/kg (original dose 8 mg/kg, Cycle 4, Reason: Other (See Comments), Comment: PLATELETS LOW)  Administration: 900 mg (3/25/2020), 900 mg (4/8/2020), 700 mg (4/22/2020), 700 mg (5/5/2020), 700 mg (5/19/2020), 700 mg (6/2/2020), 700 mg (6/16/2020)  irinotecan (CAMPTOSAR) 440 mg in sodium chloride 0 9 % 500 mL chemo infusion, 430 mg, Intravenous, Once, 7 of 12 cycles  Dose modification: 144 mg/m2 (original dose 180 mg/m2, Cycle 2, Reason: Treatment Parameters Not Met, Comment: Reduce dose to 80% (180 mg/m2 to 144 mg/m2)), 144 mg/m2 (original dose 180 mg/m2, Cycle 3, Reason: Dose Not Tolerated, Comment: PATIENT DID NOT TOLERATE HIGHER DOSE), 144 mg/m2 (80 % of original dose 180 mg/m2, Cycle 3, Reason: Other (see comments), Comment: per dr hamilton reduce dose to 80%  due to platelets of 33,509 ), 135 mg/m2 (original dose 180 mg/m2, Cycle 7, Reason: Treatment Parameters Not Met, Comment: platelets 80)  Administration: 440 mg (3/25/2020), 340 mg (4/8/2020), 340 mg (4/22/2020), 340 mg (5/5/2020), 340 mg (5/19/2020), 340 mg (6/2/2020), 300 mg (6/16/2020)  leucovorin 1,000 mg in sodium chloride 0 9 % 250 mL IVPB, 956 mg, Intravenous, Once, 7 of 12 cycles  Dose modification: 320 mg/m2 (80 % of original dose 400 mg/m2, Cycle 3, Reason: Other (see comments), Comment: per dr Anahi Manzano reduce dose to 80% based on platelets 76,750 ), 668 mg/m2 (original dose 400 mg/m2, Cycle 4, Reason: Other (See Comments), Comment: PLATELETS LOW)  Administration: 1,000 mg (3/25/2020), 1,000 mg (4/8/2020), 800 mg (4/22/2020), 800 mg (5/5/2020), 765 mg (5/19/2020), 765 mg (6/2/2020), 800 mg (6/16/2020)      7/14/2020 -  Chemotherapy     bevacizumab (AVASTIN) 795 mg in sodium chloride 0 9 % 100 mL IVPB, 7 5 mg/kg, Intravenous, Once, 0 of 6 cycles        Malignant neoplasm of ascending colon ([address] )    2/5/2018 Initial Diagnosis     Malignant neoplasm of ascending colon ([facility] [address] )      8/13/2019 - 9/9/2019 Chemotherapy     bevacizumab (AVASTIN) 500 mg in sodium chloride 0 9 % 100 mL IVPB, 505 mg, Intravenous, Once, 1 of 11 cycles  Administration: 500 mg (8/27/2019)  irinotecan (CAMPTOSAR) 340 mg in dextrose 5 % 500 mL chemo infusion, 330 mg (83 3 % of original dose 180 mg/m2), Intravenous, Once, 2 of 12 cycles  Dose modification: 150 mg/m2 (original dose 180 mg/m2, Cycle 1, Reason: Dose Not Tolerated)  Administration: 340 mg (8/13/2019), 340 mg (8/27/2019)  oxaliplatin (ELOXATIN) 200 mg in dextrose 5 % 250 mL chemo infusion, 198 05 mg, Intravenous, Once, 2 of 12 cycles  Administration: 200 mg (8/13/2019), 200 mg (8/27/2019)      3/25/2020 - 6/29/2020 Chemotherapy     pegfilgrastim (NEULASTA ONPRO) subcutaneous injection kit 6 mg, 6 mg, Subcutaneous, Once, 6 of 11 cycles  Administration: 6 mg (3/27/2020), 6 mg (4/10/2020), 6 mg (4/24/2020), 6 mg (5/7/2020), 6 mg (6/4/2020), 6 mg (6/18/2020)  fosaprepitant (EMEND) 150 mg in sodium chloride 0 9 % 250 mL IVPB, 150 mg, Intravenous, Once, 7 of 12 cycles  Administration: 150 mg (3/25/2020), 150 mg (4/8/2020), 150 mg (4/22/2020), 150 mg (5/5/2020), 150 mg (5/19/2020), 150 mg (6/2/2020), 150 mg (6/16/2020)  ramucirumab (CYRAMZA) 900 mg in sodium chloride 0 9 % 250 mL IVPB, 864 mg (100 % of original dose 8 mg/kg), Intravenous, Once, 7 of 12 cycles  Dose modification: 8 mg/kg (original dose 8 mg/kg, Cycle 1), 6 4 mg/kg (80 % of original dose 8 mg/kg, Cycle 3, Reason: Other (see comments), Comment: per dr hamilton reduce dose to 80% based on platelets of 98,658), 6 4 mg/kg (original dose 8 mg/kg, Cycle 4, Reason: Other (See Comments), Comment: PLATELETS LOW)  Administration: 900 mg (3/25/2020), 900 mg (4/8/2020), 700 mg (4/22/2020), 700 mg (5/5/2020), 700 mg (5/19/2020), 700 mg (6/2/2020), 700 mg (6/16/2020)  irinotecan (CAMPTOSAR) 440 mg in sodium chloride 0 9 % 500 mL chemo infusion, 430 mg, Intravenous, Once, 7 of 12 cycles  Dose modification: 144 mg/m2 (original dose 180 mg/m2, Cycle 2, Reason: Treatment Parameters Not Met, Comment: Reduce dose to 80% (180 mg/m2 to 144 mg/m2)), 144 mg/m2 (original dose 180 mg/m2, Cycle 3, Reason: Dose Not Tolerated, Comment: PATIENT DID NOT TOLERATE HIGHER DOSE), 144 mg/m2 (80 % of original dose 180 mg/m2, Cycle 3, Reason: Other (see comments), Comment: per dr hamilton reduce dose to 80%  due to platelets of 44,897 ), 135 mg/m2 (original dose 180 mg/m2, Cycle 7, Reason: Treatment Parameters Not Met, Comment: platelets 80)  Administration: 440 mg (3/25/2020), 340 mg (4/8/2020), 340 mg (4/22/2020), 340 mg (5/5/2020), 340 mg (5/19/2020), 340 mg (6/2/2020), 300 mg (6/16/2020)  leucovorin 1,000 mg in sodium chloride 0 9 % 250 mL IVPB, 956 mg, Intravenous, Once, 7 of 12 cycles  Dose modification: 320 mg/m2 (80 % of original dose 400 mg/m2, Cycle 3, Reason: Other (see comments), Comment: per dr Anahi Manzano reduce dose to 80% based on platelets 30,347 ), 400 mg/m2 (original dose 400 mg/m2, Cycle 4, Reason: Other (See Comments), Comment: PLATELETS LOW)  Administration: 1,000 mg (3/25/2020), 1,000 mg (4/8/2020), 800 mg (4/22/2020), 800 mg (5/5/2020), 765 mg (5/19/2020), 765 mg (6/2/2020), 800 mg (6/16/2020)      7/14/2020 -  Chemotherapy     bevacizumab (AVASTIN) 795 mg in sodium chloride 0 9 % 100 mL IVPB, 7 5 mg/kg, Intravenous, Once, 0 of 6 cycles        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)    2/5/2018 Initial Diagnosis     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[facility] [address] )      7/24/2019 - 8/6/2019 Radiation     Treatment:  Course: C2    Plan ID Energy Fractions Dose per Fraction (cGy) Dose Correction (cGy) Total Dose Delivered (cGy) Elapsed Days   R LL_Hilum 10X 10 / 10 300 0 3,000 13            8/13/2019 - 9/9/2019 Chemotherapy     bevacizumab (AVASTIN) 500 mg in sodium chloride 0 9 % 100 mL IVPB, 505 mg, Intravenous, Once, 1 of 11 cycles  Administration: 500 mg (8/27/2019)  irinotecan (CAMPTOSAR) 340 mg in dextrose 5 % 500 mL chemo infusion, 330 mg (83 3 % of original dose 180 mg/m2), Intravenous, Once, 2 of 12 cycles  Dose modification: 150 mg/m2 (original dose 180 mg/m2, Cycle 1, Reason: Dose Not Tolerated)  Administration: 340 mg (8/13/2019), 340 mg (8/27/2019)  oxaliplatin (ELOXATIN) 200 mg in dextrose 5 % 250 mL chemo infusion, 198 05 mg, Intravenous, Once, 2 of 12 cycles  Administration: 200 mg (8/13/2019), 200 mg (8/27/2019)      3/25/2020 - 6/29/2020 Chemotherapy     pegfilgrastim (NEULASTA ONPRO) subcutaneous injection kit 6 mg, 6 mg, Subcutaneous, Once, 6 of 11 cycles  Administration: 6 mg (3/27/2020), 6 mg (4/10/2020), 6 mg (4/24/2020), 6 mg (5/7/2020), 6 mg (6/4/2020), 6 mg (6/18/2020)  fosaprepitant (EMEND) 150 mg in sodium chloride 0 9 % 250 mL IVPB, 150 mg, Intravenous, Once, 7 of 12 cycles  Administration: 150 mg (3/25/2020), 150 mg (4/8/2020), 150 mg (4/22/2020), 150 mg (5/5/2020), 150 mg (5/19/2020), 150 mg (6/2/2020), 150 mg (6/16/2020)  ramucirumab (CYRAMZA) 900 mg in sodium chloride 0 9 % 250 mL IVPB, 864 mg (100 % of original dose 8 mg/kg), Intravenous, Once, 7 of 12 cycles  Dose modification: 8 mg/kg (original dose 8 mg/kg, Cycle 1), 6 4 mg/kg (80 % of original dose 8 mg/kg, Cycle 3, Reason: Other (see comments), Comment: per dr hamilton reduce dose to 80% based on platelets of 42,733), 6 4 mg/kg (original dose 8 mg/kg, Cycle 4, Reason: Other (See Comments), Comment: PLATELETS LOW)  Administration: 900 mg (3/25/2020), 900 mg (4/8/2020), 700 mg (4/22/2020), 700 mg (5/5/2020), 700 mg (5/19/2020), 700 mg (6/2/2020), 700 mg (6/16/2020)  irinotecan (CAMPTOSAR) 440 mg in sodium chloride 0 9 % 500 mL chemo infusion, 430 mg, Intravenous, Once, 7 of 12 cycles  Dose modification: 144 mg/m2 (original dose 180 mg/m2, Cycle 2, Reason: Treatment Parameters Not Met, Comment: Reduce dose to 80% (180 mg/m2 to 144 mg/m2)), 144 mg/m2 (original dose 180 mg/m2, Cycle 3, Reason: Dose Not Tolerated, Comment: PATIENT DID NOT TOLERATE HIGHER DOSE), 144 mg/m2 (80 % of original dose 180 mg/m2, Cycle 3, Reason: Other (see comments), Comment: per dr hamilton reduce dose to 80%  due to platelets of 85,682 ), 135 mg/m2 (original dose 180 mg/m2, Cycle 7, Reason: Treatment Parameters Not Met, Comment: platelets 80)  Administration: 440 mg (3/25/2020), 340 mg (4/8/2020), 340 mg (4/22/2020), 340 mg (5/5/2020), 340 mg (5/19/2020), 340 mg (6/2/2020), 300 mg (6/16/2020)  leucovorin 1,000 mg in sodium chloride 0 9 % 250 mL IVPB, 956 mg, Intravenous, Once, 7 of 12 cycles  Dose modification: 320 mg/m2 (80 % of original dose 400 mg/m2, Cycle 3, Reason: Other (see comments), Comment: per dr Tammy Gomes reduce dose to 80% based on platelets 17,509 ), 436 mg/m2 (original dose 400 mg/m2, Cycle 4, Reason: Other (See Comments), Comment: PLATELETS LOW)  Administration: 1,000 mg (3/25/2020), 1,000 mg (4/8/2020), 800 mg (4/22/2020), 800 mg (5/5/2020), 765 mg (5/19/2020), 765 mg (6/2/2020), 800 mg (6/16/2020)      7/14/2020 -  Chemotherapy     bevacizumab (AVASTIN) 795 mg in sodium chloride 0 9 % 100 mL IVPB, 7 5 mg/kg, Intravenous, Once, 0 of 6 cycles       Interval history: He continues on nasal cannula O2, 2 L; sometimes 3  He has been adding salt to his foods  ROS: Review of Systems   Constitutional: Positive for appetite change and fatigue  Negative for chills, fever and unexpected weight change  HENT: Negative for mouth sores and nosebleeds  Respiratory: Positive for cough (clear phlegm, after eating) and shortness of breath  Cardiovascular: Negative for chest pain, palpitations and leg swelling  Gastrointestinal: Negative for abdominal pain, blood in stool, constipation, diarrhea, nausea and vomiting  Genitourinary: Negative for difficulty urinating, dysuria and hematuria  Musculoskeletal: Positive for neck pain (improving slightly with PT)  Negative for arthralgias  Skin: Negative  Neurological: Positive for dizziness (with activity ), light-headedness (with miniaml acitivty ) and headaches (related to his neck pain)  Negative for weakness and numbness  Hematological: Negative  Psychiatric/Behavioral: Negative          Past Medical History:   Diagnosis Date    Atrial fibrillation ([address] )     Colon cancer (HCC)     Dyspnea on exertion     Malignant neoplasm of ascending colon (HCC)     Metastatic carcinoma to lung (HCC)     Pneumonitis     Pulmonary nodules     Shortness of breath        Past Surgical History:   Procedure Laterality Date    ANTERIOR CRUCIATE LIGAMENT REPAIR      ANTERIOR CRUCIATE LIGAMENT REPAIR      2  on R knee    APPENDECTOMY      ATRIAL ABLATION SURGERY      BACK SURGERY      COLON SURGERY      Ascending and transverse colon    COLONOSCOPY      LUNG REMOVAL, PARTIAL      LUNG SEGMENTECTOMY Right 2/26/2020    Procedure: RESECTION WEDGE LUNG;  Surgeon: Shan Abdullahi MD;  [facility]: [facility];  Service: Thoracic    W[place] THORACOSCOPY W/THERA WEDGE RESEXN INITIAL UNILAT Right 2/26/2020    Procedure: THORACOSCOPY VIDEO ASSISTED SURGERY (VATS); Surgeon: Shan Abdullahi MD;  Location: [facility];  Service: Thoracic    RIGHT COLECTOMY      and Transverse colon resection    THORACOTOMY         Social History     Socioeconomic History    Marital status: /Civil Union     Spouse name: Not on file    Number of children: Not on file    Years of education: Not on file    Highest education level: Not on file   Occupational History    Not on file   Social Needs    Financial resource strain: Not on file    Food insecurity:     Worry: Not on file     Inability: Not on file    Transportation needs:     Medical: Not on file     Non-medical: Not on file   Tobacco Use    Smoking status: Former Smoker     Years: 10 00     Types: Cigars     Start date: 2000     Last attempt to quit: 2010     Years since quitting: 10 5    Smokeless tobacco: Never Used    Tobacco comment: Cigars    Substance and Sexual Activity    Alcohol use:  Yes     Alcohol/week: 8 0 standard drinks     Types: 8 Cans of beer per week     Frequency: 2-3 times a week     Drinks per session: 1 or 2     Binge frequency: Never    Drug use: No    Sexual activity: Not on file   Lifestyle    Physical activity:     Days per week: Not on file Minutes per session: Not on file    Stress: Not on file   Relationships    Social connections:     Talks on phone: Not on file     Gets together: Not on file     Attends Faith service: Not on file     Active member of club or organization: Not on file     Attends meetings of clubs or organizations: Not on file     Relationship status: Not on file    Intimate partner violence:     Fear of current or ex partner: Not on file     Emotionally abused: Not on file     Physically abused: Not on file     Forced sexual activity: Not on file   Other Topics Concern    Not on file   Social History Narrative    Patient not questioned about family hx        Family History   Problem Relation Age of Onset    Diabetes Mother     Heart disease Mother     Heart disease Father     Skin cancer Sister        Allergies   Allergen Reactions    Aspirin Other (See Comments)     Nose bleed    Sulfa Antibiotics Other (See Comments)     Dizzy          Current Outpatient Medications:     acetaminophen (TYLENOL) 500 mg tablet, Take 500 mg by mouth every 6 (six) hours as needed for mild pain, Disp: , Rfl:     albuterol (VENTOLIN HFA) 90 mcg/act inhaler, INHALE TWO PUFFS BY MOUTH EVERY 4 HOURS AS NEEDED FOR SHORTNESS OF BREATH   no more than 4 times daily, Disp: 18 g, Rfl: 1    ascorbic acid (VITAMIN C) 250 MG CHEW, Chew 250 mg daily, Disp: , Rfl:     docusate sodium (COLACE) 100 mg capsule, Take 1 capsule (100 mg total) by mouth 2 (two) times a day, Disp: 10 capsule, Rfl: 0    gabapentin (NEURONTIN) 300 mg capsule, Take 1 capsule (300 mg total) by mouth 3 (three) times a day, Disp: 63 capsule, Rfl: 0    ibuprofen (MOTRIN) 200 mg tablet, Take by mouth every 6 (six) hours as needed for mild pain, Disp: , Rfl:     loperamide (IMODIUM) 2 mg capsule, Take 2 mg by mouth 4 (four) times a day as needed for diarrhea, Disp: , Rfl:     methocarbamol (ROBAXIN) 750 mg tablet, , Disp: , Rfl:     ondansetron (ZOFRAN) 4 mg tablet, Take 1 tablet (4 mg total) by mouth every 6 (six) hours as needed for nausea or vomiting, Disp: 50 tablet, Rfl: 2    prochlorperazine (COMPAZINE) 10 mg tablet, Take 1 tablet (10 mg total) by mouth every 6 (six) hours as needed for nausea or vomiting, Disp: 30 tablet, Rfl: 3      Physical Exam:  BP 92/64 (BP Location: Left arm)   Pulse 76   Temp (!) 95 1 °F (35 1 °C) (Tympanic)   Resp 16   Ht 6' 3" (1 905 m)   Wt 106 kg (234 lb)   SpO2 90%   BMI 29 25 kg/m²     Physical Exam   Constitutional: He is oriented to person, place, and time  No distress  Appears fatigued, appears chronically ill    HENT:   Head: Normocephalic and atraumatic  Eyes: Conjunctivae are normal  No scleral icterus  Neck: Normal range of motion  Neck supple  Cardiovascular: Normal rate, regular rhythm and normal heart sounds  No murmur heard  Pulmonary/Chest: Effort normal and breath sounds normal  No respiratory distress  Nasal cannula O2 present    Abdominal: Soft  There is no tenderness  Musculoskeletal: Normal range of motion  He exhibits no edema or tenderness  Lymphadenopathy:     He has no cervical adenopathy  Neurological: He is alert and oriented to person, place, and time  No cranial nerve deficit  Skin: Skin is warm and dry  Psychiatric: He has a normal mood and affect         Labs:  Lab Results   Component Value Date    WBC 14 51 (H) 06/29/2020    HGB 12 0 06/29/2020    HCT 35 4 (L) 06/29/2020     (H) 06/29/2020    PLT 91 (L) 06/29/2020     Lab Results   Component Value Date     10/19/2015    K 4 5 06/29/2020    CL 92 (L) 06/29/2020    CO2 28 06/29/2020    ANIONGAP 8 10/19/2015    BUN 11 06/29/2020    CREATININE 1 35 (H) 06/29/2020    GLUCOSE 92 10/19/2015    GLUF 83 06/29/2020    CALCIUM 8 9 06/29/2020    AST 31 06/29/2020    ALT 23 06/29/2020    ALKPHOS 143 (H) 06/29/2020    PROT 7 1 10/19/2015    BILITOT 1 24 (H) 10/19/2015    EGFR 57 06/29/2020     Patient voiced understanding and agreement in the above discussion  Aware to contact our office with questions/symptoms in the interim  This note has been generated by voice recognition software system  Therefore, there may be spelling, grammar, and or syntax errors  Please contact if questions arise

## 2020-07-02 ENCOUNTER — HOSPITAL ENCOUNTER (OUTPATIENT)
Dept: INFUSION CENTER | Facility: CLINIC | Age: 60
End: 2020-07-02

## 2020-07-02 ENCOUNTER — DOCUMENTATION (OUTPATIENT)
Dept: HEMATOLOGY ONCOLOGY | Facility: CLINIC | Age: 60
End: 2020-07-02

## 2020-07-02 DIAGNOSIS — C77.1 METASTASIS TO INTRATHORACIC LYMPH NODES (HCC): ICD-10-CM

## 2020-07-02 DIAGNOSIS — C18.2 MALIGNANT NEOPLASM OF ASCENDING COLON (HCC): ICD-10-CM

## 2020-07-02 DIAGNOSIS — C18.9 METASTASIS FROM COLON CANCER (HCC): ICD-10-CM

## 2020-07-02 DIAGNOSIS — C79.9 METASTASIS FROM COLON CANCER (HCC): ICD-10-CM

## 2020-07-02 DIAGNOSIS — C18.9 COLON CANCER METASTASIZED TO LUNG (HCC): ICD-10-CM

## 2020-07-02 DIAGNOSIS — C78.00 COLON CANCER METASTASIZED TO LUNG (HCC): Primary | ICD-10-CM

## 2020-07-02 DIAGNOSIS — C78.00 COLON CANCER METASTASIZED TO LUNG (HCC): ICD-10-CM

## 2020-07-02 DIAGNOSIS — C18.9 COLON CANCER METASTASIZED TO LUNG (HCC): Primary | ICD-10-CM

## 2020-07-02 RX ORDER — CAPECITABINE 150 MG/1
150 TABLET, FILM COATED ORAL 2 TIMES DAILY
Qty: 28 TABLET | Refills: 3 | Status: SHIPPED | OUTPATIENT
Start: 2020-07-02 | End: 2020-07-02 | Stop reason: SDUPTHER

## 2020-07-02 RX ORDER — CAPECITABINE 500 MG/1
2000 TABLET, FILM COATED ORAL 2 TIMES DAILY
Qty: 112 TABLET | Refills: 3 | Status: SHIPPED | OUTPATIENT
Start: 2020-07-02 | End: 2020-08-22 | Stop reason: HOSPADM

## 2020-07-02 RX ORDER — CAPECITABINE 500 MG/1
2000 TABLET, FILM COATED ORAL 2 TIMES DAILY
Qty: 112 TABLET | Refills: 3 | Status: SHIPPED | OUTPATIENT
Start: 2020-07-02 | End: 2020-07-02 | Stop reason: SDUPTHER

## 2020-07-02 RX ORDER — CAPECITABINE 150 MG/1
150 TABLET, FILM COATED ORAL 2 TIMES DAILY
Qty: 28 TABLET | Refills: 3 | Status: SHIPPED | OUTPATIENT
Start: 2020-07-02 | End: 2020-07-16 | Stop reason: ALTCHOICE

## 2020-07-02 NOTE — TELEPHONE ENCOUNTER
RX PRINTED INSTEAD OF ESCRIBING SO I MANUALLY FAXED TO [phone] ([facility]: Jena Dumont)    FAXED CONFIRMED SENT X 2 AT 4:43PM 7/2 (ALSO SCANNED INTO MEDIA SECTION OF CHART)

## 2020-07-02 NOTE — PROGRESS NOTES
7/1/2020 received notification from clinical pt will be starting Capecitabine    Pt has [facility]  ID # [id number]  BIN #[id number]  PCN # [id number]  GRP # [id number]    Could not start auth since notes were not completed and dosing needed to be confirmed  7/2/2020 received dosing confirmation   2150 mg q d x 14 days followed by 7 days off  Submitted for auth through cover my meds  Per response, this has been approved  Per the approval letter from Elizabeth Low #[id number] is valid from 7/2/2020 through 7/2/2021  Called [facility] to confirm the 150 mg has also been approved  Per Herminio Richards, it falls under the same authorization number as the 500mg tablet  Notified clinical, [facility], and finance  Once we obtain a copay amount, this will be sent over to Paul Mcqueen since, per [facility], this must be filled through [facility]

## 2020-07-06 ENCOUNTER — TELEPHONE (OUTPATIENT)
Dept: HEMATOLOGY ONCOLOGY | Facility: CLINIC | Age: 60
End: 2020-07-06

## 2020-07-06 NOTE — TELEPHONE ENCOUNTER
Tc to [facility] spoke with [facility]  She stated they have received the rx and she will call me back today if the pharmacist needs any additional information

## 2020-07-06 NOTE — TELEPHONE ENCOUNTER
Josafat Pean called stating that they will need a hard copy of the script with the ICD 10 codes noted, can be faxed to [phone]

## 2020-07-06 NOTE — TELEPHONE ENCOUNTER
Carey Nicolas from [facility] is calling in to inform that patient's script for XELODA has not been received   She indicates that a call can be either made to herself at [phone] or call the pharmacy directly and speak directly to them at [phone]

## 2020-07-08 ENCOUNTER — TELEPHONE (OUTPATIENT)
Dept: HEMATOLOGY ONCOLOGY | Facility: CLINIC | Age: 60
End: 2020-07-08

## 2020-07-08 NOTE — TELEPHONE ENCOUNTER
Called patient and reviewed ok to start his Xeloda on 7/13  Patient is scheduled to start Avastin on 7/14 pending MRI results from 7/12  Please ensure we get her MRI results before his scheduled infusion on 7/14  Reviewed his UA from 6/15 is ok to use for his first Avastin  Patient was appreciative

## 2020-07-08 NOTE — TELEPHONE ENCOUNTER
Patient received his Xeloda today  Reviewed how to take meds  Patient would like to know if he can start Xeloda on 7/13/20 or do they want him to start tomorrow? Patient's MRI of brain is scheduled for 7/12 at 9 am     Patient's call back # [phone] (M)

## 2020-07-09 ENCOUNTER — DOCUMENTATION (OUTPATIENT)
Dept: HEMATOLOGY ONCOLOGY | Facility: CLINIC | Age: 60
End: 2020-07-09

## 2020-07-09 RX ORDER — SODIUM CHLORIDE 9 MG/ML
20 INJECTION, SOLUTION INTRAVENOUS ONCE
Status: CANCELLED | OUTPATIENT
Start: 2020-07-14

## 2020-07-09 RX ORDER — DEXTROSE MONOHYDRATE 50 MG/ML
20 INJECTION, SOLUTION INTRAVENOUS ONCE
Status: CANCELLED | OUTPATIENT
Start: 2020-07-14

## 2020-07-09 NOTE — PROGRESS NOTES
Per IM request from Anu Santamaria pharmacist, reviewed tx plan with Dr Janes Peña  Removed the zofran/decadron premed and the D5W as these are not needed for Avastin

## 2020-07-12 ENCOUNTER — HOSPITAL ENCOUNTER (OUTPATIENT)
Dept: MRI IMAGING | Facility: HOSPITAL | Age: 60
Discharge: HOME | End: 2020-07-12
Payer: MEDICARE

## 2020-07-12 DIAGNOSIS — C78.00 COLON CANCER METASTASIZED TO LUNG (HCC): ICD-10-CM

## 2020-07-12 DIAGNOSIS — C79.31 BRAIN METASTASES (HCC): Primary | ICD-10-CM

## 2020-07-12 DIAGNOSIS — C77.1 METASTASIS TO INTRATHORACIC LYMPH NODES (HCC): ICD-10-CM

## 2020-07-12 DIAGNOSIS — C18.9 COLON CANCER METASTASIZED TO LUNG (HCC): ICD-10-CM

## 2020-07-12 DIAGNOSIS — C18.2 MALIGNANT NEOPLASM OF ASCENDING COLON (HCC): ICD-10-CM

## 2020-07-12 PROCEDURE — A9585 GADOBUTROL INJECTION: HCPCS | Performed by: PHYSICIAN ASSISTANT

## 2020-07-12 PROCEDURE — 70553 MRI BRAIN STEM W/O & W/DYE: CPT

## 2020-07-12 RX ORDER — DEXAMETHASONE 4 MG/1
4 TABLET ORAL 2 TIMES DAILY WITH MEALS
Qty: 30 TABLET | Refills: 1 | Status: ON HOLD | OUTPATIENT
Start: 2020-07-12 | End: 2020-08-17 | Stop reason: CLARIF

## 2020-07-12 RX ADMIN — GADOBUTROL 10 ML: 604.72 INJECTION INTRAVENOUS at 09:42

## 2020-07-12 NOTE — PROGRESS NOTES
Received a call from MRI reading room  There is a new 2 3 cm lesion; called pt, asymptomatic, no headache, focal neural deficit  Ordered steroid; made primary oncology team aware

## 2020-07-13 ENCOUNTER — DOCUMENTATION (OUTPATIENT)
Dept: HEMATOLOGY ONCOLOGY | Facility: CLINIC | Age: 60
End: 2020-07-13

## 2020-07-13 ENCOUNTER — TELEPHONE (OUTPATIENT)
Dept: HEMATOLOGY ONCOLOGY | Facility: CLINIC | Age: 60
End: 2020-07-13

## 2020-07-13 DIAGNOSIS — C78.00 COLON CANCER METASTASIZED TO LUNG (HCC): ICD-10-CM

## 2020-07-13 DIAGNOSIS — C79.31 BRAIN METASTASES (HCC): Primary | ICD-10-CM

## 2020-07-13 DIAGNOSIS — C77.1: ICD-10-CM

## 2020-07-13 DIAGNOSIS — C18.9 COLON CANCER METASTASIZED TO LUNG (HCC): ICD-10-CM

## 2020-07-13 DIAGNOSIS — C77.1 METASTASIS TO INTRATHORACIC LYMPH NODES (HCC): ICD-10-CM

## 2020-07-13 DIAGNOSIS — C79.31 MALIGNANT NEOPLASM METASTATIC TO BRAIN (HCC): Primary | ICD-10-CM

## 2020-07-13 DIAGNOSIS — C18.2 MALIGNANT NEOPLASM OF ASCENDING COLON (HCC): ICD-10-CM

## 2020-07-13 DIAGNOSIS — C79.9 METASTASIS FROM COLON CANCER (HCC): ICD-10-CM

## 2020-07-13 DIAGNOSIS — T45.1X5A CHEMOTHERAPY INDUCED NEUTROPENIA (HCC): ICD-10-CM

## 2020-07-13 DIAGNOSIS — D70.1 CHEMOTHERAPY INDUCED NEUTROPENIA (HCC): ICD-10-CM

## 2020-07-13 DIAGNOSIS — C18.9: ICD-10-CM

## 2020-07-13 DIAGNOSIS — C18.9 METASTASIS FROM COLON CANCER (HCC): ICD-10-CM

## 2020-07-13 NOTE — PROGRESS NOTES
Per Dr Kevin Joy patient has a brain met and would like patient referred tor rad onc to see if it can be treated this way  We will delay Avastin from 7/14 until 8/25  Reviewed with Gonsalo at [facility]  (Cycle #1 deferred to 8/25)  Dr Kevin Joy called patient and made him aware

## 2020-07-13 NOTE — PROGRESS NOTES
I spoke with patient and explained single metastatic lesion to the brain that was explained to him by Dr Nile Preciado over the weekend and Dr Nile Preciado started him on steroid  Patient is being referred to Radiation oncologist   He might be a candidate for SRS  Avastin will be on hold  He will take Xeloda    Left message for rosa Brown at radiation oncology

## 2020-07-13 NOTE — TELEPHONE ENCOUNTER
Instructed to schedule patient with Radiation for brain lesion  I spoke with Danisha Mason who stated she will be contact Lindsey Carmona personally due to speaking with Arline

## 2020-07-14 ENCOUNTER — HOSPITAL ENCOUNTER (OUTPATIENT)
Dept: INFUSION CENTER | Facility: CLINIC | Age: 60
End: 2020-07-14

## 2020-07-14 PROBLEM — C79.31 BRAIN METASTASES (HCC): Status: ACTIVE | Noted: 2020-07-14

## 2020-07-16 ENCOUNTER — RADIATION ONCOLOGY CONSULT (OUTPATIENT)
Dept: RADIATION ONCOLOGY | Facility: HOSPITAL | Age: 60
End: 2020-07-16
Attending: RADIOLOGY
Payer: MEDICARE

## 2020-07-16 VITALS
HEART RATE: 89 BPM | WEIGHT: 233.4 LBS | RESPIRATION RATE: 20 BRPM | BODY MASS INDEX: 29.02 KG/M2 | DIASTOLIC BLOOD PRESSURE: 68 MMHG | HEIGHT: 75 IN | TEMPERATURE: 98.2 F | SYSTOLIC BLOOD PRESSURE: 108 MMHG | OXYGEN SATURATION: 97 %

## 2020-07-16 DIAGNOSIS — C79.31 MALIGNANT NEOPLASM METASTATIC TO BRAIN (HCC): ICD-10-CM

## 2020-07-16 DIAGNOSIS — Z01.818 PREPROCEDURAL EXAMINATION: ICD-10-CM

## 2020-07-16 DIAGNOSIS — Z01.818 PREPROCEDURAL EXAMINATION: Primary | ICD-10-CM

## 2020-07-16 DIAGNOSIS — C79.31 BRAIN METASTASES (HCC): Primary | ICD-10-CM

## 2020-07-16 PROCEDURE — 77334 RADIATION TREATMENT AID(S): CPT | Performed by: RADIOLOGY

## 2020-07-16 PROCEDURE — 77290 THER RAD SIMULAJ FIELD CPLX: CPT | Performed by: RADIOLOGY

## 2020-07-16 PROCEDURE — U0003 INFECTIOUS AGENT DETECTION BY NUCLEIC ACID (DNA OR RNA); SEVERE ACUTE RESPIRATORY SYNDROME CORONAVIRUS 2 (SARS-COV-2) (CORONAVIRUS DISEASE [COVID-19]), AMPLIFIED PROBE TECHNIQUE, MAKING USE OF HIGH THROUGHPUT TECHNOLOGIES AS DESCRIBED BY CMS-2020-01-R: HCPCS

## 2020-07-16 PROCEDURE — 77470 SPECIAL RADIATION TREATMENT: CPT | Performed by: RADIOLOGY

## 2020-07-16 PROCEDURE — 77370 RADIATION PHYSICS CONSULT: CPT | Performed by: RADIOLOGY

## 2020-07-16 PROCEDURE — 99211 OFF/OP EST MAY X REQ PHY/QHP: CPT | Performed by: RADIOLOGY

## 2020-07-16 NOTE — PROGRESS NOTES
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 status post right hemicolectomy in , pathologic stage T3, N2, M1, moderately differentiated adenocarcinoma of the ascending colon followed by FOLFOX  In , he underwent wedge resection of left lower and right lower lobe lesions, then additional chemotherapy with Avastin and FOLFIRI  Right VATS and right lower lobe wedge resection , SBRT of left lung metastases 2014, left lower lobectomy 2015, Xeloda and Avastin from 2015 to 2017  He was last see on 17 by Dr Jayashree Goetz for consideration of SBRT of a right lower lobe lesion  At that time, the lesion was not amenable to wedge resection and he was not a candidate for another lobectomy    His case was discussed at thoracic tumor conference, the consensus was to defer SBRT, continue  observation and repeat CT scan of the chest in 2-3 months     Brian Ledezma has continued his systemic treatment with Dr Kemar Traore with his treatment with Lonsurf ending in May 2019  [facility] chest CT from May 13, 2019 showed progression in his large right lower lobe lung mass along with slight enlargement of a small left upper lobe lung nodule and a new right lung base nodule   We discussed a course of 3000 cGy in 10 fractions to the right lower lobe and hilar region   His other lung nodules will be observed and treated with chemotherapy   Should they progress in the future, then additional SBRT lung treatment could be considered  He has completed Radiation to the RLL hilum, on 8/6/19  He was last seen on 9/11/19 6/24/2020 CT chest, abdomen and pelvis:   IMPRESSION:  1  Findings concerning for disease progression  2   Innumerable bilateral pulmonary nodules again noted compatible with metastasis  Some of the nodules and masses are larger compared to the prior exam compatible with progression of metastasis  3   There is now a filling defect in the right lower lobe pulmonary artery directly contiguous with the right lower lobe mass suspicious for tumor invasion  4   Small right pleural effusion is new  Mild pleural fluid on the left somewhat loculated near the lung apex, slightly increased  5   New mild pelvic ascites, nonspecific  Otherwise no findings for metastatic disease to the abdomen or pelvis          7/1/2020 F/U with Letty Madrid PA-C, Hem Onc:  CT scan showed disease progression  Treatment to be discontinued at this time  Patient's options remain limited  Molecular testing with Sarah Payne was completed  There are no actionable mutations  Patient's performance status is 3  He is not eligible for clinical trials  Patient recently was on Xeloda and 2015 to 2017 with Avastin with benefit  Dr Carlos Cam recommend patient to trial this again  Plan is for Xeloda 1000 mg/m2 BID 2 weeks on, one week off   Avastin 7 5 mg/kg every 21 days   Dr Carlos Cam also recommended radiation oncology referral due to the tumor that is invading towards the pulmonary artery      7/12/2020 MRI brain  IMPRESSION:  There is a new enhancing parenchymal mass within the right posterior lateral temporal lobe measuring 2 2 x 2 3 x 2 0 cm with surrounding vasogenic edema   Given patient's history of colon carcinoma, this is suspicious for parenchymal metastasis   This lesion is new compared to prior study performed approximately one year ago        7/13/2020 Progress note from Dr Carlos Cam:  spoke with patient and explained single metastatic lesion to the brain that was explained to him by Dr Mariano Merino over the weekend and Dr Mariano Merino started him on steroid  Patient is being referred to Radiation oncologist   He might be a candidate for SRS  Avastin will be on hold  He will take Xeloda             States he has daily headaches  He has not noted any significant change since initiating steroids 3 days ago  No nausea or vomiting  No focal  neurologic symptoms  Historical Information      Colon cancer metastasized to lung [facility])    2008 Initial Diagnosis     Colon cancer metastasized to lung [facility])      2008 - 2009 Chemotherapy     FOLFOX for two 6 month courses (3 month break in-between courses, according to the patient)  - Dr Nelson Napier      2/18/2008 Surgery     Partial colectomy/Right hemicolectomy:  - stage IV (pT3,pN2,pM1a, G2)    - Dr Mauricio Huff        4/21/2010 Surgery     Right lower lobe lung nodule:  - metastatic colonic adenocarcinoma      8/19/2010 Surgery     Left lower lobe wedge resection:  - moderately differentiated adenocarcinoma, consistent with metastasis from known colonic primary, 1 3 cm      2011 Surgery     Transverse colectomy      4/2011 - 10/2011 Chemotherapy     Avastin and FOLFIRI       11/9/2011 Surgery     RAZ lung wedge resection:  - metastatic adenocarcinoma of the colon  - invasive carcinoma is 0 4 cm from parenchymal resection      2012 Surgery     Wedge resection for right lung nodule      11/29/2012 Surgery     I  [id number] (11/29/12)   Omental nodule, excision:   - Metastatic adenocarcinoma morphologically consistent with colonic primary  1/15/2014 Surgery     A  Lung, right lower lobe, wedge resection:   - 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, moderately to poorly differentiated, consistent with colorectal primary, see note  - Resection margin, no tumor seen  - Background lung with few non-necrotizing granulomas and emphysematous changes  3/4/2014 - 3/14/2014 Radiation     Plan ID Energy Fractions Dose per Fraction (cGy) Total Dose Delivered (cGy) Elapsed Days   SBRT Lt Lung 6X 5 / 5 1,000 5,000 8     - Dr Jeanne Borerro      4/14/2015 Surgery     Left lower lobe lobectomy, cervical mediastinoscopy, Left posterolateral thoracotomy:  A   Lymph node Level 2R:    - 3/3 lymph nodes, positive for metastatic colonic adenocarcinoma  BCoby Cockayne node Level 7:   - 3/8 lymph nodes, positive for metastatic colonic adenocarcinoma       C   Left lower lobe:   - Metastatic colonic adenocarcinoma    - The vascular and bronchial margins of resection are negative for tumor    - 0/6 lymph nodes, negative for metastatic colonic adenocarcinoma     - The tumor is 0 5 cm away from the hilar staple line        6/2015 - 2/2017 Chemotherapy     Xeloda and Avastin      9/2018 - 5/2019 Chemotherapy     Lonsurf      5/1/2019 - 11/29/2019 Chemotherapy     Stivarga       8/13/2019 - 9/9/2019 Chemotherapy     bevacizumab (AVASTIN) 500 mg in sodium chloride 0 9 % 100 mL IVPB, 505 mg, Intravenous, Once, 1 of 11 cycles  Administration: 500 mg (8/27/2019)  irinotecan (CAMPTOSAR) 340 mg in dextrose 5 % 500 mL chemo infusion, 330 mg (83 3 % of original dose 180 mg/m2), Intravenous, Once, 2 of 12 cycles  Dose modification: 150 mg/m2 (original dose 180 mg/m2, Cycle 1, Reason: Dose Not Tolerated)  Administration: 340 mg (8/13/2019), 340 mg (8/27/2019)  oxaliplatin (ELOXATIN) 200 mg in dextrose 5 % 250 mL chemo infusion, 198 05 mg, Intravenous, Once, 2 of 12 cycles  Administration: 200 mg (8/13/2019), 200 mg (8/27/2019)      3/25/2020 - 6/29/2020 Chemotherapy     pegfilgrastim (NEULASTA ONPRO) subcutaneous injection kit 6 mg, 6 mg, Subcutaneous, Once, 6 of 11 cycles  Administration: 6 mg (3/27/2020), 6 mg (4/10/2020), 6 mg (4/24/2020), 6 mg (5/7/2020), 6 mg (6/4/2020), 6 mg (6/18/2020)  fosaprepitant (EMEND) 150 mg in sodium chloride 0 9 % 250 mL IVPB, 150 mg, Intravenous, Once, 7 of 12 cycles  Administration: 150 mg (3/25/2020), 150 mg (4/8/2020), 150 mg (4/22/2020), 150 mg (5/5/2020), 150 mg (5/19/2020), 150 mg (6/2/2020), 150 mg (6/16/2020)  ramucirumab (CYRAMZA) 900 mg in sodium chloride 0 9 % 250 mL IVPB, 864 mg (100 % of original dose 8 mg/kg), Intravenous, Once, 7 of 12 cycles  Dose modification: 8 mg/kg (original dose 8 mg/kg, Cycle 1), 6 4 mg/kg (80 % of original dose 8 mg/kg, Cycle 3, Reason: Other (see comments), Comment: per dr Jamar Mauro reduce dose to 80% based on platelets of 17,213), 6 4 mg/kg (original dose 8 mg/kg, Cycle 4, Reason: Other (See Comments), Comment: PLATELETS LOW)  Administration: 900 mg (3/25/2020), 900 mg (4/8/2020), 700 mg (4/22/2020), 700 mg (5/5/2020), 700 mg (5/19/2020), 700 mg (6/2/2020), 700 mg (6/16/2020)  irinotecan (CAMPTOSAR) 440 mg in sodium chloride 0 9 % 500 mL chemo infusion, 430 mg, Intravenous, Once, 7 of 12 cycles  Dose modification: 144 mg/m2 (original dose 180 mg/m2, Cycle 2, Reason: Treatment Parameters Not Met, Comment: Reduce dose to 80% (180 mg/m2 to 144 mg/m2)), 144 mg/m2 (original dose 180 mg/m2, Cycle 3, Reason: Dose Not Tolerated, Comment: PATIENT DID NOT TOLERATE HIGHER DOSE), 144 mg/m2 (80 % of original dose 180 mg/m2, Cycle 3, Reason: Other (see comments), Comment: per dr hamilton reduce dose to 80%  due to platelets of 81,049 ), 135 mg/m2 (original dose 180 mg/m2, Cycle 7, Reason: Treatment Parameters Not Met, Comment: platelets 80)  Administration: 440 mg (3/25/2020), 340 mg (4/8/2020), 340 mg (4/22/2020), 340 mg (5/5/2020), 340 mg (5/19/2020), 340 mg (6/2/2020), 300 mg (6/16/2020)  leucovorin 1,000 mg in sodium chloride 0 9 % 250 mL IVPB, 956 mg, Intravenous, Once, 7 of 12 cycles  Dose modification: 320 mg/m2 (80 % of original dose 400 mg/m2, Cycle 3, Reason: Other (see comments), Comment: per dr Jamar Mauro reduce dose to 80% based on platelets 32,994 ), 212 mg/m2 (original dose 400 mg/m2, Cycle 4, Reason: Other (See Comments), Comment: PLATELETS LOW)  Administration: 1,000 mg (3/25/2020), 1,000 mg (4/8/2020), 800 mg (4/22/2020), 800 mg (5/5/2020), 765 mg (5/19/2020), 765 mg (6/2/2020), 800 mg (6/16/2020)      8/25/2020 -  Chemotherapy     bevacizumab (AVASTIN) 795 mg in sodium chloride 0 9 % 100 mL IVPB, 7 5 mg/kg = 795 mg, Intravenous, Once, 1 of 6 cycles        Malignant neoplasm of ascending colon ([facility] [address] )    2/5/2018 Initial Diagnosis     [facility] of [facility]      8/13/2019 - 9/9/2019 Chemotherapy     bevacizumab (AVASTIN) 500 mg in sodium chloride 0 9 % 100 mL IVPB, 505 mg, Intravenous, Once, 1 of 11 cycles  Administration: 500 mg (8/27/2019)  irinotecan (CAMPTOSAR) 340 mg in dextrose 5 % 500 mL chemo infusion, 330 mg (83 3 % of original dose 180 mg/m2), Intravenous, Once, 2 of 12 cycles  Dose modification: 150 mg/m2 (original dose 180 mg/m2, Cycle 1, Reason: Dose Not Tolerated)  Administration: 340 mg (8/13/2019), 340 mg (8/27/2019)  oxaliplatin (ELOXATIN) 200 mg in dextrose 5 % 250 mL chemo infusion, 198 05 mg, Intravenous, Once, 2 of 12 cycles  Administration: 200 mg (8/13/2019), 200 mg (8/27/2019)      3/25/2020 - 6/29/2020 Chemotherapy     pegfilgrastim (NEULASTA ONPRO) subcutaneous injection kit 6 mg, 6 mg, Subcutaneous, Once, 6 of 11 cycles  Administration: 6 mg (3/27/2020), 6 mg (4/10/2020), 6 mg (4/24/2020), 6 mg (5/7/2020), 6 mg (6/4/2020), 6 mg (6/18/2020)  fosaprepitant (EMEND) 150 mg in sodium chloride 0 9 % 250 mL IVPB, 150 mg, Intravenous, Once, 7 of 12 cycles  Administration: 150 mg (3/25/2020), 150 mg (4/8/2020), 150 mg (4/22/2020), 150 mg (5/5/2020), 150 mg (5/19/2020), 150 mg (6/2/2020), 150 mg (6/16/2020)  ramucirumab (CYRAMZA) 900 mg in sodium chloride 0 9 % 250 mL IVPB, 864 mg (100 % of original dose 8 mg/kg), Intravenous, Once, 7 of 12 cycles  Dose modification: 8 mg/kg (original dose 8 mg/kg, Cycle 1), 6 4 mg/kg (80 % of original dose 8 mg/kg, Cycle 3, Reason: Other (see comments), Comment: per  alfonso reduce dose to 80% based on platelets of 09,695), 6 4 mg/kg (original dose 8 mg/kg, Cycle 4, Reason: Other (See Comments), Comment: PLATELETS LOW)  Administration: 900 mg (3/25/2020), 900 mg (4/8/2020), 700 mg (4/22/2020), 700 mg (5/5/2020), 700 mg (5/19/2020), 700 mg (6/2/2020), 700 mg (6/16/2020)  irinotecan (CAMPTOSAR) 440 mg in sodium chloride 0 9 % 500 mL chemo infusion, 430 mg, Intravenous, Once, 7 of 12 cycles  Dose modification: 144 mg/m2 (original dose 180 mg/m2, Cycle 2, Reason: Treatment Parameters Not Met, Comment: Reduce dose to 80% (180 mg/m2 to 144 mg/m2)), 144 mg/m2 (original dose 180 mg/m2, Cycle 3, Reason: Dose Not Tolerated, Comment: PATIENT DID NOT TOLERATE HIGHER DOSE), 144 mg/m2 (80 % of original dose 180 mg/m2, Cycle 3, Reason: Other (see comments), Comment: per dr hamilton reduce dose to 80%  due to platelets of 89,882 ), 135 mg/m2 (original dose 180 mg/m2, Cycle 7, Reason: Treatment Parameters Not Met, Comment: platelets 80)  Administration: 440 mg (3/25/2020), 340 mg (4/8/2020), 340 mg (4/22/2020), 340 mg (5/5/2020), 340 mg (5/19/2020), 340 mg (6/2/2020), 300 mg (6/16/2020)  leucovorin 1,000 mg in sodium chloride 0 9 % 250 mL IVPB, 956 mg, Intravenous, Once, 7 of 12 cycles  Dose modification: 320 mg/m2 (80 % of original dose 400 mg/m2, Cycle 3, Reason: Other (see comments), Comment: per dr Rolo Marshall reduce dose to 80% based on platelets 58,906 ), 715 mg/m2 (original dose 400 mg/m2, Cycle 4, Reason: Other (See Comments), Comment: PLATELETS LOW)  Administration: 1,000 mg (3/25/2020), 1,000 mg (4/8/2020), 800 mg (4/22/2020), 800 mg (5/5/2020), 765 mg (5/19/2020), 765 mg (6/2/2020), 800 mg (6/16/2020)      8/25/2020 -  Chemotherapy     bevacizumab (AVASTIN) 795 mg in sodium chloride 0 9 % 100 mL IVPB, 7 5 mg/kg = 795 mg, Intravenous, Once, 1 of 6 cycles        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)    2/5/2018 Initial Diagnosis     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) 7/24/2019 - 8/6/2019 Radiation     Treatment:  Course: C2    Plan ID Energy Fractions Dose per Fraction (cGy) Dose Correction (cGy) Total Dose Delivered (cGy) Elapsed Days   R LL_Hilum 10X 10 / 10 300 0 3,000 13            8/13/2019 - 9/9/2019 Chemotherapy     bevacizumab (AVASTIN) 500 mg in sodium chloride 0 9 % 100 mL IVPB, 505 mg, Intravenous, Once, 1 of 11 cycles  Administration: 500 mg (8/27/2019)  irinotecan (CAMPTOSAR) 340 mg in dextrose 5 % 500 mL chemo infusion, 330 mg (83 3 % of original dose 180 mg/m2), Intravenous, Once, 2 of 12 cycles  Dose modification: 150 mg/m2 (original dose 180 mg/m2, Cycle 1, Reason: Dose Not Tolerated)  Administration: 340 mg (8/13/2019), 340 mg (8/27/2019)  oxaliplatin (ELOXATIN) 200 mg in dextrose 5 % 250 mL chemo infusion, 198 05 mg, Intravenous, Once, 2 of 12 cycles  Administration: 200 mg (8/13/2019), 200 mg (8/27/2019)      3/25/2020 - 6/29/2020 Chemotherapy     pegfilgrastim (NEULASTA ONPRO) subcutaneous injection kit 6 mg, 6 mg, Subcutaneous, Once, 6 of 11 cycles  Administration: 6 mg (3/27/2020), 6 mg (4/10/2020), 6 mg (4/24/2020), 6 mg (5/7/2020), 6 mg (6/4/2020), 6 mg (6/18/2020)  fosaprepitant (EMEND) 150 mg in sodium chloride 0 9 % 250 mL IVPB, 150 mg, Intravenous, Once, 7 of 12 cycles  Administration: 150 mg (3/25/2020), 150 mg (4/8/2020), 150 mg (4/22/2020), 150 mg (5/5/2020), 150 mg (5/19/2020), 150 mg (6/2/2020), 150 mg (6/16/2020)  ramucirumab (CYRAMZA) 900 mg in sodium chloride 0 9 % 250 mL IVPB, 864 mg (100 % of original dose 8 mg/kg), Intravenous, Once, 7 of 12 cycles  Dose modification: 8 mg/kg (original dose 8 mg/kg, Cycle 1), 6 4 mg/kg (80 % of original dose 8 mg/kg, Cycle 3, Reason: Other (see comments), Comment: per dr hamilton reduce dose to 80% based on platelets of 28,710), 6 4 mg/kg (original dose 8 mg/kg, Cycle 4, Reason: Other (See Comments), Comment: PLATELETS LOW)  Administration: 900 mg (3/25/2020), 900 mg (4/8/2020), 700 mg (4/22/2020), 700 mg (5/5/2020), 700 mg (5/19/2020), 700 mg (6/2/2020), 700 mg (6/16/2020)  irinotecan (CAMPTOSAR) 440 mg in sodium chloride 0 9 % 500 mL chemo infusion, 430 mg, Intravenous, Once, 7 of 12 cycles  Dose modification: 144 mg/m2 (original dose 180 mg/m2, Cycle 2, Reason: Treatment Parameters Not Met, Comment: Reduce dose to 80% (180 mg/m2 to 144 mg/m2)), 144 mg/m2 (original dose 180 mg/m2, Cycle 3, Reason: Dose Not Tolerated, Comment: PATIENT DID NOT TOLERATE HIGHER DOSE), 144 mg/m2 (80 % of original dose 180 mg/m2, Cycle 3, Reason: Other (see comments), Comment: per dr hamilton reduce dose to 80%  due to platelets of 07,569 ), 135 mg/m2 (original dose 180 mg/m2, Cycle 7, Reason: Treatment Parameters Not Met, Comment: platelets 80)  Administration: 440 mg (3/25/2020), 340 mg (4/8/2020), 340 mg (4/22/2020), 340 mg (5/5/2020), 340 mg (5/19/2020), 340 mg (6/2/2020), 300 mg (6/16/2020)  leucovorin 1,000 mg in sodium chloride 0 9 % 250 mL IVPB, 956 mg, Intravenous, Once, 7 of 12 cycles  Dose modification: 320 mg/m2 (80 % of original dose 400 mg/m2, Cycle 3, Reason: Other (see comments), Comment: per dr Cuauhtemoc Conway reduce dose to 80% based on platelets 66,224 ), 739 mg/m2 (original dose 400 mg/m2, Cycle 4, Reason: Other (See Comments), Comment: PLATELETS LOW)  Administration: 1,000 mg (3/25/2020), 1,000 mg (4/8/2020), 800 mg (4/22/2020), 800 mg (5/5/2020), 765 mg (5/19/2020), 765 mg (6/2/2020), 800 mg (6/16/2020)      8/25/2020 -  Chemotherapy     bevacizumab (AVASTIN) 795 mg in sodium chloride 0 9 % 100 mL IVPB, 7 5 mg/kg = 795 mg, Intravenous, Once, 1 of 6 cycles        [facility]    7/14/2020 Initial Diagnosis     Brain metastases (HCC)           Past Medical History:   Diagnosis Date    Atrial fibrillation (HCC)     Colon cancer ([facility] [address] )     Dyspnea on exertion     Malignant neoplasm of ascending colon ([facility] [address] )     Metastatic carcinoma to lung (HCC)     Pneumonitis     Pulmonary nodules     Shortness of breath Past Surgical History:   Procedure Laterality Date    ANTERIOR CRUCIATE LIGAMENT REPAIR      ANTERIOR CRUCIATE LIGAMENT REPAIR      2  on R knee    APPENDECTOMY      ATRIAL ABLATION SURGERY      BACK SURGERY      COLON SURGERY      Ascending and transverse colon    COLONOSCOPY      LUNG REMOVAL, PARTIAL      LUNG SEGMENTECTOMY Right 2/26/2020    Procedure: RESECTION WEDGE LUNG;  Surgeon: Shani Lorenzo MD;  [facility]: [facility];  Service: Thoracic    TN THORACOSCOPY W/THERA WEDGE RESEXN INITIAL UNILAT Right 2/26/2020    Procedure: THORACOSCOPY VIDEO ASSISTED SURGERY (VATS);   Surgeon: Shani Lorenzo MD;  [facility]: [facility];  Service: Thoracic    RIGHT COLECTOMY      and Transverse colon resection    THORACOTOMY         Family History   Problem Relation Age of Onset    Diabetes Mother     Heart disease Mother     Heart disease Father     Skin cancer Sister        Social History   Social History     Substance and Sexual Activity   Alcohol Use Yes    Alcohol/week: 8 0 standard drinks    Types: 8 Cans of beer per week    Frequency: 2-3 times a week    Drinks per session: 1 or 2    Binge frequency: Never     Social History     Substance and Sexual Activity   Drug Use No     Social History     Tobacco Use   Smoking Status Former Smoker    Years: 10 00    Types: Cigars    Start date: 2000    Last attempt to quit: 2010    Years since quitting: 10 5   Smokeless Tobacco Never Used   Tobacco Comment    Cigars          Meds/Allergies     Current Outpatient Medications:     acetaminophen (TYLENOL) 500 mg tablet, Take 500 mg by mouth every 6 (six) hours as needed for mild pain, Disp: , Rfl:     albuterol (VENTOLIN HFA) 90 mcg/act inhaler, INHALE TWO PUFFS BY MOUTH EVERY 4 HOURS AS NEEDED FOR SHORTNESS OF BREATH   no more than 4 times daily, Disp: 18 g, Rfl: 1    ascorbic acid (VITAMIN C) 250 MG CHEW, Chew 250 mg daily, Disp: , Rfl:     capecitabine (XELODA) 500 MG tablet, Take 4 tablets (2,000 mg total) by mouth 2 (two) times a day for 14 days Followed by 1 week off  with 1 other capecitabine prescription for 2,150 mg total, Disp: 112 tablet, Rfl: 3    dexamethasone (DECADRON) 4 mg tablet, Take 1 tablet (4 mg total) by mouth 2 (two) times a day with meals, Disp: 30 tablet, Rfl: 1    docusate sodium (COLACE) 100 mg capsule, Take 1 capsule (100 mg total) by mouth 2 (two) times a day, Disp: 10 capsule, Rfl: 0    ibuprofen (MOTRIN) 200 mg tablet, Take by mouth every 6 (six) hours as needed for mild pain, Disp: , Rfl:     loperamide (IMODIUM) 2 mg capsule, Take 2 mg by mouth 4 (four) times a day as needed for diarrhea, Disp: , Rfl:     ondansetron (ZOFRAN) 4 mg tablet, Take 1 tablet (4 mg total) by mouth every 6 (six) hours as needed for nausea or vomiting, Disp: 50 tablet, Rfl: 2    prochlorperazine (COMPAZINE) 10 mg tablet, Take 1 tablet (10 mg total) by mouth every 6 (six) hours as needed for nausea or vomiting, Disp: 30 tablet, Rfl: 3  Allergies   Allergen Reactions    Aspirin Other (See Comments)     Nose bleed    Sulfa Antibiotics Other (See Comments)     Dizzy          Review of Systems   Constitutional: Positive for appetite change (Decreased) and fatigue (Chronic)  HENT: Positive for sore throat and trouble swallowing  Negative for ear pain  Eyes: Positive for photophobia (During H/A's)  Respiratory: Positive for cough, shortness of breath and wheezing  Cardiovascular: Positive for palpitations  Negative for leg swelling  Gastrointestinal: Positive for nausea and vomiting  Endocrine: Negative  Genitourinary:        Nocturia 1x/night   Musculoskeletal: Positive for arthralgias (Right hand), neck pain and neck stiffness  Negative for back pain  Skin: Negative  Allergic/Immunologic: Negative  Neurological: Positive for dizziness (Intermittent), speech difficulty (Difficulty with word finding), light-headedness and headaches (Right temporal)   Negative for tremors and seizures  Hematological: Bruises/bleeds easily  Psychiatric/Behavioral: The patient is nervous/anxious            OBJECTIVE:   /68 (BP Location: Left arm, Patient Position: Sitting, Cuff Size: Standard)   Pulse 89   Temp 98 2 °F (36 8 °C) (Temporal)   Resp 20   Ht 6' 3" (1 905 m)   Wt 106 kg (233 lb 6 4 oz)   SpO2 97%   BMI 29 17 kg/m²   Pain Assessment:  0  Performance Status: ECOG/Zubrod/WHO: 1 - Symptomatic but completely ambulatory    Physical Exam   He is conversing appropriately  He has nasal oxygen  No focal muscle weakness  He is ambulating independently      RESULTS  Lab Results    Chemistry        Component Value Date/Time     10/19/2015 0740    K 4 5 06/29/2020 0847    K 4 4 10/19/2015 0740    CL 92 (L) 06/29/2020 0847     10/19/2015 0740    CO2 28 06/29/2020 0847    CO2 28 8 10/19/2015 0740    BUN 11 06/29/2020 0847    BUN 15 10/19/2015 0740    CREATININE 1 35 (H) 06/29/2020 0847    CREATININE 1 13 12/21/2015 0733        Component Value Date/Time    CALCIUM 8 9 06/29/2020 0847    CALCIUM 8 8 10/19/2015 0740    ALKPHOS 143 (H) 06/29/2020 0847    ALKPHOS 89 10/19/2015 0740    AST 31 06/29/2020 0847    AST 21 10/19/2015 0740    ALT 23 06/29/2020 0847    ALT 21 10/19/2015 0740    BILITOT 1 24 (H) 10/19/2015 0740            Lab Results   Component Value Date    WBC 14 51 (H) 06/29/2020    HGB 12 0 06/29/2020    HCT 35 4 (L) 06/29/2020     (H) 06/29/2020    PLT 91 (L) 06/29/2020         Imaging Studies  Mri Brain W Wo Contrast    Result Date: 7/12/2020  Narrative: MRI BRAIN WITH AND WITHOUT CONTRAST INDICATION: C18 2: Malignant neoplasm of ascending colon 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 C18 9: Malignant neoplasm of colon, unspecified C78 00: Secondary malignant neoplasm of unspecified lung  COMPARISON:  7/15/2019  TECHNIQUE: Sagittal T1, axial T2, axial FLAIR, axial T1, axial Gradient, axial diffusion   Sagittal, axial T1 postcontrast   Axial bravo postcontrast with coronal reconstructions  IV Contrast:  10 mL of gadobutrol injection (MULTI-DOSE)  IMAGE QUALITY:   Diagnostic  FINDINGS: BRAIN PARENCHYMA:  There is a new complex solid enhancing lesion identified within the right posterior lateral temporal lobe centered within the middle temporal gyrus  This measures 2 2 x 2 3 x 2 0 cm with some internal low signal on gradient imaging, possibly blood products or calcium  There is moderate surrounding vasogenic edema with mild localized mass effect  Small scattered hyperintensities on T2/FLAIR imaging are noted in the periventricular and subcortical white matter demonstrating an appearance that is statistically most likely to represent mild microangiopathic change  Postcontrast imaging of the remainder of the brain parenchyma is unremarkable  VENTRICLES:  Normal for the patient's age  SELLA AND PITUITARY GLAND:  Normal  ORBITS:  Normal  PARANASAL SINUSES:  Normal  VASCULATURE:  Evaluation of the major intracranial vasculature demonstrates appropriate flow voids  CALVARIUM AND SKULL BASE:  Normal  EXTRACRANIAL SOFT TISSUES:  Normal      Impression: There is a new enhancing parenchymal mass within the right posterior lateral temporal lobe measuring 2 2 x 2 3 x 2 0 cm with surrounding vasogenic edema  Given patient's history of colon carcinoma, this is suspicious for parenchymal metastasis  This lesion is new compared to prior study performed approximately one year ago  The study was marked in [facility] for immediate notification  Workstation performed: [id number]     Ct Chest Abdomen Pelvis W Contrast    Result Date: 6/26/2020  Narrative: CT CHEST, ABDOMEN AND PELVIS WITH IV CONTRAST INDICATION:   C18 2: Malignant neoplasm of ascending colon 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 C18 9: Malignant neoplasm of colon, unspecified C78 00: Secondary malignant neoplasm of unspecified lung  COMPARISON:  CT chest abdomen pelvis 2/4/2020 TECHNIQUE: CT examination of the chest, abdomen and pelvis was performed  Axial, sagittal, and coronal 2D reformatted images were created from the source data and submitted for interpretation  Radiation dose length product (DLP) for this visit:  841 mGy-cm   This examination, like all CT scans performed in the [facility], was performed utilizing techniques to minimize radiation dose exposure, including the use of iterative reconstruction and automated exposure control  IV Contrast:  100 mL of iohexol (OMNIPAQUE) Enteric Contrast: Enteric contrast was administered  FINDINGS: CHEST LUNGS:  Innumerable bilateral pulmonary nodules compatible with metastasis  Many nodules have increased in size compatible with disease progression  Interlobular septal thickening most suggestive of lymphangitic spread of disease similar to the prior exam   A right midlung nodule laterally measures up to 9 mm in size image 65 series 3, previously 6 mm  A right upper lobe nodule centrally measures 9 mm in size image 51 series 3, previously 4 mm  Left upper lung spiculated pleural-based lesion measures 2 9 x 2 2 cm, image 42 series 3, previously 2 5 x 1 8 cm  Right lower lobe central mass measures 7 3 x 5 2 cm image 84 series 3, previously 7 6 x 5 1 cm  There are internal calcifications  Patchy groundglass opacity in the right lower lobe adjacent to this mass, increased, question related to atelectasis or infiltrate  More prominent central masslike consolidation in the right middle lobe region also may be related to malignancy  This measures approximately 5 4 x 4 3 cm, previously 4 3 x 3 7 cm  PLEURA:  Small right pleural effusion is new  Mild pleural fluid on the left somewhat loculated near the lung apex, slightly increased  Calcified pleural plaque formation on the left  HEART/GREAT VESSELS:  Heart is normal size  No pericardial effusion    Thoracic aorta is normal caliber  Prominent main pulmonary artery suggests pulmonary artery hypertension  There is now a rounded filling defect within the right lower lobe pulmonary  artery posteriorly measuring up to 1 3 cm in size  See image 38 series 2  This is directly contiguous with the enlarging right lower lobe mass suspicious for tumor invasion  MEDIASTINUM AND ZABRINA:  Clustered small lymph nodes noted in the superior mediastinum on the left adjacent to the pleural-based spiculated mass  A lymph node here measures 1 4 x 1 2 cm image 20 series 2, previously 1 0 x 1 0 cm  CHEST WALL AND LOWER NECK:   Right-sided Mediport line tip terminates in the SVC  ABDOMEN LIVER/BILIARY TREE:  Unremarkable  GALLBLADDER:  No calcified gallstones  No pericholecystic inflammatory change  SPLEEN:  Unremarkable  PANCREAS:  Unremarkable  ADRENAL GLANDS:  Unremarkable  KIDNEYS/URETERS:  Unremarkable  No hydronephrosis  STOMACH AND BOWEL:  Prior right hemicolectomy  No suspicious masses at the remaining proximal colon  No bowel obstruction  APPENDIX:  Prior appendectomy  ABDOMINOPELVIC CAVITY:  Mild ascites, new  No free air  No significant lymphadenopathy  VESSELS:  Unremarkable for patient's age  PELVIS REPRODUCTIVE ORGANS:  Unremarkable for patient's age  URINARY BLADDER:  Underdistended limiting evaluation  ABDOMINAL WALL/INGUINAL REGIONS:  Tiny fat-containing left inguinal hernia  OSSEOUS STRUCTURES:  No acute fracture or destructive osseous lesion  Impression: 1  Findings concerning for disease progression  2   Innumerable bilateral pulmonary nodules again noted compatible with metastasis  Some of the nodules and masses are larger compared to the prior exam compatible with progression of metastasis  3   There is now a filling defect in the right lower lobe pulmonary artery directly contiguous with the right lower lobe mass suspicious for tumor invasion  4   Small right pleural effusion is new    Mild pleural fluid on the left somewhat loculated near the lung apex, slightly increased  5   New mild pelvic ascites, nonspecific  Otherwise no findings for metastatic disease to the abdomen or pelvis  The study was marked in [facility] for significant notification  Workstation performed: [id number]         Pathology:  FINAL PATHOLOGIC DIAGNOSIS  Reported:  4/21/2015  A   Lymph node Level 2R:    - 3/3 lymph nodes, positive for metastatic colonic adenocarcinoma  BIsaac Kvng node Level 7:   - 3/8 lymph nodes, positive for metastatic colonic adenocarcinoma       C   Left lower lobe:   - Metastatic colonic adenocarcinoma    - The vascular and bronchial margins of resection are negative for tumor    - 0/6 lymph nodes, negative for metastatic colonic adenocarcinoma     - The tumor is 0 5 cm away from the hilar staple line  ASSESSMENT  1  Brain metastases ([facility] [address] )     2  Malignant neoplasm metastatic to brain [facility])  Ambulatory referral to Radiation Oncology     Cancer Staging  No matching staging information was found for the patient  PLAN/DISCUSSION  No orders of the defined types were placed in this encounter  Benji Peralta is a 63y o  year old male with stage IV colon carcinoma  He presents with solitary brain metastasis  He is symptomatic with headaches and has been started on Decadron 4 milligrams b i d     We discussed increasing his Decadron to 4 milligrams t i d  I reviewed with him options for treatment for solitary brain metastasis  This includes surgical resection followed by radiation, whole-brain radiation treatment or stereotactic approach  We discussed each of these modalities with treatment time, volume or radiated and potential side effects as well as outcomes  He has elected to pursue a stereotactic approach  We discussed he would likely be eligible for [facility] however during planning we will ensure that SRT would not be needed based on the size of his lesion    We reviewed CT simulation at which time a customized frameless radiosurgery mask will be constructed  Possible side effects were reviewed with him both acute and long-term  This can include but not limited to fatigue, seizure, worsening of his edema requiring increased steroids, and long-term radionecrosis  He is agreeable to proceeding with treatment  He will receive his [facility]st [facility]  Wong Dallas MD  7/16/2020,1:17 PM      Portions of the record may have been created with voice recognition software  Occasional wrong word or "sound a like" substitutions may have occurred due to the inherent limitations of voice recognition software  Read the chart carefully and recognize, using context, where substitutions have occurred

## 2020-07-16 NOTE — PROGRESS NOTES
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 status post right hemicolectomy in 2008, pathologic stage T3, N2, M1, moderately differentiated adenocarcinoma of the ascending colon followed by FOLFOX  In 2010, he underwent wedge resection of left lower and right lower lobe lesions, then additional chemotherapy with Avastin and FOLFIRI  Right VATS and right lower lobe wedge resection 2014, SBRT of left lung metastases March 2014, left lower lobectomy April 2015, Xeloda and Avastin from June 2015 to February 2017  He was last see on 5/16/17 by Dr Armando Bose for consideration of SBRT of a right lower lobe lesion  At that time, the lesion was not amenable to wedge resection and he was not a candidate for another lobectomy    His case was discussed at thoracic tumor conference, the consensus was to defer SBRT, continue  observation and repeat CT scan of the chest in 2-3 months     [facility] has continued his systemic treatment with Dr Tej Chisholm with his treatment with Lonsurf ending in May 2019  [facility] chest CT from May 13, 2019 showed progression in his large right lower lobe lung mass along with slight enlargement of a small left upper lobe lung nodule and a new right lung base nodule  We discussed a course of 3000 cGy in 10 fractions to the right lower lobe and hilar region  [facility] other lung nodules will be observed and treated with chemotherapy   Should they progress in the future, then additional SBRT lung treatment could be considered  He has completed Radiation to the RLL hilum, on 8/6/19  He was last seen on 9/11/19 6/24/2020 CT chest, abdomen and pelvis:   IMPRESSION:  1  Findings concerning for disease progression  2   Innumerable bilateral pulmonary nodules again noted compatible with metastasis  Some of the nodules and masses are larger compared to the prior exam compatible with progression of metastasis  3   There is now a filling defect in the right lower lobe pulmonary artery directly contiguous with the right lower lobe mass suspicious for tumor invasion  4   Small right pleural effusion is new  Mild pleural fluid on the left somewhat loculated near the lung apex, slightly increased  5   New mild pelvic ascites, nonspecific  Otherwise no findings for metastatic disease to the abdomen or pelvis  7/1/2020 F/U with Julia Griffith PA-C, Hem Onc:  CT scan showed disease progression  Treatment to be discontinued at this time  Patient's options remain limited  Molecular testing with Felton Bourne was completed  There are no actionable mutations  Patient's performance status is 3  He is not eligible for clinical trials  Patient recently was on Xeloda and 2015 to 2017 with Avastin with benefit  Dr Nidia Traore recommend patient to trial this again  Plan is for Xeloda 1000 mg/m2 BID 2 weeks on, one week off  Avastin 7 5 mg/kg every 21 days   Dr Nidia Traore also recommended radiation oncology referral due to the tumor that is invading towards the pulmonary artery  7/12/2020 MRI brain  IMPRESSION:  There is a new enhancing parenchymal mass within the right posterior lateral temporal lobe measuring 2 2 x 2 3 x 2 0 cm with surrounding vasogenic edema  Given patient's history of colon carcinoma, this is suspicious for parenchymal metastasis  This lesion is new compared to prior study performed approximately one year ago  7/13/2020 Progress note from Dr Nidia Traore:  spoke with patient and explained single metastatic lesion to the brain that was explained to him by Dr Jena Granda over the weekend and Dr Jena Granda started him on steroid  Patient is being referred to Radiation oncologist   He might be a candidate for SRS  Avastin will be on hold    He will take Xeloda     Colon cancer metastasized to lung [facility]) 2008 Initial Diagnosis     Colon cancer metastasized to lung [facility])      2008 - 2009 Chemotherapy     FOLFOX for two 6 month courses (3 month break in-between courses, according to the patient)  - Dr Millie Singh      2/18/2008 Surgery     Partial colectomy/Right hemicolectomy:  - stage IV (pT3,pN2,pM1a, G2)    - Dr Agnes Dixon        4/21/2010 Surgery     Right lower lobe lung nodule:  - metastatic colonic adenocarcinoma      8/19/2010 Surgery     Left lower lobe wedge resection:  - moderately differentiated adenocarcinoma, consistent with metastasis from known colonic primary, 1 3 cm      2011 Surgery     Transverse colectomy      4/2011 - 10/2011 Chemotherapy     Avastin and FOLFIRI       11/9/2011 Surgery     RAZ lung wedge resection:  - metastatic adenocarcinoma of the colon  - invasive carcinoma is 0 4 cm from parenchymal resection      2012 Surgery     Wedge resection for right lung nodule      11/29/2012 Surgery     I  [id number] (11/29/12)   Omental nodule, excision:   - Metastatic adenocarcinoma morphologically consistent with colonic primary  1/15/2014 Surgery     A  Lung, right lower lobe, wedge resection:   - 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, moderately to poorly differentiated, consistent with colorectal primary, see note  - Resection margin, no tumor seen  - Background lung with few non-necrotizing granulomas and emphysematous changes  3/4/2014 - 3/14/2014 Radiation     Plan ID Energy Fractions Dose per Fraction (cGy) Total Dose Delivered (cGy) Elapsed Days   SBRT Lt Lung 6X 5 / 5 1,000 5,000 8     - Dr Kaye Cosby      4/14/2015 Surgery     Left lower lobe lobectomy, cervical mediastinoscopy, Left posterolateral thoracotomy:  A   Lymph node Level 2R:    - 3/3 lymph nodes, positive for metastatic colonic adenocarcinoma       B   Lymph node Level 7:   - 3/8 lymph nodes, positive for metastatic colonic adenocarcinoma       C   Left lower lobe:   - Metastatic colonic adenocarcinoma    - The vascular and bronchial margins of resection are negative for tumor    - 0/6 lymph nodes, negative for metastatic colonic adenocarcinoma     - The tumor is 0 5 cm away from the hilar staple line        6/2015 - 2/2017 Chemotherapy     Xeloda and Avastin      9/2018 - 5/2019 Chemotherapy     Lonsurf      5/1/2019 - 11/29/2019 Chemotherapy     Stivarga       8/13/2019 - 9/9/2019 Chemotherapy     bevacizumab (AVASTIN) 500 mg in sodium chloride 0 9 % 100 mL IVPB, 505 mg, Intravenous, Once, 1 of 11 cycles  Administration: 500 mg (8/27/2019)  irinotecan (CAMPTOSAR) 340 mg in dextrose 5 % 500 mL chemo infusion, 330 mg (83 3 % of original dose 180 mg/m2), Intravenous, Once, 2 of 12 cycles  Dose modification: 150 mg/m2 (original dose 180 mg/m2, Cycle 1, Reason: Dose Not Tolerated)  Administration: 340 mg (8/13/2019), 340 mg (8/27/2019)  oxaliplatin (ELOXATIN) 200 mg in dextrose 5 % 250 mL chemo infusion, 198 05 mg, Intravenous, Once, 2 of 12 cycles  Administration: 200 mg (8/13/2019), 200 mg (8/27/2019)      3/25/2020 - 6/29/2020 Chemotherapy     pegfilgrastim (NEULASTA ONPRO) subcutaneous injection kit 6 mg, 6 mg, Subcutaneous, Once, 6 of 11 cycles  Administration: 6 mg (3/27/2020), 6 mg (4/10/2020), 6 mg (4/24/2020), 6 mg (5/7/2020), 6 mg (6/4/2020), 6 mg (6/18/2020)  fosaprepitant (EMEND) 150 mg in sodium chloride 0 9 % 250 mL IVPB, 150 mg, Intravenous, Once, 7 of 12 cycles  Administration: 150 mg (3/25/2020), 150 mg (4/8/2020), 150 mg (4/22/2020), 150 mg (5/5/2020), 150 mg (5/19/2020), 150 mg (6/2/2020), 150 mg (6/16/2020)  ramucirumab (CYRAMZA) 900 mg in sodium chloride 0 9 % 250 mL IVPB, 864 mg (100 % of original dose 8 mg/kg), Intravenous, Once, 7 of 12 cycles  Dose modification: 8 mg/kg (original dose 8 mg/kg, Cycle 1), 6 4 mg/kg (80 % of original dose 8 mg/kg, Cycle 3, Reason: Other (see comments), Comment: per dr hamilton reduce dose to 80% based on platelets of 67,238), 6 4 mg/kg (original dose 8 mg/kg, Cycle 4, Reason: Other (See Comments), Comment: PLATELETS LOW)  Administration: 900 mg (3/25/2020), 900 mg (4/8/2020), 700 mg (4/22/2020), 700 mg (5/5/2020), 700 mg (5/19/2020), 700 mg (6/2/2020), 700 mg (6/16/2020)  irinotecan (CAMPTOSAR) 440 mg in sodium chloride 0 9 % 500 mL chemo infusion, 430 mg, Intravenous, Once, 7 of 12 cycles  Dose modification: 144 mg/m2 (original dose 180 mg/m2, Cycle 2, Reason: Treatment Parameters Not Met, Comment: Reduce dose to 80% (180 mg/m2 to 144 mg/m2)), 144 mg/m2 (original dose 180 mg/m2, Cycle 3, Reason: Dose Not Tolerated, Comment: PATIENT DID NOT TOLERATE HIGHER DOSE), 144 mg/m2 (80 % of original dose 180 mg/m2, Cycle 3, Reason: Other (see comments), Comment: per dr hamilton reduce dose to 80%  due to platelets of 08,374 ), 135 mg/m2 (original dose 180 mg/m2, Cycle 7, Reason: Treatment Parameters Not Met, Comment: platelets 80)  Administration: 440 mg (3/25/2020), 340 mg (4/8/2020), 340 mg (4/22/2020), 340 mg (5/5/2020), 340 mg (5/19/2020), 340 mg (6/2/2020), 300 mg (6/16/2020)  leucovorin 1,000 mg in sodium chloride 0 9 % 250 mL IVPB, 956 mg, Intravenous, Once, 7 of 12 cycles  Dose modification: 320 mg/m2 (80 % of original dose 400 mg/m2, Cycle 3, Reason: Other (see comments), Comment: per dr Eloisa Black reduce dose to 80% based on platelets 42,917 ), 929 mg/m2 (original dose 400 mg/m2, Cycle 4, Reason: Other (See Comments), Comment: PLATELETS LOW)  Administration: 1,000 mg (3/25/2020), 1,000 mg (4/8/2020), 800 mg (4/22/2020), 800 mg (5/5/2020), 765 mg (5/19/2020), 765 mg (6/2/2020), 800 mg (6/16/2020)      8/25/2020 -  Chemotherapy     bevacizumab (AVASTIN) 795 mg in sodium chloride 0 9 % 100 mL IVPB, 7 5 mg/kg = 795 mg, Intravenous, Once, 1 of 6 cycles        Malignant neoplasm of ascending colon ([facility] [address] )    2/5/2018 Initial Diagnosis     Malignant neoplasm of ascending colon ([facility] [address] )      8/13/2019 - 9/9/2019 Chemotherapy     bevacizumab (AVASTIN) 500 mg in sodium chloride 0 9 % 100 mL IVPB, 505 mg, Intravenous, Once, 1 of 11 cycles  Administration: 500 mg (8/27/2019)  irinotecan (CAMPTOSAR) 340 mg in dextrose 5 % 500 mL chemo infusion, 330 mg (83 3 % of original dose 180 mg/m2), Intravenous, Once, 2 of 12 cycles  Dose modification: 150 mg/m2 (original dose 180 mg/m2, Cycle 1, Reason: Dose Not Tolerated)  Administration: 340 mg (8/13/2019), 340 mg (8/27/2019)  oxaliplatin (ELOXATIN) 200 mg in dextrose 5 % 250 mL chemo infusion, 198 05 mg, Intravenous, Once, 2 of 12 cycles  Administration: 200 mg (8/13/2019), 200 mg (8/27/2019)      3/25/2020 - 6/29/2020 Chemotherapy     pegfilgrastim (NEULASTA ONPRO) subcutaneous injection kit 6 mg, 6 mg, Subcutaneous, Once, 6 of 11 cycles  Administration: 6 mg (3/27/2020), 6 mg (4/10/2020), 6 mg (4/24/2020), 6 mg (5/7/2020), 6 mg (6/4/2020), 6 mg (6/18/2020)  fosaprepitant (EMEND) 150 mg in sodium chloride 0 9 % 250 mL IVPB, 150 mg, Intravenous, Once, 7 of 12 cycles  Administration: 150 mg (3/25/2020), 150 mg (4/8/2020), 150 mg (4/22/2020), 150 mg (5/5/2020), 150 mg (5/19/2020), 150 mg (6/2/2020), 150 mg (6/16/2020)  ramucirumab (CYRAMZA) 900 mg in sodium chloride 0 9 % 250 mL IVPB, 864 mg (100 % of original dose 8 mg/kg), Intravenous, Once, 7 of 12 cycles  Dose modification: 8 mg/kg (original dose 8 mg/kg, Cycle 1), 6 4 mg/kg (80 % of original dose 8 mg/kg, Cycle 3, Reason: Other (see comments), Comment: per dr hamilton reduce dose to 80% based on platelets of 72,897), 6 4 mg/kg (original dose 8 mg/kg, Cycle 4, Reason: Other (See Comments), Comment: PLATELETS LOW)  Administration: 900 mg (3/25/2020), 900 mg (4/8/2020), 700 mg (4/22/2020), 700 mg (5/5/2020), 700 mg (5/19/2020), 700 mg (6/2/2020), 700 mg (6/16/2020)  irinotecan (CAMPTOSAR) 440 mg in sodium chloride 0 9 % 500 mL chemo infusion, 430 mg, Intravenous, Once, 7 of 12 cycles  Dose modification: 144 mg/m2 (original dose 180 mg/m2, Cycle 2, Reason: Treatment Parameters Not Met, Comment: Reduce dose to 80% (180 mg/m2 to 144 mg/m2)), 144 mg/m2 (original dose 180 mg/m2, Cycle 3, Reason: Dose Not Tolerated, Comment: PATIENT DID NOT TOLERATE HIGHER DOSE), 144 mg/m2 (80 % of original dose 180 mg/m2, Cycle 3, Reason: Other (see comments), Comment: per dr hamilton reduce dose to 80%  due to platelets of 75,386 ), 135 mg/m2 (original dose 180 mg/m2, Cycle 7, Reason: Treatment Parameters Not Met, Comment: platelets 80)  Administration: 440 mg (3/25/2020), 340 mg (4/8/2020), 340 mg (4/22/2020), 340 mg (5/5/2020), 340 mg (5/19/2020), 340 mg (6/2/2020), 300 mg (6/16/2020)  leucovorin 1,000 mg in sodium chloride 0 9 % 250 mL IVPB, 956 mg, Intravenous, Once, 7 of 12 cycles  Dose modification: 320 mg/m2 (80 % of original dose 400 mg/m2, Cycle 3, Reason: Other (see comments), Comment: per dr Amisha Diggs reduce dose to 80% based on platelets 90,385 ), 124 mg/m2 (original dose 400 mg/m2, Cycle 4, Reason: Other (See Comments), Comment: PLATELETS LOW)  Administration: 1,000 mg (3/25/2020), 1,000 mg (4/8/2020), 800 mg (4/22/2020), 800 mg (5/5/2020), 765 mg (5/19/2020), 765 mg (6/2/2020), 800 mg (6/16/2020)      8/25/2020 -  Chemotherapy     bevacizumab (AVASTIN) 795 mg in sodium chloride 0 9 % 100 mL IVPB, 7 5 mg/kg = 795 mg, Intravenous, Once, 1 of 6 cycles        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)    2/5/2018 Initial Diagnosis     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[address] )      7/24/2019 - 8/6/2019 Radiation     Treatment:  Course: C2    Plan ID Energy Fractions Dose per Fraction (cGy) Dose Correction (cGy) Total Dose Delivered (cGy) Elapsed Days   R LL_Hilum 10X 10 / 10 300 0 3,000 13            8/13/2019 - 9/9/2019 Chemotherapy     bevacizumab (AVASTIN) 500 mg in sodium chloride 0 9 % 100 mL IVPB, 505 mg, Intravenous, Once, 1 of 11 cycles  Administration: 500 mg (8/27/2019)  irinotecan (CAMPTOSAR) 340 mg in dextrose 5 % 500 mL chemo infusion, 330 mg (83 3 % of original dose 180 mg/m2), Intravenous, Once, 2 of 12 cycles  Dose modification: 150 mg/m2 (original dose 180 mg/m2, Cycle 1, Reason: Dose Not Tolerated)  Administration: 340 mg (8/13/2019), 340 mg (8/27/2019)  oxaliplatin (ELOXATIN) 200 mg in dextrose 5 % 250 mL chemo infusion, 198 05 mg, Intravenous, Once, 2 of 12 cycles  Administration: 200 mg (8/13/2019), 200 mg (8/27/2019)      3/25/2020 - 6/29/2020 Chemotherapy     pegfilgrastim (NEULASTA ONPRO) subcutaneous injection kit 6 mg, 6 mg, Subcutaneous, Once, 6 of 11 cycles  Administration: 6 mg (3/27/2020), 6 mg (4/10/2020), 6 mg (4/24/2020), 6 mg (5/7/2020), 6 mg (6/4/2020), 6 mg (6/18/2020)  fosaprepitant (EMEND) 150 mg in sodium chloride 0 9 % 250 mL IVPB, 150 mg, Intravenous, Once, 7 of 12 cycles  Administration: 150 mg (3/25/2020), 150 mg (4/8/2020), 150 mg (4/22/2020), 150 mg (5/5/2020), 150 mg (5/19/2020), 150 mg (6/2/2020), 150 mg (6/16/2020)  ramucirumab (CYRAMZA) 900 mg in sodium chloride 0 9 % 250 mL IVPB, 864 mg (100 % of original dose 8 mg/kg), Intravenous, Once, 7 of 12 cycles  Dose modification: 8 mg/kg (original dose 8 mg/kg, Cycle 1), 6 4 mg/kg (80 % of original dose 8 mg/kg, Cycle 3, Reason: Other (see comments), Comment: per dr hamilton reduce dose to 80% based on platelets of 64,050), 6 4 mg/kg (original dose 8 mg/kg, Cycle 4, Reason: Other (See Comments), Comment: PLATELETS LOW)  Administration: 900 mg (3/25/2020), 900 mg (4/8/2020), 700 mg (4/22/2020), 700 mg (5/5/2020), 700 mg (5/19/2020), 700 mg (6/2/2020), 700 mg (6/16/2020)  irinotecan (CAMPTOSAR) 440 mg in sodium chloride 0 9 % 500 mL chemo infusion, 430 mg, Intravenous, Once, 7 of 12 cycles  Dose modification: 144 mg/m2 (original dose 180 mg/m2, Cycle 2, Reason: Treatment Parameters Not Met, Comment: Reduce dose to 80% (180 mg/m2 to 144 mg/m2)), 144 mg/m2 (original dose 180 mg/m2, Cycle 3, Reason: Dose Not Tolerated, Comment: PATIENT DID NOT TOLERATE HIGHER DOSE), 144 mg/m2 (80 % of original dose 180 mg/m2, Cycle 3, Reason: Other (see comments), Comment: per dr hamilton reduce dose to 80%  due to platelets of 06,996 ), 135 mg/m2 (original dose 180 mg/m2, Cycle 7, Reason: Treatment Parameters Not Met, Comment: platelets 80)  Administration: 440 mg (3/25/2020), 340 mg (4/8/2020), 340 mg (4/22/2020), 340 mg (5/5/2020), 340 mg (5/19/2020), 340 mg (6/2/2020), 300 mg (6/16/2020)  leucovorin 1,000 mg in sodium chloride 0 9 % 250 mL IVPB, 956 mg, Intravenous, Once, 7 of 12 cycles  Dose modification: 320 mg/m2 (80 % of original dose 400 mg/m2, Cycle 3, Reason: Other (see comments), Comment: per dr Sandra Cleveland reduce dose to 80% based on platelets 80,472 ), 096 mg/m2 (original dose 400 mg/m2, Cycle 4, Reason: Other (See Comments), Comment: PLATELETS LOW)  Administration: 1,000 mg (3/25/2020), 1,000 mg (4/8/2020), 800 mg (4/22/2020), 800 mg (5/5/2020), 765 mg (5/19/2020), 765 mg (6/2/2020), 800 mg (6/16/2020)      8/25/2020 -  Chemotherapy     bevacizumab (AVASTIN) 795 mg in sodium chloride 0 9 % 100 mL IVPB, 7 5 mg/kg = 795 mg, Intravenous, Once, 1 of 6 cycles        Brain metastases ([facility] [address] )    7/14/2020 Initial Diagnosis     Brain metastases (HCC)     Clinical Trial: no    Health Maintenance   Topic Date Due    Hepatitis C Screening  1960    Medicare Annual Wellness Visit (AWV)  1960    CRC Screening: Colonoscopy  1960    Pneumococcal Vaccine: Pediatrics (0 to 5 Years) and At-Risk Patients (6 to 59 Years) (1 of 3 - PCV13) 11/13/1967    HIV Screening  11/13/1976    BMI: Followup Plan  11/13/1979    Influenza Vaccine  07/01/2020    Depression Screening PHQ  09/11/2020    BMI: Adult  07/16/2021    Pneumococcal Vaccine: 65+ Years (1 of 2 - PCV13) 11/13/2026    DTaP,Tdap,and Td Vaccines (2 - Td) 03/02/2030    HIB Vaccine  Aged Out    Hepatitis B Vaccine  Aged Out    IPV Vaccine  Aged Out    Hepatitis A Vaccine  Aged Out    Meningococcal ACWY Vaccine  Aged Out    HPV Vaccine  Aged Out     Past Medical History:   Diagnosis Date    Atrial fibrillation ([facility] [address] )     Colon cancer (HCC)     Dyspnea on exertion     Malignant neoplasm of ascending colon ([facility] [address] )     Metastatic carcinoma to lung (HCC)     Pneumonitis     Pulmonary nodules     Shortness of breath      Past Surgical History:   Procedure Laterality Date    ANTERIOR CRUCIATE LIGAMENT REPAIR      ANTERIOR CRUCIATE LIGAMENT REPAIR      2  on R knee    APPENDECTOMY      ATRIAL ABLATION SURGERY      BACK SURGERY      COLON SURGERY      Ascending and transverse colon    COLONOSCOPY      LUNG REMOVAL, PARTIAL      LUNG SEGMENTECTOMY Right 2/26/2020    Procedure: RESECTION WEDGE LUNG;  Surgeon: Sejal Ramirez MD;  [facility]: [facility];  Service: Thoracic    [place] THORACOSCOPY W/THERA WEDGE RESEXN INITIAL UNILAT Right 2/26/2020    Procedure: THORACOSCOPY VIDEO ASSISTED SURGERY (VATS); Surgeon: Sejal Ramirez MD;  [facility]: [facility];  Service: Thoracic    RIGHT COLECTOMY      and Transverse colon resection    THORACOTOMY         Family History   Problem Relation Age of Onset    Diabetes Mother     Heart disease Mother     Heart disease Father     Skin cancer Sister        Social History     Tobacco Use    Smoking status: Former Smoker     Years: 10 00     Types: Cigars     Start date: 2000     Last attempt to quit: 2010     Years since quitting: 10 5    Smokeless tobacco: Never Used    Tobacco comment: Cigars    Substance Use Topics    Alcohol use:  Yes     Alcohol/week: 8 0 standard drinks     Types: 8 Cans of beer per week     Frequency: 2-3 times a week     Drinks per session: 1 or 2     Binge frequency: Never    Drug use: No          Current Outpatient Medications:     acetaminophen (TYLENOL) 500 mg tablet, Take 500 mg by mouth every 6 (six) hours as needed for mild pain, Disp: , Rfl:     albuterol (VENTOLIN HFA) 90 mcg/act inhaler, INHALE TWO PUFFS BY MOUTH EVERY 4 HOURS AS NEEDED FOR SHORTNESS OF BREATH   no more than 4 times daily, Disp: 18 g, Rfl: 1    ascorbic acid (VITAMIN C) 250 MG CHEW, Chew 250 mg daily, Disp: , Rfl:     capecitabine (XELODA) 500 MG tablet, Take 4 tablets (2,000 mg total) by mouth 2 (two) times a day for 14 days Followed by 1 week off  with 1 other capecitabine prescription for 2,150 mg total, Disp: 112 tablet, Rfl: 3    dexamethasone (DECADRON) 4 mg tablet, Take 1 tablet (4 mg total) by mouth 2 (two) times a day with meals, Disp: 30 tablet, Rfl: 1    docusate sodium (COLACE) 100 mg capsule, Take 1 capsule (100 mg total) by mouth 2 (two) times a day, Disp: 10 capsule, Rfl: 0    ibuprofen (MOTRIN) 200 mg tablet, Take by mouth every 6 (six) hours as needed for mild pain, Disp: , Rfl:     loperamide (IMODIUM) 2 mg capsule, Take 2 mg by mouth 4 (four) times a day as needed for diarrhea, Disp: , Rfl:     ondansetron (ZOFRAN) 4 mg tablet, Take 1 tablet (4 mg total) by mouth every 6 (six) hours as needed for nausea or vomiting, Disp: 50 tablet, Rfl: 2    prochlorperazine (COMPAZINE) 10 mg tablet, Take 1 tablet (10 mg total) by mouth every 6 (six) hours as needed for nausea or vomiting, Disp: 30 tablet, Rfl: 3    Allergies   Allergen Reactions    Aspirin Other (See Comments)     Nose bleed    Sulfa Antibiotics Other (See Comments)     Dizzy       Review of Systems:  Review of Systems   Constitutional: Positive for appetite change (Decreased) and fatigue (Chronic)  HENT: Positive for sore throat and trouble swallowing  Negative for ear pain  Eyes: Positive for photophobia (During H/A's)  Respiratory: Positive for cough, shortness of breath and wheezing  Cardiovascular: Positive for palpitations  Negative for leg swelling  Gastrointestinal: Positive for nausea and vomiting  Endocrine: Negative  Genitourinary:        Nocturia 1x/night   Musculoskeletal: Positive for arthralgias (Right hand), neck pain and neck stiffness  Negative for back pain  Skin: Negative  Allergic/Immunologic: Negative      Neurological: Positive for dizziness (Intermittent), speech difficulty (Difficulty with word finding), light-headedness and headaches (Right temporal)  Negative for tremors and seizures  Hematological: Bruises/bleeds easily  Psychiatric/Behavioral: The patient is nervous/anxious  Vitals:    07/16/20 1133   BP: 108/68   BP Location: Left arm   Patient Position: Sitting   Cuff Size: Standard   Pulse: 89   Resp: 20   Temp: 98 2 °F (36 8 °C)   TempSrc: Temporal   SpO2: 97%   Weight: 106 kg (233 lb 6 4 oz)   Height: 6' 3" (1 905 m)     Pain Score:   5    Pain assessment: 5    Imaging:No images are attached to the encounter       Teaching SRS

## 2020-07-17 ENCOUNTER — DOCUMENTATION (OUTPATIENT)
Dept: RADIATION ONCOLOGY | Facility: HOSPITAL | Age: 60
End: 2020-07-17

## 2020-07-17 LAB
INPATIENT: NORMAL
SARS-COV-2 RNA SPEC QL NAA+PROBE: NOT DETECTED

## 2020-07-17 NOTE — PROGRESS NOTES
Rev'd pt's Dt upon which he rated his distress level as a 3 with physical problems listed incl breathing, fatigue, getting around, nausea, pain and dry itchy skin  Att pc to the pt-the call went to  so a msg was left for the pt introducing my role and providing him with my contact info  Will att to reach the pt early next week

## 2020-07-21 PROCEDURE — 77370 RADIATION PHYSICS CONSULT: CPT | Performed by: RADIOLOGY

## 2020-07-21 PROCEDURE — 77334 RADIATION TREATMENT AID(S): CPT | Performed by: RADIOLOGY

## 2020-07-21 PROCEDURE — 77295 3-D RADIOTHERAPY PLAN: CPT | Performed by: RADIOLOGY

## 2020-07-21 PROCEDURE — 77300 RADIATION THERAPY DOSE PLAN: CPT | Performed by: RADIOLOGY

## 2020-07-22 ENCOUNTER — APPOINTMENT (OUTPATIENT)
Dept: RADIATION ONCOLOGY | Facility: HOSPITAL | Age: 60
End: 2020-07-22
Payer: MEDICARE

## 2020-07-22 ENCOUNTER — APPOINTMENT (OUTPATIENT)
Dept: RADIATION ONCOLOGY | Facility: HOSPITAL | Age: 60
End: 2020-07-22
Attending: RADIOLOGY
Payer: MEDICARE

## 2020-07-22 ENCOUNTER — PROCEDURE VISIT (OUTPATIENT)
Dept: NEUROSURGERY | Facility: CLINIC | Age: 60
End: 2020-07-22
Payer: MEDICARE

## 2020-07-22 DIAGNOSIS — C79.31 BRAIN METASTASES (HCC): Primary | ICD-10-CM

## 2020-07-22 PROCEDURE — 61796 SRS CRANIAL LESION SIMPLE: CPT | Performed by: NEUROLOGICAL SURGERY

## 2020-07-22 PROCEDURE — 77280 THER RAD SIMULAJ FIELD SMPL: CPT | Performed by: RADIOLOGY

## 2020-07-22 PROCEDURE — 77372 SRS LINEAR BASED: CPT | Performed by: RADIOLOGY

## 2020-07-22 NOTE — PROGRESS NOTES
PATIENT NAME: Ross Shetty  : 1960  MRN: [id number]  PROCEDURE DATE: 2020    Stereotactic Radiosurgery [facility]) Operative Note    Preop Diagnosis: right temporal metastasis    Postop Diagnosis: same    Procedure Details: Frameless Stereotactic Radiosurgery for right temporal metastasis    Surgeon: Lisa Hunt MD, PhD     Assistants: none    No qualified resident was available to assist with this case  Radiation Oncologist(s): Dr Abdirahman Cartagena for treatment    Estimated Blood Loss:  None           Specimens: None    Drains: None           Total IV Fluids: None              Findings: As above  Complications:  None    Anesthesia: None      DETAILS OF PROCEDURE    The patient presented to the outpatient area of the Department of Radiation Oncology where an open faced immobilization mask was created  The patient then underwent a stereotactic head CT while immobilized in the mask  The patient was then released from the Department  The patients stereotactic CT and previous stereotactic MRI scans were fused in the [address], which was used to develop the radiosurgical plan  The radiosurgical prescription called for a dose of 18 Gray to be delivered to the PTV  The PTV was created by expanding the GTV, as contoured by myself and the Radiation Oncologist  The radiosurgical plan utilized the mini-multileaf columnator on the TrueBeam machine at the [facility]  When the final treatment plan had been developed and approved by myself, the radiation oncologist and physicist, the patient returned to the Department [facility]  The patient was positioned on the treatment couch  The Optic Surface Monitoring System (OSMS) was used initially to align the patient  The patient was then immobilized in their open faced mask  kV and then cone beam CT imaging was used to further align the patient   Once the radiation oncologist, physicist and I agreed the patient was in correct position, the fields were treated sequentially without complications  The OSMS was used during the treatment to assure continued correct positioning of the patient, and if it detected patient motion, interrupted the treatment beam until the patient was back in alignment  When the [facility] treatment had been delivered, the patient was recovered from the treatment room, and discharged from the department  There was no blood loss and no specimen        SIGNATURE: Rita Robins MD, PhD  DATE: 7/22/2020   TIME: 5:44 PM

## 2020-07-23 ENCOUNTER — TELEPHONE (OUTPATIENT)
Dept: RADIATION ONCOLOGY | Facility: HOSPITAL | Age: 60
End: 2020-07-23

## 2020-07-23 PROCEDURE — 77336 RADIATION PHYSICS CONSULT: CPT | Performed by: RADIOLOGY

## 2020-07-23 NOTE — TELEPHONE ENCOUNTER
Call to patient s/p SRS yesterday, reported HA #7/10 last night, took Tylenol with good relief  Reports sleeping well until about 4 o'clock this morning  Mild DE ANDA this morning #3/10, Tylenol taken around 0600, none needed since  No nausea/vomiting  Patient given steroid taper calender yesterday, verified that he 1 refill remaining on current dexamethasone bottle  Instructed to call with any concerns/problems  Patient verbalized understanding

## 2020-07-27 ENCOUNTER — APPOINTMENT (OUTPATIENT)
Dept: LAB | Facility: HOSPITAL | Age: 60
End: 2020-07-27
Payer: MEDICARE

## 2020-07-29 ENCOUNTER — OFFICE VISIT (OUTPATIENT)
Dept: HEMATOLOGY ONCOLOGY | Facility: CLINIC | Age: 60
End: 2020-07-29
Payer: MEDICARE

## 2020-07-29 VITALS
WEIGHT: 231.2 LBS | HEART RATE: 72 BPM | SYSTOLIC BLOOD PRESSURE: 110 MMHG | OXYGEN SATURATION: 97 % | TEMPERATURE: 98 F | HEIGHT: 75 IN | DIASTOLIC BLOOD PRESSURE: 80 MMHG | RESPIRATION RATE: 18 BRPM | BODY MASS INDEX: 28.75 KG/M2

## 2020-07-29 DIAGNOSIS — J40 BRONCHITIS: ICD-10-CM

## 2020-07-29 DIAGNOSIS — C18.9 COLON CANCER METASTASIZED TO LUNG (HCC): Chronic | ICD-10-CM

## 2020-07-29 DIAGNOSIS — D69.6 THROMBOCYTOPENIA (HCC): ICD-10-CM

## 2020-07-29 DIAGNOSIS — C77.1 METASTASIS TO INTRATHORACIC LYMPH NODES (HCC): ICD-10-CM

## 2020-07-29 DIAGNOSIS — C79.31 BRAIN METASTASES (HCC): ICD-10-CM

## 2020-07-29 DIAGNOSIS — C18.2 MALIGNANT NEOPLASM OF ASCENDING COLON (HCC): Primary | ICD-10-CM

## 2020-07-29 DIAGNOSIS — C78.00 COLON CANCER METASTASIZED TO LUNG (HCC): Chronic | ICD-10-CM

## 2020-07-29 DIAGNOSIS — Z95.828 PORT-A-CATH IN PLACE: ICD-10-CM

## 2020-07-29 PROCEDURE — 99214 OFFICE O/P EST MOD 30 MIN: CPT | Performed by: INTERNAL MEDICINE

## 2020-07-29 RX ORDER — AMOXICILLIN AND CLAVULANATE POTASSIUM 875; 125 MG/1; MG/1
1 TABLET, FILM COATED ORAL EVERY 12 HOURS SCHEDULED
COMMUNITY
End: 2020-07-29 | Stop reason: SDUPTHER

## 2020-07-29 RX ORDER — AMOXICILLIN AND CLAVULANATE POTASSIUM 875; 125 MG/1; MG/1
1 TABLET, FILM COATED ORAL EVERY 12 HOURS SCHEDULED
Qty: 14 TABLET | Refills: 1 | Status: SHIPPED | OUTPATIENT
Start: 2020-07-29 | End: 2020-08-05

## 2020-07-29 NOTE — PROGRESS NOTES
HPI:  Patient is on Xeloda for 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  Avastin is on hold because MRI scan of brain recently showed a single metastatic lesion in the brain and patient had SRS on 7/26/20  He will have follow-up MRI scan in the near future before starting Avastin  Decadron dose is being tapered by radiation oncologist   Patient states that over the weekend he had rattling in his lungs  He took Augmentin and it helped  No more rattling  Breathing improved  He will like to have Augmentin on hand  Patient had been through multiple lines of systemic therapy, multiple surgeries and radiation  In March 2020 he was started on a combination of Cyramza and FOLFIRI   He responded for a short period but then disease progressed and earlier this month , July 2020 he was started on Xeloda and was going to have Avastin   Patient is on nasal oxygen 24/7    He has cough and shortness of breath   He has generalized weakness and tiredness   Has chronic low back discomfort  Patient's most recent treatment was with stivarga  Prior to stivarga he had a cycle  irinotecan and oxaliplatin and was hospitalized after that   Will Whitman to that he had palliative radiation to the lung mass causing bronchial obstruction and dysphagia      He had been through several lines of systemic therapy for metastatic colon cancer that was 1st diagnosed in 2008  Zilphia Holiday had not have Cyramza plus chemo     In 2008 he was diagnosed to have  stage IV adenocarcinoma of the colon     He had resection of the ascending colon   Pathology showed involvement of lymph nodes and lung   Postsurgery he received FOLFOX plus Avastin for 6 months    Then, in 2011 patient had additional resection this time of the transverse colon   He received FOLFIRI plus Avastin for 6 months after surgery  [facility] has residual grade 1 peripheral 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    In 2012 he had wedge resection for right lung nodule   No chemotherapy at that time   In January 2014 he had wedge resection of the right lower    No radiation following this     History of AFib/atrial flutter and had ablation   Jesica Hof 2015-February 2017 patient was on Xeloda plus Avastin   In February 2017 patient was noted to have a new lesion of the lung  Asa Isela was unable to do RFA to this lesion   Patient was seen by thoracic surgery   Due to location, wedge resection was not able to be done  He was referred to rad onc for SBRT       May 2017 lung nodule was noted to be stable compared to February 2017 August 2017 right lower lobe pulmonary nodule had enlarged compared to May 2017      Due to enlarging nodule in May 2017, a PET/CT was performed in August 2017   This showed a 2 8 x 1 8 cm right lower medial lung mass with hypermetabolism      Aug 2017 he was referred to [facility] for Cyberknife of the lung      Patient then did not return for follow up with our office until Feb 2018  At this time patient then had repeat scans to be performed  CT C/A/P in March 2018 showed enlarging right lower lobe nodular masses -- consistent with metastatic disease      He was then seen in follow-up on 03/15/2018 after his CT scan result   Patient was presented with multiple options at included surgery, RFA, radiation, CyberKnife, systemic therapy   Patient opted for consultation for RFA in thoracic surgery and then decide      Patient then did not come back to the office until July 2018  [place] Jairo needed up to date imaging studies and had repeat PET/CT in Aug 2018 which showed enlarging hypermetabolic right lower medial lung in right hilar lesions   New hypermetabolic left lower lung nodule   Multiple left pleural base lesions, however noted to likely be inflammatory related to prior talc pleurodesis    No new lesions in the neck, abdomen, pelvis    Due to increase in metastatic disease, patient was recommended to pursue systemic therapy   Patient began Lonsurf in September 2018    Follow-up CT scan showed disease progression    Stivarga was started in October 2019 and was stopped in January 2020 because of disease progression    After that Cyramza plus FOLFIRI and now Xeloda and to add Avastin                 Current Outpatient Medications:     acetaminophen (TYLENOL) 500 mg tablet, Take 500 mg by mouth every 6 (six) hours as needed for mild pain, Disp: , Rfl:     albuterol (VENTOLIN HFA) 90 mcg/act inhaler, INHALE TWO PUFFS BY MOUTH EVERY 4 HOURS AS NEEDED FOR SHORTNESS OF BREATH   no more than 4 times daily, Disp: 18 g, Rfl: 1    amoxicillin-clavulanate (Augmentin) 875-125 mg per tablet, Take 1 tablet by mouth every 12 (twelve) hours, Disp: , Rfl:     ascorbic acid (VITAMIN C) 250 MG CHEW, Chew 250 mg daily, Disp: , Rfl:     capecitabine (XELODA) 500 MG tablet, Take 4 tablets (2,000 mg total) by mouth 2 (two) times a day for 14 days Followed by 1 week off  with 1 other capecitabine prescription for 2,150 mg total, Disp: 112 tablet, Rfl: 3    dexamethasone (DECADRON) 4 mg tablet, Take 1 tablet (4 mg total) by mouth 2 (two) times a day with meals, Disp: 30 tablet, Rfl: 1    ibuprofen (MOTRIN) 200 mg tablet, Take by mouth every 6 (six) hours as needed for mild pain, Disp: , Rfl:     loperamide (IMODIUM) 2 mg capsule, Take 2 mg by mouth 4 (four) times a day as needed for diarrhea, Disp: , Rfl:     ondansetron (ZOFRAN) 4 mg tablet, Take 1 tablet (4 mg total) by mouth every 6 (six) hours as needed for nausea or vomiting, Disp: 50 tablet, Rfl: 2    prochlorperazine (COMPAZINE) 10 mg tablet, Take 1 tablet (10 mg total) by mouth every 6 (six) hours as needed for nausea or vomiting, Disp: 30 tablet, Rfl: 3    docusate sodium (COLACE) 100 mg capsule, Take 1 capsule (100 mg total) by mouth 2 (two) times a day (Patient not taking: Reported on 7/29/2020), Disp: 10 capsule, Rfl: 0    Allergies   Allergen Reactions    Aspirin Other (See Comments)     Nose bleed    Sulfa Antibiotics Other (See Comments)     Dizzy           Colon cancer metastasized to lung [facility])    2008 Initial Diagnosis     Colon cancer metastasized to lung [facility])      2008 - 2009 Chemotherapy     FOLFOX for two 6 month courses (3 month break in-between courses, according to the patient)  - Dr Ivone Greer      2/18/2008 Surgery     Partial colectomy/Right hemicolectomy:  - stage IV (pT3,pN2,pM1a, G2)    - Dr Gina Livingston        4/21/2010 Surgery     Right lower lobe lung nodule:  - metastatic colonic adenocarcinoma      8/19/2010 Surgery     Left lower lobe wedge resection:  - moderately differentiated adenocarcinoma, consistent with metastasis from known colonic primary, 1 3 cm      2011 Surgery     Transverse colectomy      4/2011 - 10/2011 Chemotherapy     Avastin and FOLFIRI       11/9/2011 Surgery     RAZ lung wedge resection:  - metastatic adenocarcinoma of the colon  - invasive carcinoma is 0 4 cm from parenchymal resection      2012 Surgery     Wedge resection for right lung nodule      11/29/2012 Surgery     I  [id number] (11/29/12)   Omental nodule, excision:   - Metastatic adenocarcinoma morphologically consistent with colonic primary  1/15/2014 Surgery     A  Lung, right lower lobe, wedge resection:   - 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, moderately to poorly differentiated, consistent with colorectal primary, see note  - Resection margin, no tumor seen  - Background lung with few non-necrotizing granulomas and emphysematous changes  3/4/2014 - 3/14/2014 Radiation     Plan ID Energy Fractions Dose per Fraction (cGy) Total Dose Delivered (cGy) Elapsed Days   SBRT Lt Lung 6X 5 / 5 1,000 5,000 8     - Dr Jany Danielle      4/14/2015 Surgery     Left lower lobe lobectomy, cervical mediastinoscopy, Left posterolateral thoracotomy:  A   Lymph node Level 2R:    - 3/3 lymph nodes, positive for metastatic colonic adenocarcinoma       B   Lymph node Level 7:   - 3/8 lymph nodes, positive for metastatic colonic adenocarcinoma       C   Left lower lobe:   - Metastatic colonic adenocarcinoma    - The vascular and bronchial margins of resection are negative for tumor    - 0/6 lymph nodes, negative for metastatic colonic adenocarcinoma     - The tumor is 0 5 cm away from the hilar staple line        6/2015 - 2/2017 Chemotherapy     Xeloda and Avastin      9/2018 - 5/2019 Chemotherapy     Lonsurf      5/1/2019 - 11/29/2019 Chemotherapy     Stivarga       8/13/2019 - 9/9/2019 Chemotherapy     bevacizumab (AVASTIN) 500 mg in sodium chloride 0 9 % 100 mL IVPB, 505 mg, Intravenous, Once, 1 of 11 cycles  Administration: 500 mg (8/27/2019)  irinotecan (CAMPTOSAR) 340 mg in dextrose 5 % 500 mL chemo infusion, 330 mg (83 3 % of original dose 180 mg/m2), Intravenous, Once, 2 of 12 cycles  Dose modification: 150 mg/m2 (original dose 180 mg/m2, Cycle 1, Reason: Dose Not Tolerated)  Administration: 340 mg (8/13/2019), 340 mg (8/27/2019)  oxaliplatin (ELOXATIN) 200 mg in dextrose 5 % 250 mL chemo infusion, 198 05 mg, Intravenous, Once, 2 of 12 cycles  Administration: 200 mg (8/13/2019), 200 mg (8/27/2019)      3/25/2020 - 6/29/2020 Chemotherapy     pegfilgrastim (NEULASTA ONPRO) subcutaneous injection kit 6 mg, 6 mg, Subcutaneous, Once, 6 of 11 cycles  Administration: 6 mg (3/27/2020), 6 mg (4/10/2020), 6 mg (4/24/2020), 6 mg (5/7/2020), 6 mg (6/4/2020), 6 mg (6/18/2020)  fosaprepitant (EMEND) 150 mg in sodium chloride 0 9 % 250 mL IVPB, 150 mg, Intravenous, Once, 7 of 12 cycles  Administration: 150 mg (3/25/2020), 150 mg (4/8/2020), 150 mg (4/22/2020), 150 mg (5/5/2020), 150 mg (5/19/2020), 150 mg (6/2/2020), 150 mg (6/16/2020)  ramucirumab (CYRAMZA) 900 mg in sodium chloride 0 9 % 250 mL IVPB, 864 mg (100 % of original dose 8 mg/kg), Intravenous, Once, 7 of 12 cycles  Dose modification: 8 mg/kg (original dose 8 mg/kg, Cycle 1), 6 4 mg/kg (80 % of original dose 8 mg/kg, Cycle 3, Reason: Other (see comments), Comment: per dr hamilton reduce dose to 80% based on platelets of 22,640), 6 4 mg/kg (original dose 8 mg/kg, Cycle 4, Reason: Other (See Comments), Comment: PLATELETS LOW)  Administration: 900 mg (3/25/2020), 900 mg (4/8/2020), 700 mg (4/22/2020), 700 mg (5/5/2020), 700 mg (5/19/2020), 700 mg (6/2/2020), 700 mg (6/16/2020)  irinotecan (CAMPTOSAR) 440 mg in sodium chloride 0 9 % 500 mL chemo infusion, 430 mg, Intravenous, Once, 7 of 12 cycles  Dose modification: 144 mg/m2 (original dose 180 mg/m2, Cycle 2, Reason: Treatment Parameters Not Met, Comment: Reduce dose to 80% (180 mg/m2 to 144 mg/m2)), 144 mg/m2 (original dose 180 mg/m2, Cycle 3, Reason: Dose Not Tolerated, Comment: PATIENT DID NOT TOLERATE HIGHER DOSE), 144 mg/m2 (80 % of original dose 180 mg/m2, Cycle 3, Reason: Other (see comments), Comment: per dr hamilton reduce dose to 80%  due to platelets of 95,979 ), 135 mg/m2 (original dose 180 mg/m2, Cycle 7, Reason: Treatment Parameters Not Met, Comment: platelets 80)  Administration: 440 mg (3/25/2020), 340 mg (4/8/2020), 340 mg (4/22/2020), 340 mg (5/5/2020), 340 mg (5/19/2020), 340 mg (6/2/2020), 300 mg (6/16/2020)  leucovorin 1,000 mg in sodium chloride 0 9 % 250 mL IVPB, 956 mg, Intravenous, Once, 7 of 12 cycles  Dose modification: 320 mg/m2 (80 % of original dose 400 mg/m2, Cycle 3, Reason: Other (see comments), Comment: per dr Josh Meza reduce dose to 80% based on platelets 32,434 ), 898 mg/m2 (original dose 400 mg/m2, Cycle 4, Reason: Other (See Comments), Comment: PLATELETS LOW)  Administration: 1,000 mg (3/25/2020), 1,000 mg (4/8/2020), 800 mg (4/22/2020), 800 mg (5/5/2020), 765 mg (5/19/2020), 765 mg (6/2/2020), 800 mg (6/16/2020)      8/25/2020 -  Chemotherapy     bevacizumab (AVASTIN) 795 mg in sodium chloride 0 9 % 100 mL IVPB, 7 5 mg/kg = 795 mg, Intravenous, Once, 1 of 6 cycles        Malignant neoplasm of ascending colon ([facility] [address] )    2/5/2018 Initial Diagnosis     Malignant neoplasm of ascending colon ([facility] [address] ) 8/13/2019 - 9/9/2019 Chemotherapy     bevacizumab (AVASTIN) 500 mg in sodium chloride 0 9 % 100 mL IVPB, 505 mg, Intravenous, Once, 1 of 11 cycles  Administration: 500 mg (8/27/2019)  irinotecan (CAMPTOSAR) 340 mg in dextrose 5 % 500 mL chemo infusion, 330 mg (83 3 % of original dose 180 mg/m2), Intravenous, Once, 2 of 12 cycles  Dose modification: 150 mg/m2 (original dose 180 mg/m2, Cycle 1, Reason: Dose Not Tolerated)  Administration: 340 mg (8/13/2019), 340 mg (8/27/2019)  oxaliplatin (ELOXATIN) 200 mg in dextrose 5 % 250 mL chemo infusion, 198 05 mg, Intravenous, Once, 2 of 12 cycles  Administration: 200 mg (8/13/2019), 200 mg (8/27/2019)      3/25/2020 - 6/29/2020 Chemotherapy     pegfilgrastim (NEULASTA ONPRO) subcutaneous injection kit 6 mg, 6 mg, Subcutaneous, Once, 6 of 11 cycles  Administration: 6 mg (3/27/2020), 6 mg (4/10/2020), 6 mg (4/24/2020), 6 mg (5/7/2020), 6 mg (6/4/2020), 6 mg (6/18/2020)  fosaprepitant (EMEND) 150 mg in sodium chloride 0 9 % 250 mL IVPB, 150 mg, Intravenous, Once, 7 of 12 cycles  Administration: 150 mg (3/25/2020), 150 mg (4/8/2020), 150 mg (4/22/2020), 150 mg (5/5/2020), 150 mg (5/19/2020), 150 mg (6/2/2020), 150 mg (6/16/2020)  ramucirumab (CYRAMZA) 900 mg in sodium chloride 0 9 % 250 mL IVPB, 864 mg (100 % of original dose 8 mg/kg), Intravenous, Once, 7 of 12 cycles  Dose modification: 8 mg/kg (original dose 8 mg/kg, Cycle 1), 6 4 mg/kg (80 % of original dose 8 mg/kg, Cycle 3, Reason: Other (see comments), Comment: per dr hamilton reduce dose to 80% based on platelets of 64,613), 6 4 mg/kg (original dose 8 mg/kg, Cycle 4, Reason: Other (See Comments), Comment: PLATELETS LOW)  Administration: 900 mg (3/25/2020), 900 mg (4/8/2020), 700 mg (4/22/2020), 700 mg (5/5/2020), 700 mg (5/19/2020), 700 mg (6/2/2020), 700 mg (6/16/2020)  irinotecan (CAMPTOSAR) 440 mg in sodium chloride 0 9 % 500 mL chemo infusion, 430 mg, Intravenous, Once, 7 of 12 cycles  Dose modification: 144 mg/m2 (original dose 180 mg/m2, Cycle 2, Reason: Treatment Parameters Not Met, Comment: Reduce dose to 80% (180 mg/m2 to 144 mg/m2)), 144 mg/m2 (original dose 180 mg/m2, Cycle 3, Reason: Dose Not Tolerated, Comment: PATIENT DID NOT TOLERATE HIGHER DOSE), 144 mg/m2 (80 % of original dose 180 mg/m2, Cycle 3, Reason: Other (see comments), Comment: per dr hamilton reduce dose to 80%  due to platelets of 72,812 ), 135 mg/m2 (original dose 180 mg/m2, Cycle 7, Reason: Treatment Parameters Not Met, Comment: platelets 80)  Administration: 440 mg (3/25/2020), 340 mg (4/8/2020), 340 mg (4/22/2020), 340 mg (5/5/2020), 340 mg (5/19/2020), 340 mg (6/2/2020), 300 mg (6/16/2020)  leucovorin 1,000 mg in sodium chloride 0 9 % 250 mL IVPB, 956 mg, Intravenous, Once, 7 of 12 cycles  Dose modification: 320 mg/m2 (80 % of original dose 400 mg/m2, Cycle 3, Reason: Other (see comments), Comment: per dr Mayelin Thomas reduce dose to 80% based on platelets 55,934 ), 741 mg/m2 (original dose 400 mg/m2, Cycle 4, Reason: Other (See Comments), Comment: PLATELETS LOW)  Administration: 1,000 mg (3/25/2020), 1,000 mg (4/8/2020), 800 mg (4/22/2020), 800 mg (5/5/2020), 765 mg (5/19/2020), 765 mg (6/2/2020), 800 mg (6/16/2020)      8/25/2020 -  Chemotherapy     bevacizumab (AVASTIN) 795 mg in sodium chloride 0 9 % 100 mL IVPB, 7 5 mg/kg = 795 mg, Intravenous, Once, 1 of 6 cycles        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)    2/5/2018 Initial Diagnosis     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[address] )      7/24/2019 - 8/6/2019 Radiation     Treatment:  Course: C2    Plan ID Energy Fractions Dose per Fraction (cGy) Dose Correction (cGy) Total Dose Delivered (cGy) Elapsed Days   R LL_Hilum 10X 10 / 10 300 0 3,000 13            8/13/2019 - 9/9/2019 Chemotherapy     bevacizumab (AVASTIN) 500 mg in sodium chloride 0 9 % 100 mL IVPB, 505 mg, Intravenous, Once, 1 of 11 cycles  Administration: 500 mg (8/27/2019)  irinotecan (CAMPTOSAR) 340 mg in dextrose 5 % 500 mL chemo infusion, 330 mg (83 3 % of original dose 180 mg/m2), Intravenous, Once, 2 of 12 cycles  Dose modification: 150 mg/m2 (original dose 180 mg/m2, Cycle 1, Reason: Dose Not Tolerated)  Administration: 340 mg (8/13/2019), 340 mg (8/27/2019)  oxaliplatin (ELOXATIN) 200 mg in dextrose 5 % 250 mL chemo infusion, 198 05 mg, Intravenous, Once, 2 of 12 cycles  Administration: 200 mg (8/13/2019), 200 mg (8/27/2019)      3/25/2020 - 6/29/2020 Chemotherapy     pegfilgrastim (NEULASTA ONPRO) subcutaneous injection kit 6 mg, 6 mg, Subcutaneous, Once, 6 of 11 cycles  Administration: 6 mg (3/27/2020), 6 mg (4/10/2020), 6 mg (4/24/2020), 6 mg (5/7/2020), 6 mg (6/4/2020), 6 mg (6/18/2020)  fosaprepitant (EMEND) 150 mg in sodium chloride 0 9 % 250 mL IVPB, 150 mg, Intravenous, Once, 7 of 12 cycles  Administration: 150 mg (3/25/2020), 150 mg (4/8/2020), 150 mg (4/22/2020), 150 mg (5/5/2020), 150 mg (5/19/2020), 150 mg (6/2/2020), 150 mg (6/16/2020)  ramucirumab (CYRAMZA) 900 mg in sodium chloride 0 9 % 250 mL IVPB, 864 mg (100 % of original dose 8 mg/kg), Intravenous, Once, 7 of 12 cycles  Dose modification: 8 mg/kg (original dose 8 mg/kg, Cycle 1), 6 4 mg/kg (80 % of original dose 8 mg/kg, Cycle 3, Reason: Other (see comments), Comment: per dr hamilton reduce dose to 80% based on platelets of 33,243), 6 4 mg/kg (original dose 8 mg/kg, Cycle 4, Reason: Other (See Comments), Comment: PLATELETS LOW)  Administration: 900 mg (3/25/2020), 900 mg (4/8/2020), 700 mg (4/22/2020), 700 mg (5/5/2020), 700 mg (5/19/2020), 700 mg (6/2/2020), 700 mg (6/16/2020)  irinotecan (CAMPTOSAR) 440 mg in sodium chloride 0 9 % 500 mL chemo infusion, 430 mg, Intravenous, Once, 7 of 12 cycles  Dose modification: 144 mg/m2 (original dose 180 mg/m2, Cycle 2, Reason: Treatment Parameters Not Met, Comment: Reduce dose to 80% (180 mg/m2 to 144 mg/m2)), 144 mg/m2 (original dose 180 mg/m2, Cycle 3, Reason: Dose Not Tolerated, Comment: PATIENT DID NOT TOLERATE HIGHER DOSE), 144 mg/m2 (80 % of original dose 180 mg/m2, Cycle 3, Reason: Other (see comments), Comment: per dr hamliton reduce dose to 80%  due to platelets of 54,552 ), 135 mg/m2 (original dose 180 mg/m2, Cycle 7, Reason: Treatment Parameters Not Met, Comment: platelets 80)  Administration: 440 mg (3/25/2020), 340 mg (4/8/2020), 340 mg (4/22/2020), 340 mg (5/5/2020), 340 mg (5/19/2020), 340 mg (6/2/2020), 300 mg (6/16/2020)  leucovorin 1,000 mg in sodium chloride 0 9 % 250 mL IVPB, 956 mg, Intravenous, Once, 7 of 12 cycles  Dose modification: 320 mg/m2 (80 % of original dose 400 mg/m2, Cycle 3, Reason: Other (see comments), Comment: per dr Christiano Lamb reduce dose to 80% based on platelets 41,758 ), 840 mg/m2 (original dose 400 mg/m2, Cycle 4, Reason: Other (See Comments), Comment: PLATELETS LOW)  Administration: 1,000 mg (3/25/2020), 1,000 mg (4/8/2020), 800 mg (4/22/2020), 800 mg (5/5/2020), 765 mg (5/19/2020), 765 mg (6/2/2020), 800 mg (6/16/2020)      8/25/2020 -  Chemotherapy     bevacizumab (AVASTIN) 795 mg in sodium chloride 0 9 % 100 mL IVPB, 7 5 mg/kg = 795 mg, Intravenous, Once, 1 of 6 cycles        Brain metastases ([facility] [address] )    7/14/2020 Initial Diagnosis     Brain metastases ([facility] [address] )         ROS:  07/29/20 Reviewed 13 systems: See symptoms in HPI:  He has cough and shortness of breath   He has generalized weakness and tiredness   Has chronic low back discomfort  Patient is on continuous oxygen by nasal cannula   Presently no headaches, seizures, dizziness, diplopia, dysphagia, hoarseness, chest pain, palpitations,  hemoptysis, abdominal pain, nausea, vomiting, change in bowel habits, melena, hematuria, fever, chills, bleeding, bone pains, skin rash, weight loss,   numbness,  claudication and gait problem  No frequent infections  Not unusually sensitive to heat or cold  No swelling of the ankles  No swollen glands  Patient is anxious         /80 (BP Location: Right arm, Patient Position: Sitting, Cuff Size: Adult)   Pulse 72   Temp 98 °F (36 7 °C)   Resp 18   Ht 6' 3" (1 905 m)   Wt 105 kg (231 lb 3 2 oz)   SpO2 97%   BMI 28 90 kg/m²     Physical Exam:  Alert, oriented, not in acute distress, on oxygen by nasal cannula, no icterus, no oral thrush, no palpable neck mass, decreased breath sounds at lung bases , regular heart rate, abdomen  soft and non tender, no palpable abdominal mass, no ascites, no edema of ankles, no calf tenderness, no focal neurological deficit, no skin rash, no palpable lymphadenopathy, good arterial pulses, no clubbing  Patient is anxious  Performance status 3      IMAGING:  No orders to display       LABS:  Results for orders placed or performed in visit on 07/24/20   CBC and differential   Result Value Ref Range    WBC 14 46 (H) 4 31 - 10 16 Thousand/uL    RBC 3 54 (L) 3 88 - 5 62 Million/uL    Hemoglobin 13 1 12 0 - 17 0 g/dL    Hematocrit 39 5 36 5 - 49 3 %     (H) 82 - 98 fL    MCH 37 0 (H) 26 8 - 34 3 pg    MCHC 33 2 31 4 - 37 4 g/dL    RDW 16 1 (H) 11 6 - 15 1 %    MPV 9 2 8 9 - 12 7 fL    Platelets 395 (L) 170 - 390 Thousands/uL    nRBC 0 /100 WBCs   Comprehensive metabolic panel   Result Value Ref Range    Sodium 137 136 - 145 mmol/L    Potassium 4 6 3 5 - 5 3 mmol/L    Chloride 99 (L) 100 - 108 mmol/L    CO2 32 21 - 32 mmol/L    ANION GAP 6 4 - 13 mmol/L    BUN 24 5 - 25 mg/dL    Creatinine 0 94 0 60 - 1 30 mg/dL    Glucose, Fasting 106 (H) 65 - 99 mg/dL    Calcium 8 8 8 3 - 10 1 mg/dL    AST 15 5 - 45 U/L    ALT 27 12 - 78 U/L    Alkaline Phosphatase 74 46 - 116 U/L    Total Protein 6 7 6 4 - 8 2 g/dL    Albumin 3 4 (L) 3 5 - 5 0 g/dL    Total Bilirubin 0 85 0 20 - 1 00 mg/dL    eGFR 89 ml/min/1 73sq m   Manual Differential(PHLEBS Do Not Order)   Result Value Ref Range    Segmented % 88 (H) 43 - 75 %    Bands % 1 0 - 8 %    Lymphocytes % 7 (L) 14 - 44 %    Monocytes % 3 (L) 4 - 12 %    Eosinophils, % 0 0 - 6 %    Basophils % 0 0 - 1 %    Myelocytes % 1 0 - 1 % Absolute Neutrophils 12 87 (H) 1 85 - 7 62 Thousand/uL    Lymphocytes Absolute 1 01 0 60 - 4 47 Thousand/uL    Monocytes Absolute 0 43 0 00 - 1 22 Thousand/uL    Eosinophils Absolute 0 00 0 00 - 0 40 Thousand/uL    Basophils Absolute 0 00 0 00 - 0 10 Thousand/uL    Total Counted 100     Hypersegmented Neutrophils Present     Anisocytosis Present     Macrocytes Present     Platelet Estimate Borderline (A) Adequate     Labs, Imaging, & Other studies:   All pertinent labs and imaging studies were personally reviewed    Lab Results   Component Value Date     10/19/2015    K 4 6 07/27/2020    CL 99 (L) 07/27/2020    CO2 32 07/27/2020    ANIONGAP 8 10/19/2015    BUN 24 07/27/2020    CREATININE 0 94 07/27/2020    GLUCOSE 92 10/19/2015    GLUF 106 (H) 07/27/2020    CALCIUM 8 8 07/27/2020    AST 15 07/27/2020    ALT 27 07/27/2020    ALKPHOS 74 07/27/2020    PROT 7 1 10/19/2015    BILITOT 1 24 (H) 10/19/2015    EGFR 89 07/27/2020     Lab Results   Component Value Date    WBC 14 46 (H) 07/27/2020    HGB 13 1 07/27/2020    HCT 39 5 07/27/2020     (H) 07/27/2020     (L) 07/27/2020       Reviewed and discussed with patient  Assessment and plan:   Patient is on Xeloda for 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  Avastin is on hold because MRI scan of brain recently showed a single metastatic lesion in the brain and patient had SRS on 7/26/20  He will have follow-up MRI scan in the near future before starting Avastin  Decadron dose is being tapered by radiation oncologist   Patient states that over the weekend he had rattling in his lungs  He took Augmentin and it helped  No more rattling  Breathing improved  He will like to have Augmentin on hand  Patient had been through multiple lines of systemic therapy, multiple surgeries and radiation  In March 2020 he was started on a combination of Cyramza and FOLFIRI     He responded for a short period but then disease progressed and earlier this month , July 2020 he was started on Xeloda and was going to have Avastin   Patient is on nasal oxygen 24/7    He has cough and shortness of breath   He has generalized weakness and tiredness   Has chronic low back discomfort  Patient's most recent treatment was with stivarga  Prior to stivarga he had a cycle  irinotecan and oxaliplatin and was hospitalized after that   Doretha Scheuermann to that he had palliative radiation to the lung mass causing bronchial obstruction and dysphagia      He had been through several lines of systemic therapy for metastatic colon cancer that was 1st diagnosed in 2008  [place] had not have Cyramza plus chemo     In 2008 he was diagnosed to have  stage IV adenocarcinoma of the colon     He had resection of the ascending colon   Pathology showed involvement of lymph nodes and lung   Postsurgery he received FOLFOX plus Avastin for 6 months  Then, in 2011 patient had additional resection this time of the transverse colon   He received FOLFIRI plus Avastin for 6 months after surgery  [facility] has residual grade 1 peripheral 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    In 2012 he had wedge resection for right lung nodule   No chemotherapy at that time   In January 2014 he had wedge resection of the right lower    No radiation following this     History of AFib/atrial flutter and had ablation   Zeina Constantino 2015-February 2017 patient was on Xeloda plus Avastin   In February 2017 patient was noted to have a new lesion of the lung  Silvano Angulo was unable to do RFA to this lesion   Patient was seen by thoracic surgery   Due to location, wedge resection was not able to be done   He was referred to rad onc for SBRT       May 2017 lung nodule was noted to be stable compared to February 2017 August 2017 right lower lobe pulmonary nodule had enlarged compared to May 2017      Due to enlarging nodule in May 2017, a PET/CT was performed in August 2017   This showed a 2 8 x 1 8 cm right lower medial lung mass with hypermetabolism      Aug 2017 he was referred to [facility] for Cyberknife of the lung      Patient then did not return for follow up with our office until Feb 2018  At this time patient then had repeat scans to be performed  CT C/A/P in March 2018 showed enlarging right lower lobe nodular masses -- consistent with metastatic disease      He was then seen in follow-up on 03/15/2018 after his CT scan result   Patient was presented with multiple options at included surgery, RFA, radiation, CyberKnife, systemic therapy   Patient opted for consultation for RFA in thoracic surgery and then decide      Patient then did not come back to the office until July 2018  Veronica Mijares needed up to date imaging studies and had repeat PET/CT in Aug 2018 which showed enlarging hypermetabolic right lower medial lung in right hilar lesions   New hypermetabolic left lower lung nodule   Multiple left pleural base lesions, however noted to likely be inflammatory related to prior talc pleurodesis  No new lesions in the neck, abdomen, pelvis    Due to increase in metastatic disease, patient was recommended to pursue systemic therapy   Patient began [address] September 2018    Follow-up CT scan showed disease progression    Stivarga was started in October 2019 and was stopped in January 2020 because of disease progression  After that Cyramza plus FOLFIRI and now Xeloda and to add Avastin     Physical examination and test results are as recorded and discussed     Plan is as above Xeloda and to add Avastin        Condition discussed and explained   Questions answered   Discussed the importance of eating healthy foods, activities as tolerated and health screening tests   Treatment options are very much limited     Goal is prolongation of survival  Patient is capable of self-care at this time  Jodene Severance will continue to follow with his primary physician and other consultants    Discussed prevention against coronavirus    1  Malignant neoplasm of ascending colon ([facility] [address] )      2  Colon cancer metastasized to lung ([facility] )      3  Brain metastases ([facility] )      4  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

## 2020-08-11 ENCOUNTER — TELEPHONE (OUTPATIENT)
Dept: HEMATOLOGY ONCOLOGY | Facility: CLINIC | Age: 60
End: 2020-08-11

## 2020-08-11 DIAGNOSIS — B37.0 THRUSH, ORAL: Primary | ICD-10-CM

## 2020-08-11 RX ORDER — FLUCONAZOLE 100 MG/1
100 TABLET ORAL DAILY
Qty: 14 TABLET | Refills: 0 | Status: SHIPPED | OUTPATIENT
Start: 2020-08-11 | End: 2020-08-22 | Stop reason: HOSPADM

## 2020-08-11 NOTE — TELEPHONE ENCOUNTER
Patient called stating has oral thrush, he stated that he would like to know if there is anything that may help  Best call back [phone]

## 2020-08-11 NOTE — TELEPHONE ENCOUNTER
Returned tc spoke with pt  Updated that Elida ARMANDO has ordered nystatin rinse and Diflucan pills  Instructed how to take and to call with any other concerns  Pt stated he will  rxs today

## 2020-08-11 NOTE — TELEPHONE ENCOUNTER
Patient reports white patches "all over" his mouth - (Back of throat, roof of mouth, on his gums/cheek)  This has happened to him in the past - he got relief with baking soda and water  Patient has been trying baking soda and water over the past few days without relief  His mouth is very painful- only able to eat soft foods at this time  Patient requesting Rx be sent to the pharmacy  He denies fever      Rx to be sent to [facility] on [address]

## 2020-08-12 ENCOUNTER — HOSPITAL ENCOUNTER (OUTPATIENT)
Dept: INFUSION CENTER | Facility: CLINIC | Age: 60
Discharge: HOME | End: 2020-08-12
Payer: MEDICARE

## 2020-08-12 ENCOUNTER — DOCUMENTATION (OUTPATIENT)
Dept: HEMATOLOGY ONCOLOGY | Facility: CLINIC | Age: 60
End: 2020-08-12

## 2020-08-12 DIAGNOSIS — C18.9 COLON CANCER METASTASIZED TO LUNG (HCC): ICD-10-CM

## 2020-08-12 DIAGNOSIS — Z95.828 PORT-A-CATH IN PLACE: ICD-10-CM

## 2020-08-12 DIAGNOSIS — C77.1 METASTASIS TO INTRATHORACIC LYMPH NODES (HCC): ICD-10-CM

## 2020-08-12 DIAGNOSIS — C78.00 COLON CANCER METASTASIZED TO LUNG (HCC): ICD-10-CM

## 2020-08-12 DIAGNOSIS — C18.2 MALIGNANT NEOPLASM OF ASCENDING COLON (HCC): ICD-10-CM

## 2020-08-12 LAB
ALBUMIN SERPL BCP-MCNC: 3 G/DL (ref 3.5–5.7)
ALP SERPL-CCNC: 70 U/L (ref 46–116)
ALT SERPL W P-5'-P-CCNC: 17 U/L (ref 12–78)
ANION GAP SERPL CALCULATED.3IONS-SCNC: 9 MMOL/L (ref 4–13)
AST SERPL W P-5'-P-CCNC: 23 U/L (ref 5–45)
BASOPHILS # BLD AUTO: 0.01 THOUSANDS/ΜL (ref 0–0.1)
BASOPHILS NFR BLD AUTO: 0 % (ref 0–1)
BILIRUB SERPL-MCNC: 1.2 MG/DL (ref 0.2–1)
BUN SERPL-MCNC: 17 MG/DL (ref 5–25)
CALCIUM SERPL-MCNC: 8.4 MG/DL (ref 8.3–10.1)
CEA SERPL-MCNC: 200.4 NG/ML (ref 0–3)
CHLORIDE SERPL-SCNC: 97 MMOL/L (ref 98–108)
CO2 SERPL-SCNC: 30 MMOL/L (ref 21–32)
CREAT SERPL-MCNC: 0.8 MG/DL (ref 0.6–1.3)
EOSINOPHIL # BLD AUTO: 0.21 THOUSAND/ΜL (ref 0–0.61)
EOSINOPHIL NFR BLD AUTO: 6 % (ref 0–6)
ERYTHROCYTE [DISTWIDTH] IN BLOOD BY AUTOMATED COUNT: 16.6 % (ref 11.6–15.1)
GFR SERPL CREATININE-BSD FRML MDRD: 98 ML/MIN/1.73SQ M
GLUCOSE SERPL-MCNC: 93 MG/DL (ref 65–140)
HCT VFR BLD AUTO: 33.2 % (ref 36.5–49.3)
HGB BLD-MCNC: 11.4 G/DL (ref 12–17)
LYMPHOCYTES # BLD AUTO: 0.37 THOUSANDS/ΜL (ref 0.6–4.47)
LYMPHOCYTES NFR BLD AUTO: 10 % (ref 14–44)
MCH RBC QN AUTO: 37.4 PG (ref 26.8–34.3)
MCHC RBC AUTO-ENTMCNC: 34.3 G/DL (ref 31.4–37.4)
MCV RBC AUTO: 109 FL (ref 82–98)
MONOCYTES # BLD AUTO: 0.27 THOUSAND/ΜL (ref 0.17–1.22)
MONOCYTES NFR BLD AUTO: 7 % (ref 4–12)
NEUTROPHILS # BLD AUTO: 2.95 THOUSANDS/ΜL (ref 1.85–7.62)
NEUTS SEG NFR BLD AUTO: 77 % (ref 43–75)
PLATELET # BLD AUTO: 30 THOUSANDS/UL (ref 149–390)
PMV BLD AUTO: 8.4 FL (ref 8.9–12.7)
POTASSIUM SERPL-SCNC: 3.9 MMOL/L (ref 3.5–5.3)
PROT SERPL-MCNC: 5.9 G/DL (ref 6.4–8.2)
RBC # BLD AUTO: 3.05 MILLION/UL (ref 3.88–5.62)
SODIUM SERPL-SCNC: 136 MMOL/L (ref 136–145)
WBC # BLD AUTO: 3.81 THOUSAND/UL (ref 4.31–10.16)

## 2020-08-12 PROCEDURE — 80053 COMPREHEN METABOLIC PANEL: CPT

## 2020-08-12 PROCEDURE — 85025 COMPLETE CBC W/AUTO DIFF WBC: CPT

## 2020-08-12 PROCEDURE — 82378 CARCINOEMBRYONIC ANTIGEN: CPT

## 2020-08-12 NOTE — PLAN OF CARE
Problem: Knowledge Deficit  Goal: Patient/family/caregiver demonstrates understanding of disease process, treatment plan, medications, and discharge instructions  Description: Complete learning assessment and assess knowledge base    Interventions:  - Provide teaching at level of understanding  - Provide teaching via preferred learning methods  Outcome: Progressing     Problem: PAIN - ADULT  Goal: Verbalizes/displays adequate comfort level or baseline comfort level  Description: Interventions:  - Encourage patient to monitor pain and request assistance  - Assess pain using appropriate pain scale  - Administer analgesics based on type and severity of pain and evaluate response  - Implement non-pharmacological measures as appropriate and evaluate response  - Consider cultural and social influences on pain and pain management  - Notify physician/advanced practitioner if interventions unsuccessful or patient reports new pain  Outcome: Progressing     Problem: INFECTION - ADULT  Goal: Absence or prevention of progression during hospitalization  Description: INTERVENTIONS:  - Assess and monitor for signs and symptoms of infection  - Monitor lab/diagnostic results  - Monitor all insertion sites, i e  indwelling lines, tubes, and drains  - Monitor endotracheal if appropriate and nasal secretions for changes in amount and color  - [place] appropriate cooling/warming therapies per order  - Administer medications as ordered  - Instruct and encourage patient and family to use good hand hygiene technique  - Identify and instruct in appropriate isolation precautions for identified infection/condition  Outcome: Progressing  Goal: Absence of fever/infection during neutropenic period  Description: INTERVENTIONS:  - Monitor WBC    Outcome: Progressing     Problem: Knowledge Deficit  Goal: Patient/family/caregiver demonstrates understanding of disease process, treatment plan, medications, and discharge instructions  Description: Complete learning assessment and assess knowledge base    Interventions:  - Provide teaching at level of understanding  - Provide teaching via preferred learning methods  Outcome: Progressing

## 2020-08-12 NOTE — PROGRESS NOTES
Reviewed with Tasneem Quiroz patient's platelets came back critical at 30  Noted and reviewed with Dr Radha Patel

## 2020-08-12 NOTE — PROGRESS NOTES
Pt  Denies new symptoms or concerns today  Labs obtained as ordered via port a cath  Future appointments made and reviewed as ordered    Declined AVS

## 2020-08-17 ENCOUNTER — APPOINTMENT (EMERGENCY)
Dept: RADIOLOGY | Facility: HOSPITAL | Age: 60
DRG: 193 | End: 2020-08-17
Payer: MEDICARE

## 2020-08-17 ENCOUNTER — APPOINTMENT (EMERGENCY)
Dept: CT IMAGING | Facility: HOSPITAL | Age: 60
DRG: 193 | End: 2020-08-17
Payer: MEDICARE

## 2020-08-17 ENCOUNTER — HOSPITAL ENCOUNTER (INPATIENT)
Facility: HOSPITAL | Age: 60
LOS: 5 days | Discharge: HOSPICE | DRG: 193 | End: 2020-08-22
Attending: EMERGENCY MEDICINE | Admitting: INTERNAL MEDICINE
Payer: MEDICARE

## 2020-08-17 ENCOUNTER — TELEPHONE (OUTPATIENT)
Dept: HEMATOLOGY ONCOLOGY | Facility: CLINIC | Age: 60
End: 2020-08-17

## 2020-08-17 DIAGNOSIS — J96.01 ACUTE RESPIRATORY FAILURE WITH HYPOXIA (HCC): ICD-10-CM

## 2020-08-17 DIAGNOSIS — C18.9 COLON CANCER METASTASIZED TO LUNG (HCC): Chronic | ICD-10-CM

## 2020-08-17 DIAGNOSIS — C78.00 COLON CANCER METASTASIZED TO LUNG (HCC): Chronic | ICD-10-CM

## 2020-08-17 DIAGNOSIS — J18.9 MULTIFOCAL PNEUMONIA: Primary | ICD-10-CM

## 2020-08-17 DIAGNOSIS — D61.818 PANCYTOPENIA (HCC): ICD-10-CM

## 2020-08-17 DIAGNOSIS — J96.91 RESPIRATORY FAILURE WITH HYPOXIA (HCC): ICD-10-CM

## 2020-08-17 DIAGNOSIS — C79.9 METASTATIC CANCER (HCC): ICD-10-CM

## 2020-08-17 PROBLEM — R79.89 ELEVATED D-DIMER: Status: ACTIVE | Noted: 2020-08-17

## 2020-08-17 PROBLEM — R79.89 ELEVATED BRAIN NATRIURETIC PEPTIDE (BNP) LEVEL: Status: ACTIVE | Noted: 2020-08-17

## 2020-08-17 LAB
ALBUMIN SERPL BCP-MCNC: 2.5 G/DL (ref 3.5–5)
ALP SERPL-CCNC: 90 U/L (ref 46–116)
ALT SERPL W P-5'-P-CCNC: 22 U/L (ref 12–78)
ANION GAP SERPL CALCULATED.3IONS-SCNC: 4 MMOL/L (ref 4–13)
APTT PPP: 41 SECONDS (ref 23–37)
AST SERPL W P-5'-P-CCNC: 24 U/L (ref 5–45)
ATRIAL RATE: 79 BPM
BACTERIA UR QL AUTO: ABNORMAL /HPF
BASOPHILS # BLD AUTO: 0 THOUSANDS/ΜL (ref 0–0.1)
BASOPHILS NFR BLD AUTO: 0 % (ref 0–1)
BILIRUB SERPL-MCNC: 0.85 MG/DL (ref 0.2–1)
BILIRUB UR QL STRIP: NEGATIVE
BUN SERPL-MCNC: 15 MG/DL (ref 5–25)
CALCIUM SERPL-MCNC: 8.5 MG/DL (ref 8.3–10.1)
CHLORIDE SERPL-SCNC: 95 MMOL/L (ref 100–108)
CLARITY UR: CLEAR
CO2 SERPL-SCNC: 35 MMOL/L (ref 21–32)
COLOR UR: YELLOW
CREAT SERPL-MCNC: 0.68 MG/DL (ref 0.6–1.3)
D DIMER PPP FEU-MCNC: 1.87 UG/ML FEU
EOSINOPHIL # BLD AUTO: 0.17 THOUSAND/ΜL (ref 0–0.61)
EOSINOPHIL NFR BLD AUTO: 5 % (ref 0–6)
ERYTHROCYTE [DISTWIDTH] IN BLOOD BY AUTOMATED COUNT: 16.5 % (ref 11.6–15.1)
GFR SERPL CREATININE-BSD FRML MDRD: 105 ML/MIN/1.73SQ M
GLUCOSE SERPL-MCNC: 83 MG/DL (ref 65–140)
GLUCOSE UR STRIP-MCNC: NEGATIVE MG/DL
HCT VFR BLD AUTO: 21.2 % (ref 36.5–49.3)
HGB BLD-MCNC: 7.3 G/DL (ref 12–17)
HGB UR QL STRIP.AUTO: NEGATIVE
IMM GRANULOCYTES # BLD AUTO: 0.03 THOUSAND/UL (ref 0–0.2)
IMM GRANULOCYTES NFR BLD AUTO: 1 % (ref 0–2)
INR PPP: 1.07 (ref 0.84–1.19)
KETONES UR STRIP-MCNC: NEGATIVE MG/DL
LACTATE SERPL-SCNC: 0.6 MMOL/L (ref 0.5–2)
LEUKOCYTE ESTERASE UR QL STRIP: NEGATIVE
LYMPHOCYTES # BLD AUTO: 0.25 THOUSANDS/ΜL (ref 0.6–4.47)
LYMPHOCYTES NFR BLD AUTO: 8 % (ref 14–44)
MCH RBC QN AUTO: 37.6 PG (ref 26.8–34.3)
MCHC RBC AUTO-ENTMCNC: 34.4 G/DL (ref 31.4–37.4)
MCV RBC AUTO: 109 FL (ref 82–98)
MONOCYTES # BLD AUTO: 0.17 THOUSAND/ΜL (ref 0.17–1.22)
MONOCYTES NFR BLD AUTO: 5 % (ref 4–12)
NEUTROPHILS # BLD AUTO: 2.64 THOUSANDS/ΜL (ref 1.85–7.62)
NEUTS SEG NFR BLD AUTO: 81 % (ref 43–75)
NITRITE UR QL STRIP: NEGATIVE
NON-SQ EPI CELLS URNS QL MICRO: ABNORMAL /HPF
NRBC BLD AUTO-RTO: 0 /100 WBCS
NT-PROBNP SERPL-MCNC: 2568 PG/ML
P AXIS: 63 DEGREES
PH UR STRIP.AUTO: 5 [PH]
PLATELET # BLD AUTO: 27 THOUSANDS/UL (ref 149–390)
PMV BLD AUTO: 10.1 FL (ref 8.9–12.7)
POTASSIUM SERPL-SCNC: 4.2 MMOL/L (ref 3.5–5.3)
PR INTERVAL: 168 MS
PROCALCITONIN SERPL-MCNC: 0.18 NG/ML
PROT SERPL-MCNC: 6.2 G/DL (ref 6.4–8.2)
PROT UR STRIP-MCNC: ABNORMAL MG/DL
PROTHROMBIN TIME: 13.7 SECONDS (ref 11.6–14.5)
QRS AXIS: -10 DEGREES
QRSD INTERVAL: 98 MS
QT INTERVAL: 382 MS
QTC INTERVAL: 438 MS
RBC # BLD AUTO: 1.94 MILLION/UL (ref 3.88–5.62)
RBC #/AREA URNS AUTO: ABNORMAL /HPF
SARS-COV-2 RNA RESP QL NAA+PROBE: NEGATIVE
SODIUM SERPL-SCNC: 134 MMOL/L (ref 136–145)
SP GR UR STRIP.AUTO: 1.02 (ref 1–1.03)
T WAVE AXIS: 40 DEGREES
TROPONIN I SERPL-MCNC: <0.02 NG/ML
UROBILINOGEN UR QL STRIP.AUTO: 0.2 E.U./DL
VENTRICULAR RATE: 79 BPM
WBC # BLD AUTO: 3.26 THOUSAND/UL (ref 4.31–10.16)
WBC #/AREA URNS AUTO: ABNORMAL /HPF

## 2020-08-17 PROCEDURE — 96365 THER/PROPH/DIAG IV INF INIT: CPT

## 2020-08-17 PROCEDURE — 85025 COMPLETE CBC W/AUTO DIFF WBC: CPT | Performed by: EMERGENCY MEDICINE

## 2020-08-17 PROCEDURE — 99223 1ST HOSP IP/OBS HIGH 75: CPT | Performed by: PHYSICIAN ASSISTANT

## 2020-08-17 PROCEDURE — 93005 ELECTROCARDIOGRAM TRACING: CPT

## 2020-08-17 PROCEDURE — 87205 SMEAR GRAM STAIN: CPT | Performed by: PHYSICIAN ASSISTANT

## 2020-08-17 PROCEDURE — 71045 X-RAY EXAM CHEST 1 VIEW: CPT

## 2020-08-17 PROCEDURE — 87635 SARS-COV-2 COVID-19 AMP PRB: CPT | Performed by: EMERGENCY MEDICINE

## 2020-08-17 PROCEDURE — 85379 FIBRIN DEGRADATION QUANT: CPT | Performed by: EMERGENCY MEDICINE

## 2020-08-17 PROCEDURE — 87449 NOS EACH ORGANISM AG IA: CPT | Performed by: PHYSICIAN ASSISTANT

## 2020-08-17 PROCEDURE — 96375 TX/PRO/DX INJ NEW DRUG ADDON: CPT

## 2020-08-17 PROCEDURE — 84484 ASSAY OF TROPONIN QUANT: CPT | Performed by: EMERGENCY MEDICINE

## 2020-08-17 PROCEDURE — G1004 CDSM NDSC: HCPCS

## 2020-08-17 PROCEDURE — 99285 EMERGENCY DEPT VISIT HI MDM: CPT | Performed by: EMERGENCY MEDICINE

## 2020-08-17 PROCEDURE — 96361 HYDRATE IV INFUSION ADD-ON: CPT

## 2020-08-17 PROCEDURE — 36415 COLL VENOUS BLD VENIPUNCTURE: CPT | Performed by: EMERGENCY MEDICINE

## 2020-08-17 PROCEDURE — 83880 ASSAY OF NATRIURETIC PEPTIDE: CPT | Performed by: EMERGENCY MEDICINE

## 2020-08-17 PROCEDURE — 84145 PROCALCITONIN (PCT): CPT | Performed by: EMERGENCY MEDICINE

## 2020-08-17 PROCEDURE — 87070 CULTURE OTHR SPECIMN AEROBIC: CPT | Performed by: PHYSICIAN ASSISTANT

## 2020-08-17 PROCEDURE — 99285 EMERGENCY DEPT VISIT HI MDM: CPT

## 2020-08-17 PROCEDURE — 71275 CT ANGIOGRAPHY CHEST: CPT

## 2020-08-17 PROCEDURE — 87040 BLOOD CULTURE FOR BACTERIA: CPT | Performed by: EMERGENCY MEDICINE

## 2020-08-17 PROCEDURE — 81001 URINALYSIS AUTO W/SCOPE: CPT | Performed by: EMERGENCY MEDICINE

## 2020-08-17 PROCEDURE — 93010 ELECTROCARDIOGRAM REPORT: CPT

## 2020-08-17 PROCEDURE — 80053 COMPREHEN METABOLIC PANEL: CPT | Performed by: EMERGENCY MEDICINE

## 2020-08-17 PROCEDURE — 85610 PROTHROMBIN TIME: CPT | Performed by: EMERGENCY MEDICINE

## 2020-08-17 PROCEDURE — 85730 THROMBOPLASTIN TIME PARTIAL: CPT | Performed by: EMERGENCY MEDICINE

## 2020-08-17 PROCEDURE — 83605 ASSAY OF LACTIC ACID: CPT | Performed by: EMERGENCY MEDICINE

## 2020-08-17 RX ORDER — ASCORBIC ACID 500 MG
1000 TABLET ORAL DAILY
Status: DISCONTINUED | OUTPATIENT
Start: 2020-08-18 | End: 2020-08-21

## 2020-08-17 RX ORDER — ALBUTEROL SULFATE 90 UG/1
2 AEROSOL, METERED RESPIRATORY (INHALATION) ONCE
Status: COMPLETED | OUTPATIENT
Start: 2020-08-17 | End: 2020-08-17

## 2020-08-17 RX ORDER — GUAIFENESIN 600 MG
600 TABLET, EXTENDED RELEASE 12 HR ORAL EVERY 12 HOURS SCHEDULED
Status: DISCONTINUED | OUTPATIENT
Start: 2020-08-17 | End: 2020-08-22 | Stop reason: HOSPADM

## 2020-08-17 RX ORDER — DOCUSATE SODIUM 100 MG/1
100 CAPSULE, LIQUID FILLED ORAL 2 TIMES DAILY
Status: DISCONTINUED | OUTPATIENT
Start: 2020-08-17 | End: 2020-08-22 | Stop reason: HOSPADM

## 2020-08-17 RX ORDER — FLUCONAZOLE 100 MG/1
100 TABLET ORAL DAILY
Status: DISCONTINUED | OUTPATIENT
Start: 2020-08-18 | End: 2020-08-22 | Stop reason: HOSPADM

## 2020-08-17 RX ORDER — ACETAMINOPHEN 325 MG/1
650 TABLET ORAL EVERY 6 HOURS PRN
Status: DISCONTINUED | OUTPATIENT
Start: 2020-08-17 | End: 2020-08-22 | Stop reason: HOSPADM

## 2020-08-17 RX ORDER — HEPARIN SODIUM 5000 [USP'U]/ML
5000 INJECTION, SOLUTION INTRAVENOUS; SUBCUTANEOUS EVERY 8 HOURS SCHEDULED
Status: DISCONTINUED | OUTPATIENT
Start: 2020-08-17 | End: 2020-08-17 | Stop reason: ALTCHOICE

## 2020-08-17 RX ORDER — LANOLIN ALCOHOL/MO/W.PET/CERES
6 CREAM (GRAM) TOPICAL
Status: DISCONTINUED | OUTPATIENT
Start: 2020-08-17 | End: 2020-08-22 | Stop reason: HOSPADM

## 2020-08-17 RX ADMIN — SODIUM CHLORIDE 1000 ML: 0.9 INJECTION, SOLUTION INTRAVENOUS at 11:39

## 2020-08-17 RX ADMIN — ACETAMINOPHEN 650 MG: 325 TABLET ORAL at 22:29

## 2020-08-17 RX ADMIN — MELATONIN 6 MG: 3 TAB ORAL at 22:42

## 2020-08-17 RX ADMIN — CEFEPIME HYDROCHLORIDE 2000 MG: 2 INJECTION, POWDER, FOR SOLUTION INTRAVENOUS at 11:43

## 2020-08-17 RX ADMIN — ALBUTEROL SULFATE 2 PUFF: 90 AEROSOL, METERED RESPIRATORY (INHALATION) at 11:39

## 2020-08-17 RX ADMIN — IOHEXOL 67 ML: 350 INJECTION, SOLUTION INTRAVENOUS at 13:30

## 2020-08-17 RX ADMIN — GUAIFENESIN 600 MG: 600 TABLET, EXTENDED RELEASE ORAL at 22:29

## 2020-08-17 RX ADMIN — AZITHROMYCIN MONOHYDRATE 500 MG: 500 INJECTION, POWDER, LYOPHILIZED, FOR SOLUTION INTRAVENOUS at 14:30

## 2020-08-17 NOTE — ASSESSMENT & PLAN NOTE
Presenting with complaint of increasing shortness of breath    CTA chest- no PE  Increased masslike opacity in right lower lobe likely combination of tumor progression and postobstructive pneumonia  No change in multiple additional bilateral lung metastases  Extensive new patchy ground-glass opacity in both lungs greater on right which could be due to atypical infection or drug reaction  COVID negative in ED  Started on IV cefepime and azithromycin-continue here  Check Legionella and strep pneumonia urine antigens  Obtain procalcitonin now and in a m     Blood cultures pending

## 2020-08-17 NOTE — TELEPHONE ENCOUNTER
Patient's wife called to report that patient completed his 2 weeks of Xeloda and is starting off week  Patient is has been running low grade x 3 days average temps without Tylenol are 99 5  With Tylenol his temp is 97 8F today  O2 sat 94-94% on 5 L NC at rest and reports with activity sats on 5 L NC drop to 74%  Reports SOB with activity  Sergey Galvez report that patient breath sounds are "rattling then he coughs and rattling resolves but then returns"  + productive cough for clear/yellow mucous  Cough intensity has not changed from baseline  Patient reports decreased appetite and decreased PO fluids  Vitals HR 74, /75, 92%-% 5L NC at rest   Patient is finding it difficult to breath with ambulation on % L NC  Patient started on Clavamox from Dr Melanie Sotelo this morning  Patient advised to go to ER for evaluation  He will be going to [address]  Patient's wife did not feel that he needed an ambulance  She will be taking him to the ER  Please notify Dr Melanie Sotelo

## 2020-08-17 NOTE — ASSESSMENT & PLAN NOTE
Follows with Dr Justin Brown of Hematology/Oncology  Colon cancer initially diagnosed in 2008 S/p chemo and palliative radiation  Currently on xeloda for 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  Recently started this about 2 weeks ago

## 2020-08-17 NOTE — ASSESSMENT & PLAN NOTE
Hemoglobin 7 3 (baseline 13-14)  Platelets 27 (baseline 80-100s)  WBC 3 26 (baseline with leukocytosis)  Status post recent 3 rounds of oral chemotherapy xeloda (2 weeks on, 1 week off)  Hematology/oncology consulted

## 2020-08-17 NOTE — PLAN OF CARE
Problem: Potential for Falls  Goal: Patient will remain free of falls  Description: INTERVENTIONS:  - Assess patient frequently for physical needs  -  Identify cognitive and physical deficits and behaviors that affect risk of falls    -  [place] fall precautions as indicated by assessment   - Educate patient/family on patient safety including physical limitations  - Instruct patient to call for assistance with activity based on assessment  - Modify environment to reduce risk of injury  - Consider OT/PT consult to assist with strengthening/mobility  Outcome: Progressing

## 2020-08-17 NOTE — ASSESSMENT & PLAN NOTE
Found to have elevated D-dimer 1 87  CTA chest with no evidence of acute PE  Likely secondary to acute infection and cancer

## 2020-08-17 NOTE — H&P
H&P- Nomi Burns 1960, 63 y o  male MRN: [id number]    Unit/Bed#: E2 -01 Encounter: [id number]    Primary Care Provider: Jay Schilling DO   Date and time admitted to hospital: 8/17/2020 10:29 AM        * Multifocal pneumonia  Assessment & Plan  Presenting with complaint of increasing shortness of breath    CTA chest- no PE  Increased masslike opacity in right lower lobe likely combination of tumor progression and postobstructive pneumonia  No change in multiple additional bilateral lung metastases  Extensive new patchy ground-glass opacity in both lungs greater on right which could be due to atypical infection or drug reaction  COVID negative in ED  Started on IV cefepime and azithromycin-continue here  Check Legionella and strep pneumonia urine antigens  Obtain procalcitonin now and in a m     Blood cultures pending  Acute respiratory failure with hypoxia (HCC)  Assessment & Plan  Normally maintained on 2-3 L nasal cannula baseline  Recently had increased to 5 L to maintain saturations  Likely secondary to lung cancer and acute pneumonia  Continue supplemental oxygen  Elevated brain natriuretic peptide (BNP) level  Assessment & Plan  Elevated BNP 2568  Suspect contributing to hypoxia  Denies history of heart failure  Pre-hospital not on any Lasix therapy  Chest x-ray with with consideration of superimposed CHF  Will start on IV lasix 20 mg daily  Obtain echocardiogram    Pancytopenia (HCC)  Assessment & Plan  Hemoglobin 7 3 (baseline 13-14)  Platelets 27 (baseline 80-100s)  WBC 3 26 (baseline with leukocytosis)  Status post recent 3 rounds of oral chemotherapy xeloda (2 weeks on, 1 week off)  Hematology/oncology consulted    Elevated d-dimer  Assessment & Plan  Found to have elevated D-dimer 1 87  CTA chest with no evidence of acute PE  Likely secondary to acute infection and cancer  Chemotherapy induced neutropenia (HCC)  Assessment & Plan  WBC count 3 26    Monitor in setting of acute infection  Colon cancer metastasized to lung [facility])  Assessment & Plan  Follows with Dr Supriya Shoemaker of Hematology/Oncology  Colon cancer initially diagnosed in 2008 S/p chemo and palliative radiation  Currently on xeloda for 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  Recently started this about 2 weeks ago  VTE Prophylaxis: Pharmacologic VTE Prophylaxis contraindicated due to Pancytopenia  / sequential compression device   Code Status: full code  Anticipated Length of Stay:  Patient will be admitted on an Inpatient basis with an anticipated length of stay of  Greater than 2 midnights  Justification for Hospital Stay:  Multifocal pneumonia with need for IV antibiotics    Chief Complaint:  Shortness of breath    History of Present Illness:    Ernst Olmstead is a 63 y o  male with past medical history of colon cancer with Mets to lungs, chronic respiratory failure with hypoxia maintained on 2-3 L nasal cannula,     He presents from home with the complaint of increasing shortness of breath especially with any ambulation or movement over the past few days  Patient notes he is normally on 2 L oxygen nasal cannula but needed to go up to 5 L  He even notes his oxygen has been dropping with speaking  Currently undergoing currently undergoing chemotherapy with oral chemo xeloda - 2 weeks on and 1 week off  This is round 3 according to the patient  He also notes he has been on chemotherapy for the last 15 years  Review of Systems:    General:   No Fever or chills;   EENT:   No ear pain, facial swelling; No sneezing, sore throat  Skin:   No rashes, color changes  Respiratory:     + shortness of breath, cough, wheezing, stridor  Cardiovascular:     No chest pain, palpitations  Gastrointestinal:    No nausea, vomiting, diarrhea; No abdominal pain  Musculoskeletal:     No arthralgias, myalgias, swelling  Neurologic:   No dizziness, numbness, weakness   No speech difficulties  Psych:   No agitation, suicidal ideations  Otherwise, All other twelve-point review of systems normal      Past Medical and Surgical History:     Past Medical History:   Diagnosis Date    Atrial fibrillation ([facility] [address] )     Colon cancer (HCC)     Dyspnea on exertion     Malignant neoplasm of ascending colon ([facility] [address] )     Metastatic carcinoma to lung (HCC)     Pneumonitis     Pulmonary nodules     Shortness of breath        Past Surgical History:   Procedure Laterality Date    ANTERIOR CRUCIATE LIGAMENT REPAIR      ANTERIOR CRUCIATE LIGAMENT REPAIR      2  on R knee    APPENDECTOMY      ATRIAL ABLATION SURGERY      BACK SURGERY      COLON SURGERY      Ascending and transverse colon    COLONOSCOPY      LUNG REMOVAL, PARTIAL      LUNG SEGMENTECTOMY Right 2/26/2020    Procedure: RESECTION WEDGE LUNG;  Surgeon: Rosario Samuels MD;  [facility];  Service: Thoracic    [place] THORACOSCOPY W/THERA WEDGE RESEXN INITIAL UNILAT Right 2/26/2020    Procedure: THORACOSCOPY VIDEO ASSISTED SURGERY (VATS);   Surgeon: Rosario Samuels MD;  [facility]: [facility];  Service: Thoracic    RIGHT COLECTOMY      and Transverse colon resection    THORACOTOMY         Meds/Allergies:    Current Facility-Administered Medications   Medication Dose Route Frequency Provider Last Rate Last Dose    acetaminophen (TYLENOL) tablet 650 mg  650 mg Oral Q6H PRN Maddison Samuel PA-C        [START ON 8/18/2020] ascorbic acid (VITAMIN C) tablet 1,000 mg  1,000 mg Oral Daily Kelly Busby PA-C        [START ON 8/18/2020] azithromycin (ZITHROMAX) 500 mg in sodium chloride 0 9 % 250 mL IVPB  500 mg Intravenous Q24H Kelly Busby PA-C        [START ON 8/18/2020] cefepime (MAXIPIME) 2,000 mg in dextrose 5 % 50 mL IVPB  2,000 mg Intravenous Q24H Kelly Bubsy PA-C        docusate sodium (COLACE) capsule 100 mg  100 mg Oral BID Kelly Busby PA-C        [START ON 8/18/2020] fluconazole (DIFLUCAN) tablet 100 mg  100 mg Oral Daily eKlly Busby PA-C        guaiFENesin [facility]) 12 hr tablet 600 mg  600 mg Oral Q12H [address] Kelly Busby PA-C        heparin (porcine) subcutaneous injection 5,000 Units  5,000 Units Subcutaneous Q8H [address] Kelly Busby PA-C           Allergies   Allergen Reactions    Aspirin Other (See Comments)     Nose bleed    Sulfa Antibiotics Other (See Comments)     Dizzy        Allergies: Allergies   Allergen Reactions    Aspirin Other (See Comments)     Nose bleed    Sulfa Antibiotics Other (See Comments)     Dizzy        Social History:     Marital Status: /Civil Union     Substance Use History:   Social History     Substance and Sexual Activity   Alcohol Use Yes    Alcohol/week: 8 0 standard drinks    Types: 8 Cans of beer per week    Frequency: 2-3 times a week    Drinks per session: 1 or 2    Binge frequency: Never     Social History     Tobacco Use   Smoking Status Former Smoker    Years: 10 00    Types: Cigars    Start date: 2000    Last attempt to quit: 2010    Years since quitting: 10 6   Smokeless Tobacco Never Used   Tobacco Comment    Cigars      Social History     Substance and Sexual Activity   Drug Use No       Family History:    non-contributory    Physical Exam:     Vitals:   Blood Pressure: 112/71 (08/17/20 1700)  Pulse: 95 (08/17/20 1700)  Temperature: 98 °F (36 7 °C) (08/17/20 1700)  Temp Source: Temporal (08/17/20 1700)  Respirations: 20 (08/17/20 1700)  Height: 6' 2" (188 cm) (08/17/20 1038)  Weight - Scale: 103 kg (227 lb 1 2 oz) (08/17/20 1038)  SpO2: 90 % (08/17/20 1700)    Physical Exam  Vitals signs and nursing note reviewed  Constitutional:       Appearance: He is normal weight  He is not ill-appearing  HENT:      Head: Normocephalic and atraumatic  Mouth/Throat:      Mouth: Mucous membranes are moist    Eyes:      General: No scleral icterus  Cardiovascular:      Rate and Rhythm: Normal rate and regular rhythm     Pulmonary:      Effort: Pulmonary effort is normal  No respiratory distress  Breath sounds: Normal breath sounds  No stridor  No wheezing or rhonchi  Comments: Course breath sounds bilaterally  On 5 L NC  Abdominal:      General: Bowel sounds are normal  There is no distension  Palpations: Abdomen is soft  There is no mass  Skin:     General: Skin is warm and dry  Neurological:      Mental Status: He is alert and oriented to person, place, and time  Psychiatric:         Mood and Affect: Mood normal          Behavior: Behavior normal          Additional Data:     Lab Results: I have personally reviewed pertinent reports  Results from last 7 days   Lab Units 08/17/20  1135   WBC Thousand/uL 3 26*   HEMOGLOBIN g/dL 7 3*   HEMATOCRIT % 21 2*   PLATELETS Thousands/uL 27*   NEUTROS PCT % 81*   LYMPHS PCT % 8*   MONOS PCT % 5   EOS PCT % 5     Results from last 7 days   Lab Units 08/17/20  1135   POTASSIUM mmol/L 4 2   CHLORIDE mmol/L 95*   CO2 mmol/L 35*   BUN mg/dL 15   CREATININE mg/dL 0 68   CALCIUM mg/dL 8 5   ALK PHOS U/L 90   ALT U/L 22   AST U/L 24     Results from last 7 days   Lab Units 08/17/20  1135   INR  1 07       Imaging: I have personally reviewed pertinent reports  Xr Chest 1 View Portable    Result Date: 8/17/2020  Narrative: CHEST INDICATION:   cough/SOB  Known metastatic cancer  Increased O2 requirement  Desaturations  COMPARISON:  3/13/2020 EXAM PERFORMED/VIEWS:  XR CHEST PORTABLE FINDINGS:  A right-sided infusion port catheter device is noted  Heart shadow is obscured by adjacent opacity  Moderately extensive bilateral ill-defined pulmonary opacities are present with a coarse reticulonodular infiltrative pattern throughout much of the right lung and a more streaky pattern of opacity involving much of the left lung but mostly at the hilum  There are low lung volumes  There appear to be pleural effusions bilaterally  There is no pneumothorax or mediastinal shift identified    There may be loculated pleural fluid or pleural mass at the left apex  Osseous structures appear within normal limits for patient age  Impression: Increased bilateral pulmonary opacities with low lung volumes and pleural effusions probably representing progression of metastatic disease  Superimposed CHF would be another clinical consideration based on today's findings  Workstation performed: [id number]     Keerthi Harrisman Ed Chest Pe Study    Result Date: 8/17/2020  Narrative: CTA - CHEST WITH IV CONTRAST - PULMONARY ANGIOGRAM INDICATION:   SOB/hypoxia, active cancer, elevated ddimer  Metastatic colon cancer  COMPARISON: Chest radiograph from today  Chest CT from 6/24/2020  TECHNIQUE: CTA examination of the chest was performed using angiographic technique according to a protocol specifically tailored to evaluate for pulmonary embolism  Axial, sagittal, and coronal 2D reformatted images were created from the source data and  submitted for interpretation  In addition, coronal 3D MIP postprocessing was performed on the acquisition scanner  Radiation dose length product (DLP) for this visit:  514 mGy-cm   This examination, like all CT scans performed in the [facility], was performed utilizing techniques to minimize radiation dose exposure, including the use of iterative reconstruction and automated exposure control  IV Contrast:  67 mL of iohexol (OMNIPAQUE)  FINDINGS: PULMONARY ARTERIAL TREE:  No pulmonary embolus  LUNGS:  Marked increase in masslike opacity in the right lower lobe from 7 3 x 5 2 cm to 12 2 x 7 7 cm (3/98)  No definite change in innumerable bilateral lung nodules and masses  Extensive new patchy groundglass opacity throughout both lungs, greater on the right  Bilateral wedge resections  Paraseptal emphysema  PLEURA:  No change in small right pleural effusion and trace loculated left apical effusion  Gwendolyn Cruel HEART/GREAT VESSELS:  Mild coronary artery calcification indicating atherosclerotic heart disease    Redemonstration of occlusion of the right inferior pulmonary vein and narrowing of the right interlobar pulmonary artery  MEDIASTINUM AND ZABRINA:  Right port at cavoatrial junction  CHEST WALL AND LOWER NECK:   Unremarkable  VISUALIZED STRUCTURES IN THE UPPER ABDOMEN:  Unremarkable  OSSEOUS STRUCTURES:  No acute fracture or destructive osseous lesion  Impression: No pulmonary embolus  Increase in masslike opacity in the right lower lobe, likely a combination of tumor progression and postobstructive pneumonia  No change in multiple additional bilateral lung metastases  Extensive new patchy groundglass opacity in both lungs, greater on the right, which could be due to atypical infection or drug reaction  No change in small right and trace left pleural effusion  The study was marked in [facility] for immediate notification  Workstation performed: [id number]       EKG, Pathology, and Other Studies Reviewed on Admission:   · EKG:     Allscripts Records Reviewed: Yes     Total Time for Visit, including Counseling / Coordination of Care: 45 minutes  Greater than 50% of this total time spent on direct patient counseling and coordination of care  ** Please Note: This note has been constructed using a voice recognition system   **

## 2020-08-17 NOTE — ASSESSMENT & PLAN NOTE
Normally maintained on 2-3 L nasal cannula baseline  Recently had increased to 5 L to maintain saturations  Likely secondary to lung cancer and acute pneumonia  Continue supplemental oxygen

## 2020-08-17 NOTE — ED PROVIDER NOTES
History  Chief Complaint   Patient presents with    Shortness of Breath     Pt with metastatic cancer, wears O2 2-3L via nasal cannula, recently increased to 5L nasal cannula, pt reports dyspnea is worst wtih minimal exertion  recent round of chemo and radiation, taking antibiotics for bronchitis  62 yo M h/o metastatic colon ca to lungs/intrathoracic LNs/brain on chemo/rad presenting for evaluation of SOB/cough  Sx started 2 days ago, became progressively worse  Reports feeling SOB at rest, worse with exertion  Usually needs 2-3 L NC, however had to increase at home to 5L NC  With any activity he drops to the 70s per the wife  He reports cough, initially nonproductive however now productive  Denies any hemoptysis  Temperatures at home around 99F, no recorded fevers and afebrile here  He took 1 dose of Augmentin this morning that he is prescribed to take as needed  He is being treated for oral thrush and reports this is much improved  Reports mild chest discomfort  Some loose stools with the chemo, but no worse than typical  No blood in stools  Denies abdominal pain  Does report decreased PO intake  Denies leg pain  Denies history of VTE  Follows with Dr Nathalie Shore, oncology  Chemo on for 2 weeks/off for 1 week, completed on Friday (3 days ago)  Last radiation was 3 weeks ago  Developed infection at that time, treated oupt with Augmentin  MDM: 62 yo M with cough/SOB- will get septic workup, treat with O2/albuterol, abx, cardiac workup, ddimer               Prior to Admission Medications   Prescriptions Last Dose Informant Patient Reported?  Taking?   acetaminophen (TYLENOL) 500 mg tablet  Self Yes Yes   Sig: Take 500 mg by mouth every 6 (six) hours as needed for mild pain   albuterol (VENTOLIN HFA) 90 mcg/act inhaler  Self No Yes   Sig: INHALE TWO PUFFS BY MOUTH EVERY 4 HOURS AS NEEDED FOR SHORTNESS OF BREATH   no more than 4 times daily   ascorbic acid (VITAMIN C) 250 MG CHEW  Self Yes Yes   Sig: Chew 250 mg daily   capecitabine (XELODA) 500 MG tablet  Self No Yes   Sig: Take 4 tablets (2,000 mg total) by mouth 2 (two) times a day for 14 days Followed by 1 week off  with 1 other capecitabine prescription for 2,150 mg total   dexamethasone (DECADRON) 4 mg tablet  Self No Yes   Sig: Take 1 tablet (4 mg total) by mouth 2 (two) times a day with meals   docusate sodium (COLACE) 100 mg capsule  Self No Yes   Sig: Take 1 capsule (100 mg total) by mouth 2 (two) times a day   fluconazole (DIFLUCAN) 100 mg tablet   No Yes   Sig: Take 1 tablet (100 mg total) by mouth daily for 14 days   ibuprofen (MOTRIN) 200 mg tablet  Self Yes Yes   Sig: Take by mouth every 6 (six) hours as needed for mild pain   loperamide (IMODIUM) 2 mg capsule  Self Yes Yes   Sig: Take 2 mg by mouth 4 (four) times a day as needed for diarrhea   nystatin (MYCOSTATIN) 500,000 units/5 mL suspension Not Taking at Unknown time  No No   Sig: Apply 5 mL (500,000 Units total) to the mouth or throat 4 (four) times a day for 14 days Swish and ok to swallow   Patient not taking: Reported on 8/17/2020   ondansetron (ZOFRAN) 4 mg tablet Not Taking at Unknown time Self No No   Sig: Take 1 tablet (4 mg total) by mouth every 6 (six) hours as needed for nausea or vomiting   Patient not taking: Reported on 8/17/2020   prochlorperazine (COMPAZINE) 10 mg tablet Not Taking at Unknown time Self No No   Sig: Take 1 tablet (10 mg total) by mouth every 6 (six) hours as needed for nausea or vomiting   Patient not taking: Reported on 8/17/2020      Facility-Administered Medications: None       Past Medical History:   Diagnosis Date    Atrial fibrillation (HCC)     Colon cancer (HCC)     Dyspnea on exertion     Malignant neoplasm of ascending colon ([address] )     Metastatic carcinoma to lung (HCC)     Pneumonitis     Pulmonary nodules     Shortness of breath        Past Surgical History:   Procedure Laterality Date    ANTERIOR CRUCIATE LIGAMENT REPAIR      ANTERIOR CRUCIATE LIGAMENT REPAIR      2  on R knee    APPENDECTOMY      ATRIAL ABLATION SURGERY      BACK SURGERY      COLON SURGERY      Ascending and transverse colon    COLONOSCOPY      LUNG REMOVAL, PARTIAL      LUNG SEGMENTECTOMY Right 2/26/2020    Procedure: RESECTION WEDGE LUNG;  Surgeon: Lala Curtis MD;  [facility];  Service: Thoracic    DC THORACOSCOPY Scot Lucio WEDGE RESEXN INITIAL UNILAT Right 2/26/2020    Procedure: THORACOSCOPY VIDEO ASSISTED SURGERY (VATS); Surgeon: Lala Curtis MD;  [facility];  Service: Thoracic    RIGHT COLECTOMY      and Transverse colon resection    THORACOTOMY         Family History   Problem Relation Age of Onset    Diabetes Mother     Heart disease Mother     Heart disease Father     Skin cancer Sister      I have reviewed and agree with the history as documented  E-Cigarette/Vaping    E-Cigarette Use Never User      E-Cigarette/Vaping Substances    Nicotine No     THC No     CBD No     Flavoring No     Other No     Unknown No      Social History     Tobacco Use    Smoking status: Former Smoker     Years: 10 00     Types: Cigars     Start date: 2000     Last attempt to quit: 2010     Years since quitting: 10 6    Smokeless tobacco: Never Used    Tobacco comment: Cigars    Substance Use Topics    Alcohol use: Yes     Alcohol/week: 8 0 standard drinks     Types: 8 Cans of beer per week     Frequency: 2-3 times a week     Drinks per session: 1 or 2     Binge frequency: Never    Drug use: No       Review of Systems   Constitutional: Positive for appetite change and fatigue  Negative for chills, fever and unexpected weight change  HENT: Negative for ear pain, rhinorrhea and sore throat  Eyes: Negative for pain and visual disturbance  Respiratory: Positive for cough and shortness of breath  Cardiovascular: Negative for chest pain and leg swelling     Gastrointestinal: Negative for abdominal pain, constipation, diarrhea, nausea and vomiting  Endocrine: Negative for polydipsia, polyphagia and polyuria  Genitourinary: Negative for dysuria, frequency, hematuria and urgency  Musculoskeletal: Negative for back pain, myalgias and neck pain  Skin: Negative for color change and rash  Allergic/Immunologic: Negative for environmental allergies and immunocompromised state  Neurological: Negative for dizziness, weakness, light-headedness, numbness and headaches  Hematological: Negative for adenopathy  Does not bruise/bleed easily  Psychiatric/Behavioral: Negative for agitation and confusion  All other systems reviewed and are negative  Physical Exam  Physical Exam  Vitals signs and nursing note reviewed  Constitutional:       Appearance: He is well-developed  HENT:      Head: Normocephalic and atraumatic  Nose: Nose normal    Eyes:      Conjunctiva/sclera: Conjunctivae normal    Neck:      Musculoskeletal: Normal range of motion and neck supple  Cardiovascular:      Rate and Rhythm: Normal rate and regular rhythm  Heart sounds: Normal heart sounds  Pulmonary:      Effort: Pulmonary effort is normal  No respiratory distress  Breath sounds: No stridor  Wheezing and rales present  Chest:      Chest wall: No tenderness  Abdominal:      General: There is no distension  Palpations: Abdomen is soft  Tenderness: There is no abdominal tenderness  There is no guarding or rebound  Musculoskeletal:         General: No deformity  Skin:     General: Skin is warm and dry  Findings: No rash  Neurological:      Mental Status: He is alert and oriented to person, place, and time  Motor: No abnormal muscle tone  Coordination: Coordination normal    Psychiatric:         Thought Content:  Thought content normal          Judgment: Judgment normal          Vital Signs  ED Triage Vitals   Temperature Pulse Respirations Blood Pressure SpO2   08/17/20 1038 08/17/20 1038 08/17/20 1038 08/17/20 1038 08/17/20 1038   97 5 °F (36 4 °C) 82 (!) 24 144/94 (!) 85 %      Temp Source Heart Rate Source Patient Position - Orthostatic VS BP Location FiO2 (%)   08/17/20 1038 08/17/20 1038 08/17/20 1233 08/17/20 1038 --   Temporal Monitor Lying Right arm       Pain Score       08/17/20 1038       5           Vitals:    08/17/20 1530 08/17/20 1600 08/17/20 1630 08/17/20 1700   BP: 116/80 114/74 108/77 112/71   Pulse: 91 87 92 95   Patient Position - Orthostatic VS: Lying Lying Lying Lying         Visual Acuity      ED Medications  Medications   albuterol (PROVENTIL HFA,VENTOLIN HFA) inhaler 2 puff (2 puffs Inhalation Given 8/17/20 1139)   sodium chloride 0 9 % bolus 1,000 mL (0 mL Intravenous Stopped 8/17/20 1344)   cefepime (MAXIPIME) 2 g/50 mL dextrose IVPB (0 mg Intravenous Stopped 8/17/20 1215)   iohexol (OMNIPAQUE) 350 MG/ML injection (SINGLE-DOSE) 85 mL (67 mL Intravenous Given 8/17/20 1330)   azithromycin (ZITHROMAX) 500 mg in sodium chloride 0 9% 250mL IVPB 500 mg (0 mg Intravenous Stopped 8/17/20 1542)       Diagnostic Studies  Results Reviewed     Procedure Component Value Units Date/Time    Urine Microscopic [[id number]]  (Abnormal) Collected:  08/17/20 1504    Lab Status:  Final result Specimen:  Urine, Clean Catch Updated:  08/17/20 1522     RBC, UA 0-1 /hpf      WBC, UA 1-2 /hpf      Epithelial Cells Occasional /hpf      Bacteria, UA Occasional /hpf     UA w Reflex to Microscopic w Reflex to Culture [[id number]]  (Abnormal) Collected:  08/17/20 1504    Lab Status:  Final result Specimen:  Urine, Clean Catch Updated:  08/17/20 1515     Color, UA Yellow     Clarity, UA Clear     Specific Gravity, UA 1 025     pH, UA 5 0     Leukocytes, UA Negative     Nitrite, UA Negative     Protein, UA Trace mg/dl      Glucose, UA Negative mg/dl      Ketones, UA Negative mg/dl      Urobilinogen, UA 0 2 E U /dl      Bilirubin, UA Negative     Blood, UA Negative    Novel Coronavirus (Covid-19),PCR [facility] [[id number]]  (Normal) Collected:  08/17/20 1109    Lab Status:  Final result Specimen:  Nares from Nose Updated:  08/17/20 1231     SARS-CoV-2 Negative    Narrative: The specimen collection materials, transport medium, and/or testing methodology utilized in the production of these test results have been proven to be reliable in a limited validation with an abbreviated program under the Emergency Utilization Authorization provided by the FDA  Testing reported as "Presumptive positive" will be confirmed with secondary testing with a reference laboratory to ensure result accuracy  Clinical caution and judgement should be used with the interpretation of these results with consideration of the clinical impression and other laboratory testing  Testing reported as "Positive" or "Negative" has been proven to be accurate according to standard laboratory validation requirements  All testing is performed with control materials showing appropriate reactivity at standard intervals  Lactic Acid [[id number]]  (Normal) Collected:  08/17/20 1135    Lab Status:  Final result Specimen:  Blood from Central Venous Line Updated:  08/17/20 1219     LACTIC ACID 0 6 mmol/L     Narrative:       Result may be elevated if tourniquet was used during collection      NT-BNP PRO [[id number]]  (Abnormal) Collected:  08/17/20 1135    Lab Status:  Final result Specimen:  Blood from Central Venous Line Updated:  08/17/20 1218     NT-proBNP 2,568 pg/mL     Troponin I [[id number]]  (Normal) Collected:  08/17/20 1135    Lab Status:  Final result Specimen:  Blood from Central Venous Line Updated:  08/17/20 1213     Troponin I <0 02 ng/mL     CBC and differential [[id number]]  (Abnormal) Collected:  08/17/20 1135    Lab Status:  Final result Specimen:  Blood from Central Venous Line Updated:  08/17/20 1209     WBC 3 26 Thousand/uL      RBC 1 94 Million/uL      Hemoglobin 7 3 g/dL      Hematocrit 21 2 %       fL      MCH 37 6 pg      MCHC 34 4 g/dL      RDW 16 5 %      MPV 10 1 fL      Platelets 27 Thousands/uL      nRBC 0 /100 WBCs      Neutrophils Relative 81 %      Immat GRANS % 1 %      Lymphocytes Relative 8 %      Monocytes Relative 5 %      Eosinophils Relative 5 %      Basophils Relative 0 %      Neutrophils Absolute 2 64 Thousands/µL      Immature Grans Absolute 0 03 Thousand/uL      Lymphocytes Absolute 0 25 Thousands/µL      Monocytes Absolute 0 17 Thousand/µL      Eosinophils Absolute 0 17 Thousand/µL      Basophils Absolute 0 00 Thousands/µL     Comprehensive metabolic panel [[id number]]  (Abnormal) Collected:  08/17/20 1135    Lab Status:  Final result Specimen:  Blood from Central Venous Line Updated:  08/17/20 1206     Sodium 134 mmol/L      Potassium 4 2 mmol/L      Chloride 95 mmol/L      CO2 35 mmol/L      ANION GAP 4 mmol/L      BUN 15 mg/dL      Creatinine 0 68 mg/dL      Glucose 83 mg/dL      Calcium 8 5 mg/dL      AST 24 U/L      ALT 22 U/L      Alkaline Phosphatase 90 U/L      Total Protein 6 2 g/dL      Albumin 2 5 g/dL      Total Bilirubin 0 85 mg/dL      eGFR 105 ml/min/1 73sq m     Narrative:       [place] guidelines for Chronic Kidney Disease (CKD):     Stage 1 with normal or high GFR (GFR > 90 mL/min/1 73 square meters)    Stage 2 Mild CKD (GFR = 60-89 mL/min/1 73 square meters)    Stage 3A Moderate CKD (GFR = 45-59 mL/min/1 73 square meters)    Stage 3B Moderate CKD (GFR = 30-44 mL/min/1 73 square meters)    Stage 4 Severe CKD (GFR = 15-29 mL/min/1 73 square meters)    Stage 5 End Stage CKD (GFR <15 mL/min/1 73 square meters)  Note: GFR calculation is accurate only with a steady state creatinine    APTT [[id number]]  (Abnormal) Collected:  08/17/20 1135    Lab Status:  Final result Specimen:  Blood from Central Venous Line Updated:  08/17/20 1205     PTT 41 seconds     D-dimer, quantitative [[id number]]  (Abnormal) Collected:  08/17/20 1135    Lab Status:  Final result Specimen:  Blood from Central Venous Line Updated:  08/17/20 1205     D-Dimer, Quant 1 87 ug/ml FEU     Protime-INR [[id number]]  (Normal) Collected:  08/17/20 1135    Lab Status:  Final result Specimen:  Blood from Central Venous Line Updated:  08/17/20 1205     Protime 13 7 seconds      INR 1 07    Procalcitonin [[id number]] Collected:  08/17/20 1135    Lab Status: In process Specimen:  Blood from Central Venous Line Updated:  08/17/20 1144    Blood culture #2 [[id number]] Collected:  08/17/20 1135    Lab Status: In process Specimen:  Blood from Central Venous Line Updated:  08/17/20 1143    Blood culture #1 [[id number]] Collected:  08/17/20 1109    Lab Status: In process Specimen:  Blood from Arm, Right Updated:  08/17/20 1124                 CTA ED chest PE Study   ED Interpretation by Columba Grossman DO (08/17 6057)   IMPRESSION:     No pulmonary embolus      Increase in masslike opacity in the right lower lobe, likely a combination of tumor progression and postobstructive pneumonia  No change in multiple additional bilateral lung metastases      Extensive new patchy groundglass opacity in both lungs, greater on the right, which could be due to atypical infection or drug reaction      No change in small right and trace left pleural effusion  Final Result by Woodward Dancer, MD (08/17 1412)      No pulmonary embolus  Increase in masslike opacity in the right lower lobe, likely a combination of tumor progression and postobstructive pneumonia  No change in multiple additional bilateral lung metastases  Extensive new patchy groundglass opacity in both lungs, greater on the right, which could be due to atypical infection or drug reaction  No change in small right and trace left pleural effusion  The study was marked in [facility] for immediate notification                          Workstation performed: [id number]         XR chest 1 view portable   ED Interpretation by Columba Grossman DO (08/17 9043)   FINDINGS:  A right-sided infusion port catheter device is noted      Heart shadow is obscured by adjacent opacity      Moderately extensive bilateral ill-defined pulmonary opacities are present with a coarse reticulonodular infiltrative pattern throughout much of the right lung and a more streaky pattern of opacity involving much of the left lung but mostly at the hilum  There are low lung volumes  There appear to be pleural effusions bilaterally  There is no pneumothorax or mediastinal shift identified  There may be loculated pleural fluid or pleural mass at the left apex      Osseous structures appear within normal limits for patient age      IMPRESSION:     Increased bilateral pulmonary opacities with low lung volumes and pleural effusions probably representing progression of metastatic disease  Superimposed CHF would be another clinical consideration based on today's findings  Final Result by Jahaira Obando MD (08/17 1129)      Increased bilateral pulmonary opacities with low lung volumes and pleural effusions probably representing progression of metastatic disease  Superimposed CHF would be another clinical consideration based on today's findings  Workstation performed: [id number]                    Procedures  Procedures         ED Course  ED Course as of Aug 17 1733   Mon Aug 17, 2020   1100 EKG: NSR @ 79 bpm, LAD, normal intervals, nonspecific ST changes      1209 Platelet Count(!!): 27   1209 WBC(!): 3 26   1209 Hemoglobin(!): 7 3   1209 Pancytopenia from chemo      1209 D-Dimer, Quant(!): 1 87   1218 NT-proBNP(!): 2,568   1228 Discussed results and showed CXR to pt/wife   Discussed that I would like to do CTA to r/o PE and better assess for infiltrates vs  Masses vs  Fluid, they are in agreement with plan      1232 EXT SARS-COV-2: Negative   1233 Absolute Neutrophils: 2 64   1420 Discussed results with pt/wife, messaged SLIM for admission      1433 Increase in masslike opacity in the right lower lobe, likely a combination of tumor progression and postobstructive pneumonia  No change in multiple additional bilateral lung metastases      Extensive new patchy groundglass opacity in both lungs, greater on the right, which could be due to atypical infection or drug reaction  [address] [place] [address] requesting pt continue COVID bed for admission          US AUDIT      Most Recent Value   Initial Alcohol Screen: US AUDIT-C    1  How often do you have a drink containing alcohol?  0 Filed at: 08/17/2020 1037   2  How many drinks containing alcohol do you have on a typical day you are drinking? 0 Filed at: 08/17/2020 1037   3a  Male UNDER 65: How often do you have five or more drinks on one occasion? 0 Filed at: 08/17/2020 1037   Audit-C Score  0 Filed at: 08/17/2020 1037                  PAYAL/DAST-10      Most Recent Value   How many times in the past year have you    Used an illegal drug or used a prescription medication for non-medical reasons? Never Filed at: 08/17/2020 1040              Initial Sepsis Screening     Row Name 08/17/20 1219 08/17/20 1054             Is the patient's history suggestive of a new or worsening infection? (!) Yes (Proceed)  -KH  (!) Yes (Proceed)  -[address]       Suspected source of infection  pneumonia  -KH  pneumonia  -KH       Are two or more of the following signs & symptoms of infection both present and new to the patient? (!) Yes (Proceed)  -KH  No  -KH       Indicate SIRS criteria  Leukopenia (WBC < 4000 IJL); Tachypnea > 20 resp per min  -KH  Tachypnea > 20 resp per min  -KH       If the answer is yes to both questions, suspicion of sepsis is present  [de-identified]         If severe sepsis is present AND tissue hypoperfusion perists in the hour after fluid resuscitation or lactate > 4, the patient meets criteria for SEPTIC SHOCK           Are any of the following organ dysfunction criteria present within 6 hours of suspected infection and SIRS criteria that are NOT considered to be chronic conditions?   (!) Yes  -[address]         Organ dysfunction  Platelet count < 952,198/ABX  -KH         Date of presentation of severe sepsis  08/17/20  [facility]         Time of presentation of severe sepsis  1220  -KH         Tissue hypoperfusion persists in the hour after crystalloid fluid administration, evidenced, by either:           Was hypotension present within one hour of the conclusion of crystalloid fluid administration? No  -[address]         Date of presentation of septic shock           Time of presentation of septic shock             User Key  (r) = Recorded By, (t) = Taken By, (c) = Cosigned By    [address] Name Provider Type    [address] [facility] Physician           Default Flowsheet Data (last 720 hours)      Sepsis Reassess     Row Name 08/17/20 0809                   Repeat Volume Status and Tissue Perfusion Assessment Performed    Repeat Volume Status and Tissue Perfusion Assessment Performed  Yes  -KH           Volume Status and Tissue Perfusion Post Fluid Resuscitation * Must Document All *    Vital Signs Reviewed (HR, RR, BP, T)  Yes  -KH        Shock Index Reviewed  Yes  -KH        Arterial Oxygen Saturation Reviewed (POx, SaO2 or SpO2)  Yes (comment %)  -        Cardio  Normal S1/S2; Regular rate and rhythm  -KH        Pulmonary  (!) Tachypnea;Rales; Rhonchi  -        Capillary Refill  Brisk  -        Peripheral Pulses  Radial;Dorsalis Pedis; Posterior Tibialis  -        Peripheral Pulse  +2  -KH        Dorsalis Pedis  +2  -KH        Posterior Tibialis  +2  -KH        Skin  Warm;Dry  -KH        Urine output assessed  Adequate  -           *OR*   Intensive Monitoring- Must Document One of the Following Four *:    Vital Signs Reviewed          * Central Venous Pressure (CVP or RAP)          * Central Venous Oxygen (SVO2, ScvO2 or Oxygen saturation via central catheter)          * Bedside Cardiovascular US in IVC diameter and % collapse          * Passive Leg Raise OR Crystalloid Challenge  [employer]          User Key  (r) = Recorded By, (t) = Taken By, (c) = Cosigned By    Initials Name Provider Type    [address] [facility],  Physician          Сергей Allen' Criteria for PE      Most Recent Value   Wells' Criteria for PE   Clinical signs and symptoms of DVT  0 Filed at: 08/17/2020 1043   PE is primary diagnosis or equally likely  0 Filed at: 08/17/2020 1043   HR >100  0 Filed at: 08/17/2020 1043   Immobilization at least 3 days or Surgery in the previous 4 weeks  0 Filed at: 08/17/2020 1043   Previous, objectively diagnosed PE or DVT  0 Filed at: 08/17/2020 1043   Hemoptysis  0 Filed at: 08/17/2020 1043   Malignancy with treatment within 6 months or palliative  1 Filed at: 08/17/2020 1043   Wells' Criteria Total  1 Filed at: 08/17/2020 1043                  MDM  Number of Diagnoses or Management Options  Metastatic cancer [facility]):   Multifocal pneumonia:   Pancytopenia ([facility] [address] ):   Respiratory failure with hypoxia [facility]):   Diagnosis management comments: 62 yo M metastatic cancer presenting with worsening SOB and increasing oxygen requirements   Imaging with multifocal PNA/opacities, treated with IV abx, admitted for further workup/management as needed       Amount and/or Complexity of Data Reviewed  Clinical lab tests: ordered and reviewed  Tests in the radiology section of CPT®: ordered and reviewed  Tests in the medicine section of CPT®: ordered and reviewed  Review and summarize past medical records: yes  Independent visualization of images, tracings, or specimens: yes          Disposition  Final diagnoses:   Multifocal pneumonia   Pancytopenia ([address] )   Respiratory failure with hypoxia ([address] ) - acute on chronic   Metastatic cancer ([address] )     Time reflects when diagnosis was documented in both MDM as applicable and the Disposition within this note     Time User Action Codes Description Comment    8/17/2020  2:24 PM Ana RACHEL Add [J18 9] Multifocal pneumonia     8/17/2020  2:24 PM Ana RACHEL Add [D61 [id number]] Pancytopenia ([address] )     8/17/2020 2:25 PM William Chua Add [J96 [id number]] Respiratory failure with hypoxia ([address] )     8/17/2020  2:25 PM William Chua Modify [S56 67] Respiratory failure with hypoxia ([address] ) acute on chronic    8/17/2020  2:26 PM Genevieve RACHEL Add [C79 9] Metastatic cancer [facility])       ED Disposition     ED Disposition Condition Date/Time Comment    Admit Stable Mon Aug 17, 2020  2:44 PM Case was discussed with BIMAL and the patient's admission status was agreed to be Admission Status: inpatient status to the service of Dr Heri Stoddard    None         Current Discharge Medication List      CONTINUE these medications which have NOT CHANGED    Details   acetaminophen (TYLENOL) 500 mg tablet Take 500 mg by mouth every 6 (six) hours as needed for mild pain      albuterol (VENTOLIN HFA) 90 mcg/act inhaler INHALE TWO PUFFS BY MOUTH EVERY 4 HOURS AS NEEDED FOR SHORTNESS OF BREATH   no more than 4 times daily  Qty: 18 g, Refills: 1    Associated Diagnoses: Colon cancer metastasized to lung (HCC)      ascorbic acid (VITAMIN C) 250 MG CHEW Chew 250 mg daily      capecitabine (XELODA) 500 MG tablet Take 4 tablets (2,000 mg total) by mouth 2 (two) times a day for 14 days Followed by 1 week off  with 1 other capecitabine prescription for 2,150 mg total  Qty: 112 tablet, Refills: 3    Associated Diagnoses: Colon cancer metastasized to lung ([address] ); 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[address] ); Malignant neoplasm of ascending colon ([address] );  Metastasis from colon cancer (HCC)      dexamethasone (DECADRON) 4 mg tablet Take 1 tablet (4 mg total) by mouth 2 (two) times a day with meals  Qty: 30 tablet, Refills: 1    Associated Diagnoses: Brain metastases (HCC)      docusate sodium (COLACE) 100 mg capsule Take 1 capsule (100 mg total) by mouth 2 (two) times a day  Qty: 10 capsule, Refills: 0    Associated Diagnoses: Colon cancer metastasized to lung (HCC)      fluconazole (DIFLUCAN) 100 mg tablet Take 1 tablet (100 mg total) by mouth daily for 14 days  Qty: 14 tablet, Refills: 0    Associated Diagnoses: Thrush, oral      ibuprofen (MOTRIN) 200 mg tablet Take by mouth every 6 (six) hours as needed for mild pain      loperamide (IMODIUM) 2 mg capsule Take 2 mg by mouth 4 (four) times a day as needed for diarrhea      nystatin (MYCOSTATIN) 500,000 units/5 mL suspension Apply 5 mL (500,000 Units total) to the mouth or throat 4 (four) times a day for 14 days Swish and ok to swallow  Qty: 280 mL, Refills: 1    Associated Diagnoses: Thrush, oral      ondansetron (ZOFRAN) 4 mg tablet Take 1 tablet (4 mg total) by mouth every 6 (six) hours as needed for nausea or vomiting  Qty: 50 tablet, Refills: 2    Associated Diagnoses: Chemotherapy induced nausea and vomiting      prochlorperazine (COMPAZINE) 10 mg tablet Take 1 tablet (10 mg total) by mouth every 6 (six) hours as needed for nausea or vomiting  Qty: 30 tablet, Refills: 3    Associated Diagnoses: CINV (chemotherapy-induced nausea and vomiting)           No discharge procedures on file      PDMP Review     None          ED Provider  Electronically Signed by           Teoodra Cannon DO  08/17/20 0200

## 2020-08-17 NOTE — ASSESSMENT & PLAN NOTE
Elevated BNP 2568  Suspect contributing to hypoxia  Denies history of heart failure  Pre-hospital not on any Lasix therapy  Chest x-ray with with consideration of superimposed CHF  Will start on IV lasix 20 mg daily    Obtain echocardiogram

## 2020-08-18 ENCOUNTER — APPOINTMENT (INPATIENT)
Dept: NON INVASIVE DIAGNOSTICS | Facility: HOSPITAL | Age: 60
DRG: 193 | End: 2020-08-18
Payer: MEDICARE

## 2020-08-18 ENCOUNTER — TELEPHONE (OUTPATIENT)
Dept: HEMATOLOGY ONCOLOGY | Facility: CLINIC | Age: 60
End: 2020-08-18

## 2020-08-18 LAB
ABO GROUP BLD: NORMAL
BLD GP AB SCN SERPL QL: NEGATIVE
L PNEUMO1 AG UR QL IA.RAPID: NEGATIVE
PROCALCITONIN SERPL-MCNC: 0.19 NG/ML
RH BLD: POSITIVE
S PNEUM AG UR QL: NEGATIVE
SPECIMEN EXPIRATION DATE: NORMAL

## 2020-08-18 PROCEDURE — 93306 TTE W/DOPPLER COMPLETE: CPT | Performed by: INTERNAL MEDICINE

## 2020-08-18 PROCEDURE — 99223 1ST HOSP IP/OBS HIGH 75: CPT | Performed by: INTERNAL MEDICINE

## 2020-08-18 PROCEDURE — 94640 AIRWAY INHALATION TREATMENT: CPT

## 2020-08-18 PROCEDURE — 97163 PT EVAL HIGH COMPLEX 45 MIN: CPT | Performed by: PHYSICAL THERAPIST

## 2020-08-18 PROCEDURE — 93306 TTE W/DOPPLER COMPLETE: CPT

## 2020-08-18 PROCEDURE — 84145 PROCALCITONIN (PCT): CPT | Performed by: PHYSICIAN ASSISTANT

## 2020-08-18 PROCEDURE — P9040 RBC LEUKOREDUCED IRRADIATED: HCPCS

## 2020-08-18 PROCEDURE — 86901 BLOOD TYPING SEROLOGIC RH(D): CPT | Performed by: INTERNAL MEDICINE

## 2020-08-18 PROCEDURE — 99232 SBSQ HOSP IP/OBS MODERATE 35: CPT | Performed by: INTERNAL MEDICINE

## 2020-08-18 PROCEDURE — 94668 MNPJ CHEST WALL SBSQ: CPT

## 2020-08-18 PROCEDURE — 86920 COMPATIBILITY TEST SPIN: CPT

## 2020-08-18 PROCEDURE — 86850 RBC ANTIBODY SCREEN: CPT | Performed by: INTERNAL MEDICINE

## 2020-08-18 PROCEDURE — 86900 BLOOD TYPING SEROLOGIC ABO: CPT | Performed by: INTERNAL MEDICINE

## 2020-08-18 PROCEDURE — 94760 N-INVAS EAR/PLS OXIMETRY 1: CPT

## 2020-08-18 RX ORDER — BENZONATATE 100 MG/1
100 CAPSULE ORAL 3 TIMES DAILY PRN
Status: DISCONTINUED | OUTPATIENT
Start: 2020-08-18 | End: 2020-08-22 | Stop reason: HOSPADM

## 2020-08-18 RX ORDER — AZITHROMYCIN 250 MG/1
500 TABLET, FILM COATED ORAL EVERY 24 HOURS
Status: DISCONTINUED | OUTPATIENT
Start: 2020-08-19 | End: 2020-08-21

## 2020-08-18 RX ORDER — FUROSEMIDE 10 MG/ML
20 INJECTION INTRAMUSCULAR; INTRAVENOUS ONCE
Status: COMPLETED | OUTPATIENT
Start: 2020-08-18 | End: 2020-08-18

## 2020-08-18 RX ORDER — ALBUTEROL SULFATE 2.5 MG/3ML
2.5 SOLUTION RESPIRATORY (INHALATION)
Status: DISCONTINUED | OUTPATIENT
Start: 2020-08-18 | End: 2020-08-22 | Stop reason: HOSPADM

## 2020-08-18 RX ORDER — ALBUTEROL SULFATE 90 UG/1
2 AEROSOL, METERED RESPIRATORY (INHALATION) EVERY 6 HOURS PRN
Status: DISCONTINUED | OUTPATIENT
Start: 2020-08-18 | End: 2020-08-18

## 2020-08-18 RX ORDER — ALBUTEROL SULFATE 2.5 MG/3ML
2.5 SOLUTION RESPIRATORY (INHALATION) EVERY 4 HOURS PRN
Status: DISCONTINUED | OUTPATIENT
Start: 2020-08-18 | End: 2020-08-22 | Stop reason: HOSPADM

## 2020-08-18 RX ADMIN — DOCUSATE SODIUM 100 MG: 100 CAPSULE, LIQUID FILLED ORAL at 09:45

## 2020-08-18 RX ADMIN — BENZONATATE 100 MG: 100 CAPSULE ORAL at 02:21

## 2020-08-18 RX ADMIN — GUAIFENESIN 600 MG: 600 TABLET, EXTENDED RELEASE ORAL at 09:45

## 2020-08-18 RX ADMIN — CEFEPIME HYDROCHLORIDE 2000 MG: 2 INJECTION, POWDER, FOR SOLUTION INTRAVENOUS at 23:21

## 2020-08-18 RX ADMIN — CEFEPIME HYDROCHLORIDE 2000 MG: 2 INJECTION, POWDER, FOR SOLUTION INTRAVENOUS at 00:38

## 2020-08-18 RX ADMIN — FLUCONAZOLE 100 MG: 100 TABLET ORAL at 09:44

## 2020-08-18 RX ADMIN — OXYCODONE HYDROCHLORIDE AND ACETAMINOPHEN 1000 MG: 500 TABLET ORAL at 09:45

## 2020-08-18 RX ADMIN — ACETAMINOPHEN 650 MG: 325 TABLET ORAL at 09:45

## 2020-08-18 RX ADMIN — CEFEPIME HYDROCHLORIDE 2000 MG: 2 INJECTION, POWDER, FOR SOLUTION INTRAVENOUS at 11:46

## 2020-08-18 RX ADMIN — ALBUTEROL SULFATE 2.5 MG: 2.5 SOLUTION RESPIRATORY (INHALATION) at 19:53

## 2020-08-18 RX ADMIN — GUAIFENESIN 600 MG: 600 TABLET, EXTENDED RELEASE ORAL at 23:15

## 2020-08-18 RX ADMIN — ALBUTEROL SULFATE 2.5 MG: 2.5 SOLUTION RESPIRATORY (INHALATION) at 13:00

## 2020-08-18 RX ADMIN — DOCUSATE SODIUM 100 MG: 100 CAPSULE, LIQUID FILLED ORAL at 18:42

## 2020-08-18 RX ADMIN — ALBUTEROL SULFATE 2 PUFF: 90 AEROSOL, METERED RESPIRATORY (INHALATION) at 09:47

## 2020-08-18 RX ADMIN — ALBUTEROL SULFATE 2 PUFF: 90 AEROSOL, METERED RESPIRATORY (INHALATION) at 02:36

## 2020-08-18 RX ADMIN — AZITHROMYCIN MONOHYDRATE 500 MG: 500 INJECTION, POWDER, LYOPHILIZED, FOR SOLUTION INTRAVENOUS at 14:51

## 2020-08-18 RX ADMIN — FUROSEMIDE 20 MG: 10 INJECTION, SOLUTION INTRAMUSCULAR; INTRAVENOUS at 14:51

## 2020-08-18 NOTE — ASSESSMENT & PLAN NOTE
· POA, patient presenting with increased shortness of breath  CTA of the chest revealed "no PE  Increased masslike opacity in right lower lobe likely combination of tumor progression and postobstructive pneumonia  No change in multiple additional bilateral lung metastases  Extensive new patchy ground-glass opacity in both lungs greater on right which could be due to atypical infection or drug reaction "  · COVID negative in ED  Currently started on antibiotics with IV cefepime and azithromycin  · Legionella and strep pneumonia urine antigens pending  · Procalcitonin was negative x2    Blood cultures pending  · Continue antibiotics, Mucinex, respiratory protocol, airway clearance protocol, nebs

## 2020-08-18 NOTE — ASSESSMENT & PLAN NOTE
· Likely secondary to chemo    Was on Xeloda   · Current treatment is discontinued  · Patient received 2 units PRBC transfusion 08/18/20  · Platelets 78Y, hemoglobin 11 3  · Continue to monitor counts and transfuse as needed  · Will transfuse for platelets < 51,957, Hb <7 5    Lab Results   Component Value Date    HGB 11 3 (L) 08/19/2020    HGB 7 3 (L) 08/17/2020    HGB 11 4 (L) 08/12/2020    HGB 13 1 07/27/2020    HGB 12 0 06/29/2020

## 2020-08-18 NOTE — ASSESSMENT & PLAN NOTE
· At baseline, he is on 2-3 L nasal cannula supplemental O2  · Currently requiring up to 7 L to maintain sats above 90%  · Likely multifactorial secondary to lung CA with acute infectious process  · Continue supplemental O2, respiratory protocol, airway clearance protocol, IV antibiotics as above

## 2020-08-18 NOTE — PHYSICAL THERAPY NOTE
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)    Dyspnea on exertion    Port-A-Cath in place    Esophageal dysphagia    Pneumonia due to infectious organism    Acute respiratory failure with hypoxia (HCC)    Sepsis ([address] )    Neutropenic fever (HCC)    Thrombocytopenia (HCC)    Acute kidney injury ([address] )    Drug rash    Multifocal pneumonia    Chemotherapy induced nausea and vomiting    Other headache syndrome    Chemotherapy induced neutropenia (HCC)    Brain metastases (HCC)    Atrial fibrillation (HCC)    Elevated d-dimer    Pancytopenia (HCC)    Elevated brain natriuretic peptide (BNP) level       Past Medical History:   Diagnosis Date    Atrial fibrillation (HCC)     Colon cancer (HCC)     Dyspnea on exertion     Malignant neoplasm of ascending colon (HCC)     Metastatic carcinoma to lung (HCC)     Pneumonitis     Pulmonary nodules     Shortness of breath        Past Surgical History:   Procedure Laterality Date    ANTERIOR CRUCIATE LIGAMENT REPAIR      ANTERIOR CRUCIATE LIGAMENT REPAIR      2  on R knee    APPENDECTOMY      ATRIAL ABLATION SURGERY      BACK SURGERY      COLON SURGERY      Ascending and transverse colon    COLONOSCOPY      LUNG REMOVAL, PARTIAL      LUNG SEGMENTECTOMY Right 2/26/2020    Procedure: RESECTION WEDGE LUNG;  Surgeon: Mishel Lobo MD;  [facility];  Service: Thoracic    MS THORACOSCOPY W/THERA WEDGE RESEXN INITIAL UNILAT Right 2/26/2020    Procedure: THORACOSCOPY VIDEO ASSISTED SURGERY (VATS);   Surgeon: Mishel Lobo MD;  [facility];  Service: Thoracic    RIGHT COLECTOMY      and Transverse colon resection    THORACOTOMY        08/18/20 0900   Note Type   Note type Eval only   Pain Assessment   Pain Assessment Tool 0-10   Pain Score 8   Pain Location/Orientation Location: Head   Hospital Pain Intervention(s) Repositioned; Ambulation/increased activity; Emotional support  (notified nursing)   Home Living   Type of [address] [facility] Two level;Stairs to enter without rails  (0 vs 1 stair to enter)   [address] chair   Prior Function   Level of [place] Independent with ADLs and functional mobility   Lives With [place] From Family   ADL Assistance Independent   IADLs Independent   Falls in the last 6 months 1 to 4  (1 trip over O2 hose)   Comments pt was indep PTA, using home O2 at 2L  pt had 1 trip and fall  pt drives   Restrictions/Precautions   Other Precautions O2;Pain;Multiple lines   General   Family/Caregiver Present No   Cognition   Overall Cognitive Status WFL   Orientation Level Oriented X4   RUE Assessment   RUE Assessment WNL   LUE Assessment   LUE Assessment WNL   RLE Assessment   RLE Assessment X  (str 4/5)   LLE Assessment   LLE Assessment X  (str 4/5)   Coordination   Movements are Fluid and Coordinated 1   Sensation WFL   Bed Mobility   Rolling R 7  Independent   Rolling L 7  Independent   Supine to Sit 7  Independent   Sit to Supine 7  Independent   Transfers   Sit to Stand 5  Supervision   Stand to Sit 5  Supervision   Ambulation/Elevation   Gait pattern WNL   Gait Assistance 4  Minimal assist  (contact guard)   Assistive Device   (hand hold)   Distance march in place 3 reps ble   Balance   Static Sitting Normal   Dynamic Sitting Good   Static Standing Good   Dynamic Standing Fair   Ambulatory Fair   Endurance Deficit   Endurance Deficit Yes   Endurance Deficit Description spO2 at rest on 6L O2 71%, did rise to 81%, sitting was 79% and standing 71%   pt breathing through mouth, with face mask on    Activity Tolerance   Activity Tolerance Treatment limited secondary to medical complications (Comment)   Nurse Made Aware yes   Assessment Prognosis Fair   Problem List Decreased strength;Decreased endurance; Impaired balance  (pulmonary function)   Assessment pt admitted wiht severe shortness of breath, dx with mt ca, pneumonia, respiratory failure  pt referred to PT  pt reports spO2 at home with in 60's %  pt normally indep but using home O2 at 2L   livew with sife in slingle floor home  pt demonstrated moderate to severe functional limitatoins due to hypoxia, prior co morbidities  pt has current deficits in strength, gait stability, activity tolerance due to pulmonary compromise, balance  pt had resting spO2 on 6L of 79% on arrival  able to improve with deep breathing to 81%  sitting was 79% and standing 71%  pt reported being light headed when sitting and standing  pt able to march in place 3 reps ble, sat and rested and then returned to supine  pt will need skilled PT but will be able to return home  Barriers to Discharge None   Goals   Patient Goals be able to breathe better   STG Expiration Date 09/01/20   Short Term Goal #1 indep with bed mobility, transfers, amb without device for > 150'   improve strength and balance to wnl  improve activity tolerance to 45 miniutes  Plan   Treatment/Interventions Functional transfer training;LE strengthening/ROM; Therapeutic exercise; Endurance training;Patient/family training;Gait training;Equipment eval/education;Spoke to nursing  (breathing techniques)   PT Frequency 1-2x/wk   Recommendation   PT Discharge Recommendation Return to previous environment with social support   Modified [place] Scale   Modified [place] Scale 4   Barthel Index   Feeding 10   Bathing 5   Grooming Score 5   Dressing Score 10   Bladder Score 10   Bowels Score 10   Toilet Use Score 5   Transfers (Bed/Chair) Score 10   Mobility (Level Surface) Score 0   Stairs Score 0   Barthel Index Score 65   History: co - morbidities, fall risk,O2, multiple lines  Exam: impairments in locomotion, musculoskeletal, balance,pulmonary, Clinical: unstable/unpredictable  Complexity:high    Damien Aguilar, PT

## 2020-08-18 NOTE — TELEPHONE ENCOUNTER
Spoke to SEDA, she will be emailing me the [facility] paperwork to be completed   She would like this sent over to the proviced fax on the form and a copy emailed to her as well at [email or web address]

## 2020-08-18 NOTE — CONSULTS
Consultation - Yoselin Mehta 63 y o  male MRN: [id number]    Unit/Bed#: E2 -01 Encounter: [id number]      Assessment/Plan     Assessment:  In summary, this is a 15-year-old male history of advanced colon cancer as outlined  He now has disease progression radiographically and by serology  Current treatment is discontinued  Shortness of breath and cough likely reflect underlying cancer as well as possible superimposed pneumonia  Antibiotics have been initiated  Other supportive measures are in progress  He is anemic, probably related to chemotherapy  Transfusion is suggested for palliation  Patient has intermittent diarrhea attributable to Xeloda  No treatment required  This should resolve with treatment discontinuation  We reviewed that further treatment could be offered but research trial would be his main option as he has essentially exhausted standard therapies  I reviewed the above considerations at length with the patient and his wife  They voiced understanding and agreement  Plan:  See above  History of Present Illness     HPI: Yoselin Mehta is a 63y o  year old male who presents with Increasing shortness of breath with exertion  2008 patient was initially diagnosed with stage IV colon cancer with lymph node and lung involvement  He was treated with FOLFOX and Avastin  2011 he had resection of the transverse colon followed by FOLFIRI and Avastin  2012 he had a wedge resection of right lung nodule  No adjuvant chemotherapy  January 2014 wedge resection right lower lobe nodule  June 2015 through February 2017 patient was treated with Xeloda and Avastin  February 2017 patient was noted to have new lung lesion  SPRT  August 2018 patient had multifocal progression  Lonsurf was started  October 2019 progression was noted  Marissa Fleischer was initiated but discontinued in January 2020 due to disease progression  Subsequently treated with FOLFIRI and Cyramza    Progressive disease noted  Xeloda and Avastin initiated  On admission WBC 3 2, hemoglobin 7 3, , platelet count 27   81 neutrophils, 1 band, 8 lymphocytes, 5 monocytes, 5 eosinophils  CMP shows sodium 134, albumin 2 5, otherwise normal    4, June 2020 CEA 81 6  Current CT chest shows no evidence of pulmonary embolism  Right lower lobe mass, previously 7 3 cm, currently 12 2 cm  Bilateral ground-glass opacities noted  Small right pleural effusion stable  Inpatient consult to Oncology  Consult performed by: Teetee Severino DO  Consult ordered by: Brooklyn Moncada PA-C          Review of Systems   Constitutional: Positive for fatigue and fever  Negative for chills  HENT: Negative for nosebleeds  Eyes: Negative for discharge  Respiratory: Positive for cough and shortness of breath  Cardiovascular: Negative for chest pain  Gastrointestinal: Positive for diarrhea (Occasional, intermittent)  Negative for abdominal pain and constipation  Endocrine: Negative for polydipsia  Genitourinary: Negative for hematuria  Musculoskeletal: Negative for arthralgias  Skin: Negative for color change  Allergic/Immunologic: Negative for immunocompromised state  Neurological: Negative for dizziness and headaches  Hematological: Negative for adenopathy  Psychiatric/Behavioral: Negative for agitation         Historical Information   Past Medical History:   Diagnosis Date    Atrial fibrillation ([facility] [address] )     Colon cancer (HCC)     Dyspnea on exertion     Malignant neoplasm of ascending colon ([facility] [address] [facility] )     Metastatic carcinoma to lung (HCC)     Pneumonitis     Pulmonary nodules     Shortness of breath      Past Surgical History:   Procedure Laterality Date    ANTERIOR CRUCIATE LIGAMENT REPAIR      ANTERIOR CRUCIATE LIGAMENT REPAIR      2  on R knee    APPENDECTOMY      ATRIAL ABLATION SURGERY      BACK SURGERY      COLON SURGERY      Ascending and transverse colon    COLONOSCOPY      LUNG REMOVAL, PARTIAL      LUNG SEGMENTECTOMY Right 2/26/2020    Procedure: RESECTION WEDGE LUNG;  Surgeon: Olya Paulino MD;  [facility];  Service: Thoracic    NM THORACOSCOPY W/THERA WEDGE RESEXN INITIAL UNILAT Right 2/26/2020    Procedure: THORACOSCOPY VIDEO ASSISTED SURGERY (VATS); Surgeon: Olya Paulino MD;  [facility];  Service: Thoracic    RIGHT COLECTOMY      and Transverse colon resection    THORACOTOMY       Social History   Social History     Substance and Sexual Activity   Alcohol Use Yes    Alcohol/week: 8 0 standard drinks    Types: 8 Cans of beer per week    Frequency: 2-3 times a week    Drinks per session: 1 or 2    Binge frequency: Never     Social History     Substance and Sexual Activity   Drug Use No     Social History     Tobacco Use   Smoking Status Former Smoker    Years: 10 00    Types: Cigars    Start date: 2000    Last attempt to quit: 2010    Years since quitting: 10 6   Smokeless Tobacco Never Used   Tobacco Comment    Cigars      E-Cigarette/Vaping    E-Cigarette Use Never User      E-Cigarette/Vaping Substances    Nicotine No     THC No     CBD No     Flavoring No     Other No     Unknown No       Family History:   Family History   Problem Relation Age of Onset    Diabetes Mother     Heart disease Mother     Heart disease Father     Skin cancer Sister        Meds/Allergies   all current active meds have been reviewed  Allergies   Allergen Reactions    Aspirin Other (See Comments)     Nose bleed    Sulfa Antibiotics Other (See Comments)     Dizzy        Objective   Vitals: Blood pressure 129/79, pulse 88, temperature 98 °F (36 7 °C), resp  rate 22, height 6' 2" (1 88 m), weight 103 kg (227 lb 1 2 oz), SpO2 98 %      Intake/Output Summary (Last 24 hours) at 8/18/2020 1159  Last data filed at 8/18/2020 0023  Gross per 24 hour   Intake 1050 ml   Output 250 ml   Net 800 ml     Invasive Devices     Central Venous Catheter Line            Port A Cath Right Subclavian -- days          Drain            Chest Tube 1 Right Pleural 24 Fr  173 days                Physical Exam  Constitutional:       Appearance: He is well-developed  HENT:      Head: Normocephalic  Eyes:      Pupils: Pupils are equal, round, and reactive to light  Neck:      Musculoskeletal: Normal range of motion  Cardiovascular:      Rate and Rhythm: Regular rhythm  Pulmonary:      Breath sounds: Rhonchi present  Comments: Coarse breath sounds bilaterally  Abdominal:      Palpations: Abdomen is soft  Lymphadenopathy:      Cervical: No cervical adenopathy  Skin:     General: Skin is warm  Neurological:      Mental Status: He is alert  Lab Results: I have personally reviewed pertinent reports  Imaging Studies: I have personally reviewed pertinent reports  EKG, Pathology, and Other Studies: I have personally reviewed pertinent reports  Code Status: Level 3 - DNAR and DNI  Advance Directive and Living Will:      Power of :    POLST:      Counseling / Coordination of Care  Total floor / unit time spent today 40 minutes  Greater than 50% of total time was spent with the patient and / or family counseling and / or coordination of care  A description of the counseling / coordination of care: see note

## 2020-08-18 NOTE — ASSESSMENT & PLAN NOTE
· Elevated BNP 2568  Suspect contributing to hypoxia  · No prior history of CHF    Was not on diuretics pre-hospital  · Chest x-ray findings likely represents progressive metastatic disease however, in view of elevated BNP, will trial 1 dose Lasix 20 mg IV now and observe for response  · Follow-up on echocardiogram

## 2020-08-18 NOTE — ASSESSMENT & PLAN NOTE
· History of colon cancer initially diagnosed in 2008 and is status post surgery, chemo and radiation  · Currently on a Xeloda for 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 started approximately 2 weeks ago  · Follows with Dr Nate Varghese of Hematology/Oncology outpatient  · Seen by Dr Karol Mota today and it is noted that the patient has essentially exhausted standard therapies    Being recommended for research trial as his main option at this time  · Further definitive care plan per Oncology

## 2020-08-18 NOTE — TELEPHONE ENCOUNTER
Patient wife Jossie Park called stating that she has FMLA paper work from her employer which needs to be completed by Katharine Gamez, states she do not have access to a fax machine but can e mailed the form  Best call back  [phone]

## 2020-08-18 NOTE — ASSESSMENT & PLAN NOTE
· Likely secondary to chemo    Currently on Xeloda   · Continue to monitor counts with transfusion support as indicated  · Will transfuse for platelets < 21,368, Hb <7

## 2020-08-18 NOTE — PLAN OF CARE
Problem: PHYSICAL THERAPY ADULT  Goal: Performs mobility at highest level of function for planned discharge setting  See evaluation for individualized goals  Description: Treatment/Interventions: Functional transfer training, LE strengthening/ROM, Therapeutic exercise, Endurance training, Patient/family training, Gait training, Equipment eval/education, Spoke to nursing(breathing techniques)          See flowsheet documentation for full assessment, interventions and recommendations  Note: Prognosis: Fair  Problem List: Decreased strength, Decreased endurance, Impaired balance(pulmonary function)  Assessment: pt admitted wiht severe shortness of breath, dx with mt ca, pneumonia, respiratory failure  pt referred to PT  pt reports spO2 at home with in 60's %  pt normally indep but using home O2 at 2L   livew with sife in slingle floor home  pt demonstrated moderate to severe functional limitatoins due to hypoxia, prior co morbidities  pt has current deficits in strength, gait stability, activity tolerance due to pulmonary compromise, balance  pt had resting spO2 on 6L of 79% on arrival  able to improve with deep breathing to 81%  sitting was 79% and standing 71%  pt reported being light headed when sitting and standing  pt able to march in place 3 reps ble, sat and rested and then returned to supine  pt will need skilled PT but will be able to return home  Barriers to Discharge: None     PT Discharge Recommendation: Return to previous environment with social support          See flowsheet documentation for full assessment

## 2020-08-18 NOTE — PLAN OF CARE
Problem: Potential for Falls  Goal: Patient will remain free of falls  Description: INTERVENTIONS:  - Assess patient frequently for physical needs  -  Identify cognitive and physical deficits and behaviors that affect risk of falls  -  C[place]ice fall precautions as indicated by assessment   - Educate patient/family on patient safety including physical limitations  - Instruct patient to call for assistance with activity based on assessment  - Modify environment to reduce risk of injury  - Consider OT/PT consult to assist with strengthening/mobility  Outcome: Progressing     Problem: RESPIRATORY - ADULT  Goal: Achieves optimal ventilation and oxygenation  Description: INTERVENTIONS:  - Assess for changes in respiratory status  - Assess for changes in mentation and behavior  - Position to facilitate oxygenation and minimize respiratory effort  - Oxygen administered by appropriate delivery if ordered  - Encourage broncho-pulmonary hygiene including cough, deep breathe, Incentive Spirometry  - Assess the need for suctioning and aspirate as needed  - Assess and instruct to report SOB or any respiratory difficulty  - Respiratory Therapy support as indicated  Outcome: Progressing     Problem: HEMATOLOGIC - ADULT  Goal: Maintains hematologic stability  Description: INTERVENTIONS  - Assess for signs and symptoms of bleeding or hemorrhage  - Monitor labs  - Administer supportive blood products/factors as ordered and appropriate  Outcome: Progressing     Problem: SAFETY ADULT  Goal: Patient will remain free of falls  Description: INTERVENTIONS:  - Assess patient frequently for physical needs  -  Identify cognitive and physical deficits and behaviors that affect risk of falls    -  [place] fall precautions as indicated by assessment   - Educate patient/family on patient safety including physical limitations  - Instruct patient to call for assistance with activity based on assessment  - Modify environment to reduce risk of injury  - Consider OT/PT consult to assist with strengthening/mobility  Outcome: Progressing     Problem: Knowledge Deficit  Goal: Patient/family/caregiver demonstrates understanding of disease process, treatment plan, medications, and discharge instructions  Description: Complete learning assessment and assess knowledge base    Interventions:  - Provide teaching at level of understanding  - Provide teaching via preferred learning methods  Outcome: Progressing

## 2020-08-18 NOTE — PROGRESS NOTES
Progress Note - Rosmery Verde 1960, 63 y o  male MRN: [id number]    Unit/Bed#: E2 -01 Encounter: [id number]    Primary Care Provider: Richy Wiggins DO   Date and time admitted to hospital: 8/17/2020 10:29 AM    * Multifocal pneumonia  Assessment & Plan  · POA, patient presenting with increased shortness of breath  CTA of the chest revealed "no PE  Increased masslike opacity in right lower lobe likely combination of tumor progression and postobstructive pneumonia  No change in multiple additional bilateral lung metastases  Extensive new patchy ground-glass opacity in both lungs greater on right which could be due to atypical infection or drug reaction "  · COVID negative in ED  Currently started on antibiotics with IV cefepime and azithromycin  · Legionella and strep pneumonia urine antigens pending  · Procalcitonin was negative x2  Blood cultures pending  · Continue antibiotics, Mucinex, respiratory protocol, airway clearance protocol, nebs    Acute respiratory failure with hypoxia (HCC)  Assessment & Plan  · At baseline, he is on 2-3 L nasal cannula supplemental O2  · Currently requiring up to 7 L to maintain sats above 90%  · Likely multifactorial secondary to lung CA with acute infectious process  · Continue supplemental O2, respiratory protocol, airway clearance protocol, IV antibiotics as above    Colon cancer metastasized to lung [facility])  Assessment & Plan  · History of colon cancer initially diagnosed in 2008 and is status post surgery, chemo and radiation  · Currently on a Xeloda for 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 started approximately 2 weeks ago  · Follows with Dr Dasha Clemons of Hematology/Oncology outpatient  · Seen by Dr Elpidio Tapia today and it is noted that the patient has essentially exhausted standard therapies    Being recommended for research trial as his main option at this time  · Further definitive care plan per Oncology    Pancytopenia [facility])  Assessment & Plan  · Likely secondary to chemo  Currently on Xeloda   · Continue to monitor counts with transfusion support as indicated  · Will transfuse for platelets < 91,793, Hb <7 5    Elevated brain natriuretic peptide (BNP) level  Assessment & Plan  · Elevated BNP 2568  Suspect contributing to hypoxia  · No prior history of CHF  Was not on diuretics pre-hospital  · Chest x-ray findings likely represents progressive metastatic disease however, in view of elevated BNP, will trial 1 dose Lasix 20 mg IV now and observe for response  · Follow-up on echocardiogram      VTE Pharmacologic Prophylaxis:   Pharmacologic: Pharmacologic VTE Prophylaxis contraindicated due to Pancytopenia/thrombocytopenia  Mechanical VTE Prophylaxis in Place: Yes    Patient Centered Rounds: I have performed bedside rounds with nursing staff today  Discussions with Specialists or Other Care Team Provider:  Nursing    Education and Discussions with Family / Patient:  Patient and spouse updated at the bedside, all questions answered    Current Length of Stay: 1 day(s)    Current Patient Status: Inpatient   Certification Statement: The patient will continue to require additional inpatient hospital stay due to Respiratory failure/multifocal pneumonia    Discharge Plan:  Pending clinical improvement    Code Status: Level 3 - DNAR and DNI      Subjective:   Patient seen and evaluated  He reports shortness of breath  Denies any chest pain  No fever or chills  He reports productive cough with congestion  Objective:     Vitals:   Temp (24hrs), Av °F (36 7 °C), Min:97 5 °F (36 4 °C), Max:98 6 °F (37 °C)    Temp:  [97 5 °F (36 4 °C)-98 6 °F (37 °C)] 98 °F (36 7 °C)  HR:  [85-95] 88  Resp:  [18-22] 22  BP: (108-129)/(71-80) 129/79  SpO2:  [79 %-98 %] 98 %  Body mass index is 29 15 kg/m²  Input and Output Summary (last 24 hours):        Intake/Output Summary (Last 24 hours) at 2020 1429  Last data filed at 2020 0023  Gross per 24 hour   Intake    Output 250 ml   Net -250 ml       Physical Exam:  General Appearance:    Alert, ill-appearing, no acute distress, responsive and follows commands   Head:    Normocephalic    Eyes:    Conjunctiva/corneas clear, EOM's intact   Neck:   Supple   Lungs:     Diffuse crackles bilaterally with expiratory rhonchi no accessory muscle use, respirations nonlabored    Heart:    Regular rate and rhythm, S1 and S2    Abdomen:     Soft, non-tender, nondistended   Extremities:   No edema   Neurologic:  nonfocal         Additional Data:     Labs:    Results from last 7 days   Lab Units 08/17/20  1135   WBC Thousand/uL 3 26*   HEMOGLOBIN g/dL 7 3*   HEMATOCRIT % 21 2*   PLATELETS Thousands/uL 27*   NEUTROS PCT % 81*   LYMPHS PCT % 8*   MONOS PCT % 5   EOS PCT % 5     Results from last 7 days   Lab Units 08/17/20  1135   POTASSIUM mmol/L 4 2   CHLORIDE mmol/L 95*   CO2 mmol/L 35*   BUN mg/dL 15   CREATININE mg/dL 0 68   CALCIUM mg/dL 8 5   ALK PHOS U/L 90   ALT U/L 22   AST U/L 24     Results from last 7 days   Lab Units 08/17/20  1135   INR  1 07       * I Have Reviewed All Lab Data Listed Above  * Additional Pertinent Lab Tests Reviewed: [address] Admission Reviewed    Cultures:   Blood Culture:   Lab Results   Component Value Date    BLOODCX Received in Microbiology Lab  Culture in Progress  08/17/2020    BLOODCX Received in Microbiology Lab  Culture in Progress  08/17/2020    BLOODCX No Growth After 5 Days  08/27/2019    BLOODCX No Growth After 5 Days  08/27/2019    BLOODCX No Growth After 5 Days  01/26/2016    BLOODCX No Growth After 5 Days   01/26/2016     Urine Culture:   Lab Results   Component Value Date    URINECX No Growth <1000 cfu/mL 01/27/2016     Sputum Culture: No components found for: SPUTUMCX  Wound Culture: No results found for: WOUNDCULT    Last 24 Hours Medication List:   Current Facility-Administered Medications   Medication Dose Route Frequency Provider Last Rate    acetaminophen  650 mg Oral Q6H PRN Raul Ag PA-C      albuterol  2 5 mg Nebulization Q4H PRN Meenu Lopez MD      albuterol  2 5 mg Nebulization TID Meenu Lopez MD      ascorbic acid  1,000 mg Oral Daily TR Palacio-GABE      azithromycin  500 mg Intravenous Q24H TR Palacio-GABE      benzonatate  100 mg Oral TID PRN Maria Isabel Vieyra PA-C      cefepime  2,000 mg Intravenous Q12H TR Ramirez-C 2,000 mg (08/18/20 1146)    docusate sodium  100 mg Oral BID TR Ramirez-GABE      fluconazole  100 mg Oral Daily TR Ramirez-GABE      furosemide  20 mg Intravenous Once Meenu Lopez MD      guaiFENesin  600 mg Oral Q12H [address] TR Ramirez-GABE      melatonin  6 mg Oral HS PRN Maria Isabel Vieyra PA-C          Today, Patient Was Seen By: Meenu Lopez MD    ** Please Note: Dragon 360 Dictation voice to text software may have been used in the creation of this document   **

## 2020-08-19 ENCOUNTER — TELEPHONE (OUTPATIENT)
Dept: HEMATOLOGY ONCOLOGY | Facility: CLINIC | Age: 60
End: 2020-08-19

## 2020-08-19 PROBLEM — E87.2 RESPIRATORY ACIDOSIS: Status: ACTIVE | Noted: 2020-08-19

## 2020-08-19 PROBLEM — E87.1 HYPONATREMIA: Status: ACTIVE | Noted: 2020-08-19

## 2020-08-19 LAB
ABO GROUP BLD BPU: NORMAL
ABO GROUP BLD BPU: NORMAL
ANION GAP SERPL CALCULATED.3IONS-SCNC: 4 MMOL/L (ref 4–13)
ARTERIAL PATENCY WRIST A: YES
BASE EXCESS BLDA CALC-SCNC: 6.9 MMOL/L
BASOPHILS NFR BLD AUTO: 0 % (ref 0–1)
BPU ID: NORMAL
BPU ID: NORMAL
BUN SERPL-MCNC: 10 MG/DL (ref 5–25)
CALCIUM SERPL-MCNC: 8 MG/DL (ref 8.3–10.1)
CHLORIDE SERPL-SCNC: 92 MMOL/L (ref 100–108)
CO2 SERPL-SCNC: 35 MMOL/L (ref 21–32)
CREAT SERPL-MCNC: 0.55 MG/DL (ref 0.6–1.3)
CROSSMATCH: NORMAL
CROSSMATCH: NORMAL
EOSINOPHIL NFR BLD AUTO: 4 % (ref 0–6)
ERYTHROCYTE [DISTWIDTH] IN BLOOD BY AUTOMATED COUNT: 19.2 % (ref 11.6–15.1)
GFR SERPL CREATININE-BSD FRML MDRD: 115 ML/MIN/1.73SQ M
GLUCOSE SERPL-MCNC: 100 MG/DL (ref 65–140)
HCO3 BLDA-SCNC: 37.2 MMOL/L (ref 22–28)
HCT VFR BLD AUTO: 32.2 % (ref 36.5–49.3)
HGB BLD-MCNC: 11.3 G/DL (ref 12–17)
IMM GRANULOCYTES NFR BLD AUTO: 1 % (ref 0–2)
LYMPHOCYTES NFR BLD AUTO: 9 % (ref 14–44)
MCH RBC QN AUTO: 35.4 PG (ref 26.8–34.3)
MCHC RBC AUTO-ENTMCNC: 35.1 G/DL (ref 31.4–37.4)
MCV RBC AUTO: 101 FL (ref 82–98)
MONOCYTES NFR BLD AUTO: 8 % (ref 4–12)
NEUTS SEG NFR BLD AUTO: 78 % (ref 43–75)
NON VENT TYPE- NRB: 100
NRBC BLD AUTO-RTO: 0 /100 WBCS
O2 CT BLDA-SCNC: 17.3 ML/DL (ref 16–23)
OXYHGB MFR BLDA: 94.4 % (ref 94–97)
PCO2 BLDA: 87.3 MM HG (ref 36–44)
PH BLDA: 7.25 [PH] (ref 7.35–7.45)
PLATELET # BLD AUTO: 38 THOUSANDS/UL (ref 149–390)
PMV BLD AUTO: 9.6 FL (ref 8.9–12.7)
PO2 BLDA: 117.7 MM HG (ref 75–129)
POTASSIUM SERPL-SCNC: 3.9 MMOL/L (ref 3.5–5.3)
RBC # BLD AUTO: 3.19 MILLION/UL (ref 3.88–5.62)
SODIUM SERPL-SCNC: 131 MMOL/L (ref 136–145)
SPECIMEN SOURCE: ABNORMAL
UNIT DISPENSE STATUS: NORMAL
UNIT DISPENSE STATUS: NORMAL
UNIT PRODUCT CODE: NORMAL
UNIT PRODUCT CODE: NORMAL
UNIT RH: NORMAL
UNIT RH: NORMAL
WBC # BLD AUTO: 5.03 THOUSAND/UL (ref 4.31–10.16)

## 2020-08-19 PROCEDURE — 99223 1ST HOSP IP/OBS HIGH 75: CPT | Performed by: INTERNAL MEDICINE

## 2020-08-19 PROCEDURE — 80048 BASIC METABOLIC PNL TOTAL CA: CPT | Performed by: INTERNAL MEDICINE

## 2020-08-19 PROCEDURE — 94760 N-INVAS EAR/PLS OXIMETRY 1: CPT

## 2020-08-19 PROCEDURE — 85025 COMPLETE CBC W/AUTO DIFF WBC: CPT | Performed by: INTERNAL MEDICINE

## 2020-08-19 PROCEDURE — 82805 BLOOD GASES W/O2 SATURATION: CPT | Performed by: PHYSICIAN ASSISTANT

## 2020-08-19 PROCEDURE — 94002 VENT MGMT INPAT INIT DAY: CPT

## 2020-08-19 PROCEDURE — 36600 WITHDRAWAL OF ARTERIAL BLOOD: CPT

## 2020-08-19 PROCEDURE — 94668 MNPJ CHEST WALL SBSQ: CPT

## 2020-08-19 PROCEDURE — 99232 SBSQ HOSP IP/OBS MODERATE 35: CPT | Performed by: INTERNAL MEDICINE

## 2020-08-19 PROCEDURE — 94640 AIRWAY INHALATION TREATMENT: CPT

## 2020-08-19 RX ORDER — HYDROMORPHONE HCL/PF 1 MG/ML
0.2 SYRINGE (ML) INJECTION EVERY 4 HOURS PRN
Status: DISCONTINUED | OUTPATIENT
Start: 2020-08-19 | End: 2020-08-20

## 2020-08-19 RX ORDER — LORAZEPAM 2 MG/ML
1 INJECTION INTRAMUSCULAR EVERY 4 HOURS PRN
Status: DISCONTINUED | OUTPATIENT
Start: 2020-08-19 | End: 2020-08-22 | Stop reason: HOSPADM

## 2020-08-19 RX ORDER — LORAZEPAM 1 MG/1
1 TABLET ORAL EVERY 4 HOURS PRN
Status: DISCONTINUED | OUTPATIENT
Start: 2020-08-19 | End: 2020-08-21

## 2020-08-19 RX ORDER — OXYCODONE HYDROCHLORIDE 5 MG/1
5 TABLET ORAL EVERY 4 HOURS PRN
Status: DISCONTINUED | OUTPATIENT
Start: 2020-08-19 | End: 2020-08-21

## 2020-08-19 RX ADMIN — OXYCODONE HYDROCHLORIDE AND ACETAMINOPHEN 1000 MG: 500 TABLET ORAL at 09:35

## 2020-08-19 RX ADMIN — GUAIFENESIN 600 MG: 600 TABLET, EXTENDED RELEASE ORAL at 09:35

## 2020-08-19 RX ADMIN — MELATONIN 6 MG: 3 TAB ORAL at 00:21

## 2020-08-19 RX ADMIN — CEFEPIME HYDROCHLORIDE 2000 MG: 2 INJECTION, POWDER, FOR SOLUTION INTRAVENOUS at 13:12

## 2020-08-19 RX ADMIN — ALBUTEROL SULFATE 2.5 MG: 2.5 SOLUTION RESPIRATORY (INHALATION) at 20:06

## 2020-08-19 RX ADMIN — FLUCONAZOLE 100 MG: 100 TABLET ORAL at 09:35

## 2020-08-19 RX ADMIN — AZITHROMYCIN 500 MG: 250 TABLET, FILM COATED ORAL at 13:12

## 2020-08-19 RX ADMIN — ACETAMINOPHEN 650 MG: 325 TABLET ORAL at 14:04

## 2020-08-19 RX ADMIN — ACETAMINOPHEN 650 MG: 325 TABLET ORAL at 00:21

## 2020-08-19 RX ADMIN — ACETAMINOPHEN 650 MG: 325 TABLET ORAL at 20:32

## 2020-08-19 RX ADMIN — OXYCODONE HYDROCHLORIDE 5 MG: 5 TABLET ORAL at 20:30

## 2020-08-19 RX ADMIN — GUAIFENESIN 600 MG: 600 TABLET, EXTENDED RELEASE ORAL at 20:30

## 2020-08-19 RX ADMIN — ALBUTEROL SULFATE 2.5 MG: 2.5 SOLUTION RESPIRATORY (INHALATION) at 07:52

## 2020-08-19 RX ADMIN — DOCUSATE SODIUM 100 MG: 100 CAPSULE, LIQUID FILLED ORAL at 09:35

## 2020-08-19 RX ADMIN — ALBUTEROL SULFATE 2.5 MG: 2.5 SOLUTION RESPIRATORY (INHALATION) at 13:18

## 2020-08-19 NOTE — PROGRESS NOTES
Pt's 02 sat is 85-90% on 7L of 02 and is asking if he can go on a nonrebreather like he did last night  Nonrebreather is hanging from 02 cylinder so applied to patient at 15L  02 sat is now 100% and pt states he feels much better  Will monitor

## 2020-08-19 NOTE — OCCUPATIONAL THERAPY NOTE
Occupational Therapy         Patient Name: Sally LANGSTONE Date: 8/19/2020        MD's orders received  Pt currently with respiratory distress, on Bipap  Palliative care to see today  Will hold assessment today and resume when medically appropriate   Bianca Turenr OT

## 2020-08-19 NOTE — PHYSICAL THERAPY NOTE
Physical Therapy Cancellation Note- decline in medical status, currently on 15L rebreather, pending palliative care consult  Will follow pt for improvement in medical status and ability to work with pt   Gisella Dowling PT

## 2020-08-19 NOTE — ASSESSMENT & PLAN NOTE
· At baseline, he is on 2-3 L nasal cannula supplemental O2  · Patient requiring higher oxygen needs was on 7 L now is on 15 L non-rebreather with oxygen saturation %  · Patient states he feels much better with non-rebreather on and thought a lot of anxiety when was off receiving nebulizer treatment   · Likely multifactorial secondary to lung CA with acute infectious process, anemia, anxiety contributing  · Continue supplemental O2, respiratory protocol, airway clearance protocol  · Consult pulmonology  · Check ABG  · Continue to wean oxygen supplementation as tolerated  · Consult to palliative care

## 2020-08-19 NOTE — TELEPHONE ENCOUNTER
Received medication clarification request fax from   [address] has been sent to [facility]

## 2020-08-19 NOTE — ASSESSMENT & PLAN NOTE
· POA, patient presenting with increased shortness of breath  · CTA of the chest revealed "no PE  Increased masslike opacity in right lower lobe likely combination of tumor progression and postobstructive pneumonia  No change in multiple additional bilateral lung metastases  Extensive new patchy ground-glass opacity in both lungs greater on right which could be due to atypical infection or drug reaction "  · COVID negative in ED  Currently on antibiotics with IV cefepime and azithromycin  · Legionella and strep pneumonia urine antigens negative   · Procalcitonin was negative x2    Blood cultures negative   · continue supportive care Mucinex, respiratory protocol, airway clearance protocol, nebs  · Patient with significantly higher oxygen needs on 15 L non-rebreather, see below  · consult to pulmonology   · Check ABG  · Consult to palliative care

## 2020-08-19 NOTE — CASE MANAGEMENT
CM notified by CATHERINE that patient and family are interested in home hospice  Referral placed to [facility] to evaluate

## 2020-08-19 NOTE — HOSPICE NOTE
Referral received for hospice services  Care team suggested that I wait to reach out until tomorrow when we can also evaluate O2 needs   Will FU in the AM

## 2020-08-19 NOTE — ASSESSMENT & PLAN NOTE
· Sodium 131  · Likely secondary to IV diuretic given yesterday in combination with known metastatic cancer to the lung  · Continue to monitor electrolytes in a m

## 2020-08-19 NOTE — ASSESSMENT & PLAN NOTE
· History of colon cancer initially diagnosed in 2008 and is status post surgery, chemo and radiation  · Follows with Dr Shamar Roy of Hematology/Oncology outpatient  · Seen by Dr Janay Iniguez, current treatment is discontinued, appears at only further treatment options would be research trials as he has exhausted standard therapies   · I spoke with the patient at the bedside today and he is agreeable to palliative care consult  · Appreciate ongoing support per Oncology

## 2020-08-19 NOTE — ASSESSMENT & PLAN NOTE
· POA, patient presenting with increased shortness of breath  · CTA of the chest revealed "no PE  Increased masslike opacity in right lower lobe likely combination of tumor progression and postobstructive pneumonia  No change in multiple additional bilateral lung metastases  Extensive new patchy ground-glass opacity in both lungs greater on right which could be due to atypical infection or drug reaction "  · COVID negative in ED  Currently on antibiotics with IV cefepime and azithromycin  · Legionella and strep pneumonia urine antigens negative   · Procalcitonin was negative x2    Blood cultures negative   · Will discuss with pulm regarding d/c abx   · continue supportive care Mucinex, respiratory protocol, airway clearance protocol, nebs  · Patient with significantly higher oxygen needs on 15 L non-rebreather, see below  · Transition to high flow nasal cannula   · consult to pulmonology   · Check ABG  · Consult to palliative care

## 2020-08-19 NOTE — PROGRESS NOTES
The azithromycin has been converted to Oral per [facility] IV-to-PO Auto-Conversion Protocol for Adults as approved by the Pharmacy and Therapeutics Committee  The patient met all eligible criteria:  1) Age >/= 25years old   2) Received at least one dose of the IV form   3) Receiving at least one other scheduled oral/enteral medication   4) Tolerating an oral/enteral diet   and did not have any exclusions:   1) Critical care patient   2) Active GI bleed (IF assessing H2RAs or PPIs)   3) Continuous tube feeding (IF assessing cipro, doxycycline, levofloxacin, minocycline, rifampin, or voriconazole)   4) Receiving PO vancomycin (IF assessing metronidazole)   5) Persistent nausea and/or vomiting   6) Ileus or gastrointestinal obstruction   7) Marilyn/nasogastric tube set for continuous suction   8) Specific order not to automatically convert to PO (in the order's comments or if discussed in the most recent Infectious Disease or primary team's progress notes)

## 2020-08-19 NOTE — CONSULTS
Consultation - Pulmonary  Sally Flynn 63 y o  male MRN: [id number]  Unit/Bed#: E2 -01 Encounter: [id number]      Assessment/Plan      Assessment/plan  1  Acute on chronic hypoxic respiratory failure  -a m  ABG showing respiratory acidosis with hypoxemia   -continue BiPAP  Current settings 12/5, rate 14  60%  -titrate FiO2/supplemental oxygen to maintain O2 saturation greater than 88%  -DNR/ DNI which I verified with patient  -continue albuterol nebulization t i d     2  Multifocal pneumonia  -likely postobstructive  -continue cefepime/Zithromax  -blood cultures pending  -sputum culture pending  -procalcitonin negative x2    3  Advanced colon cancer with metastasis to lung  -hematology/oncology following  -disease progression radiographically and serology  -palliative care consult pending  -consider hospice care      08/17/2020 CTA reviewed  No PE noted, marked increase in masslike opacity right lower lobe from 7 3 x 5 2 cm now to 12 2 time 7 7 cm  Likely combination of tumor progression and postobstructive pneumonia  Extensive new patchy GGO throughout both lungs  Bilateral wide resection  Paraseptal emphysema  Small right pleural effusion with trace loculated apical effusion left  Labs reviewed  08/19/2020 ABG reviewed 7 24/87/117/37/6 9 on 100% non-rebreather  White count 5, hemoglobin 11 3, platelets 38, neutrophils 78  Creatinine 0 5 with  sodium 131 CO2 35  Preliminary sputum culture with Gram stain showing no polys or bacteria seen  Procalcitonin negative x2  Strep pneumo/Legionella antigen negative  ProBNP 2500  D-dimer 1 87 however no PE noted on CTA  Blood cultures pending    Preliminary no growth times 24 hours    History of Present Illness   Physician Requesting Consult: Shree Chapman MD  Reason for Consult / Principal Problem:  Respiratory failure with hypoxia and multifocal pneumonia    HPI: Sally Flynn is a 63y o  year old male with past medical history of colon cancer with metastasis to lung, chronic hypoxic respiratory failure  Patient presented from home with complaint of increased shortness of breath especially with activity/ambulation for several days  Patient reports normally on 2 L nasal O2 but needed to increase to 5 L  Patient currently undergoing chemotherapy with xeloda 2 weeks on 1 week off  Patient reports 3 rounds so far  Note from hospitalist this morning that wife was notified with patient's worsening condition  Patient is level 3 DNR/DNI  Wife stated did not want any further research trials or further chemotherapy  Palliative care consult pending  Patient currently being treated for multifocal pneumonia  Current antibiotics consisting of Zithromax and cefepime  Patient also on oral Diflucan  Spoke with patient and wife and verified that patient still agreeing to [facility] DNR  Inpatient consult to Pulmonology  Consult performed by: Farhan Jamison PA-C  Consult ordered by: Shannon Maynard PA-C          Review of Systems   Constitutional: Negative for chills and fever (Wife reports low-grade fever prior to admission)  HENT: Negative for sinus pressure, sinus pain and sore throat  Respiratory: Positive for shortness of breath  Negative for cough and wheezing  Cardiovascular: Negative  Negative for chest pain, palpitations and leg swelling  Gastrointestinal: Negative for abdominal pain  Genitourinary: Negative for decreased urine volume  Musculoskeletal: Negative for myalgias  Neurological: Negative for headaches  Hematological: Negative for adenopathy  Psychiatric/Behavioral: Negative for confusion         Historical Information   Past Medical History:   Diagnosis Date    Atrial fibrillation ([facility] [address] )     Colon cancer (HCC)     Dyspnea on exertion     Malignant neoplasm of ascending colon ([facility] [address] )     Metastatic carcinoma to lung (HCC)     Pneumonitis     Pulmonary nodules     Shortness of breath      Past Surgical History: Procedure Laterality Date    ANTERIOR CRUCIATE LIGAMENT REPAIR      ANTERIOR CRUCIATE LIGAMENT REPAIR      2  on R knee    APPENDECTOMY      ATRIAL ABLATION SURGERY      BACK SURGERY      COLON SURGERY      Ascending and transverse colon    COLONOSCOPY      LUNG REMOVAL, PARTIAL      LUNG SEGMENTECTOMY Right 2/26/2020    Procedure: RESECTION WEDGE LUNG;  Surgeon: Messi Evans MD;  [facility];  Service: Thoracic    VT THORACOSCOPY W/THERA WEDGE RESEXN INITIAL UNILAT Right 2/26/2020    Procedure: THORACOSCOPY VIDEO ASSISTED SURGERY (VATS); Surgeon: Messi Evans MD;  Location: [facility];  Service: Thoracic    RIGHT COLECTOMY      and Transverse colon resection    THORACOTOMY       Social History   Social History     Substance and Sexual Activity   Alcohol Use Yes    Alcohol/week: 8 0 standard drinks    Types: 8 Cans of beer per week    Frequency: 2-3 times a week    Drinks per session: 1 or 2    Binge frequency: Never     Social History     Substance and Sexual Activity   Drug Use No     E-Cigarette/Vaping    E-Cigarette Use Never User      E-Cigarette/Vaping Substances    Nicotine No     THC No     CBD No     Flavoring No     Other No     Unknown No      Social History     Tobacco Use   Smoking Status Former Smoker    Years: 10 00    Types: Cigars    Start date: 2000    Last attempt to quit: 2010    Years since quitting: 10 6   Smokeless Tobacco Never Used   Tobacco Comment    Cigars    Former cigar smoker quit 10 years ago    Family History: non-contributory    Meds/Allergies   all current active meds have been reviewed, current meds:   Current Facility-Administered Medications   Medication Dose Route Frequency    acetaminophen (TYLENOL) tablet 650 mg  650 mg Oral Q6H PRN    albuterol inhalation solution 2 5 mg  2 5 mg Nebulization Q4H PRN    albuterol inhalation solution 2 5 mg  2 5 mg Nebulization TID    ascorbic acid (VITAMIN C) tablet 1,000 mg  1,000 mg Oral Daily    azithromycin (ZITHROMAX) tablet 500 mg  500 mg Oral Q24H    benzonatate (TESSALON PERLES) capsule 100 mg  100 mg Oral TID PRN    cefepime (MAXIPIME) 2,000 mg in dextrose 5 % 50 mL IVPB  2,000 mg Intravenous Q12H    docusate sodium (COLACE) capsule 100 mg  100 mg Oral BID    fluconazole (DIFLUCAN) tablet 100 mg  100 mg Oral Daily    guaiFENesin (MUCINEX) 12 hr tablet 600 mg  600 mg Oral Q12H [address]    melatonin tablet 6 mg  6 mg Oral HS PRN    and PTA meds:   Prior to Admission Medications   Prescriptions Last Dose Informant Patient Reported?  Taking?   acetaminophen (TYLENOL) 500 mg tablet  Self Yes Yes   Sig: Take 500 mg by mouth every 6 (six) hours as needed for mild pain   albuterol (VENTOLIN HFA) 90 mcg/act inhaler  Self No Yes   Sig: INHALE TWO PUFFS BY MOUTH EVERY 4 HOURS AS NEEDED FOR SHORTNESS OF BREATH   no more than 4 times daily   ascorbic acid (VITAMIN C) 250 MG CHEW  Self Yes Yes   Sig: Chew 250 mg daily   capecitabine (XELODA) 500 MG tablet  Self No Yes   Sig: Take 4 tablets (2,000 mg total) by mouth 2 (two) times a day for 14 days Followed by 1 week off  with 1 other capecitabine prescription for 2,150 mg total   docusate sodium (COLACE) 100 mg capsule  Self No Yes   Sig: Take 1 capsule (100 mg total) by mouth 2 (two) times a day   fluconazole (DIFLUCAN) 100 mg tablet   No Yes   Sig: Take 1 tablet (100 mg total) by mouth daily for 14 days   ibuprofen (MOTRIN) 200 mg tablet  Self Yes Yes   Sig: Take by mouth every 6 (six) hours as needed for mild pain   loperamide (IMODIUM) 2 mg capsule  Self Yes Yes   Sig: Take 2 mg by mouth 4 (four) times a day as needed for diarrhea   ondansetron (ZOFRAN) 4 mg tablet Not Taking at Unknown time Self No No   Sig: Take 1 tablet (4 mg total) by mouth every 6 (six) hours as needed for nausea or vomiting   Patient not taking: Reported on 8/17/2020   prochlorperazine (COMPAZINE) 10 mg tablet Not Taking at Unknown time Self No No   Sig: Take 1 tablet (10 mg total) by mouth every 6 (six) hours as needed for nausea or vomiting   Patient not taking: Reported on 8/17/2020      Facility-Administered Medications: None       Allergies   Allergen Reactions    Aspirin Other (See Comments)     Nose bleed    Sulfa Antibiotics Other (See Comments)     Dizzy        Objective     Intake/Output Summary (Last 24 hours) at 8/19/2020 0949  Last data filed at 8/19/2020 0501  Gross per 24 hour   Intake 700 ml   Output 400 ml   Net 300 ml       Invasive Devices     Central Venous Catheter Line            Port A Cath Right Subclavian -- days          Drain            Chest Tube 1 Right Pleural 24 Fr  174 days            /86 (BP Location: Left arm)   Pulse 77   Temp (!) 97 2 °F (36 2 °C)   Resp 16   Ht 6' 2" (1 88 m)   Wt 103 kg (227 lb 1 2 oz)   SpO2 94%   BMI 29 15 kg/m²       Physical Exam  Vitals signs and nursing note reviewed  Constitutional:       Appearance: He is not diaphoretic  HENT:      Head: Normocephalic and atraumatic  Eyes:      General: No scleral icterus  Conjunctiva/sclera: Conjunctivae normal    Cardiovascular:      Rate and Rhythm: Normal rate  Pulses: Normal pulses  Heart sounds: Normal heart sounds  No murmur  Pulmonary:      Breath sounds: No wheezing, rhonchi or rales  Comments: Diminished breath sounds right base  Abdominal:      General: Bowel sounds are normal       Tenderness: There is no abdominal tenderness  Musculoskeletal:         General: No tenderness  Right lower leg: No edema  Left lower leg: No edema  Lymphadenopathy:      Cervical: No cervical adenopathy  Skin:     Capillary Refill: Capillary refill takes less than 2 seconds  Neurological:      General: No focal deficit present  Mental Status: He is alert and oriented to person, place, and time     Psychiatric:         Mood and Affect: Mood normal          Lab Results:   Lab Results   Component Value Date    WBC 5 03 08/19/2020    HGB 11 3 (L) 08/19/2020    HCT 32 2 (L) 08/19/2020     (H) 08/19/2020    PLT 38 (LL) 08/19/2020      No results found for: TISSUECULT, WOUNDCULT  Imaging Studies:  Reviewed as above  EKG, Pathology, and Other Studies:   Lab Results   Component Value Date/Time    FINALDX  01/07/2020 11:43 AM     A  Lung, Right Middle Lobe Bronchial Lavage, with cell count:  Negative for malignancy  Benign bronchial cells  Benign squamous cells  Pulmonary macrophages  Rare white blood cells  Satisfactory for evaluation  Cell Count    Macrophages 89%  Neutrophil       6%  Lymphocyte    5%  Eosinophil       0%  Basophil          0%        FINALDX  08/13/2018 05:47 AM     A  Lymph Node, Lymph node Level 2R, [facility] case [id number], originally collected on 4/14/2015, reaccessioned for Molecular Studies:  -  The final diagnosis reamins unchanged  Code Status: Level 3 - DNAR and DNI    Counseling / Coordination of Care  Total floor / unit time spent today 30 minutes  Greater than 50% of total time was spent with the patient and / or family counseling and / or coordination of care   A description of the counseling / coordination of care:

## 2020-08-19 NOTE — ASSESSMENT & PLAN NOTE
· At baseline, he is on 2-3 L nasal cannula supplemental O2  · Patient requiring higher oxygen needs was on 7 L now is on 15 L non-rebreather with oxygen saturation %  · Transition to high flow nasal cannula   · Patient states he feels much better with non-rebreather on and thought a lot of anxiety when was off receiving nebulizer treatment   · Likely multifactorial secondary to lung CA with acute infectious process, anemia, anxiety contributing  · Continue supplemental O2, respiratory protocol, airway clearance protocol  · Consult pulmonology  · Check ABG  · Continue to wean oxygen supplementation as tolerated  · Consult to palliative care

## 2020-08-19 NOTE — PROGRESS NOTES
Progress Note - Kimberli Sommers 1960, 63 y o  male MRN: [id number]  Unit/Bed#: E2 -01 Encounter: [id number]  Primary Care Provider: Zenia Morrissey DO   Date and time admitted to hospital: 8/17/2020 10:29 AM    PH 7 247, pCO2 87 3  I spoke with Critical Care, Jenna Baeza  Patient is mentating at this time, speaking in full sentences and does not appear acutely in respiratory distress  Will place patient on BiPAP STAT  Notified my attending  I notified Palliative care who will be seeing patient at noon today  I did re-discuss with patient code status and he continues to confirm level 3 DNR/DNI  I spoke with his wife, Yaya Lindsey on the phone and reviewed patient's worsening condition  I reviewed his continued wishes for level 3 DNR/DNI and she states they do not want any research trials or further chemo and "if he makes it through this hospital course we want to make him comfortable"  She is aware of palliative care consult and in agreement  She is leaving work to be at the hospital      Addendum: 10:30 AM, re-evalauted patient, he is tolerating BiPAP, wife and family at bedside  Mentation intact and states he feels better now  Pending on palliative Care, will decide on ABG monitoring  Spoke with Pulmonology Sunshine Harrington PA-C  Will continue to monitor closely  * Multifocal pneumonia  Assessment & Plan  · POA, patient presenting with increased shortness of breath  · CTA of the chest revealed "no PE  Increased masslike opacity in right lower lobe likely combination of tumor progression and postobstructive pneumonia  No change in multiple additional bilateral lung metastases  Extensive new patchy ground-glass opacity in both lungs greater on right which could be due to atypical infection or drug reaction "  · COVID negative in ED  Currently on antibiotics with IV cefepime and azithromycin  · Legionella and strep pneumonia urine antigens negative   · Procalcitonin was negative x2    Blood cultures negative   · Will discuss with pulmonology regarding d/c antibiotics   · continue supportive care Mucinex, respiratory protocol, airway clearance protocol, nebs  · Patient with significantly higher oxygen needs on 15 L non-rebreather, see below  · Transition to high flow nasal cannula   · consult to pulmonology   · Check ABG  · Consult to palliative care    Acute respiratory failure with hypoxia (HCC)  Assessment & Plan  · At baseline, he is on 2-3 L nasal cannula supplemental O2  · Patient requiring higher oxygen needs was on 7 L now is on 15 L non-rebreather with oxygen saturation %  · Transition to high flow nasal cannula   · Patient states he feels much better with non-rebreather on and thought a lot of anxiety when was off receiving nebulizer treatment   · Likely multifactorial secondary to lung CA with acute infectious process, anemia, anxiety contributing  · Continue supplemental O2, respiratory protocol, airway clearance protocol  · Consult pulmonology  · Check ABG  · Continue to wean oxygen supplementation as tolerated  · Consult to palliative care    Colon cancer metastasized to lung [facility])  Assessment & Plan  · History of colon cancer initially diagnosed in 2008 and is status post surgery, chemo and radiation  · Follows with Dr Nidia Traore of Hematology/Oncology outpatient  · Seen by Dr Marquita Bolivar, current treatment is discontinued, appears at only further treatment options would be research trials as he has exhausted standard therapies   · I spoke with the patient at the bedside today and he is agreeable to palliative care consult  · Appreciate ongoing support per Oncology    Chemotherapy induced neutropenia ([facility] [address] )  Assessment & Plan  WBC count 5 03 today  Monitor     Elevated d-dimer  Assessment & Plan  Found to have elevated D-dimer 1 87  CTA chest with no evidence of acute PE  Likely secondary to acute infection and cancer  Pancytopenia ([facility] [address] )  Assessment & Plan  · Likely secondary to chemo    Was on Xeloda   · Current treatment is discontinued  · Patient received 2 units PRBC transfusion 08/18/20  · Platelets 42S, hemoglobin 11 3  · Continue to monitor counts and transfuse as needed  · Will transfuse for platelets < 05,219, Hb <7 5    Lab Results   Component Value Date    HGB 11 3 (L) 08/19/2020    HGB 7 3 (L) 08/17/2020    HGB 11 4 (L) 08/12/2020    HGB 13 1 07/27/2020    HGB 12 0 06/29/2020         Elevated brain natriuretic peptide (BNP) level  Assessment & Plan  · Elevated BNP 2568  Suspect contributing to hypoxia  · Echocardiogram:  Ejection fraction 55%, moderate pulmonary hypertension, mildly dilated right ventricle, trace MR, trace ME, no pericardial effusion  · No prior history of CHF  Was not on diuretics pre-hospital  · Given 1 dose of IV Lasix 08/18/2020  · Hold off on further diuretics for now  · Monitor volume status      Hyponatremia  Assessment & Plan  · Sodium 131  · Likely secondary to IV diuretic given yesterday in combination with known metastatic cancer to the lung  · Continue to monitor electrolytes in a m  VTE Pharmacologic Prophylaxis:   Pharmacologic: Pharmacologic VTE Prophylaxis contraindicated due to Thrombocytopenia  Mechanical VTE Prophylaxis in Place: Yes    Patient Centered Rounds: I have performed bedside rounds with nursing staff today  Discussions with Specialists or Other Care Team Provider:  PulmonologyBuck texted palliative care    Education and Discussions with Family / Patient:  Spoke with the patient at the bedside, left voice message for his spouse    Time Spent for Care: 20 minutes  More than 50% of total time spent on counseling and coordination of care as described above      Current Length of Stay: 2 day(s)    Current Patient Status: Inpatient   Certification Statement: The patient will continue to require additional inpatient hospital stay due to Acute respiratory failure with hypoxia, multifocal pneumonia    Discharge Plan:  Pending clinical course    Code Status: Level 3 - DNAR and DNI      Subjective:   Patient states that he became very anxious and shortness of breath when receiving is nebulizer treatment as is non-rebreather was off  He states he just feels better with the mass on"  He is speaking in full sentences and does not appear in any respiratory distress at this time  His oxygen saturations are %  He denies any chest pain  He has a mild cough with some yellow sputum production  He denies any fevers or chills  No nausea or vomiting  I spoke at length with the patient and reiterated the conversation had with Dr Luis Cherry yesterday and he appeared to be understanding  He states he does not follow with palliative care outpatient but is agreeable to consult while here  Objective:     Vitals:   Temp (24hrs), Av 6 °F (36 4 °C), Min:97 2 °F (36 2 °C), Max:98 °F (36 7 °C)    Temp:  [97 2 °F (36 2 °C)-98 °F (36 7 °C)] 97 2 °F (36 2 °C)  HR:  [68-77] 77  Resp:  [16-18] 16  BP: (111-142)/(72-86) 142/86  SpO2:  [85 %-100 %] 96 %  Body mass index is 29 15 kg/m²  Input and Output Summary (last 24 hours): Intake/Output Summary (Last 24 hours) at 2020 0829  Last data filed at 2020 0501  Gross per 24 hour   Intake 700 ml   Output 400 ml   Net 300 ml       Physical Exam:     Physical Exam  Vitals signs and nursing note reviewed  Constitutional:       General: He is not in acute distress  Appearance: He is ill-appearing  Comments: Acute and chronically ill-appearing   HENT:      Head: Normocephalic  Eyes:      Conjunctiva/sclera: Conjunctivae normal    Cardiovascular:      Rate and Rhythm: Normal rate and regular rhythm  Pulmonary:      Effort: No respiratory distress  Breath sounds: Rhonchi present  No wheezing  Comments: On 15 L non-rebreather, decreased breath sounds throughout lung fields  Abdominal:      General: Bowel sounds are normal       Palpations: Abdomen is soft     Musculoskeletal: Right lower leg: No edema  Left lower leg: No edema  Neurological:      General: No focal deficit present  Mental Status: He is alert  Comments: Responds to questions appropriately   Psychiatric:         Mood and Affect: Mood normal       Comments: Anxious            Additional Data:     Labs:    Results from last 7 days   Lab Units 08/19/20  0533   WBC Thousand/uL 5 03   HEMOGLOBIN g/dL 11 3*   HEMATOCRIT % 32 2*   PLATELETS Thousands/uL 38*   NEUTROS PCT % 78*   LYMPHS PCT % 9*   MONOS PCT % 8   EOS PCT % 4     Results from last 7 days   Lab Units 08/19/20  0533 08/17/20  1135   SODIUM mmol/L 131* 134*   POTASSIUM mmol/L 3 9 4 2   CHLORIDE mmol/L 92* 95*   CO2 mmol/L 35* 35*   BUN mg/dL 10 15   CREATININE mg/dL 0 55* 0 68   ANION GAP mmol/L 4 4   CALCIUM mg/dL 8 0* 8 5   ALBUMIN g/dL  --  2 5*   TOTAL BILIRUBIN mg/dL  --  0 85   ALK PHOS U/L  --  90   ALT U/L  --  22   AST U/L  --  24   GLUCOSE RANDOM mg/dL 100 83     Results from last 7 days   Lab Units 08/17/20  1135   INR  1 07             Results from last 7 days   Lab Units 08/18/20  0631 08/17/20  1135   LACTIC ACID mmol/L  --  0 6   PROCALCITONIN ng/ml 0 19 0 18           * I Have Reviewed All Lab Data Listed Above  * Additional Pertinent Lab Tests Reviewed: All Labs Within Last 24 Hours Reviewed    Imaging:    Imaging Reports Reviewed Today Include: reviewed  Imaging Personally Reviewed by Myself Includes:      Recent Cultures (last 7 days):     Results from last 7 days   Lab Units 08/17/20  2020 08/17/20  1935 08/17/20  1135 08/17/20  1109   BLOOD CULTURE   --   --  No Growth at 24 hrs  No Growth at 24 hrs     GRAM STAIN RESULT  No Polys or Bacteria seen  --   --   --    LEGIONELLA URINARY ANTIGEN   --  Negative  --   --        Last 24 Hours Medication List:   Current Facility-Administered Medications   Medication Dose Route Frequency Provider Last Rate    acetaminophen  650 mg Oral Q6H PRN Zenaida Moscoso PA-C      albuterol  2 5 mg Nebulization Q4H PRN Tyler Troncoso MD      albuterol  2 5 mg Nebulization TID Tyler Troncoso MD      ascorbic acid  1,000 mg Oral Daily Dolph Aschoff, PA-C      azithromycin  500 mg Oral Q24H Tyler Troncoso MD      benzonatate  100 mg Oral TID PRN Moris Hay PA-C      cefepime  2,000 mg Intravenous Q12H Kelly Busby PA-C 2,000 mg (08/18/20 9571)    docusate sodium  100 mg Oral BID Dolph Aschoff, PA-C      fluconazole  100 mg Oral Daily Kelly Busby PA-C      guaiFENesin  600 mg Oral Q12H [address] Kelly Busby PA-C      melatonin  6 mg Oral HS PRN Moris Hay PA-C          Today, Patient Was Seen By: Yonathan Baxter PA-C    ** Please Note: Dictation voice to text software may have been used in the creation of this document   **

## 2020-08-19 NOTE — ASSESSMENT & PLAN NOTE
· Elevated BNP 2568  Suspect contributing to hypoxia  · Echocardiogram:  Ejection fraction 55%, moderate pulmonary hypertension, mildly dilated right ventricle, trace MR, trace [place], no pericardial effusion  · No prior history of CHF    Was not on diuretics pre-hospital  · Given 1 dose of IV Lasix 08/18/2020  · Hold off on further diuretics for now  · Monitor volume status

## 2020-08-19 NOTE — CONSULTS
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)    Dyspnea on exertion    Port-A-Cath in place    Esophageal dysphagia    Pneumonia due to infectious organism    Acute respiratory failure with hypoxia (HCC)    Sepsis (HCC)    Neutropenic fever (HCC)    Thrombocytopenia (HCC)    Acute kidney injury ([facility] )    Drug rash    Multifocal pneumonia    Chemotherapy induced nausea and vomiting    Other headache syndrome    Chemotherapy induced neutropenia (HCC)    Brain metastases (HCC)    Atrial fibrillation (HCC)    Elevated d-dimer    Pancytopenia (HCC)    Elevated brain natriuretic peptide (BNP) level    Hyponatremia     59M with stage 4 colon cancer (to brain, lungs, mediastinal LNs), admitted with acute hypoxic respiratory failure 2/2 postobstructive PNA from known lung tumor as well as extensive new patchy GGO throughout both lungs  Covid-19 negative  PLAN:    1  Goals:    Spoke to patient and his wife/Alvina who was present at bedside and introduced palliative care  The patient allowed Raymond Gonzalez to talk for him since he is tired from dyspnea and was on the BiPAP  She asked if I was the same as hospice  I asked is she is considering hospice since she asked and she said that at this point, it seems to be the best thing to do  She tells me that they have decided not to pursue any more cancer-directed therapies regardless of whether he survives this hospital stay or not   They reiterated DNAR/DNI code level status with me     At one point, Selene Yang looked at his wife and said "I know this is the end " When I asked him what he meant he said "I know because the fight is gone"  I asked him if he is scared, he said no  He has been fighting for 15 years  I told him he did a very good job fighting  He said he had a happy life  He has a loving wife, 2 children, and 2 dogs  I asked him if he'd like to speak to a  to which he said no  His wife appears very supportive  She is understandably sad and tearful   We spent time discussing hospice cares  Rock Conte knows about hospice from previous experience with her father  She said hospice came 3 days before he passed away  She is unclear as to "when" he needs to start hospice   I explained that just his underlying widespread cancer alone makes him eligible to receive/"start" hospice cares  Their decision to stop pursuing cancer treatments and his acute medical condition make hospice even more appropriate  It is just a matter of when they are ready to begin hospice services   We spoke about the difference between starting comfort services NOW vs At home  Ideally, they want to make it home  However, we discussed how this may not be possible given his high oxygen needs (10L oxygen is the maximum we can go for oxygen at home hospice) which may preclude safety/stability for transport to home  We will pursue current treatments for optimization for possible hospice at home  The challenge with this is his PNA is postobstructive, largely driven by his tumor  Current medical management may not be enough  But regardless, we will continue to try  Otherwise, if he does not improve or if he continues to fail, we can and should begin comfort cares while in the hospital  They are both agreeable  They are also both agreeable to starting low dose liquid oxycodone and ativan for SOB   For now, continue current cares  Their one daughter is flying in from [place] and will arrive tonight around midnight   I have advised them to have loved ones talk to him/spend time with him whether in person or virtually, sooner rather than later as prognosis is tenuous  As such, will keep him on BiPAP at least until he gets the chance to do so  Once okay, we can try to wean him off of BiPAP to at least 10L or lower if we can  Goal is to see if we can discharge stable to home to receive hospice cares   If above not possible, we can aim for the IPU   Hospice referral to [address] [place] [address] placed in the hopes of home hospice and also to provide information to wife   We will continue to follow   Discussed with Dr Aleksey Mora who joined the conversation, Rock Lowers and patient's RN  Code status: Level 3 - DNAR and DNI   Decisional apparatus:  Patient does have capacity to make medical decisions on my exam today  If such capacity is lost, patient's substitute decision maker would default to wife/Alvina by [place]  Advance Directive / Living Will / POLST:  None on file    2  Social Support:   Supportive listening provided  3  Symptom management:   Start oxycodone 5mg tab PO q4H prn for SOB   Start ativan 0 5mg tablet q4H prn for SOB/Anxiety causing SOB   Will also place IV dilaudid and ativan in case his SOB precludes him from swallowing above    Controlled Substance Review    [place]MP or [place]  reviewed: No red flags were identified; safe to proceed with prescription         I have reviewed the patient's controlled substance dispensing history in the Prescription Drug Monitoring Program in compliance with the [facility] regulations before prescribing any controlled substances  We appreciate the opportunity to participate in this patient's care  We will continue to follow  Please do not hesitate to contact our on-call provider through our clinic answering service at [phone] should you have acute symptom control concerns      IDENTIFICATION:  Inpatient consult to Palliative Care  Consult performed by: Sole Yee MD  Consult ordered by: Sunshine Wesley PA-C        Reason for Consult / Principal Problem: assistance with goals setting    HISTORY OF PRESENT ILLNESS:    Sally Flynn is a 63 y o  male with stage IV colon cancer to the brain, lungs and intrathoracic LNs and chronic hypoxic respiratory failure on 2-3L NC  Unfortunately, he manifested with disease progression despite appropriate treatments  His fight with cancer had been for the last 15 years  Reportedly continued to decline  Admitted on 8/17 from home for increasing dyspnea especially with exertion  He was found to have increase in the size of the known mass in the RLL causing postobstructive PNA and new extensive GGOs in both lungs  He tested negative for Covid-19  He was started on antibiotics for HCAP and placed on 5L NC  He was given diuretics for presumed superimposed CHF  On 8/19, his acute hypoxic respitaory failure worsened necessitating increase in oxygen supplementations to BiPAP  [place] consulted for [address]  I met with him and his wife/Alvina in his room  He was AAO x 3 on the BiPAP  His breathing was not labored on the BiPAP  He admits that his breathing is much better compared to this morning  Was able to participate in conversation  Rest of conversation as above  Interview and exam limited by: none    Review of Systems   Constitutional: Positive for activity change, appetite change and fatigue  Negative for chills and fever  HENT: Negative for trouble swallowing  Respiratory: Positive for shortness of breath  Negative for chest tightness  Cardiovascular: Negative for chest pain  Gastrointestinal: Negative for abdominal pain, constipation, diarrhea, nausea and vomiting  Psychiatric/Behavioral: Negative for sleep disturbance  The patient is not nervous/anxious  All other systems reviewed and are negative        Past Medical History:   Diagnosis Date    [facility]     Colon cancer (HCC)     Dyspnea on exertion     Malignant ne[facility] of ascending colon [facility]     Metastatic carcinoma to lung (HCC)     Pneumonitis     Pulmonary nodules     Shortness of breath      Past Surgical History:   Procedure Laterality Date    ANTERIOR CRUCIATE LIGAMENT REPAIR      ANTERIOR CRUCIATE LIGAMENT REPAIR      2  on R knee    APPENDECTOMY      ATRIAL ABLATION SURGERY      BACK SURGERY      COLON SURGERY      Ascending and transverse colon    COLONOSCOPY      LUNG REMOVAL, PARTIAL      LUNG SEGMENTECTOMY Right 2/26/2020    Procedure: RESECTION WEDGE LUNG;  Surgeon: Kay Motta MD;  [facility];  Service: Thoracic    OH THORACOSCOPY W/THERA WEDGE RESEXN INITIAL UNILAT Right 2/26/2020    Procedure: THORACOSCOPY VIDEO ASSISTED SURGERY (VATS); Surgeon: Kay Motta MD;  [facility];  Service: Thoracic    RIGHT COLECTOMY      and Transverse colon resection    THORACOTOMY       Social History     Socioeconomic History    Marital status: /Civil Union     Spouse name: Not on file    Number of children: 2    Years of education: Not on file    Highest education level: Bachelor's degree (e g , BA, AB, BS)   Occupational History    Occupation: Disabled   Social Needs    Financial resource strain: Not on file    Food insecurity     Worry: Not on file     Inability: Not on file   [employer] needs     Medical: Not on file     Non-medical: Not on file   Tobacco Use    Smoking status: Former Smoker     Years: 10 00     Types: Cigars     Start date: 2000     Last attempt to quit: 2010     Years since quitting: 10 6    Smokeless tobacco: Never Used    Tobacco comment: Cigars    Substance and Sexual Activity    Alcohol use:  Yes     Alcohol/week: 8 0 standard drinks     Types: 8 Cans of beer per week     Frequency: 2-3 times a week     Drinks per session: 1 or 2     Binge frequency: Never    Drug use: No    Sexual activity: Not on file   Lifestyle    Physical activity     Days per week: Not on file     Minutes per session: Not on file    Stress: Not on file   Relationships    Social connections Talks on phone: Not on file     Gets together: Not on file     Attends [employer] service: Not on file     Active member of club or organization: Not on file     Attends meetings of clubs or organizations: Not on file     Relationship status: Not on file    Intimate partner violence     Fear of current or ex partner: Not on file     Emotionally abused: Not on file     Physically abused: Not on file     Forced sexual activity: Not on file   Other Topics Concern    Not on file   Social History Narrative    Patient not questioned about family hx      Family History   Problem Relation Age of Onset    Diabetes Mother     Heart disease Mother     Heart disease Father     Skin cancer Sister        MEDICATIONS / ALLERGIES:  all current active meds have been reviewed    Allergies   Allergen Reactions    Aspirin Other (See Comments)     Nose bleed    Sulfa Antibiotics Other (See Comments)     Dizzy        OBJECTIVE:  /86 (BP Location: Left arm)   Pulse 77   Temp (!) 97 2 °F (36 2 °C)   Resp 16   Ht 6' 2" (1 88 m)   Wt 103 kg (227 lb 1 2 oz)   SpO2 94%   BMI 29 15 kg/m²   Physical Exam:  Constitutional: Appears thin, well-developed, chronically ill appearing, slightly toxic-looking (appears worse before BiPAP)  In no acute physical or emotional distress  Head: Normocephalic and atraumatic  Eyes: EOM are normal  No ocular discharge  No scleral icterus  Neck: No visible adenopathy or masses  Respiratory: Effort seems more normal on the BIPAP  No stridor  No respiratory distress on the BiPAP  Gastrointestinal: No abdominal distension  Musculoskeletal: No edema  Neurological: Alert, oriented and appropriately conversant  Skin: Dry, no diaphoresis  Pale  Psychiatric: Displays a normal mood and affect  Behavior, judgment and thought content appear normal      Lab Results: I have personally reviewed pertinent labs  Imaging Studies: I have personally reviewed pertinent reports      EKG, Pathology, and Other Studies: I have personally reviewed pertinent reports  Counseling / Coordination of Care:  Counseling / Coordination of Care  Total floor / unit time spent today 60 minutes  Greater than 50% of total time was spent with the patient and / or family counseling and / or coordination of care  A description of the counseling / coordination of care: provided medical updates, discussed palliative care, discussed hospice care, determined competency, determined goals of care, determined POA, determined social/family support, discussed plans of care, discussed symptom management, provided psychosocial support       Felipa Aparicio MD  [address] and Supportive Care  [phone]

## 2020-08-20 LAB
ANION GAP SERPL CALCULATED.3IONS-SCNC: 3 MMOL/L (ref 4–13)
BACTERIA SPT RESP CULT: NORMAL
BACTERIA SPT RESP CULT: NORMAL
BASOPHILS # BLD AUTO: 0.01 THOUSANDS/ΜL (ref 0–0.1)
BASOPHILS NFR BLD AUTO: 0 % (ref 0–1)
BUN SERPL-MCNC: 9 MG/DL (ref 5–25)
CALCIUM SERPL-MCNC: 8.1 MG/DL (ref 8.3–10.1)
CHLORIDE SERPL-SCNC: 89 MMOL/L (ref 100–108)
CO2 SERPL-SCNC: 38 MMOL/L (ref 21–32)
CREAT SERPL-MCNC: 0.65 MG/DL (ref 0.6–1.3)
EOSINOPHIL # BLD AUTO: 0.16 THOUSAND/ΜL (ref 0–0.61)
EOSINOPHIL NFR BLD AUTO: 4 % (ref 0–6)
ERYTHROCYTE [DISTWIDTH] IN BLOOD BY AUTOMATED COUNT: 18.6 % (ref 11.6–15.1)
GFR SERPL CREATININE-BSD FRML MDRD: 107 ML/MIN/1.73SQ M
GLUCOSE SERPL-MCNC: 89 MG/DL (ref 65–140)
GRAM STN SPEC: NORMAL
HCT VFR BLD AUTO: 32.3 % (ref 36.5–49.3)
HGB BLD-MCNC: 11.1 G/DL (ref 12–17)
IMM GRANULOCYTES # BLD AUTO: 0.04 THOUSAND/UL (ref 0–0.2)
IMM GRANULOCYTES NFR BLD AUTO: 1 % (ref 0–2)
LYMPHOCYTES # BLD AUTO: 0.49 THOUSANDS/ΜL (ref 0.6–4.47)
LYMPHOCYTES NFR BLD AUTO: 11 % (ref 14–44)
MCH RBC QN AUTO: 34.9 PG (ref 26.8–34.3)
MCHC RBC AUTO-ENTMCNC: 34.4 G/DL (ref 31.4–37.4)
MCV RBC AUTO: 102 FL (ref 82–98)
MONOCYTES # BLD AUTO: 0.42 THOUSAND/ΜL (ref 0.17–1.22)
MONOCYTES NFR BLD AUTO: 10 % (ref 4–12)
NEUTROPHILS # BLD AUTO: 3.22 THOUSANDS/ΜL (ref 1.85–7.62)
NEUTS SEG NFR BLD AUTO: 74 % (ref 43–75)
NRBC BLD AUTO-RTO: 0 /100 WBCS
PLATELET # BLD AUTO: 45 THOUSANDS/UL (ref 149–390)
PMV BLD AUTO: 9.8 FL (ref 8.9–12.7)
POTASSIUM SERPL-SCNC: 4.2 MMOL/L (ref 3.5–5.3)
RBC # BLD AUTO: 3.18 MILLION/UL (ref 3.88–5.62)
SODIUM SERPL-SCNC: 130 MMOL/L (ref 136–145)
WBC # BLD AUTO: 4.34 THOUSAND/UL (ref 4.31–10.16)

## 2020-08-20 PROCEDURE — 99232 SBSQ HOSP IP/OBS MODERATE 35: CPT | Performed by: INTERNAL MEDICINE

## 2020-08-20 PROCEDURE — 94660 CPAP INITIATION&MGMT: CPT

## 2020-08-20 PROCEDURE — 94760 N-INVAS EAR/PLS OXIMETRY 1: CPT

## 2020-08-20 PROCEDURE — 80048 BASIC METABOLIC PNL TOTAL CA: CPT | Performed by: PHYSICIAN ASSISTANT

## 2020-08-20 PROCEDURE — 94640 AIRWAY INHALATION TREATMENT: CPT

## 2020-08-20 PROCEDURE — 85025 COMPLETE CBC W/AUTO DIFF WBC: CPT | Performed by: PHYSICIAN ASSISTANT

## 2020-08-20 RX ORDER — HYDROMORPHONE HCL/PF 1 MG/ML
0.2 SYRINGE (ML) INJECTION EVERY 2 HOUR PRN
Status: DISCONTINUED | OUTPATIENT
Start: 2020-08-20 | End: 2020-08-21

## 2020-08-20 RX ORDER — METHYLPREDNISOLONE SODIUM SUCCINATE 40 MG/ML
40 INJECTION, POWDER, LYOPHILIZED, FOR SOLUTION INTRAMUSCULAR; INTRAVENOUS EVERY 8 HOURS SCHEDULED
Status: DISCONTINUED | OUTPATIENT
Start: 2020-08-20 | End: 2020-08-21

## 2020-08-20 RX ADMIN — METHYLPREDNISOLONE SODIUM SUCCINATE 40 MG: 40 INJECTION, POWDER, FOR SOLUTION INTRAMUSCULAR; INTRAVENOUS at 15:59

## 2020-08-20 RX ADMIN — GUAIFENESIN 600 MG: 600 TABLET, EXTENDED RELEASE ORAL at 20:37

## 2020-08-20 RX ADMIN — HYDROMORPHONE HYDROCHLORIDE 0.2 MG: 1 INJECTION, SOLUTION INTRAMUSCULAR; INTRAVENOUS; SUBCUTANEOUS at 20:37

## 2020-08-20 RX ADMIN — CEFEPIME HYDROCHLORIDE 2000 MG: 2 INJECTION, POWDER, FOR SOLUTION INTRAVENOUS at 23:58

## 2020-08-20 RX ADMIN — GUAIFENESIN 600 MG: 600 TABLET, EXTENDED RELEASE ORAL at 08:59

## 2020-08-20 RX ADMIN — ALBUTEROL SULFATE 2.5 MG: 2.5 SOLUTION RESPIRATORY (INHALATION) at 13:06

## 2020-08-20 RX ADMIN — LORAZEPAM 1 MG: 1 TABLET ORAL at 08:59

## 2020-08-20 RX ADMIN — LORAZEPAM 1 MG: 2 INJECTION INTRAMUSCULAR; INTRAVENOUS at 23:58

## 2020-08-20 RX ADMIN — DOCUSATE SODIUM 100 MG: 100 CAPSULE, LIQUID FILLED ORAL at 08:59

## 2020-08-20 RX ADMIN — OXYCODONE HYDROCHLORIDE AND ACETAMINOPHEN 1000 MG: 500 TABLET ORAL at 08:59

## 2020-08-20 RX ADMIN — ALBUTEROL SULFATE 2.5 MG: 2.5 SOLUTION RESPIRATORY (INHALATION) at 19:53

## 2020-08-20 RX ADMIN — OXYCODONE HYDROCHLORIDE 5 MG: 5 TABLET ORAL at 01:41

## 2020-08-20 RX ADMIN — OXYCODONE HYDROCHLORIDE 5 MG: 5 TABLET ORAL at 05:40

## 2020-08-20 RX ADMIN — CEFEPIME HYDROCHLORIDE 2000 MG: 2 INJECTION, POWDER, FOR SOLUTION INTRAVENOUS at 01:41

## 2020-08-20 RX ADMIN — HYDROMORPHONE HYDROCHLORIDE 0.2 MG: 1 INJECTION, SOLUTION INTRAMUSCULAR; INTRAVENOUS; SUBCUTANEOUS at 16:08

## 2020-08-20 RX ADMIN — CEFEPIME HYDROCHLORIDE 2000 MG: 2 INJECTION, POWDER, FOR SOLUTION INTRAVENOUS at 13:00

## 2020-08-20 RX ADMIN — LORAZEPAM 0.25 MG: 2 INJECTION INTRAMUSCULAR; INTRAVENOUS at 16:08

## 2020-08-20 RX ADMIN — AZITHROMYCIN 500 MG: 250 TABLET, FILM COATED ORAL at 15:59

## 2020-08-20 RX ADMIN — ALBUTEROL SULFATE 2.5 MG: 2.5 SOLUTION RESPIRATORY (INHALATION) at 07:37

## 2020-08-20 RX ADMIN — METHYLPREDNISOLONE SODIUM SUCCINATE 40 MG: 40 INJECTION, POWDER, FOR SOLUTION INTRAMUSCULAR; INTRAVENOUS at 23:58

## 2020-08-20 RX ADMIN — FLUCONAZOLE 100 MG: 100 TABLET ORAL at 08:59

## 2020-08-20 NOTE — PROGRESS NOTES
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)    Dyspnea on exertion    Port-A-Cath in place    Esophageal dysphagia    Pneumonia due to infectious organism    Acute respiratory failure with hypoxia (HCC)    Sepsis (HCC)    Neutropenic fever (HCC)    Thrombocytopenia (HCC)    [facility] ([facility] )    Drug rash    Multifocal pneumonia    Chemotherapy induced nausea and vomiting    Other headache syndrome    Chemotherapy induced neutropenia (HCC)    Brain metastases (HCC)    Atrial fibrillation (HCC)    Elevated d-dimer    Pancytopenia (HCC)    Elevated brain natriuretic peptide (BNP) level    Hyponatremia    Respiratory acidosis         Plan:  1  Symptom management -    - Increase availably of IV Dilaudid to q2H PRN for dyspnea   - Continue other comfort medications as ordered    2  Goals - Trending towards hospice, hopeful to medically optimize to be off of BiPAP and return home  3  Psychosocial support- supportive listening provided  All questions/concerns addressed to their satisfaction  D/W Dr Jason Will PA-C, [place], MAGY- hospice liaison     Interval history:       Patient remains on BiPAP, family at bedside  They re-iterate their goal to optimize to get home with hospice cares  Patient states the 1 mg of Ativan was too strong but is willing to try a lower dose       MEDICATIONS / ALLERGIES:     all current active meds have been reviewed and current meds:   Current Facility-Administered Medications   Medication Dose Route Frequency    acetaminophen (TYLENOL) tablet 650 mg  650 mg Oral Q6H PRN    albuterol inhalation solution 2 5 mg  2 5 mg Nebulization Q4H PRN    albuterol inhalation solution 2 5 mg  2 5 mg Nebulization TID    ascorbic acid (VITAMIN C) tablet 1,000 mg  1,000 mg Oral Daily    azithromycin (ZITHROMAX) tablet 500 mg  500 mg Oral Q24H    benzonatate (TESSALON PERLES) capsule 100 mg  100 mg Oral TID PRN    cefepime (MAXIPIME) 2,000 mg in dextrose 5 % 50 mL IVPB  2,000 mg Intravenous Q12H    docusate sodium (COLACE) capsule 100 mg  100 mg Oral BID    fluconazole (DIFLUCAN) tablet 100 mg  100 mg Oral Daily    guaiFENesin (MUCINEX) 12 hr tablet 600 mg  600 mg Oral Q12H ADEBAYO    HYDROmorphone (DILAUDID) injection 0 2 mg  0 2 mg Intravenous Q4H PRN    LORazepam (ATIVAN) injection 1 mg  1 mg Intravenous Q4H PRN    LORazepam (ATIVAN) tablet 1 mg  1 mg Oral Q4H PRN    melatonin tablet 6 mg  6 mg Oral HS PRN    oxyCODONE (ROXICODONE) IR tablet 5 mg  5 mg Oral Q4H PRN       Allergies   Allergen Reactions    Aspirin Other (See Comments)     Nose bleed    Sulfa Antibiotics Other (See Comments)     Dizzy        OBJECTIVE:    Physical Exam  Physical Exam  Vitals signs and nursing note reviewed  Constitutional:       General: He is not in acute distress  Appearance: He is ill-appearing  HENT:      Head: Normocephalic and atraumatic  Right Ear: External ear normal       Left Ear: External ear normal       Nose: Nose normal    Eyes:      General: No scleral icterus  Right eye: No discharge  Left eye: No discharge  Cardiovascular:      Rate and Rhythm: Normal rate and regular rhythm  Pulmonary:      Effort: Pulmonary effort is normal  No respiratory distress  Comments: On BiPAP  Abdominal:      General: Bowel sounds are normal  There is no distension  Palpations: Abdomen is soft  Skin:     General: Skin is warm and dry  Coloration: Skin is pale  Neurological:      Mental Status: He is alert and oriented to person, place, and time     Psychiatric:         Mood and Affect: Mood normal          Behavior: Behavior normal          Thought Content: Thought content normal          Judgment: Judgment normal          Lab Results:   I have personally reviewed pertinent labs  , CBC:   Lab Results   Component Value Date    WBC 4 34 08/20/2020    HGB 11 1 (L) 08/20/2020    HCT 32 3 (L) 08/20/2020     (H) 08/20/2020    PLT 45 (LL) 08/20/2020    MCH 34 9 (H) 08/20/2020    MCHC 34 4 08/20/2020    RDW 18 6 (H) 08/20/2020    MPV 9 8 08/20/2020    NRBC 0 08/20/2020   , CMP:   Lab Results   Component Value Date    SODIUM 130 (L) 08/20/2020    K 4 2 08/20/2020    CL 89 (L) 08/20/2020    CO2 38 (H) 08/20/2020    BUN 9 08/20/2020    CREATININE 0 65 08/20/2020    CALCIUM 8 1 (L) 08/20/2020    EGFR 107 08/20/2020   , PT/PTT:No results found for: PT, PTT  Imaging Studies: reviewed  EKG, Pathology, and Other Studies: reviewed    Counseling / Coordination of Care    Total floor / unit time spent today 25+ minutes  Greater than 50% of total time was spent with the patient and / or family counseling and / or coordination of care  A description of the counseling / coordination of care: chart review, discussion with care team, goals of care, symptom review and medication adjustments

## 2020-08-20 NOTE — PHYSICAL THERAPY NOTE
Physical Therapy Cancellation Note- pt on bipap, possible transition to hospice soon  Will follow until decision finalized   Katlin Cristobal, PT

## 2020-08-20 NOTE — OCCUPATIONAL THERAPY NOTE
Occupational Therapy         Patient Name: Marlon Ruiz  [id number] Date: 8/20/2020    MD's orders received  Pt with plans for hospice  Acute OT tx not indicated at this time  Will d/c OT order   Pari Mejía OT

## 2020-08-20 NOTE — SOCIAL WORK
Hospice liaison met with pt and his wife  CM touched base with her and pt and spouse would like to go home with hospice when appropriate  CM spoke with MD who will keep pt on IV abx for a few more days  CM will provide spouse with non-skilled home health care list for supplemental help at home  CM continuing to follow

## 2020-08-20 NOTE — HOSPICE NOTE
Met with wife and daughter this morning  Explained hospice care and services  Also explained our inpatient unit  Patient's wish is to go home  Will continue to follow as we are trying to wean off BIPAP to 10 liters which we can accommodate at home

## 2020-08-20 NOTE — PROGRESS NOTES
Progress Note - Pulmonary   Sg Ramos 63 y o  male MRN: [id number]  Unit/Bed#: E2 -01 Encounter: [id number]      Assessment/ Plan :  1  Acute on chronic hypoxic respiratory failure  · Continue BiPAP at 12/5 with a rate of 14 and 16%  Patient is getting tidal volumes of 350-400 and minute ventilation of 7  He appears comfortable on the settings  Continue to titrate FiO2/BiPAP settings to transition patient to nasal cannula  Maintain oxygen saturations greater than equal to 88%  · Continue albuterol t i d  For pulmonary toileting  · Patient is a DNR DNI  Hospice consult is pending  Further recommendations pending hospital course  2  Multifocal pneumonia  · But likely postobstructive pneumonia  · Continue IV cefepime and azithromycin  · Procalcitonin is normal from 08/18/2020  · Sputum culture shows mixed respiratory eugenio  · Strep and Legionella urinary antigens are negative  · Coronavirus is negative  · Blood culture show no growth at 48 hours    3  Abnormal CT scan of the chest  · No pulmonary embolus seen, there is an increasing masslike opacity in the right lower lobe likely combination of triggers for depression and possible postobstructive pneumonia  No changes in multiple additional bilateral lung metastases  There is an extensive new patchy ground-glass opacity in both lungs right greater than left which could be secondary to atypical infection or drug reaction  · Continue antibiotics as listed above    4  Advanced colon cancer with metastasis to the lung  · Hematology/oncology is following, appreciate their input  · Palliative care is following  · Hospice is consulted  · Patient unfortunately has significant progression of his disease on radiographic images and via serology  · Plan to have patient attempt to be weaned from BiPAP to oxygen so further decisions can be made on a patient is a candidate for home hospice versus inpatient hospice    Also further discussions need to be made with the family  Patient is currently requiring 60% via the BiPAP in order to maintain adequate oxygen saturations and for comfort  Will continue antibiotics and see how patient progresses with oxygen requirements  Subjective:   Mr Anh Hawley is lying in bed on BiPAP upon my assessment today  He does not appear to be in any distress  He does open his eyes to stimulation  Further review of systems unable to be obtained as patient is on BiPAP device  He is getting adequate oxygen saturations from current settings of 12/5 and 60%  He has adequate tidal volumes around 400  Objective:  Vitals: Blood pressure 132/78, pulse 86, temperature 98 °F (36 7 °C), temperature source Temporal, resp  rate 20, height 6' 2" (1 88 m), weight 103 kg (227 lb 1 2 oz), SpO2 94 %  , BiPAP 12/5 and 60%  , Body mass index is 29 15 kg/m²  Intake/Output Summary (Last 24 hours) at 8/20/2020 0903  Last data filed at 8/19/2020 1701  Gross per 24 hour   Intake    Output 15 ml   Net -15 ml         Physical Exam  Gen:  Patient is resting on BiPAP upon my evaluation today  He does open his eyes to stimulation  He does appear comfortable and is in no acute distress  HEENT: Mucous membranes moist, no oral lesions, no thrush  NECK: No accessory muscle use, JVP not elevated  Cardiac: Regular, single S1, single S2, no murmurs, no rubs, no gallops  Lungs:  Breath sounds are decreased in the bilateral lower lobes otherwise clear to auscultation throughout all lung fields without any wheezes, rales, rhonchi  Abdomen: normoactive bowel sounds, soft nontender, nondistended, no rebound or rigidity, no guarding  Extremities: no cyanosis, no clubbing, no edema    Labs: I have personally reviewed pertinent lab results  , ABG: No results found for: PHART, ODE9UYP, PO2ART, KOS4TCO, O8NEDZKN, BEART, SOURCE, BNP: No results found for: BNP, CBC:   Lab Results   Component Value Date    WBC 4 34 08/20/2020    HGB 11 1 (L) 08/20/2020    HCT 32 3 (L) 08/20/2020     (H) 08/20/2020    PLT 45 (LL) 08/20/2020    MCH 34 9 (H) 08/20/2020    MCHC 34 4 08/20/2020    RDW 18 6 (H) 08/20/2020    MPV 9 8 08/20/2020    NRBC 0 08/20/2020   , CMP:   Lab Results   Component Value Date    SODIUM 130 (L) 08/20/2020    K 4 2 08/20/2020    CL 89 (L) 08/20/2020    CO2 38 (H) 08/20/2020    BUN 9 08/20/2020    CREATININE 0 65 08/20/2020    CALCIUM 8 1 (L) 08/20/2020    EGFR 107 08/20/2020   , PT/INR: No results found for: PT, INR, Troponin: No results found for: TROPONINI     Imaging and other studies: I have personally reviewed pertinent reports  CT scan chest:  FINDINGS:     PULMONARY ARTERIAL TREE:  No pulmonary embolus       LUNGS:  Marked increase in masslike opacity in the right lower lobe from 7 3 x 5 2 cm to 12 2 x 7 7 cm (3/98)      No definite change in innumerable bilateral lung nodules and masses  Extensive new patchy groundglass opacity throughout both lungs, greater on the right      Bilateral wedge resections  Paraseptal emphysema      PLEURA:  No change in small right pleural effusion and trace loculated left apical effusion        HEART/GREAT VESSELS:  Mild coronary artery calcification indicating atherosclerotic heart disease  Redemonstration of occlusion of the right inferior pulmonary vein and narrowing of the right interlobar pulmonary artery      MEDIASTINUM AND ZABRINA:  Right port at cavoatrial junction      CHEST WALL AND LOWER NECK:   Unremarkable      VISUALIZED STRUCTURES IN THE UPPER ABDOMEN:  Unremarkable      OSSEOUS STRUCTURES:  No acute fracture or destructive osseous lesion      IMPRESSION:     No pulmonary embolus      Increase in masslike opacity in the right lower lobe, likely a combination of tumor progression and postobstructive pneumonia    No change in multiple additional bilateral lung metastases      Extensive new patchy groundglass opacity in both lungs, greater on the right, which could be due to atypical infection or drug reaction      No change in small right and trace left pleural effusion      The study was marked in EPIC for immediate notification      Kimi Jay PA-C

## 2020-08-20 NOTE — PLAN OF CARE
Problem: Potential for Falls  Goal: Patient will remain free of falls  Description: INTERVENTIONS:  - Assess patient frequently for physical needs  -  Identify cognitive and physical deficits and behaviors that affect risk of falls  -  [place] fall precautions as indicated by assessment   - Educate patient/family on patient safety including physical limitations  - Instruct patient to call for assistance with activity based on assessment  - Modify environment to reduce risk of injury  - Consider OT/PT consult to assist with strengthening/mobility  Outcome: Progressing     Problem: RESPIRATORY - ADULT  Goal: Achieves optimal ventilation and oxygenation  Description: INTERVENTIONS:  - Assess for changes in respiratory status  - Assess for changes in mentation and behavior  - Position to facilitate oxygenation and minimize respiratory effort  - Oxygen administered by appropriate delivery if ordered  - Encourage broncho-pulmonary hygiene including cough, deep breathe, Incentive Spirometry  - Assess the need for suctioning and aspirate as needed  - Assess and instruct to report SOB or any respiratory difficulty  - Respiratory Therapy support as indicated  Outcome: Progressing     Problem: HEMATOLOGIC - ADULT  Goal: Maintains hematologic stability  Description: INTERVENTIONS  - Assess for signs and symptoms of bleeding or hemorrhage  - Monitor labs  - Administer supportive blood products/factors as ordered and appropriate  Outcome: Progressing     Problem: SAFETY ADULT  Goal: Patient will remain free of falls  Description: INTERVENTIONS:  - Assess patient frequently for physical needs  -  Identify cognitive and physical deficits and behaviors that affect risk of falls    -  [place] fall precautions as indicated by assessment   - Educate patient/family on patient safety including physical limitations  - Instruct patient to call for assistance with activity based on assessment  - Modify environment to reduce risk of injury  - Consider OT/PT consult to assist with strengthening/mobility  Outcome: Progressing     Problem: Knowledge Deficit  Goal: Patient/family/caregiver demonstrates understanding of disease process, treatment plan, medications, and discharge instructions  Description: Complete learning assessment and assess knowledge base    Interventions:  - Provide teaching at level of understanding  - Provide teaching via preferred learning methods  Outcome: Progressing

## 2020-08-20 NOTE — PROGRESS NOTES
Progress Note - Benji Peralta 63 y o  male MRN: [id number]    Unit/Bed#: E2 -01 Encounter: [id number]      Subjective: The patient is a bit sleepy at the moment  He recently had a dose of Ativan  Overall he has been comfortable  He has been tolerating BiPAP well  He has had no chest pain, nausea, or vomiting  Physical Exam:   Temp:  [98 °F (36 7 °C)-98 6 °F (37 °C)] 98 °F (36 7 °C)  HR:  [76-86] 86  Resp:  [18-20] 20  BP: (117-132)/(78-80) 132/78    Gen:  Well-developed, well-nourished, in no distress  Neck:  Supple  No lymphadenopathy, goiter, or bruit  Heart:  Regular rhythm  No murmur, gallop, or rub  Lungs:  Bilateral rhonchi  I could hear no wheezing or rales  Abd:  Soft with active bowel sounds  No mass, tenderness, or organomegaly  Extremities:  No clubbing, cyanosis, or edema  No calf tenderness  Neuro:  Sleepy but easily arousable  No focal sign  Skin:  Warm and dry      LABS:   CBC:   Lab Results   Component Value Date    WBC 4 34 08/20/2020    HGB 11 1 (L) 08/20/2020    HCT 32 3 (L) 08/20/2020     (H) 08/20/2020    PLT 45 (LL) 08/20/2020    MCH 34 9 (H) 08/20/2020    MCHC 34 4 08/20/2020    RDW 18 6 (H) 08/20/2020    MPV 9 8 08/20/2020    NRBC 0 08/20/2020   , CMP:   Lab Results   Component Value Date    SODIUM 130 (L) 08/20/2020    K 4 2 08/20/2020    CL 89 (L) 08/20/2020    CO2 38 (H) 08/20/2020    BUN 9 08/20/2020    CREATININE 0 65 08/20/2020    CALCIUM 8 1 (L) 08/20/2020    EGFR 107 08/20/2020           Assessment/Plan:  1  Pneumonia with an element of postobstructive pneumonia  2  Acute respiratory failure with hypoxia and hypercarbia  3  Metastatic colon cancer  4  Pancytopenia secondary to chemotherapy, white count now normal  5  Hyponatremia    The patient is currently stable on BiPAP  The situation was discussed with Pulmonary, palliative Care, and the patient's family  The patient would like to transition to hospice at home    In order to accomplish this, he will have to be on a maximum of 10 L of oxygen per minute and he will need to be off BiPAP  In light of this, IV antibiotics and bronchodilators will be continued  Intravenous steroids will be instituted  His oxygen requirements and need for BiPAP will be evaluated on an ongoing basis      VTE Pharmacologic Prophylaxis: Reason for no pharmacologic prophylaxis Thrombocytopenia  VTE Mechanical Prophylaxis: sequential compression device

## 2020-08-21 PROCEDURE — 99232 SBSQ HOSP IP/OBS MODERATE 35: CPT | Performed by: INTERNAL MEDICINE

## 2020-08-21 PROCEDURE — 94003 VENT MGMT INPAT SUBQ DAY: CPT

## 2020-08-21 PROCEDURE — 94640 AIRWAY INHALATION TREATMENT: CPT

## 2020-08-21 RX ORDER — HYDROMORPHONE HCL/PF 1 MG/ML
0.2 SYRINGE (ML) INJECTION EVERY 4 HOURS
Status: DISCONTINUED | OUTPATIENT
Start: 2020-08-21 | End: 2020-08-22 | Stop reason: HOSPADM

## 2020-08-21 RX ORDER — LORAZEPAM 0.5 MG/1
0.5 TABLET ORAL EVERY 4 HOURS PRN
Status: DISCONTINUED | OUTPATIENT
Start: 2020-08-21 | End: 2020-08-22 | Stop reason: HOSPADM

## 2020-08-21 RX ORDER — METHYLPREDNISOLONE SODIUM SUCCINATE 40 MG/ML
40 INJECTION, POWDER, LYOPHILIZED, FOR SOLUTION INTRAMUSCULAR; INTRAVENOUS EVERY 12 HOURS SCHEDULED
Status: DISCONTINUED | OUTPATIENT
Start: 2020-08-21 | End: 2020-08-22 | Stop reason: HOSPADM

## 2020-08-21 RX ORDER — MORPHINE SULFATE 100 MG/5ML
10 SOLUTION ORAL
Status: DISCONTINUED | OUTPATIENT
Start: 2020-08-21 | End: 2020-08-21

## 2020-08-21 RX ADMIN — CEFEPIME HYDROCHLORIDE 2000 MG: 2 INJECTION, POWDER, FOR SOLUTION INTRAVENOUS at 13:00

## 2020-08-21 RX ADMIN — LORAZEPAM 1 MG: 2 INJECTION INTRAMUSCULAR; INTRAVENOUS at 18:21

## 2020-08-21 RX ADMIN — ALBUTEROL SULFATE 2.5 MG: 2.5 SOLUTION RESPIRATORY (INHALATION) at 08:20

## 2020-08-21 RX ADMIN — AZITHROMYCIN 500 MG: 250 TABLET, FILM COATED ORAL at 13:10

## 2020-08-21 RX ADMIN — HYDROMORPHONE HYDROCHLORIDE 0.2 MG: 1 INJECTION, SOLUTION INTRAMUSCULAR; INTRAVENOUS; SUBCUTANEOUS at 07:58

## 2020-08-21 RX ADMIN — LORAZEPAM 1 MG: 2 INJECTION INTRAMUSCULAR; INTRAVENOUS at 07:58

## 2020-08-21 RX ADMIN — ALBUTEROL SULFATE 2.5 MG: 2.5 SOLUTION RESPIRATORY (INHALATION) at 12:53

## 2020-08-21 RX ADMIN — METHYLPREDNISOLONE SODIUM SUCCINATE 40 MG: 40 INJECTION, POWDER, FOR SOLUTION INTRAMUSCULAR; INTRAVENOUS at 07:57

## 2020-08-21 RX ADMIN — METHYLPREDNISOLONE SODIUM SUCCINATE 40 MG: 40 INJECTION, POWDER, FOR SOLUTION INTRAMUSCULAR; INTRAVENOUS at 23:30

## 2020-08-21 RX ADMIN — HYDROMORPHONE HYDROCHLORIDE 0.2 MG: 1 INJECTION, SOLUTION INTRAMUSCULAR; INTRAVENOUS; SUBCUTANEOUS at 15:29

## 2020-08-21 RX ADMIN — DOCUSATE SODIUM 100 MG: 100 CAPSULE, LIQUID FILLED ORAL at 18:25

## 2020-08-21 RX ADMIN — HYDROMORPHONE HYDROCHLORIDE 0.2 MG: 1 INJECTION, SOLUTION INTRAMUSCULAR; INTRAVENOUS; SUBCUTANEOUS at 20:04

## 2020-08-21 RX ADMIN — ALBUTEROL SULFATE 2.5 MG: 2.5 SOLUTION RESPIRATORY (INHALATION) at 19:35

## 2020-08-21 RX ADMIN — ACETAMINOPHEN 650 MG: 325 TABLET ORAL at 07:58

## 2020-08-21 RX ADMIN — HYDROMORPHONE HYDROCHLORIDE 0.2 MG: 1 INJECTION, SOLUTION INTRAMUSCULAR; INTRAVENOUS; SUBCUTANEOUS at 13:03

## 2020-08-21 RX ADMIN — HYDROMORPHONE HYDROCHLORIDE 0.2 MG: 1 INJECTION, SOLUTION INTRAMUSCULAR; INTRAVENOUS; SUBCUTANEOUS at 01:37

## 2020-08-21 RX ADMIN — HYDROMORPHONE HYDROCHLORIDE 0.2 MG: 1 INJECTION, SOLUTION INTRAMUSCULAR; INTRAVENOUS; SUBCUTANEOUS at 10:46

## 2020-08-21 RX ADMIN — HYDROMORPHONE HYDROCHLORIDE 0.2 MG: 1 INJECTION, SOLUTION INTRAMUSCULAR; INTRAVENOUS; SUBCUTANEOUS at 23:29

## 2020-08-21 RX ADMIN — LORAZEPAM 1 MG: 2 INJECTION INTRAMUSCULAR; INTRAVENOUS at 13:04

## 2020-08-21 NOTE — PHYSICAL THERAPY NOTE
Physical Therapy Cancellation Note- pt is planning to go home on hospice, no further skilled PT needs  D/c PT  Please re-consult of plan changes and pt able to tolerate activity    Catarino Urias, PT

## 2020-08-21 NOTE — PLAN OF CARE
Problem: RESPIRATORY - ADULT  Goal: Achieves optimal ventilation and oxygenation  Description: INTERVENTIONS:  - Assess for changes in respiratory status  - Assess for changes in mentation and behavior  - Position to facilitate oxygenation and minimize respiratory effort  - Oxygen administered by appropriate delivery if ordered  - Encourage broncho-pulmonary hygiene including cough, deep breathe, Incentive Spirometry  - Assess the need for suctioning and aspirate as needed  - Assess and instruct to report SOB or any respiratory difficulty  - Respiratory Therapy support as indicated  Outcome: Not Progressing

## 2020-08-21 NOTE — PROGRESS NOTES
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)    Dyspnea on exertion    Port-A-Cath in place    Esophageal dysphagia    Pneumonia due to infectious organism    Acute respiratory failure with hypoxia (HCC)    Sepsis (HCC)    Neutropenic fever (HCC)    Thrombocytopenia (HCC)    Acute kidney injury ([address] )    Drug rash    Multifocal pneumonia    Chemotherapy induced nausea and vomiting    Other headache syndrome    Chemotherapy induced neutropenia (HCC)    Brain metastases (HCC)    Atrial fibrillation (HCC)    Elevated d-dimer    Pancytopenia (HCC)    Elevated brain natriuretic peptide (BNP) level    Hyponatremia    Respiratory acidosis         Plan:  1  Symptom management -    - Continue IV Dilaudid q2H PRN for dyspnea   - Continue other comfort medications as ordered   - Continue medical optimization     2  Goals - Trending towards hospice, hopeful to medically optimize to be off of BiPAP and return home  Will plan to given another few days to see if he can improve  3  Psychosocial support- supportive listening provided  All questions/concerns addressed to their satisfaction  D/W Dr Miranda Ramirez and Som Cameron, RN- hospice liaison     Interval history:       Patient doing okay this AM, states the dilaudid works well  His spouse is at bedside, She remains optimistic that he will be able to be liberated from the BiPAP and return home with hospice       MEDICATIONS / ALLERGIES:     all current active meds have been reviewed and current meds:   Current Facility-Administered Medications   Medication Dose Route Frequency    acetaminophen (TYLENOL) tablet 650 mg  650 mg Oral Q6H PRN    albuterol inhalation solution 2 5 mg  2 5 mg Nebulization Q4H PRN    albuterol inhalation solution 2 5 mg  2 5 mg Nebulization TID    ascorbic acid (VITAMIN C) tablet 1,000 mg  1,000 mg Oral Daily    azithromycin (ZITHROMAX) tablet 500 mg  500 mg Oral Q24H    benzonatate (TESSALON PERLES) capsule 100 mg  100 mg Oral TID PRN    cefepime (MAXIPIME) 2,000 mg in dextrose 5 % 50 mL IVPB  2,000 mg Intravenous Q12H    docusate sodium (COLACE) capsule 100 mg  100 mg Oral BID    fluconazole (DIFLUCAN) tablet 100 mg  100 mg Oral Daily    guaiFENesin (MUCINEX) 12 hr tablet 600 mg  600 mg Oral Q12H [facility]    HYDROmorphone (DILAUDID) injection 0 2 mg  0 2 mg Intravenous Q2H PRN    LORazepam (ATIVAN) injection 1 mg  1 mg Intravenous Q4H PRN    LORazepam (ATIVAN) tablet 1 mg  1 mg Oral Q4H PRN    melatonin tablet 6 mg  6 mg Oral HS PRN    methylPREDNISolone sodium succinate (Solu-MEDROL) injection 40 mg  40 mg Intravenous Q8H [facility]    oxyCODONE (ROXICODONE) IR tablet 5 mg  5 mg Oral Q4H PRN       Allergies   Allergen Reactions    Aspirin Other (See Comments)     Nose bleed    Sulfa Antibiotics Other (See Comments)     Dizzy        OBJECTIVE:    Physical Exam  Physical Exam  Vitals signs and nursing note reviewed  Constitutional:       General: He is not in acute distress  Appearance: He is ill-appearing  HENT:      Head: Normocephalic and atraumatic  Right Ear: External ear normal       Left Ear: External ear normal       Nose: Nose normal    Eyes:      General: No scleral icterus  Right eye: No discharge  Left eye: No discharge  Cardiovascular:      Rate and Rhythm: Normal rate and regular rhythm  Pulmonary:      Effort: Pulmonary effort is normal  No respiratory distress  Comments: On BiPAP  Abdominal:      General: Bowel sounds are normal  There is no distension  Palpations: Abdomen is soft  Skin:     General: Skin is warm and dry  Coloration: Skin is pale  Neurological:      Mental Status: He is alert and oriented to person, place, and time  Psychiatric:         Mood and Affect: Mood normal          Behavior: Behavior normal          Thought Content: Thought content normal          Judgment: Judgment normal          Lab Results:   I have personally reviewed pertinent labs  , CBC:   No results found for: WBC, HGB, HCT, MCV, PLT, ADJUSTEDWBC, MCH, MCHC, RDW, MPV, NRBC, CMP:   No results found for: SODIUM, K, CL, CO2, ANIONGAP, BUN, CREATININE, GLUCOSE, CALCIUM, AST, ALT, ALKPHOS, PROT, BILITOT, EGFR, PT/PTT:No results found for: PT, PTT  Imaging Studies: reviewed  EKG, Pathology, and Other Studies: reviewed    Counseling / Coordination of Care    Total floor / unit time spent today 25+ minutes  Greater than 50% of total time was spent with the patient and / or family counseling and / or coordination of care  A description of the counseling / coordination of care: chart review, discussion with care team, goals of care, symptom review and medication adjustments

## 2020-08-21 NOTE — CASE MANAGEMENT
Patient ordered Michael Gomez to prepare for d/c home hospice   Placed referral with Kisha Leija for DME to be delivered to hospital

## 2020-08-21 NOTE — PROGRESS NOTES
Progress Note - Pulmonary   Ross Shetty 63 y o  male MRN: [id number]  Unit/Bed#: E2 -01 Encounter: [id number]  Addendum 8/21/20: unable to provide oxymizer while hospitalized due to goal of home hospice  Will attempt transition to midflow nasal cannula with goal for home hospice tomorrow at which time an oxymizer will be provided in home    Assessment/Plan:    1  Acute hypoxic and hypercapnic respiratory failure  1  Currently on BiPAP for comfort and hypoxia, order placed for oxymizer pendant in hopes to be able to transition home on hospice  If able to tolerate pendant could be discharged on 10 L concentrator  2  Would recommend use of ativan or dilaudid prior to transition to NC due to anxiety  2  Abnormal chest CT Multifocal postobstructive pneumonia  1  Suspected secondary to postobstructive pneumonia  2  Negative: sputum culture, strep, legionella, COVID-19  3  PCT and WBC normalized-as patient is planned to go home on hospice would discontinue ABX-today will complete 5 day course  4  May continue solumedrol 40mg BID today if he remains hospitalized although likely will not provide significant symptom relief and may increase anxiety-would recommend discontinuation  3  Stage IV colon cancer with metastasis to the lung  1  Oncology following  2  Palliative care/hospice following  3  He has a very poor prognosis and therefore plan is likely to proceed with hospice with goal to be discharged home    *no further inpatient pulmonary recommendations, please call with questions or concerns    Subjective:     Binh Nurse was seen in bed upon entering the room  He was mildly lethargic during exam although recently received ativan  Main concern is discharge home, possible suffering due to SOB and family stress  Objective:         Vitals: Blood pressure 114/79, pulse 80, temperature 97 5 °F (36 4 °C), temperature source Temporal, resp   rate 18, height 6' 2" (1 88 m), weight 103 kg (227 lb 1 2 oz), SpO2 97 % , BiPAP, Body mass index is 29 15 kg/m²  Intake/Output Summary (Last 24 hours) at 8/21/2020 1005  Last data filed at 8/20/2020 1300  Gross per 24 hour   Intake    Output 200 ml   Net -200 ml         Physical Exam  Gen: lethargic, alert, oriented x 3, no acute distress  HEENT: Mucous membranes moist, no oral lesions, no thrush, BiPAP in place  NECK: no accessory muscle use, JVP not elevated  Cardiac: Regular, single S1, single S2, no murmurs, no rubs, no gallops  Lungs: scattered rhonchi  Abdomen: normoactive bowel sounds, soft nontender, nondistended, no rebound or rigidity, no guarding  Extremities: no cyanosis, no clubbing, no edema    Labs: I have personally reviewed pertinent lab results  ,     none      Imaging and other studies: none      Tutu Storey

## 2020-08-21 NOTE — CASE MANAGEMENT
Received message from [facility] about [employer]  They reported that since patient has been accepted into hospice, they can no longer be the DME provider  Called [facility] and spoke with the coordinator Khai Him  She stated that an Oxymizer can be provided, but can not be delivered to the hospital and needs to go to the patient's home  Reached out to Karel Grove NP and Swathi Grant hospice liaison to update on order  Scott Hoskins will speak to his nurse about decreasing patient's o2 needs to 10L and then assess on how to proceed with d/c planning  Will continue to follow

## 2020-08-21 NOTE — PROGRESS NOTES
08/21/20 1300   Clinical Encounter Type   Visited With Patient and family together;Health care provider   Crisis Visit Patient actively dying   Referral From    Referral To [address] Needs Prayer   Patient Spiritual Encounters   Coping 5   Spiritual Encounter Notes  explored emotional/spiritual needs & resources  Pt expressed peace and ultimate hope that God is with him through all things  Prayer was offered  Pt and family expressed gratitude  Family Spiritual Encounters   Family Coping Accepting;Open/discussion; Sadness

## 2020-08-21 NOTE — PROGRESS NOTES
08/21/20 [address] Involvement Patient not active with [facility]   Spiritual Beliefs/Perceptions   Concept of God Accepting   Belief/Cultural Issues with Death Pt expressed peace that God is ready to greet him upon death  Coping Responses   Patient Coping Accepting;Open/discussion   Family Coping Accepting;Open/discussion; Sadness   Patient Spiritual Assessment   Feelings of Well Being Pt expressed being at peace w/turning things over to God   Plan of Care   Comments  referred  to pt & family  Family/friends were present & pt agreed that they should leave momentarily as he spoke w/   listened empathically, facilitated storytelling through reflective cuing  Explored spiritual /emotional needs & resources  Pt expressed peace and ultimate hope  Family also expressed similar peacefulness & progression toward meaning and acceptance of expected outcomes       Assessment Completed by: Unit visit

## 2020-08-21 NOTE — PROGRESS NOTES
Progress Note - Gail Allan 63 y o  male MRN: [id number]    Unit/Bed#: E2 -01 Encounter: [id number]      Subjective: The patient has been pretty comfortable on BiPAP  He was recently transitioned to oxygen by nasal cannula at 10 liters/minute  He is feeling somewhat less comfortable on this  He denies any pain  He has no nausea or vomiting  Physical Exam:   Temp:  [97 5 °F (36 4 °C)] 97 5 °F (36 4 °C)  HR:  [80] 80  Resp:  [18] 18  BP: (114)/(79) 114/79    Gen:  Well-developed, in no distress  Neck:  Supple  No lymphadenopathy, goiter, or bruit  Heart:  Regular rhythm  No murmur, gallop, or rub  Lungs:  Bilateral rhonchi  I could hear no wheezes or rales  Abd:  Soft with active bowel sounds  No mass, tenderness, or organomegaly  Extremities:  No clubbing, cyanosis, or edema  No calf tenderness  Neuro:  Alert and oriented  No focal sign  Skin:  Warm and dry      LABS:  No new labs        Assessment/Plan:  1  Pneumonia, at least partially postobstructive  2  Acute respiratory failure with hypoxia and hypercarbia  3  Metastatic colon cancer  4  Pancytopenia secondary to chemotherapy  5  Hyponatremia    I discussed the situation in detail with Pulmonary, case management, hospice, and the patient's family  The patient's wishes to go home with hospice  Efforts are being made to arrange for an Oxymizer to allow for adequate oxygenation without BiPAP  The patient will be maintained on BiPAP until tomorrow because he is feeling more comfortable with it  He will be started on scheduled hydromorphone for the relief of air hunger  A CADD pump is being considered        VTE Pharmacologic Prophylaxis: Reason for no pharmacologic prophylaxis Comfort care  VTE Mechanical Prophylaxis: reason for no mechanical VTE prophylaxis Comfort care

## 2020-08-22 VITALS
OXYGEN SATURATION: 92 % | BODY MASS INDEX: 29.14 KG/M2 | TEMPERATURE: 97.7 F | HEART RATE: 91 BPM | HEIGHT: 74 IN | WEIGHT: 227.07 LBS | DIASTOLIC BLOOD PRESSURE: 77 MMHG | RESPIRATION RATE: 16 BRPM | SYSTOLIC BLOOD PRESSURE: 109 MMHG

## 2020-08-22 LAB
BACTERIA BLD CULT: NORMAL
BACTERIA BLD CULT: NORMAL

## 2020-08-22 PROCEDURE — 94660 CPAP INITIATION&MGMT: CPT

## 2020-08-22 PROCEDURE — 94760 N-INVAS EAR/PLS OXIMETRY 1: CPT

## 2020-08-22 PROCEDURE — 99239 HOSP IP/OBS DSCHRG MGMT >30: CPT | Performed by: INTERNAL MEDICINE

## 2020-08-22 PROCEDURE — 94640 AIRWAY INHALATION TREATMENT: CPT

## 2020-08-22 RX ORDER — BENZONATATE 100 MG/1
100 CAPSULE ORAL 3 TIMES DAILY PRN
Qty: 20 CAPSULE | Refills: 0 | Status: SHIPPED | OUTPATIENT
Start: 2020-08-22

## 2020-08-22 RX ORDER — PREDNISONE 10 MG/1
TABLET ORAL
Qty: 40 TABLET | Refills: 0 | Status: SHIPPED | OUTPATIENT
Start: 2020-08-22

## 2020-08-22 RX ORDER — LORAZEPAM 0.5 MG/1
0.5 TABLET ORAL EVERY 4 HOURS PRN
Qty: 30 TABLET | Refills: 0 | Status: SHIPPED | OUTPATIENT
Start: 2020-08-22 | End: 2020-09-01

## 2020-08-22 RX ADMIN — LORAZEPAM 0.5 MG: 0.5 TABLET ORAL at 15:08

## 2020-08-22 RX ADMIN — FLUCONAZOLE 100 MG: 100 TABLET ORAL at 11:21

## 2020-08-22 RX ADMIN — METHYLPREDNISOLONE SODIUM SUCCINATE 40 MG: 40 INJECTION, POWDER, FOR SOLUTION INTRAMUSCULAR; INTRAVENOUS at 12:34

## 2020-08-22 RX ADMIN — HYDROMORPHONE HYDROCHLORIDE 0.2 MG: 1 INJECTION, SOLUTION INTRAMUSCULAR; INTRAVENOUS; SUBCUTANEOUS at 03:58

## 2020-08-22 RX ADMIN — GUAIFENESIN 600 MG: 600 TABLET, EXTENDED RELEASE ORAL at 11:20

## 2020-08-22 RX ADMIN — HYDROMORPHONE HYDROCHLORIDE 0.2 MG: 1 INJECTION, SOLUTION INTRAMUSCULAR; INTRAVENOUS; SUBCUTANEOUS at 15:27

## 2020-08-22 RX ADMIN — ALBUTEROL SULFATE 2.5 MG: 2.5 SOLUTION RESPIRATORY (INHALATION) at 13:08

## 2020-08-22 RX ADMIN — HYDROMORPHONE HYDROCHLORIDE 0.2 MG: 1 INJECTION, SOLUTION INTRAMUSCULAR; INTRAVENOUS; SUBCUTANEOUS at 11:20

## 2020-08-22 RX ADMIN — ALBUTEROL SULFATE 2.5 MG: 2.5 SOLUTION RESPIRATORY (INHALATION) at 07:12

## 2020-08-22 RX ADMIN — DOCUSATE SODIUM 100 MG: 100 CAPSULE, LIQUID FILLED ORAL at 11:21

## 2020-08-22 RX ADMIN — HYDROMORPHONE HYDROCHLORIDE 0.2 MG: 1 INJECTION, SOLUTION INTRAMUSCULAR; INTRAVENOUS; SUBCUTANEOUS at 07:39

## 2020-08-22 RX ADMIN — ACETAMINOPHEN 650 MG: 325 TABLET ORAL at 12:34

## 2020-08-22 NOTE — PLAN OF CARE
Problem: RESPIRATORY - ADULT  Goal: Achieves optimal ventilation and oxygenation  Description: INTERVENTIONS:  - Assess for changes in respiratory status  - Assess for changes in mentation and behavior  - Position to facilitate oxygenation and minimize respiratory effort  - Oxygen administered by appropriate delivery if ordered  - Encourage broncho-pulmonary hygiene including cough, deep breathe, Incentive Spirometry  - Assess the need for suctioning and aspirate as needed  - Assess and instruct to report SOB or any respiratory difficulty  - Respiratory Therapy support as indicated  Outcome: Adequate for Discharge     Problem: HEMATOLOGIC - ADULT  Goal: Maintains hematologic stability  Description: INTERVENTIONS  - Assess for signs and symptoms of bleeding or hemorrhage  - Monitor labs  - Administer supportive blood products/factors as ordered and appropriate  Outcome: Adequate for Discharge     Problem: Knowledge Deficit  Goal: Patient/family/caregiver demonstrates understanding of disease process, treatment plan, medications, and discharge instructions  Description: Complete learning assessment and assess knowledge base    Interventions:  - Provide teaching at level of understanding  - Provide teaching via preferred learning methods  Outcome: Adequate for Discharge     Problem: Prexisting or High Potential for Compromised Skin Integrity  Goal: Skin integrity is maintained or improved  Description: INTERVENTIONS:  - Identify patients at risk for skin breakdown  - Assess and monitor skin integrity  - Assess and monitor nutrition and hydration status  - Monitor labs   - Assess for incontinence   - Turn and reposition patient  - Assist with mobility/ambulation  - Relieve pressure over bony prominences  - Avoid friction and shearing  - Provide appropriate hygiene as needed including keeping skin clean and dry  - Evaluate need for skin moisturizer/barrier cream  - Collaborate with interdisciplinary team   - Patient/family teaching  - Consider wound care consult   Outcome: Adequate for Discharge

## 2020-08-22 NOTE — DISCHARGE SUMMARY
Discharge Summary - Melissa Dumont 1960, [id number]        Admission Date: 8/17/2020  Discharge Date:       Discharge Diagnosis:   1  Postobstructive pneumonia  2  Acute respiratory failure with hypoxia and hypercarbia  3  Metastatic colon cancer  4  Pancytopenia secondary to chemotherapy  5  Hyponatremia    Consulting Physicians:  1  Dr Jason Rosa, Oncology  2  Dr Kristopher Vega, palliative care  3  Dr Valente Mercedes, pulmonary Medicine    Procedures Performed:   None    HPI:  The patient is a 28-year-old man who have presented to the emergency room with increasing shortness of breath  He is usually on 2 L of oxygen but needed to increase his oxygen to 5 L to maintain an adequate O2 sat  The patient has a history of metastatic colon cancer and has had progressive disease despite a variety of chemotherapeutic regimens, most recently Xeloda  He was found to have pneumonia, thought to be postobstructive from lung Mets  He was admitted for further care  Hospital Course: The patient was admitted to the hospital on appropriate cultures were obtained  He was started on broad-spectrum antibiotics  He was treated with supplemental oxygen  Despite this, he continued to have shortness of breath  An arterial blood gas was obtained that showed a pH of 7 2, pCO2 87, PO2 117  The patient was evaluated by Pulmonary and started on BiPAP  With BiPAP he did feel considerably better  He continued to require large amounts of supplemental oxygen  Because of this, he was started on intravenous methylprednisolone  After discussion with his family and with Dr Jason Rosa of Oncology, the patient decided that further disease specific therapy would be futile  He was evaluated by Dr Kristopher Vega of palliative care  After careful consideration he decided to pursue hospice care  Initially, it was thought that the patient would not be able to have BiPAP at home    A steroid trial was undertaken to try to reduce his oxygen requirements enough for him to be comfortable on 10 L of oxygen  He did think that this was at least slightly helpful  Fortunately however arrangements were made for him to have BiPAP at home  On the day of discharge the patient was comfortable  He denied any pain  He was short of breath at times but got relief with BiPAP  Vital signs were stable  Lungs reveal bilateral rhonchi  Cardiac exam revealed regular rhythm  The abdomen was soft and nontender  Disposition:  The patient was discharged home with hospice on August 22nd  Diet and activity will be as tolerated  He will use BiPAP as needed  He will have a CADD pump with hydromorphone for pain relief and air hunger  He will be under the care of the physicians of the hospice  Discharge instructions/Information to patient and family:   See after visit summary for information provided to patient and family  Provisions for Follow-Up Care:  See after visit summary for information related to follow-up care and any pertinent home health orders  Planned Readmission: No    Discharge Statement   I spent 60 minutes discharging the patient  This time was spent on the day of discharge  I had direct contact with the patient on the day of discharge  Discharge Medications:  See after visit summary for reconciled discharge medications provided to patient and family

## 2020-08-22 NOTE — NURSING NOTE
Discharge instructions reviewed with patient and family, all questions answered  Discharged to home via [address] in stable condition with all belongings

## 2020-08-25 ENCOUNTER — HOSPITAL ENCOUNTER (OUTPATIENT)
Dept: INFUSION CENTER | Facility: CLINIC | Age: 60
Discharge: HOME | End: 2020-08-25

## 2020-08-26 DIAGNOSIS — C18.9 COLON CANCER METASTASIZED TO LUNG (HCC): Chronic | ICD-10-CM

## 2020-08-26 DIAGNOSIS — C78.00 COLON CANCER METASTASIZED TO LUNG (HCC): Chronic | ICD-10-CM

## 2020-08-26 RX ORDER — ALBUTEROL SULFATE 90 UG/1
AEROSOL, METERED RESPIRATORY (INHALATION)
Qty: 18 G | Refills: 0 | OUTPATIENT
Start: 2020-08-26

## 2020-08-28 ENCOUNTER — TELEPHONE (OUTPATIENT)
Dept: NEUROSURGERY | Facility: CLINIC | Age: 60
End: 2020-08-28

## 2020-08-28 NOTE — TELEPHONE ENCOUNTER
Called patient's spouse, JustoTracey Wolff, to reschedule [phone] appointment on 9/30/20 to 9/23/20  Vicente Wolff reports the patient was just recently placed on hospice  She requested for MRI and [facility] appointment to be canceled  She said she will call if anything changes

## 2020-09-12 PROBLEM — J43.8 OTHER EMPHYSEMA (HCC): Status: ACTIVE | Noted: 2020-09-12

## 2020-09-12 PROBLEM — L27.0 DRUG RASH: Status: RESOLVED | Noted: 2019-11-06 | Resolved: 2020-09-12

## 2020-09-12 PROBLEM — E87.1 HYPONATREMIA: Status: RESOLVED | Noted: 2020-08-19 | Resolved: 2020-09-12

## 2020-09-12 PROBLEM — T45.1X5A CHEMOTHERAPY INDUCED NAUSEA AND VOMITING: Status: RESOLVED | Noted: 2020-03-03 | Resolved: 2020-09-12

## 2020-09-12 PROBLEM — R09.02 HYPOXEMIA: Status: ACTIVE | Noted: 2020-09-12

## 2020-09-12 PROBLEM — N17.9 ACUTE KIDNEY INJURY (HCC): Status: RESOLVED | Noted: 2019-08-28 | Resolved: 2020-09-12

## 2020-09-12 PROBLEM — R06.00 DYSPNEA ON EXERTION: Status: ACTIVE | Noted: 2020-09-12

## 2020-09-12 PROBLEM — R11.2 CHEMOTHERAPY INDUCED NAUSEA AND VOMITING: Status: RESOLVED | Noted: 2020-03-03 | Resolved: 2020-09-12

## 2020-09-12 PROBLEM — C78.00 METASTASIS TO LUNG (HCC): Status: ACTIVE | Noted: 2020-09-12

## 2020-09-12 PROBLEM — E87.2 RESPIRATORY ACIDOSIS: Status: RESOLVED | Noted: 2020-08-19 | Resolved: 2020-09-12

## 2020-09-12 PROBLEM — G40.109 SEIZURE DISORDER, FOCAL MOTOR (HCC): Status: ACTIVE | Noted: 2020-09-12

## 2020-09-12 PROBLEM — R79.89 ELEVATED BRAIN NATRIURETIC PEPTIDE (BNP) LEVEL: Status: RESOLVED | Noted: 2020-08-17 | Resolved: 2020-09-12

## 2020-09-12 PROBLEM — R06.01 ORTHOPNEA: Status: ACTIVE | Noted: 2020-09-12

## 2021-11-03 NOTE — PROGRESS NOTES
Attempted to notify patient's wife of reaction  No answer  Message left to call infusion center 
ER charge nurse notified that patient will be arriving via ambulance 
Patient reported that he was cold and wanted to sit near the window  Upon assessment he had a temp of 100 4 and began to shake  Patient then reported chest tightness  The hypersensitivity protocol was used  Patient was still experiencing SOB and elevated HR  Albuterol nebulizer was administered  Chapo Camara RN working with Dr Ania Jensen was made aware  Dr Ania Jensen requested that the patient be seen in the ER via ambulance 
Post-Care Instructions: I reviewed with the patient in detail post-care instructions. Patient is to wear sunprotection, and avoid picking at any of the treated lesions. Pt may apply Vaseline to crusted or scabbing areas.\\nRTC prn non-resolution
Render Post-Care Instructions In Note?: yes
Include Z78.9 (Other Specified Conditions Influencing Health Status) As An Associated Diagnosis?: No
Medical Necessity Information: It is in your best interest to select a reason for this procedure from the list below. All of these items fulfill various CMS LCD requirements except the new and changing color options.
Anesthesia Volume In Cc: 0.8
Medical Necessity Clause: This procedure was medically necessary because the lesions that were treated were: inflamed, irritated, tender
Consent: The patient's consent was obtained including but not limited to risks of crusting, scabbing, blistering, scarring, darker or lighter pigmentary change, recurrence, incomplete removal and infection.
Treatment Number (Will Not Render If 0): 0
Detail Level: Detailed

## 2023-04-04 NOTE — TELEPHONE ENCOUNTER
Patient called in stated that Dr Zabrina Black was to put in an order for pulmonologist,  A good call back number [phone] Number Of Hemigard Strips Per Side: 1
